# Patient Record
Sex: MALE | Race: BLACK OR AFRICAN AMERICAN | NOT HISPANIC OR LATINO | URBAN - METROPOLITAN AREA
[De-identification: names, ages, dates, MRNs, and addresses within clinical notes are randomized per-mention and may not be internally consistent; named-entity substitution may affect disease eponyms.]

---

## 2021-03-21 ENCOUNTER — INPATIENT (INPATIENT)
Facility: HOSPITAL | Age: 61
LOS: 3 days | Discharge: HOME | End: 2021-03-25
Attending: INTERNAL MEDICINE | Admitting: INTERNAL MEDICINE
Payer: COMMERCIAL

## 2021-03-21 VITALS
HEIGHT: 64 IN | OXYGEN SATURATION: 99 % | SYSTOLIC BLOOD PRESSURE: 117 MMHG | TEMPERATURE: 99 F | HEART RATE: 102 BPM | WEIGHT: 139.99 LBS | RESPIRATION RATE: 18 BRPM | DIASTOLIC BLOOD PRESSURE: 77 MMHG

## 2021-03-21 DIAGNOSIS — N17.9 ACUTE KIDNEY FAILURE, UNSPECIFIED: ICD-10-CM

## 2021-03-21 DIAGNOSIS — I10 ESSENTIAL (PRIMARY) HYPERTENSION: ICD-10-CM

## 2021-03-21 DIAGNOSIS — L03.012 CELLULITIS OF LEFT FINGER: ICD-10-CM

## 2021-03-21 DIAGNOSIS — N50.89 OTHER SPECIFIED DISORDERS OF THE MALE GENITAL ORGANS: ICD-10-CM

## 2021-03-21 DIAGNOSIS — E11.9 TYPE 2 DIABETES MELLITUS WITHOUT COMPLICATIONS: ICD-10-CM

## 2021-03-21 LAB
ALBUMIN SERPL ELPH-MCNC: 3.1 G/DL — LOW (ref 3.3–5)
ALP SERPL-CCNC: 69 U/L — SIGNIFICANT CHANGE UP (ref 40–120)
ALT FLD-CCNC: 26 U/L — SIGNIFICANT CHANGE UP (ref 12–78)
ANION GAP SERPL CALC-SCNC: 8 MMOL/L — SIGNIFICANT CHANGE UP (ref 5–17)
APPEARANCE UR: CLEAR — SIGNIFICANT CHANGE UP
APTT BLD: 30.8 SEC — SIGNIFICANT CHANGE UP (ref 27.5–35.5)
AST SERPL-CCNC: 16 U/L — SIGNIFICANT CHANGE UP (ref 15–37)
BACTERIA # UR AUTO: ABNORMAL
BASOPHILS # BLD AUTO: 0.05 K/UL — SIGNIFICANT CHANGE UP (ref 0–0.2)
BASOPHILS NFR BLD AUTO: 0.3 % — SIGNIFICANT CHANGE UP (ref 0–2)
BILIRUB SERPL-MCNC: 0.5 MG/DL — SIGNIFICANT CHANGE UP (ref 0.2–1.2)
BILIRUB UR-MCNC: NEGATIVE — SIGNIFICANT CHANGE UP
BLD GP AB SCN SERPL QL: SIGNIFICANT CHANGE UP
BUN SERPL-MCNC: 25 MG/DL — HIGH (ref 7–23)
CALCIUM SERPL-MCNC: 8.8 MG/DL — SIGNIFICANT CHANGE UP (ref 8.5–10.1)
CHLORIDE SERPL-SCNC: 99 MMOL/L — SIGNIFICANT CHANGE UP (ref 96–108)
CO2 SERPL-SCNC: 28 MMOL/L — SIGNIFICANT CHANGE UP (ref 22–31)
COLOR SPEC: YELLOW — SIGNIFICANT CHANGE UP
COVID-19 SPIKE DOMAIN AB INTERP: NEGATIVE — SIGNIFICANT CHANGE UP
COVID-19 SPIKE DOMAIN ANTIBODY RESULT: 0.4 U/ML — SIGNIFICANT CHANGE UP
CREAT SERPL-MCNC: 1.59 MG/DL — HIGH (ref 0.5–1.3)
CRP SERPL-MCNC: 53 MG/L — HIGH
DIFF PNL FLD: ABNORMAL
EOSINOPHIL # BLD AUTO: 0.03 K/UL — SIGNIFICANT CHANGE UP (ref 0–0.5)
EOSINOPHIL NFR BLD AUTO: 0.2 % — SIGNIFICANT CHANGE UP (ref 0–6)
EPI CELLS # UR: SIGNIFICANT CHANGE UP
FLUAV AG NPH QL: SIGNIFICANT CHANGE UP
FLUBV AG NPH QL: SIGNIFICANT CHANGE UP
GLUCOSE BLDC GLUCOMTR-MCNC: 241 MG/DL — HIGH (ref 70–99)
GLUCOSE BLDC GLUCOMTR-MCNC: 355 MG/DL — HIGH (ref 70–99)
GLUCOSE BLDC GLUCOMTR-MCNC: 454 MG/DL — CRITICAL HIGH (ref 70–99)
GLUCOSE BLDC GLUCOMTR-MCNC: 472 MG/DL — CRITICAL HIGH (ref 70–99)
GLUCOSE SERPL-MCNC: 396 MG/DL — HIGH (ref 70–99)
GLUCOSE UR QL: 1000 MG/DL
HCT VFR BLD CALC: 36.4 % — LOW (ref 39–50)
HGB BLD-MCNC: 12.3 G/DL — LOW (ref 13–17)
IMM GRANULOCYTES NFR BLD AUTO: 0.5 % — SIGNIFICANT CHANGE UP (ref 0–1.5)
INR BLD: 1.15 RATIO — SIGNIFICANT CHANGE UP (ref 0.88–1.16)
KETONES UR-MCNC: NEGATIVE — SIGNIFICANT CHANGE UP
LACTATE SERPL-SCNC: 1.3 MMOL/L — SIGNIFICANT CHANGE UP (ref 0.7–2)
LEUKOCYTE ESTERASE UR-ACNC: NEGATIVE — SIGNIFICANT CHANGE UP
LIDOCAIN IGE QN: 132 U/L — SIGNIFICANT CHANGE UP (ref 73–393)
LYMPHOCYTES # BLD AUTO: 17.1 % — SIGNIFICANT CHANGE UP (ref 13–44)
LYMPHOCYTES # BLD AUTO: 2.75 K/UL — SIGNIFICANT CHANGE UP (ref 1–3.3)
MCHC RBC-ENTMCNC: 32.1 PG — SIGNIFICANT CHANGE UP (ref 27–34)
MCHC RBC-ENTMCNC: 33.8 GM/DL — SIGNIFICANT CHANGE UP (ref 32–36)
MCV RBC AUTO: 95 FL — SIGNIFICANT CHANGE UP (ref 80–100)
MONOCYTES # BLD AUTO: 1.38 K/UL — HIGH (ref 0–0.9)
MONOCYTES NFR BLD AUTO: 8.6 % — SIGNIFICANT CHANGE UP (ref 2–14)
NEUTROPHILS # BLD AUTO: 11.81 K/UL — HIGH (ref 1.8–7.4)
NEUTROPHILS NFR BLD AUTO: 73.3 % — SIGNIFICANT CHANGE UP (ref 43–77)
NITRITE UR-MCNC: NEGATIVE — SIGNIFICANT CHANGE UP
NRBC # BLD: 0 /100 WBCS — SIGNIFICANT CHANGE UP (ref 0–0)
PH UR: 5 — SIGNIFICANT CHANGE UP (ref 5–8)
PLATELET # BLD AUTO: 211 K/UL — SIGNIFICANT CHANGE UP (ref 150–400)
POTASSIUM SERPL-MCNC: 4.5 MMOL/L — SIGNIFICANT CHANGE UP (ref 3.5–5.3)
POTASSIUM SERPL-SCNC: 4.5 MMOL/L — SIGNIFICANT CHANGE UP (ref 3.5–5.3)
PROT SERPL-MCNC: 7.6 GM/DL — SIGNIFICANT CHANGE UP (ref 6–8.3)
PROT UR-MCNC: 100 MG/DL
PROTHROM AB SERPL-ACNC: 13.2 SEC — SIGNIFICANT CHANGE UP (ref 10.6–13.6)
RBC # BLD: 3.83 M/UL — LOW (ref 4.2–5.8)
RBC # FLD: 11.7 % — SIGNIFICANT CHANGE UP (ref 10.3–14.5)
RBC CASTS # UR COMP ASSIST: ABNORMAL /HPF (ref 0–4)
SARS-COV-2 IGG+IGM SERPL QL IA: 0.4 U/ML — SIGNIFICANT CHANGE UP
SARS-COV-2 IGG+IGM SERPL QL IA: NEGATIVE — SIGNIFICANT CHANGE UP
SARS-COV-2 RNA SPEC QL NAA+PROBE: SIGNIFICANT CHANGE UP
SODIUM SERPL-SCNC: 135 MMOL/L — SIGNIFICANT CHANGE UP (ref 135–145)
SP GR SPEC: 1 — LOW (ref 1.01–1.02)
TROPONIN I SERPL-MCNC: <.015 NG/ML — SIGNIFICANT CHANGE UP (ref 0.01–0.04)
UROBILINOGEN FLD QL: NEGATIVE MG/DL — SIGNIFICANT CHANGE UP
WBC # BLD: 16.1 K/UL — HIGH (ref 3.8–10.5)
WBC # FLD AUTO: 16.1 K/UL — HIGH (ref 3.8–10.5)

## 2021-03-21 PROCEDURE — 74177 CT ABD & PELVIS W/CONTRAST: CPT | Mod: 26,MA

## 2021-03-21 PROCEDURE — 10061 I&D ABSCESS COMP/MULTIPLE: CPT

## 2021-03-21 PROCEDURE — 93010 ELECTROCARDIOGRAM REPORT: CPT

## 2021-03-21 PROCEDURE — 76870 US EXAM SCROTUM: CPT | Mod: 26

## 2021-03-21 PROCEDURE — 71045 X-RAY EXAM CHEST 1 VIEW: CPT | Mod: 26

## 2021-03-21 PROCEDURE — 76857 US EXAM PELVIC LIMITED: CPT | Mod: 26

## 2021-03-21 PROCEDURE — 99285 EMERGENCY DEPT VISIT HI MDM: CPT | Mod: 25

## 2021-03-21 PROCEDURE — 99222 1ST HOSP IP/OBS MODERATE 55: CPT

## 2021-03-21 RX ORDER — ONDANSETRON 8 MG/1
4 TABLET, FILM COATED ORAL ONCE
Refills: 0 | Status: COMPLETED | OUTPATIENT
Start: 2021-03-21 | End: 2021-03-21

## 2021-03-21 RX ORDER — DEXTROSE 50 % IN WATER 50 %
25 SYRINGE (ML) INTRAVENOUS ONCE
Refills: 0 | Status: DISCONTINUED | OUTPATIENT
Start: 2021-03-21 | End: 2021-03-25

## 2021-03-21 RX ORDER — SODIUM CHLORIDE 9 MG/ML
1000 INJECTION, SOLUTION INTRAVENOUS
Refills: 0 | Status: DISCONTINUED | OUTPATIENT
Start: 2021-03-21 | End: 2021-03-25

## 2021-03-21 RX ORDER — HEPARIN SODIUM 5000 [USP'U]/ML
5000 INJECTION INTRAVENOUS; SUBCUTANEOUS EVERY 12 HOURS
Refills: 0 | Status: DISCONTINUED | OUTPATIENT
Start: 2021-03-21 | End: 2021-03-25

## 2021-03-21 RX ORDER — VANCOMYCIN HCL 1 G
750 VIAL (EA) INTRAVENOUS EVERY 12 HOURS
Refills: 0 | Status: DISCONTINUED | OUTPATIENT
Start: 2021-03-21 | End: 2021-03-22

## 2021-03-21 RX ORDER — DEXTROSE 50 % IN WATER 50 %
12.5 SYRINGE (ML) INTRAVENOUS ONCE
Refills: 0 | Status: DISCONTINUED | OUTPATIENT
Start: 2021-03-21 | End: 2021-03-25

## 2021-03-21 RX ORDER — VANCOMYCIN HCL 1 G
1000 VIAL (EA) INTRAVENOUS ONCE
Refills: 0 | Status: COMPLETED | OUTPATIENT
Start: 2021-03-21 | End: 2021-03-21

## 2021-03-21 RX ORDER — PIPERACILLIN AND TAZOBACTAM 4; .5 G/20ML; G/20ML
3.38 INJECTION, POWDER, LYOPHILIZED, FOR SOLUTION INTRAVENOUS ONCE
Refills: 0 | Status: COMPLETED | OUTPATIENT
Start: 2021-03-21 | End: 2021-03-21

## 2021-03-21 RX ORDER — INSULIN LISPRO 100/ML
VIAL (ML) SUBCUTANEOUS
Refills: 0 | Status: DISCONTINUED | OUTPATIENT
Start: 2021-03-21 | End: 2021-03-25

## 2021-03-21 RX ORDER — MAGNESIUM HYDROXIDE 400 MG/1
30 TABLET, CHEWABLE ORAL DAILY
Refills: 0 | Status: DISCONTINUED | OUTPATIENT
Start: 2021-03-21 | End: 2021-03-25

## 2021-03-21 RX ORDER — INSULIN LISPRO 100/ML
VIAL (ML) SUBCUTANEOUS AT BEDTIME
Refills: 0 | Status: DISCONTINUED | OUTPATIENT
Start: 2021-03-21 | End: 2021-03-25

## 2021-03-21 RX ORDER — INSULIN GLARGINE 100 [IU]/ML
15 INJECTION, SOLUTION SUBCUTANEOUS AT BEDTIME
Refills: 0 | Status: DISCONTINUED | OUTPATIENT
Start: 2021-03-21 | End: 2021-03-22

## 2021-03-21 RX ORDER — DEXTROSE 50 % IN WATER 50 %
15 SYRINGE (ML) INTRAVENOUS ONCE
Refills: 0 | Status: DISCONTINUED | OUTPATIENT
Start: 2021-03-21 | End: 2021-03-25

## 2021-03-21 RX ORDER — PIPERACILLIN AND TAZOBACTAM 4; .5 G/20ML; G/20ML
3.38 INJECTION, POWDER, LYOPHILIZED, FOR SOLUTION INTRAVENOUS EVERY 8 HOURS
Refills: 0 | Status: DISCONTINUED | OUTPATIENT
Start: 2021-03-21 | End: 2021-03-22

## 2021-03-21 RX ORDER — SODIUM CHLORIDE 9 MG/ML
1000 INJECTION INTRAMUSCULAR; INTRAVENOUS; SUBCUTANEOUS
Refills: 0 | Status: DISCONTINUED | OUTPATIENT
Start: 2021-03-21 | End: 2021-03-25

## 2021-03-21 RX ORDER — SODIUM CHLORIDE 9 MG/ML
1000 INJECTION INTRAMUSCULAR; INTRAVENOUS; SUBCUTANEOUS ONCE
Refills: 0 | Status: COMPLETED | OUTPATIENT
Start: 2021-03-21 | End: 2021-03-21

## 2021-03-21 RX ORDER — LOSARTAN POTASSIUM 100 MG/1
50 TABLET, FILM COATED ORAL DAILY
Refills: 0 | Status: DISCONTINUED | OUTPATIENT
Start: 2021-03-21 | End: 2021-03-24

## 2021-03-21 RX ORDER — INFLUENZA VIRUS VACCINE 15; 15; 15; 15 UG/.5ML; UG/.5ML; UG/.5ML; UG/.5ML
0.5 SUSPENSION INTRAMUSCULAR ONCE
Refills: 0 | Status: DISCONTINUED | OUTPATIENT
Start: 2021-03-21 | End: 2021-03-24

## 2021-03-21 RX ORDER — SENNA PLUS 8.6 MG/1
2 TABLET ORAL AT BEDTIME
Refills: 0 | Status: DISCONTINUED | OUTPATIENT
Start: 2021-03-21 | End: 2021-03-25

## 2021-03-21 RX ORDER — IBUPROFEN 200 MG
600 TABLET ORAL THREE TIMES A DAY
Refills: 0 | Status: DISCONTINUED | OUTPATIENT
Start: 2021-03-21 | End: 2021-03-25

## 2021-03-21 RX ORDER — VANCOMYCIN HCL 1 G
1000 VIAL (EA) INTRAVENOUS EVERY 12 HOURS
Refills: 0 | Status: DISCONTINUED | OUTPATIENT
Start: 2021-03-21 | End: 2021-03-21

## 2021-03-21 RX ORDER — TAMSULOSIN HYDROCHLORIDE 0.4 MG/1
0.4 CAPSULE ORAL AT BEDTIME
Refills: 0 | Status: DISCONTINUED | OUTPATIENT
Start: 2021-03-21 | End: 2021-03-25

## 2021-03-21 RX ORDER — GLUCAGON INJECTION, SOLUTION 0.5 MG/.1ML
1 INJECTION, SOLUTION SUBCUTANEOUS ONCE
Refills: 0 | Status: DISCONTINUED | OUTPATIENT
Start: 2021-03-21 | End: 2021-03-25

## 2021-03-21 RX ADMIN — Medication 1 APPLICATION(S): at 18:29

## 2021-03-21 RX ADMIN — SODIUM CHLORIDE 1000 MILLILITER(S): 9 INJECTION INTRAMUSCULAR; INTRAVENOUS; SUBCUTANEOUS at 09:49

## 2021-03-21 RX ADMIN — Medication 250 MILLIGRAM(S): at 18:36

## 2021-03-21 RX ADMIN — Medication 600 MILLIGRAM(S): at 18:40

## 2021-03-21 RX ADMIN — INSULIN GLARGINE 15 UNIT(S): 100 INJECTION, SOLUTION SUBCUTANEOUS at 22:06

## 2021-03-21 RX ADMIN — Medication 600 MILLIGRAM(S): at 14:15

## 2021-03-21 RX ADMIN — PIPERACILLIN AND TAZOBACTAM 200 GRAM(S): 4; .5 INJECTION, POWDER, LYOPHILIZED, FOR SOLUTION INTRAVENOUS at 07:52

## 2021-03-21 RX ADMIN — PIPERACILLIN AND TAZOBACTAM 25 GRAM(S): 4; .5 INJECTION, POWDER, LYOPHILIZED, FOR SOLUTION INTRAVENOUS at 22:05

## 2021-03-21 RX ADMIN — HEPARIN SODIUM 5000 UNIT(S): 5000 INJECTION INTRAVENOUS; SUBCUTANEOUS at 18:41

## 2021-03-21 RX ADMIN — Medication 250 MILLIGRAM(S): at 08:50

## 2021-03-21 RX ADMIN — Medication 10: at 14:28

## 2021-03-21 RX ADMIN — TAMSULOSIN HYDROCHLORIDE 0.4 MILLIGRAM(S): 0.4 CAPSULE ORAL at 22:06

## 2021-03-21 RX ADMIN — SENNA PLUS 2 TABLET(S): 8.6 TABLET ORAL at 22:06

## 2021-03-21 RX ADMIN — PIPERACILLIN AND TAZOBACTAM 25 GRAM(S): 4; .5 INJECTION, POWDER, LYOPHILIZED, FOR SOLUTION INTRAVENOUS at 14:19

## 2021-03-21 RX ADMIN — SODIUM CHLORIDE 1000 MILLILITER(S): 9 INJECTION INTRAMUSCULAR; INTRAVENOUS; SUBCUTANEOUS at 07:53

## 2021-03-21 RX ADMIN — LOSARTAN POTASSIUM 50 MILLIGRAM(S): 100 TABLET, FILM COATED ORAL at 18:28

## 2021-03-21 RX ADMIN — Medication 8: at 22:05

## 2021-03-21 RX ADMIN — ONDANSETRON 4 MILLIGRAM(S): 8 TABLET, FILM COATED ORAL at 07:52

## 2021-03-21 NOTE — CONSULT NOTE ADULT - ATTENDING COMMENTS
Patient w no rigors, sweats or chills but left sided inguinal and scrotal discomfort. No change in voiding patterns. No prior GH, stones, UTI or retention.  AVSS  Tm in ER 99.7   exam - clean shallow, 1.5 cm ulceration near left external ring; tender. No drainage.   No erythema, crepitus or fluctuance  UA w no bacteria  Scrotal US normal  CT with no hydro or stones and mild perinephric stranding.    According to patient, had prior iv drug use over 25-30 yrs ago. Had hep C, recently treated with [place] for 3 mo and now gone.    Plan: suggest ID consult and STD eval to r/o syphillis, Chancroid, lymphovenereum etc.  No  surgical intervention necessary  f/u cx

## 2021-03-21 NOTE — ED PROVIDER NOTE - NS ED ROS FT
Constitutional: no fevers or chills  Cardiac: no palpitations, chest pain  Lungs: no shortness of breath, wheezes  Abd: +abd pain, nausea, vomiting, diarrhea  Genitourinary: no dysuria, increased urinary frequency, hematuria  Neurology: no sensorimotor deficits, no dizziness, no headache, no visual changes  Skin: no rashes  All other ROS negative except as per HPI

## 2021-03-21 NOTE — H&P ADULT - HISTORY OF PRESENT ILLNESS
60 yo male with PMH HTN, IDDM presents to ED for evaluation of LLQ pain and paronychia over left 4th digit.. patient also has ulceration of the left scrotum for 2 weeks.    Abd pain began about 2 days ago, associated with nausea and NBNB vomiting. Denies

## 2021-03-21 NOTE — H&P ADULT - NSICDXPASTMEDICALHX_GEN_ALL_CORE_FT
PAST MEDICAL HISTORY:  DM (diabetes mellitus) On Insulin    HTN (hypertension) medicxal managenent

## 2021-03-21 NOTE — ED ADULT NURSE NOTE - ED STAT RN HANDOFF DETAILS
report rec from Maryellen ERAZO. pt in stable condition. safety maintained. will continue to monitor.

## 2021-03-21 NOTE — ED PROVIDER NOTE - OBJECTIVE STATEMENT
62 yo male with PMH HTN, DM presents to ED for evaluation of LLQ pain and paronychia over left 4th digit.   Abd pain began about 2 days ago, associated with nausea and NBNB vomiting. Denies 62 yo male with PMH HTN, DM presents to ED for evaluation of LLQ pain and paronychia over left 4th digit.   Abd pain began about 2 days ago, associated with nausea and NBNB vomiting. Denies fevers, chill, chest pain, shortness of breath. Reports rash on genital area for few weeks, worsened over past few days, +discharge and pain.

## 2021-03-21 NOTE — ED ADULT TRIAGE NOTE - CHIEF COMPLAINT QUOTE
from shelter C/O LUQ pain and 2 episodes of vomiting x 2 days. Additionally C/O Paronychia to L 4th finger yesterday. denies fever, chills, chest pain.

## 2021-03-21 NOTE — ED PROVIDER NOTE - CLINICAL SUMMARY MEDICAL DECISION MAKING FREE TEXT BOX
60 yo male with abd pain, scrotal ulcer, left hand paronychia - labs, imaging, medication, consult uro, i&D paronychia, adm

## 2021-03-21 NOTE — CONSULT NOTE ADULT - SUBJECTIVE AND OBJECTIVE BOX
Patient is a 61y old  Male who presents with a chief complaint of scrotal infection (21 Mar 2021 11:44)    HPI:   62 yo male with PMH HTN, IDDM presents to ED for evaluation of LLQ pain and paronychia over left 4th digit.. patient also has ulceration of the left scrotum for 2 weeks.    Abd pain began about 2 days ago, associated with nausea and NBNB vomiting. Denies     (21 Mar 2021 11:44)    Pt seen in ED, reports 2 day h/o abdominal pain described as constant and radiating across lower abdomen with no associated nausea, vomiting or changes in bowel habits. Pt reports normal voiding, no h/o urinary issues.  Denies fever, chills, cough, dyspnea, chest pain.  Per ED, drainage from scrotum was noted concerning for ulceration/abscess.    PAST MEDICAL & SURGICAL HISTORY:  HTN (hypertension)  medicxal managenent    DM (diabetes mellitus)  On Insulin    No significant past surgical history      Review of Systems:  as above    MEDICATIONS  (STANDING):  dextrose 40% Gel 15 Gram(s) Oral once  dextrose 5%. 1000 milliLiter(s) (50 mL/Hr) IV Continuous <Continuous>  dextrose 5%. 1000 milliLiter(s) (100 mL/Hr) IV Continuous <Continuous>  dextrose 50% Injectable 25 Gram(s) IV Push once  dextrose 50% Injectable 12.5 Gram(s) IV Push once  dextrose 50% Injectable 25 Gram(s) IV Push once  glucagon  Injectable 1 milliGRAM(s) IntraMuscular once  heparin   Injectable 5000 Unit(s) SubCutaneous every 12 hours  insulin glargine Injectable (LANTUS) 15 Unit(s) SubCutaneous at bedtime  insulin lispro (ADMELOG) corrective regimen sliding scale   SubCutaneous three times a day before meals  insulin lispro (ADMELOG) corrective regimen sliding scale   SubCutaneous at bedtime  losartan 50 milliGRAM(s) Oral daily  piperacillin/tazobactam IVPB.. 3.375 Gram(s) IV Intermittent every 8 hours  senna 2 Tablet(s) Oral at bedtime  sodium chloride 0.9%. 1000 milliLiter(s) (80 mL/Hr) IV Continuous <Continuous>  vancomycin  IVPB 750 milliGRAM(s) IV Intermittent every 12 hours    MEDICATIONS  (PRN):  ibuprofen  Tablet. 600 milliGRAM(s) Oral three times a day PRN Temp greater or equal to 38C (100.4F), Moderate Pain (4 - 6)  magnesium hydroxide Suspension 30 milliLiter(s) Oral daily PRN Constipation      Allergies  No Known Allergies      SOCIAL HISTORY lives in shelter          FAMILY HISTORY: none      Vital Signs Last 24 Hrs  T(C): 37.6 (21 Mar 2021 12:18), Max: 37.6 (21 Mar 2021 12:18)  T(F): 99.6 (21 Mar 2021 12:18), Max: 99.6 (21 Mar 2021 12:18)  HR: 92 (21 Mar 2021 12:18) (88 - 102)  BP: 134/85 (21 Mar 2021 12:18) (117/77 - 154/84)  BP(mean): --  RR: 18 (21 Mar 2021 12:18) (18 - 18)  SpO2: 96% (21 Mar 2021 12:18) (96% - 99%)    Physical Exam:  General:  Appears stated age, well-groomed, well-nourished, no distress  Eyes: EOMI, conjunctiva clear  HENT:  WNL, no JVD  Chest:  clear breath sounds  Cardiovascular:  Regular rate & rhythm  Abdomen:  softly distended, active tympanic bowel sounds. No guarding or rebound tenderness.  Extremities:  no pedal edema or calf tenderness noted  Skin:  No rash  Musculoskeletal:  normal strength  Neuro/Psych:  Alert, oriented to time, place and person   : circumcised phallus, adequate meatus. 2mm skin lesion noted left posterior scrotum, no active drainage, induration, tenderness or swelling of scrotum. No evidence of abscess or cellulitis. Testes descended and normal b/l.   Right inguinoscrotal hernia, reducible with large palpable defect. Nontender to palpation.    LABS:                        12.3   16.10 )-----------( 211      ( 21 Mar 2021 07:50 )             36.4     03-21    135  |  99  |  25<H>  ----------------------------<  396<H>  4.5   |  28  |  1.59<H>    Ca    8.8      21 Mar 2021 07:50    TPro  7.6  /  Alb  3.1<L>  /  TBili  0.5  /  DBili  x   /  AST  16  /  ALT  26  /  AlkPhos  69  03-    PT/INR - ( 21 Mar 2021 07:52 )   PT: 13.2 sec;   INR: 1.15 ratio         PTT - ( 21 Mar 2021 07:52 )  PTT:30.8 sec  Urinalysis Basic - ( 21 Mar 2021 13:09 )    Color: Yellow / Appearance: Clear / S.005 / pH: x  Gluc: x / Ketone: Negative  / Bili: Negative / Urobili: Negative mg/dL   Blood: x / Protein: 100 mg/dL / Nitrite: Negative   Leuk Esterase: Negative / RBC: x / WBC x   Sq Epi: x / Non Sq Epi: x / Bacteria: x        RADIOLOGY & ADDITIONAL STUDIES:  < from: CT Abdomen and Pelvis w/ IV Cont (21 @ 09:12) >  IMPRESSION:  Colonic diverticulosis without CT evidence ofacute diverticulitis.  No abscess identified.    < end of copied text >      < from: US Testicles (21 @ 09:03) >  No testicular torsion or fluid collection.    < end of copied text >  < from: US Pelvis Limited or Follow Up (21 @ 09:03) >  Post void residual urine volume 89 ml.    < end of copied text >    Impression/Plan: 62yo male with h/o DM and HTN seen with right inguinoscrotal hernia, reducible  leukocytosis   UA with moderate blood and glucose 1000  no evidence of scrotal infection  as discussed with Dr Julian, begin flomax, repeat PVR  medical management  will follow Patient is a 61y old  Male who presents with a chief complaint of scrotal infection (21 Mar 2021 11:44)    HPI:   60 yo male with PMH HTN, IDDM presents to ED for evaluation of LLQ pain and paronychia over left 4th digit.. patient also has ulceration of the left scrotum for 2 weeks.    Abd pain began about 2 days ago, associated with nausea and NBNB vomiting. Denies     (21 Mar 2021 11:44)    Pt seen in ED, reports 2 day h/o abdominal pain described as constant and radiating across lower abdomen with no associated nausea, vomiting or changes in bowel habits. Pt reports normal voiding, no h/o urinary issues.  Denies fever, chills, cough, dyspnea, chest pain.  Per ED, drainage from scrotum was noted concerning for ulceration/abscess.    PAST MEDICAL & SURGICAL HISTORY:  HTN (hypertension)  medicxal managenent    DM (diabetes mellitus)  On Insulin    No significant past surgical history      Review of Systems:  as above    MEDICATIONS  (STANDING):  dextrose 40% Gel 15 Gram(s) Oral once  dextrose 5%. 1000 milliLiter(s) (50 mL/Hr) IV Continuous <Continuous>  dextrose 5%. 1000 milliLiter(s) (100 mL/Hr) IV Continuous <Continuous>  dextrose 50% Injectable 25 Gram(s) IV Push once  dextrose 50% Injectable 12.5 Gram(s) IV Push once  dextrose 50% Injectable 25 Gram(s) IV Push once  glucagon  Injectable 1 milliGRAM(s) IntraMuscular once  heparin   Injectable 5000 Unit(s) SubCutaneous every 12 hours  insulin glargine Injectable (LANTUS) 15 Unit(s) SubCutaneous at bedtime  insulin lispro (ADMELOG) corrective regimen sliding scale   SubCutaneous three times a day before meals  insulin lispro (ADMELOG) corrective regimen sliding scale   SubCutaneous at bedtime  losartan 50 milliGRAM(s) Oral daily  piperacillin/tazobactam IVPB.. 3.375 Gram(s) IV Intermittent every 8 hours  senna 2 Tablet(s) Oral at bedtime  sodium chloride 0.9%. 1000 milliLiter(s) (80 mL/Hr) IV Continuous <Continuous>  vancomycin  IVPB 750 milliGRAM(s) IV Intermittent every 12 hours    MEDICATIONS  (PRN):  ibuprofen  Tablet. 600 milliGRAM(s) Oral three times a day PRN Temp greater or equal to 38C (100.4F), Moderate Pain (4 - 6)  magnesium hydroxide Suspension 30 milliLiter(s) Oral daily PRN Constipation      Allergies  No Known Allergies      SOCIAL HISTORY lives in shelter          FAMILY HISTORY: none      Vital Signs Last 24 Hrs  T(C): 37.6 (21 Mar 2021 12:18), Max: 37.6 (21 Mar 2021 12:18)  T(F): 99.6 (21 Mar 2021 12:18), Max: 99.6 (21 Mar 2021 12:18)  HR: 92 (21 Mar 2021 12:18) (88 - 102)  BP: 134/85 (21 Mar 2021 12:18) (117/77 - 154/84)  BP(mean): --  RR: 18 (21 Mar 2021 12:18) (18 - 18)  SpO2: 96% (21 Mar 2021 12:18) (96% - 99%)    Physical Exam:  General:  Appears stated age, well-groomed, well-nourished, no distress  Eyes: EOMI, conjunctiva clear  HENT:  WNL, no JVD  Chest:  clear breath sounds  Cardiovascular:  Regular rate & rhythm  Abdomen:  softly distended, active tympanic bowel sounds. No guarding or rebound tenderness.  Extremities:  no pedal edema or calf tenderness noted  Skin:  No rash  Musculoskeletal:  normal strength  Neuro/Psych:  Alert, oriented to time, place and person   : circumcised phallus, adequate meatus. 2mm skin lesion noted left posterior scrotum, no active drainage, induration, tenderness or swelling of scrotum. No evidence of abscess or cellulitis. Testes descended and normal b/l.   Right inguinoscrotal hernia, reducible with large palpable defect. Nontender to palpation.    LABS:                        12.3   16.10 )-----------( 211      ( 21 Mar 2021 07:50 )             36.4     03-21    135  |  99  |  25<H>  ----------------------------<  396<H>  4.5   |  28  |  1.59<H>    Ca    8.8      21 Mar 2021 07:50    TPro  7.6  /  Alb  3.1<L>  /  TBili  0.5  /  DBili  x   /  AST  16  /  ALT  26  /  AlkPhos  69  03-    PT/INR - ( 21 Mar 2021 07:52 )   PT: 13.2 sec;   INR: 1.15 ratio         PTT - ( 21 Mar 2021 07:52 )  PTT:30.8 sec  Urinalysis Basic - ( 21 Mar 2021 13:09 )    Color: Yellow / Appearance: Clear / S.005 / pH: x  Gluc: x / Ketone: Negative  / Bili: Negative / Urobili: Negative mg/dL   Blood: x / Protein: 100 mg/dL / Nitrite: Negative   Leuk Esterase: Negative / RBC: x / WBC x   Sq Epi: x / Non Sq Epi: x / Bacteria: x        RADIOLOGY & ADDITIONAL STUDIES:  < from: CT Abdomen and Pelvis w/ IV Cont (21 @ 09:12) >  IMPRESSION:  Colonic diverticulosis without CT evidence ofacute diverticulitis.  No abscess identified.    < end of copied text >      < from: US Testicles (21 @ 09:03) >  No testicular torsion or fluid collection.    < end of copied text >  < from: US Pelvis Limited or Follow Up (21 @ 09:03) >  Post void residual urine volume 89 ml.    < end of copied text >    Impression/Plan: 62yo male with h/o DM and HTN seen with right inguinoscrotal hernia, reducible  leukocytosis, s/p zosyn/vanco in ED  UA with moderate blood and glucose 1000  no evidence of scrotal infection  as discussed with Dr Julian, begin flomax, repeat PVR. Follow up blood and urine culture results  medical management  will follow Patient is a 61y old  Male who presents with a chief complaint of scrotal infection (21 Mar 2021 11:44)    HPI:   60 yo male with PMH HTN, IDDM presents to ED for evaluation of LLQ pain and paronychia over left 4th digit.. patient also has ulceration of the left scrotum for 2 weeks.    Abd pain began about 2 days ago, associated with nausea and NBNB vomiting. Denies     (21 Mar 2021 11:44)    Pt seen in ED, reports 2 day h/o abdominal pain described as constant and radiating across lower abdomen with no associated nausea, vomiting or changes in bowel habits. Pt reports normal voiding, no h/o urinary issues.  Denies fever, chills, cough, dyspnea, chest pain.  Per ED, drainage from scrotum was noted concerning for ulceration/abscess.  Patient states his left middle finger was drained in the ER.    PAST MEDICAL & SURGICAL HISTORY:  HTN (hypertension)  medicxal managenent    DM (diabetes mellitus)  On Insulin    No significant past surgical history      Review of Systems:  as above    MEDICATIONS  (STANDING):  dextrose 40% Gel 15 Gram(s) Oral once  dextrose 5%. 1000 milliLiter(s) (50 mL/Hr) IV Continuous <Continuous>  dextrose 5%. 1000 milliLiter(s) (100 mL/Hr) IV Continuous <Continuous>  dextrose 50% Injectable 25 Gram(s) IV Push once  dextrose 50% Injectable 12.5 Gram(s) IV Push once  dextrose 50% Injectable 25 Gram(s) IV Push once  glucagon  Injectable 1 milliGRAM(s) IntraMuscular once  heparin   Injectable 5000 Unit(s) SubCutaneous every 12 hours  insulin glargine Injectable (LANTUS) 15 Unit(s) SubCutaneous at bedtime  insulin lispro (ADMELOG) corrective regimen sliding scale   SubCutaneous three times a day before meals  insulin lispro (ADMELOG) corrective regimen sliding scale   SubCutaneous at bedtime  losartan 50 milliGRAM(s) Oral daily  piperacillin/tazobactam IVPB.. 3.375 Gram(s) IV Intermittent every 8 hours  senna 2 Tablet(s) Oral at bedtime  sodium chloride 0.9%. 1000 milliLiter(s) (80 mL/Hr) IV Continuous <Continuous>  vancomycin  IVPB 750 milliGRAM(s) IV Intermittent every 12 hours    MEDICATIONS  (PRN):  ibuprofen  Tablet. 600 milliGRAM(s) Oral three times a day PRN Temp greater or equal to 38C (100.4F), Moderate Pain (4 - 6)  magnesium hydroxide Suspension 30 milliLiter(s) Oral daily PRN Constipation      Allergies  No Known Allergies      SOCIAL HISTORY lives in shelter          FAMILY HISTORY: none      Vital Signs Last 24 Hrs  T(C): 37.6 (21 Mar 2021 12:18), Max: 37.6 (21 Mar 2021 12:18)  T(F): 99.6 (21 Mar 2021 12:18), Max: 99.6 (21 Mar 2021 12:18)  HR: 92 (21 Mar 2021 12:18) (88 - 102)  BP: 134/85 (21 Mar 2021 12:18) (117/77 - 154/84)  BP(mean): --  RR: 18 (21 Mar 2021 12:18) (18 - 18)  SpO2: 96% (21 Mar 2021 12:18) (96% - 99%)    Physical Exam:  General:  Appears stated age, well-groomed, well-nourished, no distress  Eyes: EOMI, conjunctiva clear  HENT:  WNL, no JVD  Chest:  clear breath sounds  Cardiovascular:  Regular rate & rhythm  Abdomen:  softly distended, active tympanic bowel sounds. No guarding or rebound tenderness.  Extremities:  no pedal edema or calf tenderness noted  Skin:  No rash  Musculoskeletal:  normal strength  Neuro/Psych:  Alert, oriented to time, place and person   : circumcised phallus, adequate meatus. Superficial ulcerating skin lesion noted left posterior scrotum, no active drainage, induration, tenderness or swelling of scrotum. No evidence of abscess or cellulitis. Testes descended and normal b/l.   Right inguinoscrotal hernia, reducible with large palpable defect. Nontender to palpation.    LABS:                        12.3   16.10 )-----------( 211      ( 21 Mar 2021 07:50 )             36.4     03-21    135  |  99  |  25<H>  ----------------------------<  396<H>  4.5   |  28  |  1.59<H>    Ca    8.8      21 Mar 2021 07:50    TPro  7.6  /  Alb  3.1<L>  /  TBili  0.5  /  DBili  x   /  AST  16  /  ALT  26  /  AlkPhos  69  03-21  PT/INR - ( 21 Mar 2021 07:52 )   PT: 13.2 sec;   INR: 1.15 ratio         PTT - ( 21 Mar 2021 07:52 )  PTT:30.8 sec  Urinalysis Basic - ( 21 Mar 2021 13:09 )    Color: Yellow / Appearance: Clear / S.005 / pH: x  Gluc: x / Ketone: Negative  / Bili: Negative / Urobili: Negative mg/dL   Blood: x / Protein: 100 mg/dL / Nitrite: Negative   Leuk Esterase: Negative / RBC: x / WBC x   Sq Epi: x / Non Sq Epi: x / Bacteria: x      RADIOLOGY & ADDITIONAL STUDIES:  < from: CT Abdomen and Pelvis w/ IV Cont (21 @ 09:12) >  IMPRESSION:  Colonic diverticulosis without CT evidence ofacute diverticulitis.  No abscess identified.    < end of copied text >      < from: US Testicles (21 @ 09:03) >  No testicular torsion or fluid collection.    < end of copied text >  < from: US Pelvis Limited or Follow Up (21 @ 09:03) >  Post void residual urine volume 89 ml.    < end of copied text >      Impression/Plan: 62yo male with h/o DM and HTN seen with superficial rash of groin, appears fungal and right inguinoscrotal hernia, reducible  leukocytosis, s/p zosyn/vanco in ED  UA with moderate blood and glucose 1000  no evidence of scrotal infection, no drainable collections. Superficial rash appears fungal, recommend topical clotrimazole  as discussed with Dr Julian, begin flomax, repeat PVR. Follow up blood and urine culture results  medical management  will follow

## 2021-03-21 NOTE — ED ADULT NURSE NOTE - OBJECTIVE STATEMENT
from shelter C/O LUQ pain and 2 episodes of vomiting x 2 days. pt also c. o fleft finger pain raidtiating up left arm. pt states pain 10/10 no meds prior to arrival. pmh dm, sugar within normal limits at home, pmh HTN. Additionally C/O Paronychia to L 4th finger yesterday.  puss fillled, no redness. pt denies fever, chills, chest pain diarrhea, constipation

## 2021-03-21 NOTE — H&P ADULT - NSHPLABSRESULTS_GEN_ALL_CORE
12.3                 135  | 28   | 25           16.10 >-----------< 211     ------------------------< 396                   36.4                 4.5  | 99   | 1.59                                         Ca 8.8   Mg x     Ph x      PT/INR - ( 21 Mar 2021 07:52 )   PT: 13.2 sec;   INR: 1.15 ratio         PTT - ( 21 Mar 2021 07:52 )  PTT:30.8 sec    < from: US Pelvis Limited or Follow Up (03.21.21 @ 09:03) >    EXAM:  US PELVIC LIMITED OR FOLLOW UP                            PROCEDURE DATE:  03/21/2021          INTERPRETATION:  CLINICAL INFORMATION: Left pelvic pain    COMPARISON: None available.    TECHNIQUE: Sonography of the bladder.    FINDINGS:    Bladder: Within normal limits.    Pre void volume:  165 ml.    Post void volume: 89 ml.    Reproductive organs: Prostate within normal limits.          IMPRESSION:    Post void residual urine volume 89 ml.    < end of copied text >    < from: CT Abdomen and Pelvis w/ IV Cont (03.21.21 @ 09:12) >      EXAM:  CT ABDOMEN AND PELVIS IC                            PROCEDURE DATE:  03/21/2021          INTERPRETATION:  CLINICAL INFORMATION: Left pelvic pain    COMPARISON: None.    CONTRAST/COMPLICATIONS:  IV Contrast: Omnipaque 350  96 cc administered 4 cc discarded  Oral Contrast: NONE  Complications: None reported at time of study completion    PROCEDURE:  CT of the Abdomen and Pelvis was performed.  Sagittal and coronal reformats were performed.    FINDINGS:  LOWER CHEST: Basilar atelectasis.    LIVER: Within normal limits.  BILE DUCTS: Normal caliber.  GALLBLADDER: Within normal limits.  SPLEEN: Within normal limits.  PANCREAS: Within normal limits.  ADRENALS: 1 cm indeterminate left adrenal nodule.  KIDNEYS/URETERS: Nonspecific perinephric stranding and fluid. No hydronephrosis.    BLADDER: Within normal limits.  REPRODUCTIVE ORGANS: Prostate within normal limits.    BOWEL: No bowel obstruction. Appendix is normal. Colonic diverticulosis.  PERITONEUM: No ascites.  VESSELS: Within normal limits.  RETROPERITONEUM/LYMPH NODES: No lymphadenopathy.  ABDOMINAL WALL: Fat-containing umbilical hernia. Bilateral fat-containing inguinal hernias.  BONES: Mild degenerative changes.    IMPRESSION:  Colonic diverticulosis without CT evidence of acute diverticulitis.  No abscess identified.    < end of copied text >

## 2021-03-21 NOTE — H&P ADULT - NSHPPHYSICALEXAM_GEN_ALL_CORE
General: A/ox 3, No acute Distress  skin : Normal  Head. [place]err[place]. No lacerations  Neck: Supple, NO JVD  Cardiac: S1 S2, No M/R/G  Pulmonary: CTAP, Breathing unlabored, No Rhonchi/Rales/Wheezing  Abdomen: Soft, Non -tender, +BS x 4 quads  Genitalis. there is ulcertion ovr the left scotum. testilces are not swollen or tender.  the skin of the scrotum is not indurated.   Neuro: A/o x 3, No focal deficits. Normal Motor and sensory exam  Vascular: Normal distal pulses.  Extremities: . No rashes. No edema. there is paronychia of the left 4th fingrt- I&D done in the Ed.  Decubiti ; None

## 2021-03-21 NOTE — ED PROVIDER NOTE - PHYSICAL EXAMINATION
Gen: no acute distress, well appearing, awake, alert and oriented x 3  Cardiac: regular rate and rhythm, +S1S2  Pulm: Clear to auscultation bilaterally  Abd: soft, +ttp lower abd, nondistended, no guarding  Back: neg CVA ttp, nontender spine  : (Juice tech at bedside) external genitalia unremarkable, no penile lesions/ulcerations, no penile discharge, +scrotal ulceration noted with active drainage  Extremity: LUE: +swelling noted over left 4th finger, adjacent to nailbed, sensorimotor intact   Neuro: awake, alert, oriented x 3, sensorimotor intact

## 2021-03-22 LAB
A1C WITH ESTIMATED AVERAGE GLUCOSE RESULT: 11.8 % — HIGH (ref 4–5.6)
ALBUMIN SERPL ELPH-MCNC: 2.7 G/DL — LOW (ref 3.3–5)
ALP SERPL-CCNC: 57 U/L — SIGNIFICANT CHANGE UP (ref 40–120)
ALT FLD-CCNC: 23 U/L — SIGNIFICANT CHANGE UP (ref 12–78)
ANION GAP SERPL CALC-SCNC: 2 MMOL/L — LOW (ref 5–17)
AST SERPL-CCNC: 12 U/L — LOW (ref 15–37)
BILIRUB SERPL-MCNC: 0.3 MG/DL — SIGNIFICANT CHANGE UP (ref 0.2–1.2)
BUN SERPL-MCNC: 22 MG/DL — SIGNIFICANT CHANGE UP (ref 7–23)
CALCIUM SERPL-MCNC: 8 MG/DL — LOW (ref 8.5–10.1)
CHLORIDE SERPL-SCNC: 108 MMOL/L — SIGNIFICANT CHANGE UP (ref 96–108)
CO2 SERPL-SCNC: 30 MMOL/L — SIGNIFICANT CHANGE UP (ref 22–31)
CREAT SERPL-MCNC: 1.18 MG/DL — SIGNIFICANT CHANGE UP (ref 0.5–1.3)
CULTURE RESULTS: NO GROWTH — SIGNIFICANT CHANGE UP
ERYTHROCYTE [SEDIMENTATION RATE] IN BLOOD: 82 MM/HR — HIGH (ref 0–20)
ESTIMATED AVERAGE GLUCOSE: 292 MG/DL — HIGH (ref 68–114)
GLUCOSE BLDC GLUCOMTR-MCNC: 228 MG/DL — HIGH (ref 70–99)
GLUCOSE BLDC GLUCOMTR-MCNC: 255 MG/DL — HIGH (ref 70–99)
GLUCOSE BLDC GLUCOMTR-MCNC: 280 MG/DL — HIGH (ref 70–99)
GLUCOSE BLDC GLUCOMTR-MCNC: 286 MG/DL — HIGH (ref 70–99)
GLUCOSE SERPL-MCNC: 252 MG/DL — HIGH (ref 70–99)
HBV CORE IGM SER-ACNC: SIGNIFICANT CHANGE UP
HBV SURFACE AB SER-ACNC: SIGNIFICANT CHANGE UP
HBV SURFACE AG SER-ACNC: SIGNIFICANT CHANGE UP
HCT VFR BLD CALC: 33.5 % — LOW (ref 39–50)
HCV AB S/CO SERPL IA: 11.98 S/CO — HIGH (ref 0–0.99)
HCV AB SERPL-IMP: REACTIVE
HGB BLD-MCNC: 11.3 G/DL — LOW (ref 13–17)
HIV 1+2 AB+HIV1 P24 AG SERPL QL IA: SIGNIFICANT CHANGE UP
MCHC RBC-ENTMCNC: 31.7 PG — SIGNIFICANT CHANGE UP (ref 27–34)
MCHC RBC-ENTMCNC: 33.7 GM/DL — SIGNIFICANT CHANGE UP (ref 32–36)
MCV RBC AUTO: 93.8 FL — SIGNIFICANT CHANGE UP (ref 80–100)
NRBC # BLD: 0 /100 WBCS — SIGNIFICANT CHANGE UP (ref 0–0)
PLATELET # BLD AUTO: 183 K/UL — SIGNIFICANT CHANGE UP (ref 150–400)
POTASSIUM SERPL-MCNC: 3.7 MMOL/L — SIGNIFICANT CHANGE UP (ref 3.5–5.3)
POTASSIUM SERPL-SCNC: 3.7 MMOL/L — SIGNIFICANT CHANGE UP (ref 3.5–5.3)
PROCALCITONIN SERPL-MCNC: 0.08 NG/ML — SIGNIFICANT CHANGE UP (ref 0.02–0.1)
PROT SERPL-MCNC: 6.4 GM/DL — SIGNIFICANT CHANGE UP (ref 6–8.3)
RBC # BLD: 3.57 M/UL — LOW (ref 4.2–5.8)
RBC # FLD: 11.7 % — SIGNIFICANT CHANGE UP (ref 10.3–14.5)
SODIUM SERPL-SCNC: 140 MMOL/L — SIGNIFICANT CHANGE UP (ref 135–145)
SPECIMEN SOURCE: SIGNIFICANT CHANGE UP
WBC # BLD: 9.31 K/UL — SIGNIFICANT CHANGE UP (ref 3.8–10.5)
WBC # FLD AUTO: 9.31 K/UL — SIGNIFICANT CHANGE UP (ref 3.8–10.5)

## 2021-03-22 PROCEDURE — 99231 SBSQ HOSP IP/OBS SF/LOW 25: CPT

## 2021-03-22 RX ORDER — INSULIN GLARGINE 100 [IU]/ML
20 INJECTION, SOLUTION SUBCUTANEOUS AT BEDTIME
Refills: 0 | Status: DISCONTINUED | OUTPATIENT
Start: 2021-03-22 | End: 2021-03-25

## 2021-03-22 RX ORDER — CEFTRIAXONE 500 MG/1
1000 INJECTION, POWDER, FOR SOLUTION INTRAMUSCULAR; INTRAVENOUS EVERY 24 HOURS
Refills: 0 | Status: DISCONTINUED | OUTPATIENT
Start: 2021-03-22 | End: 2021-03-25

## 2021-03-22 RX ADMIN — Medication 6: at 07:44

## 2021-03-22 RX ADMIN — TAMSULOSIN HYDROCHLORIDE 0.4 MILLIGRAM(S): 0.4 CAPSULE ORAL at 21:17

## 2021-03-22 RX ADMIN — CEFTRIAXONE 100 MILLIGRAM(S): 500 INJECTION, POWDER, FOR SOLUTION INTRAMUSCULAR; INTRAVENOUS at 14:25

## 2021-03-22 RX ADMIN — PIPERACILLIN AND TAZOBACTAM 25 GRAM(S): 4; .5 INJECTION, POWDER, LYOPHILIZED, FOR SOLUTION INTRAVENOUS at 05:54

## 2021-03-22 RX ADMIN — SODIUM CHLORIDE 80 MILLILITER(S): 9 INJECTION INTRAMUSCULAR; INTRAVENOUS; SUBCUTANEOUS at 11:34

## 2021-03-22 RX ADMIN — HEPARIN SODIUM 5000 UNIT(S): 5000 INJECTION INTRAVENOUS; SUBCUTANEOUS at 05:54

## 2021-03-22 RX ADMIN — LOSARTAN POTASSIUM 50 MILLIGRAM(S): 100 TABLET, FILM COATED ORAL at 05:54

## 2021-03-22 RX ADMIN — HEPARIN SODIUM 5000 UNIT(S): 5000 INJECTION INTRAVENOUS; SUBCUTANEOUS at 17:17

## 2021-03-22 RX ADMIN — Medication 6: at 16:26

## 2021-03-22 RX ADMIN — Medication 2: at 22:11

## 2021-03-22 RX ADMIN — INSULIN GLARGINE 20 UNIT(S): 100 INJECTION, SOLUTION SUBCUTANEOUS at 22:10

## 2021-03-22 RX ADMIN — Medication 4: at 11:33

## 2021-03-22 RX ADMIN — Medication 1 APPLICATION(S): at 17:18

## 2021-03-22 RX ADMIN — Medication 1 APPLICATION(S): at 05:55

## 2021-03-22 RX ADMIN — Medication 250 MILLIGRAM(S): at 05:54

## 2021-03-22 NOTE — CONSULT NOTE ADULT - SUBJECTIVE AND OBJECTIVE BOX
60 yo man with PMH HTN, IDDM presents to ED for evaluation of LLQ pain and paronychia over left 4th digit. patient also has ulceration of the left scrotum for 2 weeks.    Abd pain began about 2 days ago, associated with nausea and NBNB vomiting. Denies     (21 Mar 2021 11:44)      PAST MEDICAL & SURGICAL HISTORY:  HTN (hypertension)  medicxal managenent    DM (diabetes mellitus)  On Insulin    No significant past surgical history        SOCHX:   tobacco,  -  alcohol    FMHX: FA/MO  - contributory       Recent Travel:    Immunizations:    Allergies    No Known Allergies    Intolerances        MEDICATIONS  (STANDING):  clotrimazole 1% Cream 1 Application(s) Topical two times a day  dextrose 40% Gel 15 Gram(s) Oral once  dextrose 5%. 1000 milliLiter(s) (50 mL/Hr) IV Continuous <Continuous>  dextrose 5%. 1000 milliLiter(s) (100 mL/Hr) IV Continuous <Continuous>  dextrose 50% Injectable 25 Gram(s) IV Push once  dextrose 50% Injectable 12.5 Gram(s) IV Push once  dextrose 50% Injectable 25 Gram(s) IV Push once  glucagon  Injectable 1 milliGRAM(s) IntraMuscular once  heparin   Injectable 5000 Unit(s) SubCutaneous every 12 hours  influenza   Vaccine 0.5 milliLiter(s) IntraMuscular once  insulin glargine Injectable (LANTUS) 15 Unit(s) SubCutaneous at bedtime  insulin lispro (ADMELOG) corrective regimen sliding scale   SubCutaneous three times a day before meals  insulin lispro (ADMELOG) corrective regimen sliding scale   SubCutaneous at bedtime  losartan 50 milliGRAM(s) Oral daily  piperacillin/tazobactam IVPB.. 3.375 Gram(s) IV Intermittent every 8 hours  senna 2 Tablet(s) Oral at bedtime  sodium chloride 0.9%. 1000 milliLiter(s) (80 mL/Hr) IV Continuous <Continuous>  tamsulosin 0.4 milliGRAM(s) Oral at bedtime  vancomycin  IVPB 750 milliGRAM(s) IV Intermittent every 12 hours        REVIEW OF SYSTEMS: IN PROGRESS   CONSTITUTIONAL: No fever, weight loss, or fatigue  EYES: No eye pain, visual disturbances, or discharge  ENMT:  No difficulty hearing, tinnitus, vertigo; No sinus or throat pain  NECK: No pain or stiffness  BREASTS: No pain, masses, or nipple discharge  RESPIRATORY: No cough, wheezing, chills or hemoptysis; No shortness of breath  CARDIOVASCULAR: No chest pain, palpitations, dizziness, or leg swelling  GASTROINTESTINAL: No abdominal or epigastric pain. No nausea, vomiting, or hematemesis; No diarrhea or constipation. No melena or hematochezia.  GENITOURINARY: No dysuria, frequency, hematuria, or incontinence  NEUROLOGICAL: No headaches, memory loss, loss of strength, numbness, or tremors  SKIN: No itching, burning, rashes, or lesions       VITAL SIGNS:    T(C): 36.4 (21 @ 06:13), Max: 37.6 (21 @ 12:18)  T(F): 97.6 (21 @ :13), Max: 99.6 (21 @ 12:18)  HR: 76 (21 @ :13) (76 - 92)  BP: 143/84 (21 @ 06:13) (124/80 - 155/92)  RR: 18 (21 @ 06:13) (17 - 18)  SpO2: 94% (21 @ :13) (94% - 97%)    PHYSICAL EXAM: IN PROGRESS     GENERAL: NAD, well-groomed, well-developed  ENMT: No tonsillar erythema, exudates, or enlargement; Moist mucous membranes, Good dentition, No lesions  NECK: Supple, No JVD, Normal thyroid  NERVOUS SYSTEM:  Al  CHEST/LUNG: Clear bilaterally; No rales, rhonchi, wheezing bilaterally  HEART: Regular rate and rhythm; No murmurs, rubs, or gallops  ABDOMEN: Soft, Nontender, Nondistended; Bowel sounds present  EXTREMITIES:  2+ Peripheral Pulses, No clubbing, cyanosis, or edema  LYMPH: No lymphadenopathy noted  SKIN: No rashes or lesions      LABS:                         12.3   16.10 )-----------( 211      ( 21 Mar 2021 07:50 )             36.4         135  |  99  |  25<H>  ----------------------------<  396<H>  4.5   |  28  |  1.59<H>    Ca    8.8      21 Mar 2021 07:50    TPro  7.6  /  Alb  3.1<L>  /  TBili  0.5  /  DBili  x   /  AST  16  /  ALT  26  /  AlkPhos  69  03-21    LIVER FUNCTIONS - ( 21 Mar 2021 07:50 )  Alb: 3.1 g/dL / Pro: 7.6 gm/dL / ALK PHOS: 69 U/L / ALT: 26 U/L / AST: 16 U/L / GGT: x           PT/INR - ( 21 Mar 2021 07:52 )   PT: 13.2 sec;   INR: 1.15 ratio         PTT - ( 21 Mar 2021 07:52 )  PTT:30.8 sec  Urinalysis Basic - ( 21 Mar 2021 13:09 )    Color: Yellow / Appearance: Clear / S.005 / pH: x  Gluc: x / Ketone: Negative  / Bili: Negative / Urobili: Negative mg/dL   Blood: x / Protein: 100 mg/dL / Nitrite: Negative   Leuk Esterase: Negative / RBC: 3-5 /HPF / WBC x   Sq Epi: x / Non Sq Epi: Few / Bacteria: Few        CARDIAC MARKERS ( 21 Mar 2021 07:50 )  <.015 ng/mL / x     / x     / x     / x                                      Radiology:          60 yo man with PMH HTN, IDDM presents to ED for evaluation of LLQ pain and paronychia over left 4th digit. patient also has ulceration of the left scrotum for 2 weeks.    Abd pain began about 2 days ago, associated with nausea and NBNB vomiting. Denies     (21 Mar 2021 11:44)      PAST MEDICAL & SURGICAL HISTORY:  HTN (hypertension)  medicxal managenent    DM (diabetes mellitus)  On Insulin    No significant past surgical history        SOCHX:   tobacco,  -  alcohol    FMHX: FA/MO  - contributory       Recent Travel:    Immunizations:    Allergies    No Known Allergies    Intolerances        MEDICATIONS  (STANDING):  clotrimazole 1% Cream 1 Application(s) Topical two times a day  dextrose 40% Gel 15 Gram(s) Oral once  dextrose 5%. 1000 milliLiter(s) (50 mL/Hr) IV Continuous <Continuous>  dextrose 5%. 1000 milliLiter(s) (100 mL/Hr) IV Continuous <Continuous>  dextrose 50% Injectable 25 Gram(s) IV Push once  dextrose 50% Injectable 12.5 Gram(s) IV Push once  dextrose 50% Injectable 25 Gram(s) IV Push once  glucagon  Injectable 1 milliGRAM(s) IntraMuscular once  heparin   Injectable 5000 Unit(s) SubCutaneous every 12 hours  influenza   Vaccine 0.5 milliLiter(s) IntraMuscular once  insulin glargine Injectable (LANTUS) 15 Unit(s) SubCutaneous at bedtime  insulin lispro (ADMELOG) corrective regimen sliding scale   SubCutaneous three times a day before meals  insulin lispro (ADMELOG) corrective regimen sliding scale   SubCutaneous at bedtime  losartan 50 milliGRAM(s) Oral daily  piperacillin/tazobactam IVPB.. 3.375 Gram(s) IV Intermittent every 8 hours  senna 2 Tablet(s) Oral at bedtime  sodium chloride 0.9%. 1000 milliLiter(s) (80 mL/Hr) IV Continuous <Continuous>  tamsulosin 0.4 milliGRAM(s) Oral at bedtime  vancomycin  IVPB 750 milliGRAM(s) IV Intermittent every 12 hours        REVIEW OF SYSTEMS:   CONSTITUTIONAL: No fever, weight loss, or fatigue  EYES: No eye pain, visual disturbances, or discharge  ENMT:  No difficulty hearing, tinnitus, vertigo; No sinus or throat pain  NECK: No pain or stiffness  BREASTS: No pain, masses, or nipple discharge  RESPIRATORY: No cough, wheezing, chills or hemoptysis; No shortness of breath  CARDIOVASCULAR: No chest pain, palpitations, dizziness, or leg swelling  GASTROINTESTINAL: LLQ abdominal pain ?   GENITOURINARY: No dysuria, frequency, hematuria, or incontinence  NEUROLOGICAL: No headaches, memory loss, loss of strength, numbness, or tremors  SKIN: No itching, burning, rashes, or lesions       VITAL SIGNS:    T(C): 36.4 (21 @ 06:13), Max: 37.6 (21 @ 12:18)  T(F): 97.6 (21 @ 06:13), Max: 99.6 (21 @ 12:18)  HR: 76 (21 @ 06:13) (76 - 92)  BP: 143/84 (21 @ 06:13) (124/80 - 155/92)  RR: 18 (21 @ 06:13) (17 - 18)  SpO2: 94% (21 @ 06:13) (94% - 97%)    PHYSICAL EXAM     GENERAL: NAD, well-groomed, well-developed  ENMT: No tonsillar erythema, exudates  NECK: Supple, No JVD, Normal thyroid  NERVOUS SYSTEM:  AAO, poor historian   CHEST/LUNG: Clear bilaterally; No rales, rhonchi, wheezing bilaterally  HEART: Regular rate and rhythm; No murmurs, rubs, or gallops  ABDOMEN: Soft, Nontender, Nondistended; Bowel sounds present    - small healed ulcer on left scrotum no active discharge , not even see well , no ulcer on penile shaft , no active urethral discharge   EXTREMITIES:  2+ Peripheral Pulses, No clubbing, cyanosis, or edema  LYMPH: No lymphadenopathy noted  SKIN: No rashes or lesions       LABS:                         12.3   16.10 )-----------( 211      ( 21 Mar 2021 07:50 )             36.4         135  |  99  |  25<H>  ----------------------------<  396<H>  4.5   |  28  |  1.59<H>    Ca    8.8      21 Mar 2021 07:50    TPro  7.6  /  Alb  3.1<L>  /  TBili  0.5  /  DBili  x   /  AST  16  /  ALT  26  /  AlkPhos  69  03-21    LIVER FUNCTIONS - ( 21 Mar 2021 07:50 )  Alb: 3.1 g/dL / Pro: 7.6 gm/dL / ALK PHOS: 69 U/L / ALT: 26 U/L / AST: 16 U/L / GGT: x           PT/INR - ( 21 Mar 2021 07:52 )   PT: 13.2 sec;   INR: 1.15 ratio         PTT - ( 21 Mar 2021 07:52 )  PTT:30.8 sec  Urinalysis Basic - ( 21 Mar 2021 13:09 )    Color: Yellow / Appearance: Clear / S.005 / pH: x  Gluc: x / Ketone: Negative  / Bili: Negative / Urobili: Negative mg/dL   Blood: x / Protein: 100 mg/dL / Nitrite: Negative   Leuk Esterase: Negative / RBC: 3-5 /HPF / WBC x   Sq Epi: x / Non Sq Epi: Few / Bacteria: Few        CARDIAC MARKERS ( 21 Mar 2021 07:50 )  <.015 ng/mL / x     / x     / x     / x                                      Radiology:

## 2021-03-22 NOTE — CONSULT NOTE ADULT - ASSESSMENT
62 yo man with PMH HTN, IDDM presents to ED for evaluation of LLQ pain and paronychia over left 4th digit. patient also has ulceration of the left scrotum for 2 weeks.     IVDU over 25-30 yrs ago. Had hep C, recently treated with [facility] for 3 mo and now gone.      CT shows Colonic diverticulosis. No abscess. mild perinephric stranding.  Sonogram- No testicular torsion or fluid collection. Post void residual urine volume 89 ml.  urinalysis neg for LE and nitrites   leukocytosis noted   R/O STD   SEEN BY UROLOGY   NOTE TO CONTINUE   EVALUATION IN PROGRESS      62 yo man with history of  IVDU over 25-30 yrs ago. Had hep C, recently treated with [facility] for 3 mo and now gone.  patient report he has been clean for many years, not using any illicit drugs and not sexually active for 3 years ?     nonspecific left scrotum small ulcer   does not look like  Chancroid, lymphovenereum  right inguinoscrotal hernia, reducible  CT shows Colonic diverticulosis. No abscess. Nonspecific perinephric stranding and fluid. No hydronephrosis.  Sonogram- No testicular torsion or fluid collection. Post void residual urine volume 89 ml.  urinalysis neg for LE and nitrites   leukocytosis noted   patient agreed for hiv test   send for hiv, rpr ,syphilis, hepatitis B serology , urine GC , esr , crp   fu blood cultures   urology on case   we will fu  thanks

## 2021-03-23 LAB
ANION GAP SERPL CALC-SCNC: 5 MMOL/L — SIGNIFICANT CHANGE UP (ref 5–17)
BASOPHILS # BLD AUTO: 0.02 K/UL — SIGNIFICANT CHANGE UP (ref 0–0.2)
BASOPHILS NFR BLD AUTO: 0.2 % — SIGNIFICANT CHANGE UP (ref 0–2)
BUN SERPL-MCNC: 14 MG/DL — SIGNIFICANT CHANGE UP (ref 7–23)
CALCIUM SERPL-MCNC: 8 MG/DL — LOW (ref 8.5–10.1)
CHLORIDE SERPL-SCNC: 110 MMOL/L — HIGH (ref 96–108)
CO2 SERPL-SCNC: 27 MMOL/L — SIGNIFICANT CHANGE UP (ref 22–31)
CREAT SERPL-MCNC: 0.93 MG/DL — SIGNIFICANT CHANGE UP (ref 0.5–1.3)
CRP SERPL-MCNC: 42 MG/L — HIGH
CRP SERPL-MCNC: 80 MG/L — HIGH
EOSINOPHIL # BLD AUTO: 0.16 K/UL — SIGNIFICANT CHANGE UP (ref 0–0.5)
EOSINOPHIL NFR BLD AUTO: 1.9 % — SIGNIFICANT CHANGE UP (ref 0–6)
GLUCOSE BLDC GLUCOMTR-MCNC: 212 MG/DL — HIGH (ref 70–99)
GLUCOSE BLDC GLUCOMTR-MCNC: 213 MG/DL — HIGH (ref 70–99)
GLUCOSE BLDC GLUCOMTR-MCNC: 216 MG/DL — HIGH (ref 70–99)
GLUCOSE BLDC GLUCOMTR-MCNC: 219 MG/DL — HIGH (ref 70–99)
GLUCOSE SERPL-MCNC: 215 MG/DL — HIGH (ref 70–99)
HCT VFR BLD CALC: 32.3 % — LOW (ref 39–50)
HGB BLD-MCNC: 11 G/DL — LOW (ref 13–17)
IMM GRANULOCYTES NFR BLD AUTO: 0.2 % — SIGNIFICANT CHANGE UP (ref 0–1.5)
LYMPHOCYTES # BLD AUTO: 2.38 K/UL — SIGNIFICANT CHANGE UP (ref 1–3.3)
LYMPHOCYTES # BLD AUTO: 28.6 % — SIGNIFICANT CHANGE UP (ref 13–44)
MCHC RBC-ENTMCNC: 32.2 PG — SIGNIFICANT CHANGE UP (ref 27–34)
MCHC RBC-ENTMCNC: 34.1 GM/DL — SIGNIFICANT CHANGE UP (ref 32–36)
MCV RBC AUTO: 94.4 FL — SIGNIFICANT CHANGE UP (ref 80–100)
MONOCYTES # BLD AUTO: 0.63 K/UL — SIGNIFICANT CHANGE UP (ref 0–0.9)
MONOCYTES NFR BLD AUTO: 7.6 % — SIGNIFICANT CHANGE UP (ref 2–14)
NEUTROPHILS # BLD AUTO: 5.12 K/UL — SIGNIFICANT CHANGE UP (ref 1.8–7.4)
NEUTROPHILS NFR BLD AUTO: 61.5 % — SIGNIFICANT CHANGE UP (ref 43–77)
NRBC # BLD: 0 /100 WBCS — SIGNIFICANT CHANGE UP (ref 0–0)
PLATELET # BLD AUTO: 189 K/UL — SIGNIFICANT CHANGE UP (ref 150–400)
POTASSIUM SERPL-MCNC: 3.9 MMOL/L — SIGNIFICANT CHANGE UP (ref 3.5–5.3)
POTASSIUM SERPL-SCNC: 3.9 MMOL/L — SIGNIFICANT CHANGE UP (ref 3.5–5.3)
RBC # BLD: 3.42 M/UL — LOW (ref 4.2–5.8)
RBC # FLD: 11.6 % — SIGNIFICANT CHANGE UP (ref 10.3–14.5)
SODIUM SERPL-SCNC: 142 MMOL/L — SIGNIFICANT CHANGE UP (ref 135–145)
T PALLIDUM AB TITR SER: NEGATIVE — SIGNIFICANT CHANGE UP
WBC # BLD: 8.33 K/UL — SIGNIFICANT CHANGE UP (ref 3.8–10.5)
WBC # FLD AUTO: 8.33 K/UL — SIGNIFICANT CHANGE UP (ref 3.8–10.5)

## 2021-03-23 RX ORDER — LACTOBACILLUS ACIDOPHILUS 100MM CELL
1 CAPSULE ORAL DAILY
Refills: 0 | Status: DISCONTINUED | OUTPATIENT
Start: 2021-03-23 | End: 2021-03-25

## 2021-03-23 RX ORDER — AZITHROMYCIN 500 MG/1
500 TABLET, FILM COATED ORAL EVERY 24 HOURS
Refills: 0 | Status: DISCONTINUED | OUTPATIENT
Start: 2021-03-23 | End: 2021-03-25

## 2021-03-23 RX ADMIN — Medication 4: at 08:13

## 2021-03-23 RX ADMIN — Medication 4: at 11:26

## 2021-03-23 RX ADMIN — LOSARTAN POTASSIUM 50 MILLIGRAM(S): 100 TABLET, FILM COATED ORAL at 05:33

## 2021-03-23 RX ADMIN — Medication 1 TABLET(S): at 17:01

## 2021-03-23 RX ADMIN — Medication 600 MILLIGRAM(S): at 11:27

## 2021-03-23 RX ADMIN — HEPARIN SODIUM 5000 UNIT(S): 5000 INJECTION INTRAVENOUS; SUBCUTANEOUS at 17:04

## 2021-03-23 RX ADMIN — AZITHROMYCIN 255 MILLIGRAM(S): 500 TABLET, FILM COATED ORAL at 11:27

## 2021-03-23 RX ADMIN — INSULIN GLARGINE 20 UNIT(S): 100 INJECTION, SOLUTION SUBCUTANEOUS at 21:26

## 2021-03-23 RX ADMIN — Medication 4: at 17:01

## 2021-03-23 RX ADMIN — CEFTRIAXONE 100 MILLIGRAM(S): 500 INJECTION, POWDER, FOR SOLUTION INTRAMUSCULAR; INTRAVENOUS at 17:03

## 2021-03-23 RX ADMIN — HEPARIN SODIUM 5000 UNIT(S): 5000 INJECTION INTRAVENOUS; SUBCUTANEOUS at 05:33

## 2021-03-23 RX ADMIN — Medication 1 APPLICATION(S): at 17:04

## 2021-03-23 RX ADMIN — Medication 600 MILLIGRAM(S): at 12:27

## 2021-03-23 RX ADMIN — Medication 1 APPLICATION(S): at 05:34

## 2021-03-23 RX ADMIN — SENNA PLUS 2 TABLET(S): 8.6 TABLET ORAL at 21:27

## 2021-03-23 RX ADMIN — TAMSULOSIN HYDROCHLORIDE 0.4 MILLIGRAM(S): 0.4 CAPSULE ORAL at 21:27

## 2021-03-23 RX ADMIN — SODIUM CHLORIDE 80 MILLILITER(S): 9 INJECTION INTRAMUSCULAR; INTRAVENOUS; SUBCUTANEOUS at 21:26

## 2021-03-23 NOTE — PROGRESS NOTE ADULT - ASSESSMENT
60 yo man with history of  IVDU over 25-30 yrs ago. Had hep C, recently treated with [facility] for 3 mo and now gone.  patient report he has been clean for many years, not using any illicit drugs and not sexually active for 3 years ?     nonspecific painful left scrotum small ulcer   r/o Chancroid, lymphovenereum  no fever or leukocytosis   right inguinoscrotal hernia, reducible  CT shows Nonspecific perinephric stranding and fluid. No hydron  Sonogram- No testicular torsion or fluid collection. Post void residual urine volume 89 ml.  urinalysis neg for infection   superficial wound culture with strep pyrogen pending susceptibility   HIV and RPR neg  pending urine GC , esr   crp 80   fu blood cultures   urology on case   spoke with [facility] asked for chancroid panel , they are looking for it and will call me back   we will fu

## 2021-03-23 NOTE — DIETITIAN INITIAL EVALUATION ADULT. - EDUCATION DIETARY MODIFICATIONS
Discussed high blood sugars with pt. Encouraged consistent meals throughout the day as provided by shelter. Also, discussed importance of blood sugar control for wound healing. Discussed follow up with his  at the shelter to provide appointment with PCP after discharge from hospital to follow up blood sugars./(1) partially meets; needs review/practice

## 2021-03-23 NOTE — DIETITIAN INITIAL EVALUATION ADULT. - PERTINENT LABORATORY DATA
03-23 Na142 mmol/L Glu 215 mg/dL<H> K+ 3.9 mmol/L Cr  0.93 mg/dL BUN 14 mg/dL 03-22 Alb 2.7 g/dL<L>    03-23 Finger Sticks 213-291mg/dL    03-22 HbA1c 11.8%

## 2021-03-23 NOTE — DIETITIAN INITIAL EVALUATION ADULT. - OTHER INFO
Pt reports good appetite and PO intake during LOS. Per flow sheets pt consuming % documented meals. Denies difficulty chewing/swallowing. Pt denies nausea, vomiting, diarrhea, or constipation. States weight loss of 5 pounds x 1 week; states  pounds. Weight loss as noted below.  Pt with T2DM; states Metformin use PTA. States he checks blood sugars 2x/day with ranges 180-200mg/dL PTA. HbA1c 11.8% indicates poor blood glucose control.  Discussed high blood sugars with pt. Encouraged consistent meals throughout the day as provided by shelter. Also, discussed importance of blood sugar control for wound healing. Discussed follow up with his  at the shelter to provide appointment with PCP after discharge from hospital to follow up blood sugars. Pt made aware RD remains available.

## 2021-03-23 NOTE — DIETITIAN INITIAL EVALUATION ADULT. - DIET TYPE
Glucerna x 2/day (provides 440 kcal, 20 g protein)/low sodium/consistent carbohydrate (evening snack)/supplement (specify)

## 2021-03-23 NOTE — DIETITIAN INITIAL EVALUATION ADULT. - OTHER CALCULATIONS
Ht (cm): 162.6cm   Wt (kg): 63.5kg (dosing weight 03/21)   BMI: 24     IBW: 58.9KG  %IBW:  108% UBW:  65.7KG %UBW: 97%

## 2021-03-23 NOTE — DIETITIAN INITIAL EVALUATION ADULT. - ORAL INTAKE PTA/DIET HISTORY
Pt reports poor appetite and PO intake x 1 week PTA due to not feeling well. States he lives at a shelter which provides 3 prepackaged meals/day PTA. Reports he usually consumes 3 meals/day and has no diet restrictions PTA.

## 2021-03-23 NOTE — DIETITIAN INITIAL EVALUATION ADULT. - PERTINENT MEDS FT
MEDICATIONS  (STANDING):  azithromycin  IVPB 500 milliGRAM(s) IV Intermittent every 24 hours  cefTRIAXone   IVPB 1000 milliGRAM(s) IV Intermittent every 24 hours  clotrimazole 1% Cream 1 Application(s) Topical two times a day  dextrose 40% Gel 15 Gram(s) Oral once  dextrose 5%. 1000 milliLiter(s) (50 mL/Hr) IV Continuous <Continuous>  dextrose 5%. 1000 milliLiter(s) (100 mL/Hr) IV Continuous <Continuous>  dextrose 50% Injectable 25 Gram(s) IV Push once  dextrose 50% Injectable 12.5 Gram(s) IV Push once  dextrose 50% Injectable 25 Gram(s) IV Push once  glucagon  Injectable 1 milliGRAM(s) IntraMuscular once  heparin   Injectable 5000 Unit(s) SubCutaneous every 12 hours  influenza   Vaccine 0.5 milliLiter(s) IntraMuscular once  insulin glargine Injectable (LANTUS) 20 Unit(s) SubCutaneous at bedtime  insulin lispro (ADMELOG) corrective regimen sliding scale   SubCutaneous three times a day before meals  insulin lispro (ADMELOG) corrective regimen sliding scale   SubCutaneous at bedtime  losartan 50 milliGRAM(s) Oral daily  senna 2 Tablet(s) Oral at bedtime  sodium chloride 0.9%. 1000 milliLiter(s) (80 mL/Hr) IV Continuous <Continuous>  tamsulosin 0.4 milliGRAM(s) Oral at bedtime    MEDICATIONS  (PRN):  ibuprofen  Tablet. 600 milliGRAM(s) Oral three times a day PRN Temp greater or equal to 38C (100.4F), Moderate Pain (4 - 6)  magnesium hydroxide Suspension 30 milliLiter(s) Oral daily PRN Constipation

## 2021-03-23 NOTE — DIETITIAN NUTRITION RISK NOTIFICATION - TREATMENT: THE FOLLOWING DIET HAS BEEN RECOMMENDED
Diet, Consistent Carbohydrate w/Evening Snack:   Low Sodium  Supplement Feeding Modality:  Oral  Glucerna Shake Cans or Servings Per Day:  1       Frequency:  Two Times a day (03-23-21 @ 15:14) [Pending Verification By Attending]  Diet, DASH/TLC:   Sodium & Cholesterol Restricted  Consistent Carbohydrate {Evening Snack} (03-21-21 @ 11:42) [Active]

## 2021-03-24 LAB
ERYTHROCYTE [SEDIMENTATION RATE] IN BLOOD: 76 MM/HR — HIGH (ref 0–20)
GLUCOSE BLDC GLUCOMTR-MCNC: 143 MG/DL — HIGH (ref 70–99)
GLUCOSE BLDC GLUCOMTR-MCNC: 200 MG/DL — HIGH (ref 70–99)
GLUCOSE BLDC GLUCOMTR-MCNC: 211 MG/DL — HIGH (ref 70–99)
GLUCOSE BLDC GLUCOMTR-MCNC: 246 MG/DL — HIGH (ref 70–99)
N GONORRHOEA RRNA SPEC QL NAA+PROBE: SIGNIFICANT CHANGE UP
SPECIMEN SOURCE: SIGNIFICANT CHANGE UP

## 2021-03-24 RX ORDER — JNJ-78436735 50000000000 [PFU]/.5ML
0.5 SUSPENSION INTRAMUSCULAR ONCE
Refills: 0 | Status: COMPLETED | OUTPATIENT
Start: 2021-03-24 | End: 2021-03-24

## 2021-03-24 RX ORDER — LOSARTAN POTASSIUM 100 MG/1
100 TABLET, FILM COATED ORAL DAILY
Refills: 0 | Status: DISCONTINUED | OUTPATIENT
Start: 2021-03-24 | End: 2021-03-25

## 2021-03-24 RX ADMIN — AZITHROMYCIN 255 MILLIGRAM(S): 500 TABLET, FILM COATED ORAL at 11:29

## 2021-03-24 RX ADMIN — CEFTRIAXONE 100 MILLIGRAM(S): 500 INJECTION, POWDER, FOR SOLUTION INTRAMUSCULAR; INTRAVENOUS at 15:24

## 2021-03-24 RX ADMIN — TAMSULOSIN HYDROCHLORIDE 0.4 MILLIGRAM(S): 0.4 CAPSULE ORAL at 21:52

## 2021-03-24 RX ADMIN — Medication 2: at 17:51

## 2021-03-24 RX ADMIN — SODIUM CHLORIDE 80 MILLILITER(S): 9 INJECTION INTRAMUSCULAR; INTRAVENOUS; SUBCUTANEOUS at 21:52

## 2021-03-24 RX ADMIN — Medication 1 APPLICATION(S): at 05:44

## 2021-03-24 RX ADMIN — LOSARTAN POTASSIUM 100 MILLIGRAM(S): 100 TABLET, FILM COATED ORAL at 13:29

## 2021-03-24 RX ADMIN — LOSARTAN POTASSIUM 50 MILLIGRAM(S): 100 TABLET, FILM COATED ORAL at 05:44

## 2021-03-24 RX ADMIN — INSULIN GLARGINE 20 UNIT(S): 100 INJECTION, SOLUTION SUBCUTANEOUS at 21:52

## 2021-03-24 RX ADMIN — SENNA PLUS 2 TABLET(S): 8.6 TABLET ORAL at 21:52

## 2021-03-24 RX ADMIN — HEPARIN SODIUM 5000 UNIT(S): 5000 INJECTION INTRAVENOUS; SUBCUTANEOUS at 05:44

## 2021-03-24 RX ADMIN — Medication 1 APPLICATION(S): at 17:52

## 2021-03-24 RX ADMIN — HEPARIN SODIUM 5000 UNIT(S): 5000 INJECTION INTRAVENOUS; SUBCUTANEOUS at 17:53

## 2021-03-24 RX ADMIN — SODIUM CHLORIDE 80 MILLILITER(S): 9 INJECTION INTRAMUSCULAR; INTRAVENOUS; SUBCUTANEOUS at 05:43

## 2021-03-24 RX ADMIN — Medication 1 TABLET(S): at 11:31

## 2021-03-24 RX ADMIN — JNJ-78436735 0.5 MILLILITER(S): 50000000000 SUSPENSION INTRAMUSCULAR at 17:36

## 2021-03-24 RX ADMIN — Medication 4: at 11:30

## 2021-03-24 NOTE — PROGRESS NOTE ADULT - NUTRITIONAL ASSESSMENT
This patient has been assessed with a concern for Malnutrition and has been determined to have a diagnosis/diagnoses of Moderate protein-calorie malnutrition.    This patient is being managed with:   Diet Consistent Carbohydrate w/Evening Snack-  Low Sodium  Supplement Feeding Modality:  Oral  Glucerna Shake Cans or Servings Per Day:  1       Frequency:  Two Times a day  Entered: Mar 23 2021  3:14PM

## 2021-03-24 NOTE — PROGRESS NOTE ADULT - ASSESSMENT
60 yo man with history of  IVDU over 25-30 yrs ago. Had hep C, recently treated with [facility] for 3 mo and now gone.  patient report he has been clean for many years, not using any illicit drugs and not sexually active for 3 years ? self-reported     nonspecific painful left scrotum small ulcer r/o Chancroid, lymphovenereum vs HSV less likely   left 4th digit paronychia   no fever or leukocytosis   right inguinoscrotal hernia, reducible  CT shows Nonspecific perinephric stranding and fluid. No hydron  Sonogram- No testicular torsion or fluid collection. Post void residual urine volume 89 ml.  urinalysis neg for infection   superficial left 4th digit wound culture with strep pyrogen gp B pending susceptibility   HIV and RPR neg , crp down to 42 from 80   pending urine GC , esr   fu blood cultures   urology on case   I spoke with lab , haemophilus ducreyi wound swab culture will be sent to reference lab and not sure duration to be done and final result will take longer and not very inaccurate as per lab on phone   clinical evaluation of ulcer daily , less draining today

## 2021-03-25 ENCOUNTER — TRANSCRIPTION ENCOUNTER (OUTPATIENT)
Age: 61
End: 2021-03-25

## 2021-03-25 VITALS
TEMPERATURE: 100 F | DIASTOLIC BLOOD PRESSURE: 83 MMHG | OXYGEN SATURATION: 95 % | RESPIRATION RATE: 17 BRPM | SYSTOLIC BLOOD PRESSURE: 161 MMHG | HEART RATE: 92 BPM

## 2021-03-25 LAB
GLUCOSE BLDC GLUCOMTR-MCNC: 159 MG/DL — HIGH (ref 70–99)
GLUCOSE BLDC GLUCOMTR-MCNC: 165 MG/DL — HIGH (ref 70–99)
GLUCOSE BLDC GLUCOMTR-MCNC: 174 MG/DL — HIGH (ref 70–99)
HSV+VZV DNA SPEC QL NAA+PROBE: SIGNIFICANT CHANGE UP
SPECIMEN SOURCE: SIGNIFICANT CHANGE UP

## 2021-03-25 RX ORDER — INSULIN GLARGINE 100 [IU]/ML
14 INJECTION, SOLUTION SUBCUTANEOUS
Qty: 840 | Refills: 0
Start: 2021-03-25 | End: 2021-05-23

## 2021-03-25 RX ORDER — SENNA PLUS 8.6 MG/1
2 TABLET ORAL
Qty: 0 | Refills: 0 | DISCHARGE
Start: 2021-03-25

## 2021-03-25 RX ORDER — HYDRALAZINE HCL 50 MG
10 TABLET ORAL ONCE
Refills: 0 | Status: COMPLETED | OUTPATIENT
Start: 2021-03-25 | End: 2021-03-25

## 2021-03-25 RX ORDER — LACTOBACILLUS ACIDOPHILUS 100MM CELL
1 CAPSULE ORAL
Qty: 20 | Refills: 0
Start: 2021-03-25 | End: 2021-04-03

## 2021-03-25 RX ORDER — METFORMIN HYDROCHLORIDE 850 MG/1
1 TABLET ORAL
Qty: 120 | Refills: 0
Start: 2021-03-25 | End: 2021-05-23

## 2021-03-25 RX ORDER — LOSARTAN POTASSIUM 100 MG/1
1 TABLET, FILM COATED ORAL
Qty: 60 | Refills: 0
Start: 2021-03-25 | End: 2021-05-23

## 2021-03-25 RX ORDER — TAMSULOSIN HYDROCHLORIDE 0.4 MG/1
1 CAPSULE ORAL
Qty: 60 | Refills: 0
Start: 2021-03-25 | End: 2021-05-23

## 2021-03-25 RX ORDER — INSULIN GLARGINE 100 [IU]/ML
14 INJECTION, SOLUTION SUBCUTANEOUS
Qty: 420 | Refills: 1
Start: 2021-03-25 | End: 2021-05-23

## 2021-03-25 RX ADMIN — Medication 1 TABLET(S): at 11:03

## 2021-03-25 RX ADMIN — Medication 10 MILLIGRAM(S): at 01:15

## 2021-03-25 RX ADMIN — LOSARTAN POTASSIUM 100 MILLIGRAM(S): 100 TABLET, FILM COATED ORAL at 05:16

## 2021-03-25 RX ADMIN — HEPARIN SODIUM 5000 UNIT(S): 5000 INJECTION INTRAVENOUS; SUBCUTANEOUS at 05:16

## 2021-03-25 RX ADMIN — Medication 1 APPLICATION(S): at 05:16

## 2021-03-25 RX ADMIN — HEPARIN SODIUM 5000 UNIT(S): 5000 INJECTION INTRAVENOUS; SUBCUTANEOUS at 17:23

## 2021-03-25 RX ADMIN — SODIUM CHLORIDE 80 MILLILITER(S): 9 INJECTION INTRAMUSCULAR; INTRAVENOUS; SUBCUTANEOUS at 05:15

## 2021-03-25 RX ADMIN — CEFTRIAXONE 100 MILLIGRAM(S): 500 INJECTION, POWDER, FOR SOLUTION INTRAMUSCULAR; INTRAVENOUS at 13:05

## 2021-03-25 RX ADMIN — Medication 2: at 16:09

## 2021-03-25 RX ADMIN — Medication 2: at 08:07

## 2021-03-25 RX ADMIN — Medication 1 APPLICATION(S): at 17:23

## 2021-03-25 RX ADMIN — Medication 2: at 11:03

## 2021-03-25 NOTE — DISCHARGE NOTE NURSING/CASE MANAGEMENT/SOCIAL WORK - PATIENT PORTAL LINK FT
You can access the FollowMyHealth Patient Portal offered by [facility] by registering at the following website: [email or web address]. By joining [facility]’s FollowMyHealth portal, you will also be able to view your health information using other applications (apps) compatible with our system.

## 2021-03-25 NOTE — PROGRESS NOTE ADULT - PROVIDER SPECIALTY LIST ADULT
Urology
Infectious Disease
Internal Medicine

## 2021-03-25 NOTE — DISCHARGE NOTE PROVIDER - DETAILS OF MALNUTRITION DIAGNOSIS/DIAGNOSES
This patient has been assessed with a concern for Malnutrition and was treated during this hospitalization for the following Nutrition diagnosis/diagnoses:     -  03/23/2021: Moderate protein-calorie malnutrition   This patient has been assessed with a concern for Malnutrition and was treated during this hospitalization for the following Nutrition diagnosis/diagnoses:     -  03/23/2021: Moderate protein-calorie malnutrition    This patient has been assessed with a concern for Malnutrition and was treated during this hospitalization for the following Nutrition diagnosis/diagnoses:     -  03/23/2021: Moderate protein-calorie malnutrition   This patient has been assessed with a concern for Malnutrition and was treated during this hospitalization for the following Nutrition diagnosis/diagnoses:     -  03/23/2021: Moderate protein-calorie malnutrition    This patient has been assessed with a concern for Malnutrition and was treated during this hospitalization for the following Nutrition diagnosis/diagnoses:     -  03/23/2021: Moderate protein-calorie malnutrition    This patient has been assessed with a concern for Malnutrition and was treated during this hospitalization for the following Nutrition diagnosis/diagnoses:     -  03/23/2021: Moderate protein-calorie malnutrition   This patient has been assessed with a concern for Malnutrition and was treated during this hospitalization for the following Nutrition diagnosis/diagnoses:     -  03/23/2021: Moderate protein-calorie malnutrition    This patient has been assessed with a concern for Malnutrition and was treated during this hospitalization for the following Nutrition diagnosis/diagnoses:     -  03/23/2021: Moderate protein-calorie malnutrition    This patient has been assessed with a concern for Malnutrition and was treated during this hospitalization for the following Nutrition diagnosis/diagnoses:     -  03/23/2021: Moderate protein-calorie malnutrition    This patient has been assessed with a concern for Malnutrition and was treated during this hospitalization for the following Nutrition diagnosis/diagnoses:     -  03/23/2021: Moderate protein-calorie malnutrition

## 2021-03-25 NOTE — DISCHARGE NOTE PROVIDER - NSDCCPCAREPLAN_GEN_ALL_CORE_FT
PRINCIPAL DISCHARGE DIAGNOSIS  Diagnosis: Scrotal ulcer  Assessment and Plan of Treatment: Continue Augmentin x 10 days, Follow up with wound care at [facility] wound clinic      SECONDARY DISCHARGE DIAGNOSES  Diagnosis: Paronychia of finger of left hand  Assessment and Plan of Treatment: Continue Augmentin x 10 days

## 2021-03-25 NOTE — PROGRESS NOTE ADULT - REASON FOR ADMISSION
scrotal infection
scrotal ulcer
scrotal infection

## 2021-03-25 NOTE — DISCHARGE NOTE PROVIDER - CARE PROVIDER_API CALL
Ming Tam)  Emergency Medicine; Internal Medicine  [address] [place] [address]  Phone: [phone]  Fax: [phone]  Follow Up Time: 1 week

## 2021-03-25 NOTE — PROGRESS NOTE ADULT - ASSESSMENT
60 yo man with history of  IVDU over 25-30 yrs ago. Had hep C, recently treated with [facility] for 3 mo and now gone.  patient report he has been clean for many years, not using any illicit drugs and not sexually active for 3 years ? self-reported     nonspecific painful left scrotum small ulcer r/o Chancroid, lymphovenereum vs HSV less likely   left 4th digit paronychia   no fever or leukocytosis   right inguinoscrotal hernia, reducible  CT shows Nonspecific perinephric stranding and fluid. No hydron  Sonogram- No testicular torsion or fluid collection. Post void residual urine volume 89 ml.  urinalysis neg for infection   superficial left 4th digit wound culture with strep pyrogen gp B pending susceptibility   HIV and RPR neg , crp down to 42 from 80   urine GC pcr neg , dc Zithromax   fu blood cultures so far neg   urology on case   I spoke with lab , haemophilus ducreyi wound swab culture was sent to reference lab and not sure duration to be done and final result will take longer and not very inaccurate as per lab on phone , no need to wait   can discharge on po Augmentin   much less draining today , pain improved   personal hygiene and sexual counselling given

## 2021-03-25 NOTE — PROGRESS NOTE ADULT - SUBJECTIVE AND OBJECTIVE BOX
Patient is a 61y old  Male who presents with a chief complaint of scrotal infection (22 Mar 2021 09:40)  afebrile.. Abd pain improved. feeling better   vitals stable.   blood sugars elevated- will increase lantus dose    INTERVAL HPI/OVERNIGHT EVENTS:  PAST MEDICAL & SURGICAL HISTORY:  HTN (hypertension)  medicxal managenent    DM (diabetes mellitus)  On Insulin    No significant past surgical history        MEDICATIONS  (STANDING):  clotrimazole 1% Cream 1 Application(s) Topical two times a day  dextrose 40% Gel 15 Gram(s) Oral once  dextrose 5%. 1000 milliLiter(s) (50 mL/Hr) IV Continuous <Continuous>  dextrose 5%. 1000 milliLiter(s) (100 mL/Hr) IV Continuous <Continuous>  dextrose 50% Injectable 25 Gram(s) IV Push once  dextrose 50% Injectable 12.5 Gram(s) IV Push once  dextrose 50% Injectable 25 Gram(s) IV Push once  glucagon  Injectable 1 milliGRAM(s) IntraMuscular once  heparin   Injectable 5000 Unit(s) SubCutaneous every 12 hours  influenza   Vaccine 0.5 milliLiter(s) IntraMuscular once  insulin glargine Injectable (LANTUS) 15 Unit(s) SubCutaneous at bedtime  insulin lispro (ADMELOG) corrective regimen sliding scale   SubCutaneous three times a day before meals  insulin lispro (ADMELOG) corrective regimen sliding scale   SubCutaneous at bedtime  losartan 50 milliGRAM(s) Oral daily  piperacillin/tazobactam IVPB.. 3.375 Gram(s) IV Intermittent every 8 hours  senna 2 Tablet(s) Oral at bedtime  sodium chloride 0.9%. 1000 milliLiter(s) (80 mL/Hr) IV Continuous <Continuous>  tamsulosin 0.4 milliGRAM(s) Oral at bedtime  vancomycin  IVPB 750 milliGRAM(s) IV Intermittent every 12 hours    MEDICATIONS  (PRN):  ibuprofen  Tablet. 600 milliGRAM(s) Oral three times a day PRN Temp greater or equal to 38C (100.4F), Moderate Pain (4 - 6)  magnesium hydroxide Suspension 30 milliLiter(s) Oral daily PRN Constipation      Allergies    No Known Allergies    Intolerances        REVIEW OF SYSTEMS:  CONSTITUTIONAL: No fever, weight loss, or fatigue  EYES: No eye pain, visual disturbances, or discharge  ENMT:  No difficulty hearing, tinnitus, vertigo; No sinus or throat pain  NECK: No pain or stiffness  BREASTS: No pain, masses, or nipple discharge  RESPIRATORY: No cough, wheezing, chills or hemoptysis; No shortness of breath  CARDIOVASCULAR: No chest pain, palpitations, dizziness, or leg swelling  GASTROINTESTINAL: No abdominal or epigastric pain. No nausea, vomiting, or hematemesis; No diarrhea or constipation. No melena or hematochezia.  GENITOURINARY: No dysuria, frequency, hematuria, or incontinence  NEUROLOGICAL: No headaches, memory loss, loss of strength, numbness, or tremors  SKIN: No itching, burning, rashes, or lesions   LYMPH NODES: No enlarged glands  ENDOCRINE: No heat or cold intolerance; No hair loss  MUSCULOSKELETAL: No joint pain or swelling; No muscle, back, or extremity pain  PSYCHIATRIC: No depression, anxiety, mood swings, or difficulty sleeping  HEME/LYMPH: No easy bruising, or bleeding gums  ALLERY AND IMMUNOLOGIC: No hives or eczema    Vital Signs Last 24 Hrs  T(C): 36.4 (22 Mar 2021 06:13), Max: 37.6 (21 Mar 2021 12:18)  T(F): 97.6 (22 Mar 2021 06:13), Max: 99.6 (21 Mar 2021 12:18)  HR: 76 (22 Mar 2021 06:13) (76 - 92)  BP: 143/84 (22 Mar 2021 06:13) (124/80 - 155/92)  BP(mean): --  RR: 18 (22 Mar 2021 06:13) (17 - 18)  SpO2: 94% (22 Mar 2021 06:13) (94% - 97%)    PHYSICAL EXAM:  GENERAL: NAD, well-groomed, well-developed  HEAD:  Atraumatic, Normocephalic  EYES: EOMI, PERRLA, conjunctiva and sclera clear  ENMT: No tonsillar erythema, exudates, or enlargement; Moist mucous membranes, Good dentition, No lesions  NECK: Supple, No JVD, Normal thyroid  NERVOUS SYSTEM:  Alert & Oriented X3, Good concentration; Motor Strength 5/5 B/L upper and lower extremities; DTRs 2+ intact and symmetric  CHEST/LUNG: Clear to percussion bilaterally; No rales, rhonchi, wheezing, or rubs  HEART: Regular rate and rhythm; No murmurs, rubs, or gallops  ABDOMEN: Soft, Nontender, Nondistended; Bowel sounds present  EXTREMITIES:  2+ Peripheral Pulses, No clubbing, cyanosis, or edema  LYMPH: No lymphadenopathy noted  SKIN: No rashes or lesions ulcer on left scrotum    LABS:                        11.3   9.31  )-----------( 183      ( 22 Mar 2021 09:58 )             33.5     03-22    140  |  108  |  22  ----------------------------<  252<H>  3.7   |  30  |  1.18    Ca    8.0<L>      22 Mar 2021 09:58    TPro  6.4  /  Alb  2.7<L>  /  TBili  0.3  /  DBili  x   /  AST  12<L>  /  ALT  23  /  AlkPhos  57  -22      PT/INR - ( 21 Mar 2021 07:52 )   PT: 13.2 sec;   INR: 1.15 ratio         PTT - ( 21 Mar 2021 07:52 )  PTT:30.8 sec  Urinalysis Basic - ( 21 Mar 2021 13:09 )    Color: Yellow / Appearance: Clear / S.005 / pH: x  Gluc: x / Ketone: Negative  / Bili: Negative / Urobili: Negative mg/dL   Blood: x / Protein: 100 mg/dL / Nitrite: Negative   Leuk Esterase: Negative / RBC: 3-5 /HPF / WBC x   Sq Epi: x / Non Sq Epi: Few / Bacteria: Few      CAPILLARY BLOOD GLUCOSE      POCT Blood Glucose.: 255 mg/dL (22 Mar 2021 07:38)  POCT Blood Glucose.: 472 mg/dL (21 Mar 2021 21:47)  POCT Blood Glucose.: 454 mg/dL (21 Mar 2021 21:46)  POCT Blood Glucose.: 241 mg/dL (21 Mar 2021 16:27)  POCT Blood Glucose.: 355 mg/dL (21 Mar 2021 14:23)      CARDIAC MARKERS ( 21 Mar 2021 07:50 )  <.015 ng/mL / x     / x     / x     / x                RADIOLOGY & ADDITIONAL TESTS:    Imaging Personally Reviewed:  [ ] YES  [ ] NO    Consultant(s) Notes Reviewed:  [ ] YES  [ ] NO    Care Discussed with Consultants/Other Providers [ ] YES  [ ] NO    Care discussed with family,         [  ]   yes  [  ]  No    imp:    stable[ ]    unstable[  ]     improving [ x  ]       unchanged  [  ]                Plans:  Continue present plans  [ x ] as per ID and                New consult [  ]   specialty  .......               order test[  ]    test name.  cbc , bmp in am                Discharge Planning  [  ]           
Patient is a 61y old  Male who presents with a chief complaint of scrotal infection (22 Mar 2021 14:11)  diabtes under control   infection improving   ID/ notes reviewed.    INTERVAL HPI/OVERNIGHT EVENTS:  PAST MEDICAL & SURGICAL HISTORY:  HTN (hypertension)  medicxal managenent    DM (diabetes mellitus)  On Insulin    No significant past surgical history        MEDICATIONS  (STANDING):  azithromycin  IVPB 500 milliGRAM(s) IV Intermittent every 24 hours  cefTRIAXone   IVPB 1000 milliGRAM(s) IV Intermittent every 24 hours  clotrimazole 1% Cream 1 Application(s) Topical two times a day  dextrose 40% Gel 15 Gram(s) Oral once  dextrose 5%. 1000 milliLiter(s) (50 mL/Hr) IV Continuous <Continuous>  dextrose 5%. 1000 milliLiter(s) (100 mL/Hr) IV Continuous <Continuous>  dextrose 50% Injectable 25 Gram(s) IV Push once  dextrose 50% Injectable 12.5 Gram(s) IV Push once  dextrose 50% Injectable 25 Gram(s) IV Push once  glucagon  Injectable 1 milliGRAM(s) IntraMuscular once  heparin   Injectable 5000 Unit(s) SubCutaneous every 12 hours  influenza   Vaccine 0.5 milliLiter(s) IntraMuscular once  insulin glargine Injectable (LANTUS) 20 Unit(s) SubCutaneous at bedtime  insulin lispro (ADMELOG) corrective regimen sliding scale   SubCutaneous three times a day before meals  insulin lispro (ADMELOG) corrective regimen sliding scale   SubCutaneous at bedtime  losartan 50 milliGRAM(s) Oral daily  senna 2 Tablet(s) Oral at bedtime  sodium chloride 0.9%. 1000 milliLiter(s) (80 mL/Hr) IV Continuous <Continuous>  tamsulosin 0.4 milliGRAM(s) Oral at bedtime    MEDICATIONS  (PRN):  ibuprofen  Tablet. 600 milliGRAM(s) Oral three times a day PRN Temp greater or equal to 38C (100.4F), Moderate Pain (4 - 6)  magnesium hydroxide Suspension 30 milliLiter(s) Oral daily PRN Constipation      Allergies    No Known Allergies    Intolerances        REVIEW OF SYSTEMS:  CONSTITUTIONAL: No fever, weight loss, or fatigue  EYES: No eye pain, visual disturbances, or discharge  ENMT:  No difficulty hearing, tinnitus, vertigo; No sinus or throat pain  NECK: No pain or stiffness  BREASTS: No pain, masses, or nipple discharge  RESPIRATORY: No cough, wheezing, chills or hemoptysis; No shortness of breath  CARDIOVASCULAR: No chest pain, palpitations, dizziness, or leg swelling  GASTROINTESTINAL: No abdominal or epigastric pain. No nausea, vomiting, or hematemesis; No diarrhea or constipation. No melena or hematochezia.  GENITOURINARY: No dysuria, frequency, hematuria, or incontinence  NEUROLOGICAL: No headaches, memory loss, loss of strength, numbness, or tremors  SKIN: No itching, burning, rashes, or lesions   LYMPH NODES: No enlarged glands  ENDOCRINE: No heat or cold intolerance; No hair loss  MUSCULOSKELETAL: No joint pain or swelling; No muscle, back, or extremity pain  PSYCHIATRIC: No depression, anxiety, mood swings, or difficulty sleeping  HEME/LYMPH: No easy bruising, or bleeding gums  ALLERY AND IMMUNOLOGIC: No hives or eczema    Vital Signs Last 24 Hrs  T(C): 37.4 (23 Mar 2021 05:30), Max: 37.4 (23 Mar 2021 05:30)  T(F): 99.4 (23 Mar 2021 05:30), Max: 99.4 (23 Mar 2021 05:30)  HR: 89 (23 Mar 2021 05:30) (85 - 94)  BP: 151/86 (23 Mar 2021 05:30) (134/57 - 179/91)  BP(mean): --  RR: 18 (23 Mar 2021 05:30) (18 - 18)  SpO2: 95% (23 Mar 2021 05:30) (94% - 96%)    PHYSICAL EXAM:  GENERAL: NAD, well-groomed, well-developed  HEAD:  Atraumatic, Normocephalic  EYES: EOMI, PERRLA, conjunctiva and sclera clear  ENMT: No tonsillar erythema, exudates, or enlargement; Moist mucous membranes, Good dentition, No lesions  NECK: Supple, No JVD, Normal thyroid  NERVOUS SYSTEM:  Alert & Oriented X3, Good concentration; Motor Strength 5/5 B/L upper and lower extremities; DTRs 2+ intact and symmetric  CHEST/LUNG: Clear to percussion bilaterally; No rales, rhonchi, wheezing, or rubs  HEART: Regular rate and rhythm; No murmurs, rubs, or gallops  ABDOMEN: Soft, Nontender, Nondistended; Bowel sounds present  EXTREMITIES:  2+ Peripheral Pulses, No clubbing, cyanosis, or edema  LYMPH: No lymphadenopathy noted  SKIN: No rashes or lesions    LABS:                        11.0   8.33  )-----------( 189      ( 23 Mar 2021 09:29 )             32.3         142  |  110<H>  |  14  ----------------------------<  215<H>  3.9   |  27  |  0.93    Ca    8.0<L>      23 Mar 2021 09:29    TPro  6.4  /  Alb  2.7<L>  /  TBili  0.3  /  DBili  x   /  AST  12<L>  /  ALT  23  /  AlkPhos  57          Urinalysis Basic - ( 21 Mar 2021 13:09 )    Color: Yellow / Appearance: Clear / S.005 / pH: x  Gluc: x / Ketone: Negative  / Bili: Negative / Urobili: Negative mg/dL   Blood: x / Protein: 100 mg/dL / Nitrite: Negative   Leuk Esterase: Negative / RBC: 3-5 /HPF / WBC x   Sq Epi: x / Non Sq Epi: Few / Bacteria: Few      CAPILLARY BLOOD GLUCOSE      POCT Blood Glucose.: 219 mg/dL (23 Mar 2021 08:03)  POCT Blood Glucose.: 286 mg/dL (22 Mar 2021 22:01)  POCT Blood Glucose.: 280 mg/dL (22 Mar 2021 16:26)  POCT Blood Glucose.: 228 mg/dL (22 Mar 2021 11:13)                RADIOLOGY & ADDITIONAL TESTS:    Imaging Personally Reviewed:  [ ] YES  [ ] NO    Consultant(s) Notes Reviewed:  [ ] YES  [ ] NO    Care Discussed with Consultants/Other Providers [ ] YES  [ ] NO    Care discussed with family,         [  ]   yes  [  ]  No    imp:    stable[ ]    unstable[  ]     improving [ x  ]       unchanged  [  ]                Plans:  Continue present plans  [x  ] continuos  abx as per ID.               New consult [  ]   specialty  .......               order test[  ]    test name.                  Discharge Planning  [  ]           
   60 yo male with PMH HTN, IDDM presents to ED for evaluation of LLQ pain and paronychia over left 4th digit.. patient also has ulceration of the left scrotum for 2 weeks.    Abd pain began about 2 days ago, associated with nausea and NBNB vomiting. Denies     (21 Mar 2021 11:44)      Allergies    No Known Allergies    Intolerances        MEDICATIONS  (STANDING):  azithromycin  IVPB 500 milliGRAM(s) IV Intermittent every 24 hours  cefTRIAXone   IVPB 1000 milliGRAM(s) IV Intermittent every 24 hours  clotrimazole 1% Cream 1 Application(s) Topical two times a day  dextrose 40% Gel 15 Gram(s) Oral once  dextrose 5%. 1000 milliLiter(s) (50 mL/Hr) IV Continuous <Continuous>  dextrose 5%. 1000 milliLiter(s) (100 mL/Hr) IV Continuous <Continuous>  dextrose 50% Injectable 25 Gram(s) IV Push once  dextrose 50% Injectable 12.5 Gram(s) IV Push once  dextrose 50% Injectable 25 Gram(s) IV Push once  glucagon  Injectable 1 milliGRAM(s) IntraMuscular once  heparin   Injectable 5000 Unit(s) SubCutaneous every 12 hours  influenza   Vaccine 0.5 milliLiter(s) IntraMuscular once  insulin glargine Injectable (LANTUS) 20 Unit(s) SubCutaneous at bedtime  insulin lispro (ADMELOG) corrective regimen sliding scale   SubCutaneous three times a day before meals  insulin lispro (ADMELOG) corrective regimen sliding scale   SubCutaneous at bedtime  losartan 50 milliGRAM(s) Oral daily  senna 2 Tablet(s) Oral at bedtime  sodium chloride 0.9%. 1000 milliLiter(s) (80 mL/Hr) IV Continuous <Continuous>  tamsulosin 0.4 milliGRAM(s) Oral at bedtime        REVIEW OF SYSTEMS:    CONSTITUTIONAL: No fever, chills, weight loss, or fatigue  HEENT: No sore throat, runny nose, ear ache  RESPIRATORY: No cough, wheezing, No shortness of breath  CARDIOVASCULAR: No chest pain, palpitations, dizziness  GASTROINTESTINAL: No abdominal pain. No nausea, vomiting, diarrhea  GENITOURINARY: No dysuria, increase frequency, hematuria, or incontinence  NEUROLOGICAL: No headaches, memory loss, loss of strength, numbness, or tremors, no weakness  EXTREMITY: No pedal edema BLE  SKIN: No itching, burning, rashes, or lesions     VITAL SIGNS:  T(C): 37.4 (03-23-21 @ 05:30), Max: 37.4 (21 @ 05:30)  T(F): 99.4 (21 @ 05:30), Max: 99.4 (21 @ 05:30)  HR: 89 (21 @ 05:30) (85 - 94)  BP: 151/86 (21 @ 05:30) (134/57 - 179/91)  RR: 18 (21 @ 05:30) (18 - 18)  SpO2: 95% (21 @ 05:30) (94% - 96%)  Wt(kg): --    PHYSICAL EXAM:    GENERAL: not in any distress  CHEST/LUNG: Clear to percussion bilaterally; No rales, rhonchi, wheezing  HEART: Regular rate and rhythm; No murmurs, rubs, or gallops  ABDOMEN: Soft, Nontender, Nondistended; Bowel sounds present, no rebound   EXTREMITIES:  2+ Peripheral Pulses, No clubbing, cyanosis, or edema  GENITOURINARY:  left scrotum ulcer with surrounding skin erythema and scant purulent discharge   SKIN: No rashes or lesions  BACK: no pressor sore   NERVOUS SYSTEM:  Alert & Oriented     LABS:                         11.0   8.33  )-----------( 189      ( 23 Mar 2021 09:29 )             32.3     03-    142  |  110<H>  |  14  ----------------------------<  215<H>  3.9   |  27  |  0.93    Ca    8.0<L>      23 Mar 2021 09:29    TPro  6.4  /  Alb  2.7<L>  /  TBili  0.3  /  DBili  x   /  AST  12<L>  /  ALT  23  /  AlkPhos  57  03-22    LIVER FUNCTIONS - ( 22 Mar 2021 09:58 )  Alb: 2.7 g/dL / Pro: 6.4 gm/dL / ALK PHOS: 57 U/L / ALT: 23 U/L / AST: 12 U/L / GGT: x             Urinalysis Basic - ( 21 Mar 2021 13:09 )    Color: Yellow / Appearance: Clear / S.005 / pH: x  Gluc: x / Ketone: Negative  / Bili: Negative / Urobili: Negative mg/dL   Blood: x / Protein: 100 mg/dL / Nitrite: Negative   Leuk Esterase: Negative / RBC: 3-5 /HPF / WBC x   Sq Epi: x / Non Sq Epi: Few / Bacteria: Few                Sedimentation Rate, Erythrocyte: 82 mm/hr ( @ 12:56)            Culture Results:   Numerous Streptococcus pyogenes (Group A)  Penicillin and ampicillin are drugs of choice for  treatment of beta-hemolytic streptococcal infections.  Susceptibility testing is not performed routinely because  S. pyogenes (GAS) is universally susceptible to penicillin  and resistance in other strains is extremely rare. ( @ 18:12)  Culture Results:   No growth ( @ 17:11)  Culture Results:   No growth to date. ( @ 10:52)  Culture Results:   No growth to date. ( @ 10:52)                Radiology:      
HPI:   62 yo male with PMH HTN, IDDM presents to ED for evaluation of LLQ pain and paronychia over left 4th digit.. patient also has ulceration of the left scrotum for 2 weeks.    Abd pain began about 2 days ago, associated with nausea and NBNB vomiting. Denies     (21 Mar 2021 11:44)      Allergies    No Known Allergies    Intolerances        MEDICATIONS  (STANDING):  azithromycin  IVPB 500 milliGRAM(s) IV Intermittent every 24 hours  cefTRIAXone   IVPB 1000 milliGRAM(s) IV Intermittent every 24 hours  clotrimazole 1% Cream 1 Application(s) Topical two times a day  dextrose 40% Gel 15 Gram(s) Oral once  dextrose 5%. 1000 milliLiter(s) (50 mL/Hr) IV Continuous <Continuous>  dextrose 5%. 1000 milliLiter(s) (100 mL/Hr) IV Continuous <Continuous>  dextrose 50% Injectable 25 Gram(s) IV Push once  dextrose 50% Injectable 12.5 Gram(s) IV Push once  dextrose 50% Injectable 25 Gram(s) IV Push once  glucagon  Injectable 1 milliGRAM(s) IntraMuscular once  heparin   Injectable 5000 Unit(s) SubCutaneous every 12 hours  influenza   Vaccine 0.5 milliLiter(s) IntraMuscular once  insulin glargine Injectable (LANTUS) 20 Unit(s) SubCutaneous at bedtime  insulin lispro (ADMELOG) corrective regimen sliding scale   SubCutaneous three times a day before meals  insulin lispro (ADMELOG) corrective regimen sliding scale   SubCutaneous at bedtime  lactobacillus acidophilus 1 Tablet(s) Oral daily  losartan 50 milliGRAM(s) Oral daily  senna 2 Tablet(s) Oral at bedtime  sodium chloride 0.9%. 1000 milliLiter(s) (80 mL/Hr) IV Continuous <Continuous>  tamsulosin 0.4 milliGRAM(s) Oral at bedtime        REVIEW OF SYSTEMS: poor historian     CONSTITUTIONAL: No fever,  RESPIRATORY: No cough,  CARDIOVASCULAR: No chest pain, palpitations, dizziness  GASTROINTESTINAL: No abdominal pain  GENITOURINARY: No dysuria,  NEUROLOGICAL: No headaches  EXTREMITY: No pedal edema BLE  SKIN: No itching    VITAL SIGNS:  T(C): 37.3 (03-24-21 @ 05:39), Max: 37.3 (03-24-21 @ 05:39)  T(F): 99.1 (03-24-21 @ 05:39), Max: 99.1 (03-24-21 @ 05:39)  HR: 94 (03-24-21 @ 05:39) (76 - 94)  BP: 152/84 (03-24-21 @ 05:39) (152/84 - 187/104)  RR: 18 (03-24-21 @ 05:39) (18 - 18)  SpO2: 94% (03-24-21 @ 05:39) (94% - 98%)  Wt(kg): --    PHYSICAL EXAM:    GENERAL: not in any distress  HEENT: Neck is supple, normocephalic, atraumatic   CHEST/LUNG: Clear to percussion bilaterally; No rales, rhonchi, wheezing  HEART: Regular rate and rhythm; No murmurs, rubs, or gallops  ABDOMEN: Soft, Nontender, Nondistended; Bowel sounds present, no rebound   EXTREMITIES:  2+ Peripheral Pulses, No clubbing, cyanosis, or edema  GENITOURINARY:  left scrotum ulcer - less discharge , minimal draining      SKIN: No rashes or lesions  BACK: no pressor sore   NERVOUS SYSTEM:  Alert & Oriented     LABS:                         11.0   8.33  )-----------( 189      ( 23 Mar 2021 09:29 )             32.3     03-23    142  |  110<H>  |  14  ----------------------------<  215<H>  3.9   |  27  |  0.93    Ca    8.0<L>      23 Mar 2021 09:29                      Sedimentation Rate, Erythrocyte: 82 mm/hr (03-22 @ 12:56)            Culture Results:   Numerous Streptococcus pyogenes (Group A)  Penicillin and ampicillin are drugs of choice for  treatment of beta-hemolytic streptococcal infections.  Susceptibility testing is not performed routinely because  S. pyogenes (GAS) is universally susceptible to penicillin  and resistance in other strains is extremely rare. (03-21 @ 18:12)  Culture Results:   No growth (03-21 @ 17:11)  Culture Results:   No growth to date. (03-21 @ 10:52)  Culture Results:   No growth to date. (03-21 @ 10:52)                Radiology:      
HPI:   62 yo male with PMH HTN, IDDM presents to ED for evaluation of LLQ pain and paronychia over left 4th digit.. patient also has ulceration of the left scrotum for 2 weeks.    Abd pain began about 2 days ago, associated with nausea and NBNB vomiting. Denies     (21 Mar 2021 11:44)      Allergies    No Known Allergies    Intolerances        MEDICATIONS  (STANDING):  cefTRIAXone   IVPB 1000 milliGRAM(s) IV Intermittent every 24 hours  clotrimazole 1% Cream 1 Application(s) Topical two times a day  dextrose 40% Gel 15 Gram(s) Oral once  dextrose 5%. 1000 milliLiter(s) (50 mL/Hr) IV Continuous <Continuous>  dextrose 5%. 1000 milliLiter(s) (100 mL/Hr) IV Continuous <Continuous>  dextrose 50% Injectable 25 Gram(s) IV Push once  dextrose 50% Injectable 12.5 Gram(s) IV Push once  dextrose 50% Injectable 25 Gram(s) IV Push once  glucagon  Injectable 1 milliGRAM(s) IntraMuscular once  heparin   Injectable 5000 Unit(s) SubCutaneous every 12 hours  insulin glargine Injectable (LANTUS) 20 Unit(s) SubCutaneous at bedtime  insulin lispro (ADMELOG) corrective regimen sliding scale   SubCutaneous three times a day before meals  insulin lispro (ADMELOG) corrective regimen sliding scale   SubCutaneous at bedtime  lactobacillus acidophilus 1 Tablet(s) Oral daily  losartan 100 milliGRAM(s) Oral daily  senna 2 Tablet(s) Oral at bedtime  sodium chloride 0.9%. 1000 milliLiter(s) (80 mL/Hr) IV Continuous <Continuous>  tamsulosin 0.4 milliGRAM(s) Oral at bedtime    MEDICATIONS  (PRN):  ibuprofen  Tablet. 600 milliGRAM(s) Oral three times a day PRN Temp greater or equal to 38C (100.4F), Moderate Pain (4 - 6)  magnesium hydroxide Suspension 30 milliLiter(s) Oral daily PRN Constipation      REVIEW OF SYSTEMS:    CONSTITUTIONAL: No fever,  RESPIRATORY: No cough, wheezing, No shortness of breath  CARDIOVASCULAR: No chest pain, palpitations, dizziness  GASTROINTESTINAL: No abdominal pain. No nausea, vomiting, diarrhea  GENITOURINARY: No dysuria, increase frequency, hematuria, or incontinence  NEUROLOGICAL: No headaches  EXTREMITY: No pedal edema BLE  SKIN: No itching, burning, rashes, or lesions     VITAL SIGNS:  T(C): 37.5 (03-25-21 @ 05:36), Max: 37.5 (03-25-21 @ 05:36)  T(F): 99.5 (03-25-21 @ 05:36), Max: 99.5 (03-25-21 @ 05:36)  HR: 99 (03-25-21 @ 05:36) (85 - 99)  BP: 137/67 (03-25-21 @ 05:36) (137/67 - 187/94)  RR: 18 (03-25-21 @ 05:36) (18 - 18)  SpO2: 93% (03-25-21 @ 05:36) (93% - 96%)  Wt(kg): --    PHYSICAL EXAM:    GENERAL: not in any distress  HEENT: Neck is supple, normocephalic, atraumatic   CHEST/LUNG: Clear to percussion bilaterally; No rales, rhonchi, wheezing  HEART: Regular rate and rhythm; No murmurs, rubs, or gallops  ABDOMEN: Soft, Nontender, Nondistended; Bowel sounds present, no rebound   EXTREMITIES:  2+ Peripheral Pulses, No clubbing, cyanosis, or edema  GENITOURINARY: less draining scrotum ulcer , healing better   SKIN: No rashes or lesions  BACK: no pressor sore   NERVOUS SYSTEM:  Alert & Oriented     LABS:                           Sedimentation Rate, Erythrocyte: 76 mm/hr (03-24 @ 11:14)  Sedimentation Rate, Erythrocyte: 82 mm/hr (03-22 @ 12:56)            Culture Results:   Numerous Streptococcus pyogenes (Group A)  Penicillin and ampicillin are drugs of choice for  treatment of beta-hemolytic streptococcal infections.  Susceptibility testing is not performed routinely because  S. pyogenes (GAS) is universally susceptible to penicillin  and resistance in other strains is extremely rare.  Few Coag Negative Staphylococcus "Susceptibilities not performed" (03-21 @ 18:12)  Culture Results:   No growth (03-21 @ 17:11)  Culture Results:   No growth to date. (03-21 @ 10:52)  Culture Results:   No growth to date. (03-21 @ 10:52)                Radiology:      
Patient seen and examined bedside resting comfortably.  No complaints offered.   Voiding spontaneously without difficulty.  Tolerating diet      T(F): 97.7 (03-22-21 @ 11:57), Max: 98.4 (03-21-21 @ 17:57)  HR: 94 (03-22-21 @ 11:57) (76 - 94)  BP: 134/57 (03-22-21 @ 11:57) (124/80 - 155/92)  RR: 18 (03-22-21 @ 11:57) (17 - 18)  SpO2: 96% (03-22-21 @ 11:57) (94% - 97%)        POCT Blood Glucose.: 228 mg/dL (22 Mar 2021 11:13)  POCT Blood Glucose.: 255 mg/dL (22 Mar 2021 07:38)  POCT Blood Glucose.: 472 mg/dL (21 Mar 2021 21:47)  POCT Blood Glucose.: 454 mg/dL (21 Mar 2021 21:46)  POCT Blood Glucose.: 241 mg/dL (21 Mar 2021 16:27)  POCT Blood Glucose.: 355 mg/dL (21 Mar 2021 14:23)      PHYSICAL EXAM:    General: NAD, alert and awake  HEENT: NCAT, EOMI, conjunctiva clear  Chest: nonlabored respirations, CTA b/l.  Abdomen: soft, NT/ND.   Extremities: Calf soft, nontender b/l.   : No suprapubic tenderness or bladder distention.  Approx 1cm ulcer with purulent drainage in the left groin. Not tender to palpation.     LABS:                        11.3   9.31  )-----------( 183      ( 22 Mar 2021 09:58 )             33.5   03-22    140  |  108  |  22  ----------------------------<  252<H>  3.7   |  30  |  1.18    Ca    8.0<L>      22 Mar 2021 09:58    TPro  6.4  /  Alb  2.7<L>  /  TBili  0.3  /  DBili  x   /  AST  12<L>  /  ALT  23  /  AlkPhos  57  03-22  PT/INR - ( 21 Mar 2021 07:52 )   PT: 13.2 sec;   INR: 1.15 ratio         PTT - ( 21 Mar 2021 07:52 )  PTT:30.8 sec  I&O's Detail    21 Mar 2021 07:01  -  22 Mar 2021 07:00  --------------------------------------------------------  IN:    Oral Fluid: 475 mL  Total IN: 475 mL    OUT:    Voided (mL): 400 mL  Total OUT: 400 mL    Total NET: 75 mL      
Patient is a 61y old  Male who presents with a chief complaint of scrotal infection (24 Mar 2021 10:19)     has ulcer on left scrotum which is draining culture sent     paronychia is improving     No fever  INTERVAL HPI/OVERNIGHT EVENTS:  PAST MEDICAL & SURGICAL HISTORY:  HTN (hypertension)  medical managenent    DM (diabetes mellitus)  On Insulin    No significant past surgical history        MEDICATIONS  (STANDING):  azithromycin  IVPB 500 milliGRAM(s) IV Intermittent every 24 hours  cefTRIAXone   IVPB 1000 milliGRAM(s) IV Intermittent every 24 hours  clotrimazole 1% Cream 1 Application(s) Topical two times a day  dextrose 40% Gel 15 Gram(s) Oral once  dextrose 5%. 1000 milliLiter(s) (50 mL/Hr) IV Continuous <Continuous>  dextrose 5%. 1000 milliLiter(s) (100 mL/Hr) IV Continuous <Continuous>  dextrose 50% Injectable 25 Gram(s) IV Push once  dextrose 50% Injectable 12.5 Gram(s) IV Push once  dextrose 50% Injectable 25 Gram(s) IV Push once  glucagon  Injectable 1 milliGRAM(s) IntraMuscular once  heparin   Injectable 5000 Unit(s) SubCutaneous every 12 hours  influenza   Vaccine 0.5 milliLiter(s) IntraMuscular once  insulin glargine Injectable (LANTUS) 20 Unit(s) SubCutaneous at bedtime  insulin lispro (ADMELOG) corrective regimen sliding scale   SubCutaneous three times a day before meals  insulin lispro (ADMELOG) corrective regimen sliding scale   SubCutaneous at bedtime  lactobacillus acidophilus 1 Tablet(s) Oral daily  losartan 100 milliGRAM(s) Oral daily  senna 2 Tablet(s) Oral at bedtime  sodium chloride 0.9%. 1000 milliLiter(s) (80 mL/Hr) IV Continuous <Continuous>  tamsulosin 0.4 milliGRAM(s) Oral at bedtime    MEDICATIONS  (PRN):  ibuprofen  Tablet. 600 milliGRAM(s) Oral three times a day PRN Temp greater or equal to 38C (100.4F), Moderate Pain (4 - 6)  magnesium hydroxide Suspension 30 milliLiter(s) Oral daily PRN Constipation      Allergies    No Known Allergies    Intolerances        REVIEW OF SYSTEMS:  CONSTITUTIONAL: No fever, weight loss, or fatigue  EYES: No eye pain, visual disturbances, or discharge  ENMT:  No difficulty hearing, tinnitus, vertigo; No sinus or throat pain  NECK: No pain or stiffness  BREASTS: No pain, masses, or nipple discharge  RESPIRATORY: No cough, wheezing, chills or hemoptysis; No shortness of breath  CARDIOVASCULAR: No chest pain, palpitations, dizziness, or leg swelling  GASTROINTESTINAL: No abdominal or epigastric pain. No nausea, vomiting, or hematemesis; No diarrhea or constipation. No melena or hematochezia.  GENITOURINARY: No dysuria, frequency, hematuria, or incontinence  NEUROLOGICAL: No headaches, memory loss, loss of strength, numbness, or tremors  SKIN: No itching, burning, rashes, or lesions   LYMPH NODES: No enlarged glands  ENDOCRINE: No heat or cold intolerance; No hair loss  MUSCULOSKELETAL: No joint pain or swelling; No muscle, back, or extremity pain  PSYCHIATRIC: No depression, anxiety, mood swings, or difficulty sleeping  HEME/LYMPH: No easy bruising, or bleeding gums  ALLERY AND IMMUNOLOGIC: No hives or eczema    Vital Signs Last 24 Hrs  T(C): 37.3 (24 Mar 2021 05:39), Max: 37.3 (24 Mar 2021 05:39)  T(F): 99.1 (24 Mar 2021 05:39), Max: 99.1 (24 Mar 2021 05:39)  HR: 94 (24 Mar 2021 05:39) (76 - 94)  BP: 152/84 (24 Mar 2021 05:39) (152/84 - 187/104)  BP(mean): --  RR: 18 (24 Mar 2021 05:39) (18 - 18)  SpO2: 94% (24 Mar 2021 05:39) (94% - 98%)    PHYSICAL EXAM:  GENERAL: NAD, well-groomed, well-developed  HEAD:  Atraumatic, Normocephalic  EYES: EOMI, PERRLA, conjunctiva and sclera clear  ENMT: No tonsillar erythema, exudates, or enlargement; Moist mucous membranes, Good dentition, No lesions  NECK: Supple, No JVD, Normal thyroid  NERVOUS SYSTEM:  Alert & Oriented X3, Good concentration; Motor Strength 5/5 B/L upper and lower extremities; DTRs 2+ intact and symmetric  CHEST/LUNG: Clear to percussion bilaterally; No rales, rhonchi, wheezing, or rubs  HEART: Regular rate and rhythm; No murmurs, rubs, or gallops  ABDOMEN: Soft, Nontender, Nondistended; Bowel sounds present  EXTREMITIES:  2+ Peripheral Pulses, No clubbing, cyanosis, or edema   Genitalis- draining ulcer from left scrotum  LYMPH: No lymphadenopathy noted  SKIN: No rashes or lesions    LABS:                        11.0   8.33  )-----------( 189      ( 23 Mar 2021 09:29 )             32.3     03-23    142  |  110<H>  |  14  ----------------------------<  215<H>  3.9   |  27  |  0.93    Ca    8.0<L>      23 Mar 2021 09:29            CAPILLARY BLOOD GLUCOSE      POCT Blood Glucose.: 143 mg/dL (24 Mar 2021 07:25)  POCT Blood Glucose.: 212 mg/dL (23 Mar 2021 21:09)  POCT Blood Glucose.: 216 mg/dL (23 Mar 2021 16:00)  POCT Blood Glucose.: 213 mg/dL (23 Mar 2021 11:15)                RADIOLOGY & ADDITIONAL TESTS:    Imaging Personally Reviewed:  [ ] YES  [ ] NO    Consultant(s) Notes Reviewed:  [x ] YES  [ ] NO    Care Discussed with Consultants/Other Providers [ x] YES  [ ] NO    Care discussed with family,         [  ]   yes  [  ]  No    imp:    stable[x ]    unstable[  ]     improving [   x]       unchanged  [  ]                Plans:  Continue present plans  [x  ] will switch to po augmentin                New consult [  ]   specialty  .......               order test[  ]    test name.                  Discharge Planning  [ x ]

## 2021-03-25 NOTE — DISCHARGE NOTE PROVIDER - NSDCMRMEDTOKEN_GEN_ALL_CORE_FT
amoxicillin-clavulanate 875 mg-125 mg oral tablet: 1 tab(s) orally 2 times a day   Basaglar KwikPen 100 units/mL subcutaneous solution: 14 unit(s) subcutaneous once a day   clotrimazole 1% topical cream: 1 application topically 2 times a day  Fortamet 1000 mg oral tablet, extended release: 1 tab(s) orally 2 times a day   insulin glargine: 14 unit(s) subcutaneous once a day   lactobacillus acidophilus oral capsule: 1 cap(s) orally 2 times a day   losartan 100 mg oral tablet: 1 tab(s) orally once a day  senna oral tablet: 2 tab(s) orally once a day (at bedtime)  tamsulosin 0.4 mg oral capsule: 1 cap(s) orally once a day (at bedtime)

## 2021-03-25 NOTE — DISCHARGE NOTE PROVIDER - HOSPITAL COURSE
62 yo man with history of  IVDU over 25-30 yrs ago. Had hep C, recently treated with [place] for 3 mo and now gone.  patient report he has been clean for many years, not using any illicit drugs and not sexually active for 3 years ? self-reported     nonspecific painful left scrotum small ulcer r/o Chancroid, lymphovenereum vs HSV less likely   left 4th digit paronychia   no fever or leukocytosis   right inguinoscrotal hernia, reducible  CT shows Nonspecific perinephric stranding and fluid. No hydron  Sonogram- No testicular torsion or fluid collection. Post void residual urine volume 89 ml.  urinalysis neg for infection   superficial left 4th digit wound culture with strep pyrogen gp B pending susceptibility   HIV and RPR neg , crp down to 42 from 80   urine GC pcr neg , dc Zithromax   fu blood cultures so far neg   urology on case   I spoke with lab , haemophilus ducreyi wound swab culture was sent to reference lab and not sure duration to be done and final result will take longer and not very inaccurate as per lab on phone , no need to wait   can discharge on po Augmentin   much less draining today , pain improved   personal hygiene and sexual counselling given

## 2021-03-25 NOTE — DISCHARGE NOTE PROVIDER - REASON FOR ADMISSION
Scrotal Infection    Scrotal Ulcer																																																			           scrotal infection

## 2021-03-25 NOTE — DISCHARGE NOTE NURSING/CASE MANAGEMENT/SOCIAL WORK - NSDCVIVACCINE_GEN_ALL_CORE_FT
Severe acute respiratory syndrome coronavirus 2 (SARS-CoV-2) (Coronavirus disease [COVID-19]) vaccine , 2021/3/24 17:36 , Daiana Diallo (CASTRO)

## 2021-03-26 LAB
CULTURE RESULTS: SIGNIFICANT CHANGE UP
HCV RNA FLD QL NAA+PROBE: SIGNIFICANT CHANGE UP
SPECIMEN SOURCE: SIGNIFICANT CHANGE UP

## 2021-03-30 DIAGNOSIS — E44.0 MODERATE PROTEIN-CALORIE MALNUTRITION: ICD-10-CM

## 2021-03-30 DIAGNOSIS — N17.9 ACUTE KIDNEY FAILURE, UNSPECIFIED: ICD-10-CM

## 2021-03-30 DIAGNOSIS — K57.30 DIVERTICULOSIS OF LARGE INTESTINE WITHOUT PERFORATION OR ABSCESS WITHOUT BLEEDING: ICD-10-CM

## 2021-03-30 DIAGNOSIS — E10.9 TYPE 1 DIABETES MELLITUS WITHOUT COMPLICATIONS: ICD-10-CM

## 2021-03-30 DIAGNOSIS — N50.89 OTHER SPECIFIED DISORDERS OF THE MALE GENITAL ORGANS: ICD-10-CM

## 2021-03-30 DIAGNOSIS — N40.0 BENIGN PROSTATIC HYPERPLASIA WITHOUT LOWER URINARY TRACT SYMPTOMS: ICD-10-CM

## 2021-03-30 DIAGNOSIS — K40.90 UNILATERAL INGUINAL HERNIA, WITHOUT OBSTRUCTION OR GANGRENE, NOT SPECIFIED AS RECURRENT: ICD-10-CM

## 2021-03-30 DIAGNOSIS — I10 ESSENTIAL (PRIMARY) HYPERTENSION: ICD-10-CM

## 2021-03-30 DIAGNOSIS — L03.012 CELLULITIS OF LEFT FINGER: ICD-10-CM

## 2021-06-19 ENCOUNTER — EMERGENCY (EMERGENCY)
Facility: HOSPITAL | Age: 61
LOS: 0 days | Discharge: ANOTHER HOSPITAL | End: 2021-06-20
Attending: EMERGENCY MEDICINE
Payer: COMMERCIAL

## 2021-06-19 VITALS
OXYGEN SATURATION: 97 % | HEIGHT: 64 IN | WEIGHT: 139.99 LBS | HEART RATE: 90 BPM | TEMPERATURE: 98 F | DIASTOLIC BLOOD PRESSURE: 97 MMHG | RESPIRATION RATE: 19 BRPM | SYSTOLIC BLOOD PRESSURE: 174 MMHG

## 2021-06-19 DIAGNOSIS — H66.92 OTITIS MEDIA, UNSPECIFIED, LEFT EAR: ICD-10-CM

## 2021-06-19 DIAGNOSIS — Z87.891 PERSONAL HISTORY OF NICOTINE DEPENDENCE: ICD-10-CM

## 2021-06-19 DIAGNOSIS — Z91.19 PATIENT'S NONCOMPLIANCE WITH OTHER MEDICAL TREATMENT AND REGIMEN: ICD-10-CM

## 2021-06-19 DIAGNOSIS — L02.412 CUTANEOUS ABSCESS OF LEFT AXILLA: ICD-10-CM

## 2021-06-19 DIAGNOSIS — I10 ESSENTIAL (PRIMARY) HYPERTENSION: ICD-10-CM

## 2021-06-19 DIAGNOSIS — Z20.822 CONTACT WITH AND (SUSPECTED) EXPOSURE TO COVID-19: ICD-10-CM

## 2021-06-19 DIAGNOSIS — T38.3X6A UNDERDOSING OF INSULIN AND ORAL HYPOGLYCEMIC [ANTIDIABETIC] DRUGS, INITIAL ENCOUNTER: ICD-10-CM

## 2021-06-19 DIAGNOSIS — E78.5 HYPERLIPIDEMIA, UNSPECIFIED: ICD-10-CM

## 2021-06-19 DIAGNOSIS — E11.65 TYPE 2 DIABETES MELLITUS WITH HYPERGLYCEMIA: ICD-10-CM

## 2021-06-19 DIAGNOSIS — Z79.4 LONG TERM (CURRENT) USE OF INSULIN: ICD-10-CM

## 2021-06-19 DIAGNOSIS — H40.9 UNSPECIFIED GLAUCOMA: ICD-10-CM

## 2021-06-19 DIAGNOSIS — L02.411 CUTANEOUS ABSCESS OF RIGHT AXILLA: ICD-10-CM

## 2021-06-19 DIAGNOSIS — Z91.128 PATIENT'S INTENTIONAL UNDERDOSING OF MEDICATION REGIMEN FOR OTHER REASON: ICD-10-CM

## 2021-06-19 DIAGNOSIS — H92.02 OTALGIA, LEFT EAR: ICD-10-CM

## 2021-06-19 DIAGNOSIS — Z91.14 PATIENT'S OTHER NONCOMPLIANCE WITH MEDICATION REGIMEN: ICD-10-CM

## 2021-06-19 DIAGNOSIS — H60.22 MALIGNANT OTITIS EXTERNA, LEFT EAR: ICD-10-CM

## 2021-06-19 LAB
ALBUMIN SERPL ELPH-MCNC: 3.1 G/DL — LOW (ref 3.3–5)
ALP SERPL-CCNC: 72 U/L — SIGNIFICANT CHANGE UP (ref 40–120)
ALT FLD-CCNC: 26 U/L — SIGNIFICANT CHANGE UP (ref 12–78)
ANION GAP SERPL CALC-SCNC: 3 MMOL/L — LOW (ref 5–17)
APPEARANCE UR: CLEAR — SIGNIFICANT CHANGE UP
APTT BLD: 24.6 SEC — LOW (ref 27.5–35.5)
AST SERPL-CCNC: 22 U/L — SIGNIFICANT CHANGE UP (ref 15–37)
BASOPHILS # BLD AUTO: 0.05 K/UL — SIGNIFICANT CHANGE UP (ref 0–0.2)
BASOPHILS NFR BLD AUTO: 0.5 % — SIGNIFICANT CHANGE UP (ref 0–2)
BILIRUB SERPL-MCNC: 0.3 MG/DL — SIGNIFICANT CHANGE UP (ref 0.2–1.2)
BILIRUB UR-MCNC: NEGATIVE — SIGNIFICANT CHANGE UP
BUN SERPL-MCNC: 21 MG/DL — SIGNIFICANT CHANGE UP (ref 7–23)
CALCIUM SERPL-MCNC: 9.3 MG/DL — SIGNIFICANT CHANGE UP (ref 8.5–10.1)
CHLORIDE SERPL-SCNC: 105 MMOL/L — SIGNIFICANT CHANGE UP (ref 96–108)
CO2 SERPL-SCNC: 30 MMOL/L — SIGNIFICANT CHANGE UP (ref 22–31)
COLOR SPEC: YELLOW — SIGNIFICANT CHANGE UP
CREAT SERPL-MCNC: 1.05 MG/DL — SIGNIFICANT CHANGE UP (ref 0.5–1.3)
DIFF PNL FLD: ABNORMAL
EOSINOPHIL # BLD AUTO: 0.11 K/UL — SIGNIFICANT CHANGE UP (ref 0–0.5)
EOSINOPHIL NFR BLD AUTO: 1.2 % — SIGNIFICANT CHANGE UP (ref 0–6)
FLUAV AG NPH QL: SIGNIFICANT CHANGE UP
FLUBV AG NPH QL: SIGNIFICANT CHANGE UP
GLUCOSE BLDC GLUCOMTR-MCNC: 304 MG/DL — HIGH (ref 70–99)
GLUCOSE SERPL-MCNC: 318 MG/DL — HIGH (ref 70–99)
GLUCOSE UR QL: 1000 MG/DL
HCT VFR BLD CALC: 40.9 % — SIGNIFICANT CHANGE UP (ref 39–50)
HGB BLD-MCNC: 13.7 G/DL — SIGNIFICANT CHANGE UP (ref 13–17)
IMM GRANULOCYTES NFR BLD AUTO: 0.3 % — SIGNIFICANT CHANGE UP (ref 0–1.5)
INR BLD: 1.05 RATIO — SIGNIFICANT CHANGE UP (ref 0.88–1.16)
KETONES UR-MCNC: NEGATIVE — SIGNIFICANT CHANGE UP
LACTATE SERPL-SCNC: 0.7 MMOL/L — SIGNIFICANT CHANGE UP (ref 0.7–2)
LEUKOCYTE ESTERASE UR-ACNC: NEGATIVE — SIGNIFICANT CHANGE UP
LYMPHOCYTES # BLD AUTO: 2.75 K/UL — SIGNIFICANT CHANGE UP (ref 1–3.3)
LYMPHOCYTES # BLD AUTO: 29.4 % — SIGNIFICANT CHANGE UP (ref 13–44)
MAGNESIUM SERPL-MCNC: 2.4 MG/DL — SIGNIFICANT CHANGE UP (ref 1.6–2.6)
MCHC RBC-ENTMCNC: 31.6 PG — SIGNIFICANT CHANGE UP (ref 27–34)
MCHC RBC-ENTMCNC: 33.5 GM/DL — SIGNIFICANT CHANGE UP (ref 32–36)
MCV RBC AUTO: 94.5 FL — SIGNIFICANT CHANGE UP (ref 80–100)
MONOCYTES # BLD AUTO: 0.67 K/UL — SIGNIFICANT CHANGE UP (ref 0–0.9)
MONOCYTES NFR BLD AUTO: 7.2 % — SIGNIFICANT CHANGE UP (ref 2–14)
NEUTROPHILS # BLD AUTO: 5.75 K/UL — SIGNIFICANT CHANGE UP (ref 1.8–7.4)
NEUTROPHILS NFR BLD AUTO: 61.4 % — SIGNIFICANT CHANGE UP (ref 43–77)
NITRITE UR-MCNC: NEGATIVE — SIGNIFICANT CHANGE UP
NRBC # BLD: 0 /100 WBCS — SIGNIFICANT CHANGE UP (ref 0–0)
PH UR: 5 — SIGNIFICANT CHANGE UP (ref 5–8)
PLATELET # BLD AUTO: 226 K/UL — SIGNIFICANT CHANGE UP (ref 150–400)
POTASSIUM SERPL-MCNC: 4.7 MMOL/L — SIGNIFICANT CHANGE UP (ref 3.5–5.3)
POTASSIUM SERPL-SCNC: 4.7 MMOL/L — SIGNIFICANT CHANGE UP (ref 3.5–5.3)
PROT SERPL-MCNC: 8.2 GM/DL — SIGNIFICANT CHANGE UP (ref 6–8.3)
PROT UR-MCNC: 500 MG/DL
PROTHROM AB SERPL-ACNC: 12.1 SEC — SIGNIFICANT CHANGE UP (ref 10.6–13.6)
RBC # BLD: 4.33 M/UL — SIGNIFICANT CHANGE UP (ref 4.2–5.8)
RBC # FLD: 11.9 % — SIGNIFICANT CHANGE UP (ref 10.3–14.5)
SARS-COV-2 RNA SPEC QL NAA+PROBE: SIGNIFICANT CHANGE UP
SODIUM SERPL-SCNC: 138 MMOL/L — SIGNIFICANT CHANGE UP (ref 135–145)
SP GR SPEC: 1.01 — SIGNIFICANT CHANGE UP (ref 1.01–1.02)
UROBILINOGEN FLD QL: NEGATIVE MG/DL — SIGNIFICANT CHANGE UP
WBC # BLD: 9.36 K/UL — SIGNIFICANT CHANGE UP (ref 3.8–10.5)
WBC # FLD AUTO: 9.36 K/UL — SIGNIFICANT CHANGE UP (ref 3.8–10.5)

## 2021-06-19 PROCEDURE — 70450 CT HEAD/BRAIN W/O DYE: CPT | Mod: 26,MA

## 2021-06-19 PROCEDURE — 71045 X-RAY EXAM CHEST 1 VIEW: CPT | Mod: 26

## 2021-06-19 PROCEDURE — 99285 EMERGENCY DEPT VISIT HI MDM: CPT

## 2021-06-19 RX ORDER — INSULIN HUMAN 100 [IU]/ML
5 INJECTION, SOLUTION SUBCUTANEOUS ONCE
Refills: 0 | Status: COMPLETED | OUTPATIENT
Start: 2021-06-19 | End: 2021-06-19

## 2021-06-19 RX ORDER — SODIUM CHLORIDE 9 MG/ML
1000 INJECTION INTRAMUSCULAR; INTRAVENOUS; SUBCUTANEOUS ONCE
Refills: 0 | Status: COMPLETED | OUTPATIENT
Start: 2021-06-19 | End: 2021-06-19

## 2021-06-19 RX ORDER — OFLOXACIN 0.3 %
10 DROPS OPHTHALMIC (EYE) ONCE
Refills: 0 | Status: COMPLETED | OUTPATIENT
Start: 2021-06-19 | End: 2021-06-19

## 2021-06-19 RX ORDER — CIPROFLOXACIN HCL 0.3 %
4 DROPS OPHTHALMIC (EYE) ONCE
Refills: 0 | Status: DISCONTINUED | OUTPATIENT
Start: 2021-06-19 | End: 2021-06-19

## 2021-06-19 RX ORDER — VANCOMYCIN HCL 1 G
1000 VIAL (EA) INTRAVENOUS ONCE
Refills: 0 | Status: COMPLETED | OUTPATIENT
Start: 2021-06-19 | End: 2021-06-19

## 2021-06-19 RX ORDER — CIPROFLOXACIN LACTATE 400MG/40ML
400 VIAL (ML) INTRAVENOUS ONCE
Refills: 0 | Status: COMPLETED | OUTPATIENT
Start: 2021-06-19 | End: 2021-06-19

## 2021-06-19 RX ADMIN — Medication 200 MILLIGRAM(S): at 21:26

## 2021-06-19 RX ADMIN — Medication 250 MILLIGRAM(S): at 22:59

## 2021-06-19 RX ADMIN — Medication 1000 MILLIGRAM(S): at 23:59

## 2021-06-19 RX ADMIN — SODIUM CHLORIDE 1000 MILLILITER(S): 9 INJECTION INTRAMUSCULAR; INTRAVENOUS; SUBCUTANEOUS at 20:31

## 2021-06-19 RX ADMIN — INSULIN HUMAN 5 UNIT(S): 100 INJECTION, SOLUTION SUBCUTANEOUS at 23:35

## 2021-06-19 RX ADMIN — SODIUM CHLORIDE 1000 MILLILITER(S): 9 INJECTION INTRAMUSCULAR; INTRAVENOUS; SUBCUTANEOUS at 23:34

## 2021-06-19 RX ADMIN — SODIUM CHLORIDE 1000 MILLILITER(S): 9 INJECTION INTRAMUSCULAR; INTRAVENOUS; SUBCUTANEOUS at 21:30

## 2021-06-19 RX ADMIN — Medication 400 MILLIGRAM(S): at 22:26

## 2021-06-19 RX ADMIN — Medication 10 DROP(S): at 23:02

## 2021-06-19 NOTE — ED PROVIDER NOTE - PHYSICAL EXAMINATION
Gen: Alert, NAD, well appearing  Head: NC, AT, EOMI, normal lids/conjunctiva  ENT: normal hearing, patent oropharynx without erythema/exudate, uvula midline, right ear with significant cerumen obscuring TM, left ear canal swollen with purulent drainage, BL mastoids NT  Neck: +supple, no tenderness, +Trachea midline  Pulm: Bilateral BS, normal resp effort, no wheeze/stridor/retractions  CV: RRR, no M/R/G, +dist pulses  Abd: soft, NT/ND, Negative [place] signs, +BS, no palpable masses  Mskel: no edema/erythema/cyanosis  Skin: no rash, warm/dry, cluster of nondraining abscess in right and left axilla, left upper thigh abscess is draining  Neuro: AAOx3, no apparent sensory/motor deficits, coordination intact Gen: Alert, NAD, well appearing  Head: NC, AT, EOMI, normal lids/conjunctiva  ENT: normal hearing, patent oropharynx without erythema/exudate, uvula midline, right ear with significant cerumen obscuring TM, left ear canal swollen with purulent drainage, BL mastoids NT  Neck: +supple, no tenderness, +Trachea midline  Pulm: Bilateral BS, normal resp effort, no wheeze/stridor/retractions  CV: RRR, no M/R/G, +dist pulses  Abd: soft, NT/ND, Negative [place] signs, +BS, no palpable masses  Mskel: no edema/erythema/cyanosis  Skin: no rash, warm/dry, confluent cluster of nondraining abscess in right and left axilla, left upper thigh abscess is draining  Neuro: AAOx3, no apparent sensory/motor deficits, coordination intact

## 2021-06-19 NOTE — ED ADULT NURSE NOTE - ED STAT RN HANDOFF DETAILS
Report given to Darleen ERAZO. Patient is A&Ox4. Patient complains of left ear pain and abscesses on the bilateral armpits and on the left groin that is draining. Abscesses have been present for 1 week and the left ear pain with ringing has been present x1day. Patient denies changing in his hearing. Patient has IV access in the left AC 20g with no fluids running at this time. Patient received all medications with no adverse reactions noted. All concerns endorsed to oncoming RN.

## 2021-06-19 NOTE — ED ADULT NURSE REASSESSMENT NOTE - NS ED NURSE REASSESS COMMENT FT1
Patient BP is elevated even after administration of Labetalol IVP. Dr. Bob made aware and okay with transfer to [facility] contacted as per EMS crew and receiving physician is okay with transfer because patient is noncompliant with BP medications usually.

## 2021-06-19 NOTE — ED ADULT TRIAGE NOTE - CHIEF COMPLAINT QUOTE
left ear pain x 1 day, left groin, bilateral armpit abscess/infection with pus x1 week, denies fever/chills  hx dm, htn, glaucoma

## 2021-06-19 NOTE — ED PROVIDER NOTE - CLINICAL SUMMARY MEDICAL DECISION MAKING FREE TEXT BOX
Patient with poorly controlled DM with multiple issues.  VSS.  Has glucose 318, no DKA.  Exam and history concerning for malignant OM, needs ENT and admission.  Also needs surgery consult for multiple abscess drainage. No concern for nec fasc.  D/w Dr. Chairez ENT via transfer center and Dr. Perez, ER at [facility].  Patient will require transfer for ongoing management and is consenting to transfer.

## 2021-06-19 NOTE — ED PROVIDER NOTE - IV ALTEPLASE EXCL ABS HIDDEN
Assessment: Follow up with [facility] today, no change in intake or food choices, finds cooking for herself is much better than getting prepared meals. Has but back on sweets and chips but does allow herself some on occasion.  Wt down 3# since last visit, today's wet 177#, Gardenia has been losing wt at each visit which she is very happy about.  Continues to walk two times daily at her building and now attends adult day two days per week which she enjoys.    Bg noted and all WNL, fasting < 130 mg/dl and PC < 140 mg/dl.  No diabetes medication at this time, continues with diet control.    Breakfast, cereal and toast or eggs and toast, lunch is generally a sandwich, and dinner is sandwich or frozen meals, purchased lean cuisine and healthy choice meals as suggested at last visit. Snacks as desired on fruit or chips/ sweet on occasion.     Doing well with current dm mgmt. Follow up 9.2017 with MD and CDE.     Plan: as above    Subjective and Objective:      Gardenia Muller is referred by  for Diabetes Education.     Lab Results   Component Value Date    HGBA1C 6.4 (H) 04/12/2017         Current diabetes medications:  none    Goals       monitoring            1. Maintain A1c < 7.0.  3.16.16  MET            Follow up:   CDE (certified diabetic educator)      Education:     Monitoring   Meter (per above goals): Competent  Monitoring: Competent  BG goals: Assessed and Discussed    Nutrition Management  Nutrition Management: Discussed and Competent  Weight: Assessed and Discussed  Portions/Balance: Competent  Carb ID/Count: Competent  Label Reading: Competent  Heart Healthy Fats: Competent  Menu Planning: Discussed  Physical Activity: Discussed and Competent    Acute Complications: Prevent, Detect, Treat:  Hypoglycemia: Assessed  Hyperglycemia: Assessed    Chronic Complications  Foot Care:Competent  Skin Care: Competent  Eye: Competent  ABC: Assessed  Teeth:Competent  Goal Setting and Problem Solving:  Assessed  Barriers: Assessed  Psychosocial Adjustments: Assessed      Time spent with the patient: 30 minutes for diabetes education and counseling.   Previous Education: yes  Visit Type:MNT  Hours Remaining: DSMT 2 and MNT 45 min      Lolis Young  5/24/2017              show

## 2021-06-19 NOTE — ED PROVIDER NOTE - CARE PLAN
Principal Discharge DX:	Acute malignant otitis externa of left ear  Secondary Diagnosis:	Abscesses of both axillae  Secondary Diagnosis:	Hyperglycemia

## 2021-06-19 NOTE — ED ADULT NURSE NOTE - OBJECTIVE STATEMENT
Patient received at bed 30. Patient is A&Ox4. Patient reports left ear pain and discomfort x1 day. Patient reports abscesses on the left and right armpit-and on the left groin that is currently draining. Patient reports ringing in the ear, but denies hearing changes. Patient denies trauma to the ear. Patient denies fevers and headache. Patient denies chest pain and SOB. Patient denies N/V/D and sick contacts. Patient reports PMH of DM and HTN but is noncompliant with medications daily.

## 2021-06-19 NOTE — ED PROVIDER NOTE - NSRISKOFTRANSFER_ED_A_ED
Death or Disability/Increased Pain/Transportation Risk (There is always a risk of traffic delays resulting in deterioration of condition.)

## 2021-06-19 NOTE — ED PROVIDER NOTE - OBJECTIVE STATEMENT
Triage Nurse Notes: "left ear pain x 1 day, left groin, bilateral armpit abscess/infection with pus x1 week, denies fever/chills"    Pertinent PMH/PSH/FHx/SHx and Review of Systems contained within:     Pt is a 61 year old male HLD, DM not complaint with medications, HTN, glaucoma, former alcoholic last drink in 2018, who presents to the ED today for ear an infection x 1 week. Pt c/o severe ear pain and purulent drainage from the left ear. Denies fevers or chills. Reports multiple abscesses in the right and left axilla and abscesses in left groin which is draining. Pt has not been taking any antibiotics for his ear and reports he has not been taking care of his diabetes properly. Denies CP, numbness, tingling, abdominal pain, generalized weakness, decreased sense of hearing, or swimming in pools. Pt is a former smoker, last smoked 4 years ago.    No fever/chills, No photophobia/eye pain/changes in vision, No ear pain/sore throat/dysphagia, No chest pain/palpitations, no SOB/cough/wheeze/stridor, No abdominal pain, No N/V/D, no dysuria/frequency/discharge, No neck/back pain, no rash, no changes in neurological status/function. + ear pain and drainage, + abscess of b/l axilla and left of groin Triage Nurse Notes: "left ear pain x 1 day, left groin, bilateral armpit abscess/infection with pus x1 week, denies fever/chills"    Pertinent PMH/PSH/FHx/SHx and Review of Systems contained within:     Pt is a 61 year old male HLD, DM not complaint with medications, HTN, glaucoma, former alcoholic last drink in 2018, who presents to the ED today for ear an infection x 1 week. Pt c/o severe ear pain and purulent drainage from the left ear. Denies fevers or chills, no significant changes in hearing.  He also reports multiple abscesses in the right and left axilla and a draining abscess in left groin which is draining. He denies h/o hidradenitis suppurtiva but says that he gets frequent abscesses.  Pt has not been taking any antibiotics for his ear or skin and report poor medical follow up. Denies CP, numbness, tingling, abdominal pain, generalized weakness, decreased sense of hearing, or exposure to travel or swimming in pools. Pt is a former smoker, last smoked 4 years ago.    No fever/chills, No photophobia/eye pain/changes in vision, No ear pain/sore throat/dysphagia, No chest pain/palpitations, no SOB/cough/wheeze/stridor, No abdominal pain, No N/V/D, no dysuria/frequency/discharge, No neck/back pain, no rash, no changes in neurological status/function. + ear pain and drainage, + abscess of b/l axilla and left of groin

## 2021-06-20 ENCOUNTER — INPATIENT (INPATIENT)
Facility: HOSPITAL | Age: 61
LOS: 18 days | Discharge: NURSING, REHAB OR LONG-TERM CARE | End: 2021-07-09
Attending: INTERNAL MEDICINE | Admitting: INTERNAL MEDICINE
Payer: MEDICAID

## 2021-06-20 VITALS
HEIGHT: 64 IN | SYSTOLIC BLOOD PRESSURE: 185 MMHG | DIASTOLIC BLOOD PRESSURE: 104 MMHG | RESPIRATION RATE: 16 BRPM | TEMPERATURE: 98 F | OXYGEN SATURATION: 100 % | HEART RATE: 80 BPM

## 2021-06-20 VITALS — DIASTOLIC BLOOD PRESSURE: 105 MMHG | SYSTOLIC BLOOD PRESSURE: 181 MMHG

## 2021-06-20 DIAGNOSIS — E11.65 TYPE 2 DIABETES MELLITUS WITH HYPERGLYCEMIA: ICD-10-CM

## 2021-06-20 DIAGNOSIS — E27.8 OTHER SPECIFIED DISORDERS OF ADRENAL GLAND: ICD-10-CM

## 2021-06-20 DIAGNOSIS — E78.49 OTHER HYPERLIPIDEMIA: ICD-10-CM

## 2021-06-20 DIAGNOSIS — H60.22 MALIGNANT OTITIS EXTERNA, LEFT EAR: ICD-10-CM

## 2021-06-20 DIAGNOSIS — H66.92 OTITIS MEDIA, UNSPECIFIED, LEFT EAR: ICD-10-CM

## 2021-06-20 DIAGNOSIS — L73.9 FOLLICULAR DISORDER, UNSPECIFIED: ICD-10-CM

## 2021-06-20 DIAGNOSIS — I10 ESSENTIAL (PRIMARY) HYPERTENSION: ICD-10-CM

## 2021-06-20 PROBLEM — E11.9 TYPE 2 DIABETES MELLITUS WITHOUT COMPLICATIONS: Chronic | Status: ACTIVE | Noted: 2021-03-21

## 2021-06-20 LAB
A1C WITH ESTIMATED AVERAGE GLUCOSE RESULT: 14.3 % — HIGH (ref 4–5.6)
ACETONE SERPL-MCNC: NEGATIVE — SIGNIFICANT CHANGE UP
BACTERIA # UR AUTO: ABNORMAL
EPI CELLS # UR: SIGNIFICANT CHANGE UP
ESTIMATED AVERAGE GLUCOSE: 364 MG/DL — HIGH (ref 68–114)
GLUCOSE BLDC GLUCOMTR-MCNC: 234 MG/DL — HIGH (ref 70–99)
GLUCOSE BLDC GLUCOMTR-MCNC: 251 MG/DL — HIGH (ref 70–99)
GLUCOSE BLDC GLUCOMTR-MCNC: 277 MG/DL — HIGH (ref 70–99)
GLUCOSE BLDC GLUCOMTR-MCNC: 279 MG/DL — HIGH (ref 70–99)
GLUCOSE BLDC GLUCOMTR-MCNC: 295 MG/DL — HIGH (ref 70–99)
GLUCOSE BLDC GLUCOMTR-MCNC: 385 MG/DL — HIGH (ref 70–99)
RBC CASTS # UR COMP ASSIST: ABNORMAL /HPF (ref 0–4)
WBC UR QL: SIGNIFICANT CHANGE UP

## 2021-06-20 PROCEDURE — 99223 1ST HOSP IP/OBS HIGH 75: CPT

## 2021-06-20 PROCEDURE — 99285 EMERGENCY DEPT VISIT HI MDM: CPT

## 2021-06-20 PROCEDURE — 70481 CT ORBIT/EAR/FOSSA W/DYE: CPT | Mod: 26,59

## 2021-06-20 PROCEDURE — 70450 CT HEAD/BRAIN W/O DYE: CPT | Mod: 26

## 2021-06-20 RX ORDER — AMLODIPINE BESYLATE 2.5 MG/1
10 TABLET ORAL DAILY
Refills: 0 | Status: DISCONTINUED | OUTPATIENT
Start: 2021-06-21 | End: 2021-07-09

## 2021-06-20 RX ORDER — CIPROFLOXACIN LACTATE 400MG/40ML
400 VIAL (ML) INTRAVENOUS EVERY 12 HOURS
Refills: 0 | Status: DISCONTINUED | OUTPATIENT
Start: 2021-06-20 | End: 2021-06-23

## 2021-06-20 RX ORDER — AMLODIPINE BESYLATE 2.5 MG/1
5 TABLET ORAL ONCE
Refills: 0 | Status: COMPLETED | OUTPATIENT
Start: 2021-06-20 | End: 2021-06-20

## 2021-06-20 RX ORDER — DEXTROSE 50 % IN WATER 50 %
15 SYRINGE (ML) INTRAVENOUS ONCE
Refills: 0 | Status: DISCONTINUED | OUTPATIENT
Start: 2021-06-20 | End: 2021-07-09

## 2021-06-20 RX ORDER — TAMSULOSIN HYDROCHLORIDE 0.4 MG/1
0.4 CAPSULE ORAL AT BEDTIME
Refills: 0 | Status: DISCONTINUED | OUTPATIENT
Start: 2021-06-20 | End: 2021-07-09

## 2021-06-20 RX ORDER — INSULIN GLARGINE 100 [IU]/ML
14 INJECTION, SOLUTION SUBCUTANEOUS AT BEDTIME
Refills: 0 | Status: DISCONTINUED | OUTPATIENT
Start: 2021-06-20 | End: 2021-06-21

## 2021-06-20 RX ORDER — LISINOPRIL 2.5 MG/1
10 TABLET ORAL DAILY
Refills: 0 | Status: DISCONTINUED | OUTPATIENT
Start: 2021-06-20 | End: 2021-06-21

## 2021-06-20 RX ORDER — AMLODIPINE BESYLATE 2.5 MG/1
5 TABLET ORAL DAILY
Refills: 0 | Status: DISCONTINUED | OUTPATIENT
Start: 2021-06-20 | End: 2021-06-20

## 2021-06-20 RX ORDER — INSULIN LISPRO 100/ML
4 VIAL (ML) SUBCUTANEOUS
Refills: 0 | Status: DISCONTINUED | OUTPATIENT
Start: 2021-06-20 | End: 2021-06-21

## 2021-06-20 RX ORDER — CIPROFLOXACIN HCL 0.3 %
4 DROPS OPHTHALMIC (EYE)
Refills: 0 | Status: DISCONTINUED | OUTPATIENT
Start: 2021-06-20 | End: 2021-06-20

## 2021-06-20 RX ORDER — ACETAMINOPHEN 500 MG
650 TABLET ORAL ONCE
Refills: 0 | Status: COMPLETED | OUTPATIENT
Start: 2021-06-20 | End: 2021-06-20

## 2021-06-20 RX ORDER — ACETAMINOPHEN 500 MG
650 TABLET ORAL EVERY 4 HOURS
Refills: 0 | Status: DISCONTINUED | OUTPATIENT
Start: 2021-06-20 | End: 2021-07-09

## 2021-06-20 RX ORDER — DEXTROSE 50 % IN WATER 50 %
25 SYRINGE (ML) INTRAVENOUS ONCE
Refills: 0 | Status: DISCONTINUED | OUTPATIENT
Start: 2021-06-20 | End: 2021-07-09

## 2021-06-20 RX ORDER — OXYCODONE HYDROCHLORIDE 5 MG/1
10 TABLET ORAL EVERY 4 HOURS
Refills: 0 | Status: DISCONTINUED | OUTPATIENT
Start: 2021-06-20 | End: 2021-06-24

## 2021-06-20 RX ORDER — OFLOXACIN 0.3 %
10 DROPS OPHTHALMIC (EYE)
Refills: 0 | Status: COMPLETED | OUTPATIENT
Start: 2021-06-20 | End: 2021-06-25

## 2021-06-20 RX ORDER — INSULIN LISPRO 100/ML
VIAL (ML) SUBCUTANEOUS
Refills: 0 | Status: DISCONTINUED | OUTPATIENT
Start: 2021-06-20 | End: 2021-06-20

## 2021-06-20 RX ORDER — VANCOMYCIN HCL 1 G
1000 VIAL (EA) INTRAVENOUS EVERY 12 HOURS
Refills: 0 | Status: DISCONTINUED | OUTPATIENT
Start: 2021-06-20 | End: 2021-06-22

## 2021-06-20 RX ORDER — AMLODIPINE BESYLATE 2.5 MG/1
1 TABLET ORAL
Qty: 0 | Refills: 0 | DISCHARGE

## 2021-06-20 RX ORDER — INSULIN LISPRO 100/ML
VIAL (ML) SUBCUTANEOUS
Refills: 0 | Status: DISCONTINUED | OUTPATIENT
Start: 2021-06-20 | End: 2021-07-09

## 2021-06-20 RX ORDER — OXYCODONE HYDROCHLORIDE 5 MG/1
5 TABLET ORAL EVERY 4 HOURS
Refills: 0 | Status: DISCONTINUED | OUTPATIENT
Start: 2021-06-20 | End: 2021-06-24

## 2021-06-20 RX ORDER — INSULIN LISPRO 100/ML
3 VIAL (ML) SUBCUTANEOUS ONCE
Refills: 0 | Status: COMPLETED | OUTPATIENT
Start: 2021-06-20 | End: 2021-06-20

## 2021-06-20 RX ORDER — INSULIN GLARGINE 100 [IU]/ML
10 INJECTION, SOLUTION SUBCUTANEOUS AT BEDTIME
Refills: 0 | Status: DISCONTINUED | OUTPATIENT
Start: 2021-06-20 | End: 2021-06-20

## 2021-06-20 RX ORDER — LABETALOL HCL 100 MG
10 TABLET ORAL ONCE
Refills: 0 | Status: COMPLETED | OUTPATIENT
Start: 2021-06-20 | End: 2021-06-20

## 2021-06-20 RX ORDER — GLUCAGON INJECTION, SOLUTION 0.5 MG/.1ML
1 INJECTION, SOLUTION SUBCUTANEOUS ONCE
Refills: 0 | Status: DISCONTINUED | OUTPATIENT
Start: 2021-06-20 | End: 2021-07-09

## 2021-06-20 RX ORDER — HYDRALAZINE HCL 50 MG
5 TABLET ORAL ONCE
Refills: 0 | Status: COMPLETED | OUTPATIENT
Start: 2021-06-20 | End: 2021-06-20

## 2021-06-20 RX ORDER — DEXTROSE 50 % IN WATER 50 %
12.5 SYRINGE (ML) INTRAVENOUS ONCE
Refills: 0 | Status: DISCONTINUED | OUTPATIENT
Start: 2021-06-20 | End: 2021-07-09

## 2021-06-20 RX ORDER — INSULIN LISPRO 100/ML
VIAL (ML) SUBCUTANEOUS AT BEDTIME
Refills: 0 | Status: DISCONTINUED | OUTPATIENT
Start: 2021-06-20 | End: 2021-07-09

## 2021-06-20 RX ORDER — ATORVASTATIN CALCIUM 80 MG/1
20 TABLET, FILM COATED ORAL AT BEDTIME
Refills: 0 | Status: DISCONTINUED | OUTPATIENT
Start: 2021-06-20 | End: 2021-07-09

## 2021-06-20 RX ADMIN — AMLODIPINE BESYLATE 5 MILLIGRAM(S): 2.5 TABLET ORAL at 03:40

## 2021-06-20 RX ADMIN — Medication 250 MILLIGRAM(S): at 13:07

## 2021-06-20 RX ADMIN — AMLODIPINE BESYLATE 5 MILLIGRAM(S): 2.5 TABLET ORAL at 13:07

## 2021-06-20 RX ADMIN — Medication 200 MILLIGRAM(S): at 11:32

## 2021-06-20 RX ADMIN — AMLODIPINE BESYLATE 5 MILLIGRAM(S): 2.5 TABLET ORAL at 14:40

## 2021-06-20 RX ADMIN — Medication 10 DROP(S): at 04:13

## 2021-06-20 RX ADMIN — Medication 3 UNIT(S): at 13:21

## 2021-06-20 RX ADMIN — OXYCODONE HYDROCHLORIDE 10 MILLIGRAM(S): 5 TABLET ORAL at 17:39

## 2021-06-20 RX ADMIN — SODIUM CHLORIDE 1000 MILLILITER(S): 9 INJECTION INTRAMUSCULAR; INTRAVENOUS; SUBCUTANEOUS at 00:34

## 2021-06-20 RX ADMIN — Medication 10 DROP(S): at 17:39

## 2021-06-20 RX ADMIN — Medication 2: at 04:25

## 2021-06-20 RX ADMIN — Medication 1: at 22:37

## 2021-06-20 RX ADMIN — TAMSULOSIN HYDROCHLORIDE 0.4 MILLIGRAM(S): 0.4 CAPSULE ORAL at 21:44

## 2021-06-20 RX ADMIN — Medication 5 MILLIGRAM(S): at 14:39

## 2021-06-20 RX ADMIN — OXYCODONE HYDROCHLORIDE 10 MILLIGRAM(S): 5 TABLET ORAL at 18:29

## 2021-06-20 RX ADMIN — Medication 5: at 18:03

## 2021-06-20 RX ADMIN — Medication 4 UNIT(S): at 18:02

## 2021-06-20 RX ADMIN — Medication 250 MILLIGRAM(S): at 21:45

## 2021-06-20 RX ADMIN — LISINOPRIL 10 MILLIGRAM(S): 2.5 TABLET ORAL at 11:32

## 2021-06-20 RX ADMIN — Medication 650 MILLIGRAM(S): at 03:40

## 2021-06-20 RX ADMIN — Medication 3: at 07:37

## 2021-06-20 RX ADMIN — Medication 10 MILLIGRAM(S): at 01:26

## 2021-06-20 RX ADMIN — INSULIN GLARGINE 14 UNIT(S): 100 INJECTION, SOLUTION SUBCUTANEOUS at 22:37

## 2021-06-20 RX ADMIN — ATORVASTATIN CALCIUM 20 MILLIGRAM(S): 80 TABLET, FILM COATED ORAL at 21:44

## 2021-06-20 RX ADMIN — Medication 200 MILLIGRAM(S): at 17:39

## 2021-06-20 NOTE — H&P ADULT - ASSESSMENT
61M with PMH uncontrolled DM, with neuropathy, cataract, glaucoma presented with malignant otitis externa and folliculitis.

## 2021-06-20 NOTE — ED ADULT TRIAGE NOTE - CHIEF COMPLAINT QUOTE
pt is a transfer from [place] for ENT consult. pt c/o left ear pain  and headache x 1 week. pt also c/o bilateral armpit and left groin abscess that's been draining.  pt states hearing is slightly muffled. pt noted to be hypertensive and given 10mg hydralazine at other hospital. Pt not compliant with his medications. No complaints of chest pain, nausea, dizziness, vomiting  SOB, fever, chills verbalized.. Pt received with 20g to left AC with no redness or swelling noted.

## 2021-06-20 NOTE — H&P ADULT - PROBLEM SELECTOR PLAN 1
CT head as above  ENT eval appreciated, f/u culture  CT IAC w/ contrast to evaluated for OM  c/w Vanc and Cipro.  ID consult, need abx plan as patient is un-domicile with h/o IVDU

## 2021-06-20 NOTE — CONSULT NOTE ADULT - PROBLEM SELECTOR RECOMMENDATION 9
T2DM uncontrolled a1c of 14.3 with barriers of poor vision (unable to see insulin units) and lives at shelter  -recommend Lantus 14 units with Admelog 4 units tidac  -Admelog low dose correction scale tidac and qhs  -Nutrition consult    Discharge   Provider to RN insulin pen teaching (see if patient can give himself insulin by counting the number of clicks of the pen)   Patient states that he is unable to do basal/bolus insulin, will tentatively discharge on humalog 70/30 pen BID dose TBD.   [facility] (office and aquiles emailed)   [address] [facility] [phone] T2DM uncontrolled a1c of 14.3 with barriers of poor vision (unable to see insulin units) and lives at shelter  -recommend Lantus 14 units with Admelog 4 units tidac  -Admelog low dose correction scale tidac and qhs  -Nutrition consult    Discharge   Provider to RN insulin pen teaching (see if patient can give himself insulin by counting the number of clicks of the pen)   Patient states that he is unable to do basal/bolus insulin, will tentatively discharge on Novolog 70/30 pen BID dose TBD.   [facility] [place]) (office and aquiles emailed)   [address] [facility] [phone]

## 2021-06-20 NOTE — ED ADULT NURSE NOTE - OBJECTIVE STATEMENT
Pt received to room 3 A&OX4 and ambulatory at baseline. Pt is a transfer from [facility] for ENT consult. Pt endorsing L ear pain and HA x 1 week. Drainage noted to ear at this time, gauze applied at [facility]. Pt with armpit abscess and L groin abcess with drainage. Pt denies fevers or chills. Pt endorsing limited access to healthcare, states he lives in a homeless shelter. PMHx DM, glaucoma. Received with 20G IV in LAC. MD sears in progress.

## 2021-06-20 NOTE — CONSULT NOTE ADULT - SUBJECTIVE AND OBJECTIVE BOX
HPI:  61M PMH EtOH abuse, uncontrolled DM, HTN, glaucoma, who presented to [facility] with 1 week history of left-sided ear pain and hearing loss.   CT with nonspecific effusion of left mastoid aircells/middle ear cavity, but grossly intact ossicles.  No vertigo, tinnitus      T(C): 36.7 (06-20-21 @ 02:41), Max: 37 (06-20-21 @ 01:33)  HR: 82 (06-20-21 @ 02:41) (80 - 90)  BP: 191/90 (06-20-21 @ 02:41) (174/97 - 196/110)  RR: 16 (06-20-21 @ 02:41) (16 - 19)  SpO2: 100% (06-20-21 @ 02:41) (97% - 100%)  NAD, AOx3  No respiratory distress, stridor, stertor on RA  Voice quality: normal  PERRL, EOMI  Nose: bilateral NC clear anteriorly, IT normal, mucosa normal  OC/OP: edentulous, tongue and FOM soft, no lesions, OP clear  AD: cerumen impaction  AS: no post-auricular/mastoid tenderness or fullness, pinna wnl and minimally tender to manipulation, EAC with purulent debris (cultured and debrided), granulation tissue at BC junction, unable to visualize TM.   Neck: soft and flat, no LAD  CN7 intact          A/P:  61M, undomiciled, uncontrolled, DM presents with 1 week history of AS ear pain. Physical exam with granulation tissue at bony cartilaginous junction, minimal debris (cultured), unable to visualize TM. FN intact.    -Admit to medicine for DM control, IV Abx  -CT temporal bone  -continue cipro gtt, continue IV cipro and vanc  -fu left ear culture  -dry ear prec  -strict glucose control (<150)  -ENT to debride ear canal prn  -Will follow  -will d/w attending and update with any further recommendations

## 2021-06-20 NOTE — ED ADULT NURSE NOTE - CHIEF COMPLAINT QUOTE
pt is a transfer from [employer] [facility] [employer] for ENT consult. pt c/o left ear pain  and headache x 1 week. pt also c/o bilateral armpit and left groin abscess that's been draining.  pt states hearing is slightly muffled. pt noted to be hypertensive and given 10mg hydralazine at other hospital. Pt not compliant with his medications. No complaints of chest pain, nausea, dizziness, vomiting  SOB, fever, chills verbalized.. Pt received with 20g to left AC with no redness or swelling noted.

## 2021-06-20 NOTE — H&P ADULT - PROBLEM SELECTOR PLAN 3
multiple skin lesions with small area of induration and erythema. no fluctuant or discharges from these lesions.   likely secondary to uncontrolled DM. would be empirically covered with current abx regimen.   monitor lesions.

## 2021-06-20 NOTE — H&P ADULT - NSHPPHYSICALEXAM_GEN_ALL_CORE
T(C): 36.6 (06-20-21 @ 04:58), Max: 37 (06-20-21 @ 01:33)  HR: 84 (06-20-21 @ 04:58) (80 - 90)  BP: 158/77 (06-20-21 @ 04:58) (158/77 - 196/110)  RR: 18 (06-20-21 @ 04:58) (16 - 19)  SpO2: 100% (06-20-21 @ 04:58) (97% - 100%)    GENERAL: no apparent distress, on room air  HEAD:  Atraumatic, Normocephalic  EYES: EOMI, PERRLA, conjunctiva and sclera clear b/l  NECK: No cervical lymphadenopathy; no obvious masses. posterior auricular tenderness over left year.   CHEST/LUNG: Clear to auscultation bilaterally; No wheezing or crackles  HEART: Regular rate and rhythm; No obvious murmurs; nl S1/S2; No peripheral edema  ABDOMEN: Soft, Nontender, Nondistended; Bowel sounds present  EXTREMITIES:  2+ Peripheral Pulses; No joint swelling.  PSYCH: normal affect, calm demeanor  NEUROLOGY: Awake and alert; CN 2-12 grossly intact; no obvious FND  SKIN: multiple lesions of erythematous, tender lesions over b/l armpit and left groin, around body hair.

## 2021-06-20 NOTE — ED PROVIDER NOTE - CLINICAL SUMMARY MEDICAL DECISION MAKING FREE TEXT BOX
61M with concern for malignant otitis media. Will consult ENT to evaluate for malignant OM. Will likely need an admission.

## 2021-06-20 NOTE — CONSULT NOTE ADULT - PROBLEM SELECTOR RECOMMENDATION 4
Adrenal nodule incidentally found  -check aldosterone, renin activity (currently restarted on lisinopril)   -check metanephrine  -24 hour urine cortisol

## 2021-06-20 NOTE — H&P ADULT - HISTORY OF PRESENT ILLNESS
Patient is a 61M with PMH DM2, HTN, HLD, diabetic neuropathy, glaucoma/cataract with poor vision, HCV s/p [place] (cleared per patient) presented with left ear pain. Patient initially presented to [facility] and transferred to [facility] for ENT evaluation. Patient states he has been experiencing pain in his left ear for the past 1 week, with waxy, crusty discharges. associated with decreased hearing in the left ear. also endorsed chills. no n/v/d. Patient also reports multiple painful bumps over his b/l armpit, abdominal/chest and left groin. Patient was recently moved from a shelter in [place] to [place] in March and has not been able to get his medications, since the pharmacy and PCP were both in [place] and he is unable to travel due to his eye sight. He has not been taking any medications for the past 3 months.   In ED, afebrile. vss. hypertensive. no respiratory distress. patient was evaluated and cultures send by ENT. CT head performed with concerning signs for OM. patient was started on Cipro and vanco.

## 2021-06-20 NOTE — ED PROVIDER NOTE - OBJECTIVE STATEMENT
61M with PMHx of alcohol abuse, DM, HTN, glaucoma p/w left ear pain and drainage. Was transferred from  for drainage from the left ear and pain that has been worsening over one week. Has not taken any treatment for this infection. Denies fevers, chills, nausea, vomiting, dizziness, room spinning, rashes or other changes. Does endorse some mild hearing loss from his left ear. was accepted here for ENT evaluation. Also concern for abscesses in the b/l armpits.

## 2021-06-20 NOTE — ED ADULT NURSE NOTE - NSIMPLEMENTINTERV_GEN_ALL_ED
Implemented All Universal Safety Interventions:  [place] to call system. Call bell, personal items and telephone within reach. Instruct patient to call for assistance. Room bathroom lighting operational. Non-slip footwear when patient is off stretcher. Physically safe environment: no spills, clutter or unnecessary equipment. Stretcher in lowest position, wheels locked, appropriate side rails in place.

## 2021-06-20 NOTE — ED PROVIDER NOTE - ATTENDING CONTRIBUTION TO CARE
62 y/o M with h/o HTN, DM on insulin (nonadherent to meds), undomiciled transfer from [facility] for ENT evaluation.  Pt reports 1 week of left ear pain with hearing loss and discharge.  Also reports swelling to bilateral axilla.  Pt found to be hyperglycemic and hypertensive (he states he was previously on insulin, amlodipine, has not taken meds in 3 months).  CT head and exam concerning for malignant otitis externa - pt started on topical and IV cipro, vanc, transferred to [facility] for ENT evaluation.  No fever, abd pain, rash, cp, sob, palpitations, congestion, sore throat, headache.  Well appearing, lying in stretcher, awake and alert, nontoxic.  AF/VSS.  NCAT L TM not visualized, discharge, edema, tragal tenderness no mastoid tenderness.  Neck supple.  (+)nonfluctuant swelling and mild tenderness to bilateral axilla, no palpable LAD.  Lungs cta bl.  Cards nl S1/S2, RRR, no MRG.  Abd soft ntnd.  ENT consulted, will need admission for control of hyperglycemia, iv abx, f/u ENT recs.  No e/o fluid collection to axilla for I&D at this time.

## 2021-06-20 NOTE — PROGRESS NOTE ADULT - SUBJECTIVE AND OBJECTIVE BOX
ENT FOLLOW UP CONSULT NOTE    Interval Events  CT head done  CT T bone pending read    Vital Signs Last 24 Hrs  T(C): 36.8 (20 Jun 2021 13:53), Max: 37.2 (20 Jun 2021 10:14)  T(F): 98.3 (20 Jun 2021 13:53), Max: 99 (20 Jun 2021 10:14)  HR: 83 (20 Jun 2021 13:53) (80 - 90)  BP: 197/104 (20 Jun 2021 13:53) (158/77 - 197/104)  BP(mean): --  RR: 17 (20 Jun 2021 13:53) (16 - 19)  SpO2: 100% (20 Jun 2021 13:53) (97% - 100%)    PHYSICAL EXAM:  Gen: NAD, A/Ox3  Breathing comfortably on RA  Voice: normal  Facial nerve function fully intact  Ears: Right - ear canal cerumen, TM intact            Left - external ear canal mild swelling, purulent fluid, granulation medial, unable to see TM  Neck: Flat, supple, no lymphadenopathy    A/P  61M with DM2, HTN, HLD, diabetic neuropathy, glaucoma/cataract with poor vision, HCV s/p [place] (cleared per patient) presented 6/20 with left ear pain. Leading diagnosis left malignant otitis externa    -f/u CT temporal bone read  -left ear with wick in place, ENT will manage  -ciprofloxacin drops to left ear, 4 drops BID   -continue IV antibiotics, currently ciprofloxacin, vanc  -f/u left ear culture  -strict glucose control  -will follow  -call/page with questions    Ronald Palacios MD  Department of Otolaryngology - Head and Neck Surgery  Adult Page #[phone]

## 2021-06-20 NOTE — ED PROVIDER NOTE - NS ED MD DISPO SPECIAL CONSIDERATION1
Patient here for lab draw only name and  verified venipuncture performed on patient's right arm was successful patient tolerated well. Low Suspicion of COVID-19

## 2021-06-20 NOTE — CONSULT NOTE ADULT - SUBJECTIVE AND OBJECTIVE BOX
HPI:  Patient is a 61M with PMH DM2, HTN, HLD, diabetic neuropathy, glaucoma/cataract with poor vision, HCV s/p [place] (cleared per patient) presented with left ear pain. Patient initially presented to [facility] and transferred to [facility] for ENT evaluation.    Endocrine History:  Followed at [facility] Dr. Montgomery, last visit 12/2020 per patient but he was recently moved from a shelter in [place] to [place] in March and has not been able to get his medications, since the pharmacy and PCP were both in [place] and he is unable to travel due to his eye sight.  Has not been taking his insulin or other meds for at least 3 months. Hgb a1c of 14.3 supposed to be on lantus 12 units qhs and metformin 1000 mg bid. States that his FS are 180-200s even while taking his meds. He eats 3 meals a day and endorses adherence to diabetic diet. Complicated by peripheral neuropathy, glaucoma and cataracts. Patient is unable to see the units on insulin pen but can read the  numbers on glucometer.     Left indeterminate adrenal nodule on CT 3/2021 incidentally found.     PAST MEDICAL & SURGICAL HISTORY:  DM (diabetes mellitus)  On Insulin    HTN (hypertension)  medicxal managenent    No significant past surgical history    FAMILY HISTORY:  Fh of diabetes in brother     Social History: Quit etoh, tobacco and illicit drug use in 2018. Lives in shelter, no family around for support.     Outpatient Medications: Home Medications:  amLODIPine 10 mg oral tablet: 1 tab(s) orally once a day (20 Jun 2021 10:12)  atorvastatin:  (20 Jun 2021 10:12)  lisinopril 20 mg oral tablet: 1 tab(s) orally once a day (20 Jun 2021 10:12)      MEDICATIONS  (STANDING):  ciprofloxacin   IVPB 400 milliGRAM(s) IV Intermittent every 12 hours  dextrose 40% Gel 15 Gram(s) Oral once  dextrose 50% Injectable 25 Gram(s) IV Push once  dextrose 50% Injectable 12.5 Gram(s) IV Push once  dextrose 50% Injectable 25 Gram(s) IV Push once  glucagon  Injectable 1 milliGRAM(s) IntraMuscular once  insulin glargine Injectable (LANTUS) 14 Unit(s) SubCutaneous at bedtime  insulin lispro (ADMELOG) corrective regimen sliding scale   SubCutaneous three times a day before meals  insulin lispro (ADMELOG) corrective regimen sliding scale   SubCutaneous at bedtime  insulin lispro Injectable (ADMELOG) 4 Unit(s) SubCutaneous three times a day before meals  lisinopril 10 milliGRAM(s) Oral daily  ofloxacin 0.3% Solution 10 Drop(s) Left Ear two times a day  tamsulosin 0.4 milliGRAM(s) Oral at bedtime  vancomycin  IVPB 1000 milliGRAM(s) IV Intermittent every 12 hours    MEDICATIONS  (PRN):      Allergies    No Known Allergies    Intolerances      Review of Systems:  Constitutional: No fever, chills   Neuro: No tremors, headache   Cardiovascular: No chest pain, palpitations  Respiratory: No SOB, no cough  GI: No nausea, vomiting, abdominal pain  : No dysuria, polyuria   Skin: no rash, ulcers   Psych: no depression, anxiety   Endocrine: no polyphagia, polydipsia     ALL OTHER SYSTEMS REVIEWED AND NEGATIVE        PHYSICAL EXAM:  VITALS: T(C): 36.6 (06-20-21 @ 14:18)  T(F): 97.9 (06-20-21 @ 14:18), Max: 99 (06-20-21 @ 10:14)  HR: 90 (06-20-21 @ 15:27) (80 - 90)  BP: 160/82 (06-20-21 @ 15:27) (158/77 - 197/104)  RR:  (16 - 19)  SpO2:  (97% - 100%)  Wt(kg): --  GENERAL: NAD, well-groomed, well-developed  EYES: No proptosis, anicteric  HEENT:  Atraumatic, Normocephalic, moist mucous membranes  RESPIRATORY: Clear to auscultation bilaterally; No rales, rhonchi, wheezing  CARDIOVASCULAR: Regular rate and rhythm; No murmurs  GI: Soft, nontender, non distended, normal bowel sounds  SKIN: Dry, intact, No rashes or lesions  NEURO: AOx3, moves all extremities spontaneously   PSYCH: Reactive affect, euthymic mood    POCT Blood Glucose.: 295 mg/dL (06-20-21 @ 12:04)  POCT Blood Glucose.: 277 mg/dL (06-20-21 @ 07:26)  POCT Blood Glucose.: 234 mg/dL (06-20-21 @ 03:44)  POCT Blood Glucose.: 273 mg/dL (06-20-21 @ 02:24)  POCT Blood Glucose.: 251 mg/dL (06-20-21 @ 00:29)  POCT Blood Glucose.: 304 mg/dL (06-19-21 @ 23:33)                            13.7   9.36  )-----------( 226      ( 19 Jun 2021 20:42 )             40.9       06-19    138  |  105  |  21  ----------------------------<  318<H>  4.7   |  30  |  1.05    EGFR if : 90  EGFR if non : 77    Ca    9.3      06-19  Mg     2.4     06-19    TPro  8.2  /  Alb  3.1<L>  /  TBili  0.3  /  DBili  x   /  AST  22  /  ALT  26  /  AlkPhos  72  06-19      Thyroid Function Tests:              A1C with Estimated Average Glucose Result: 14.3 % (06-20-21 @ 03:47)  A1C with Estimated Average Glucose Result: 11.8 % (03-22-21 @ 14:20)      Radiology:

## 2021-06-20 NOTE — H&P ADULT - PROBLEM SELECTOR PLAN 2
uncontrolled in setting of medication non-adherence  A1c increased from 11 to 14 over past 3 months  start basal Lantus 10U, moderate ISS  Endocrine cs emailed.   check Lipid panel.

## 2021-06-20 NOTE — H&P ADULT - NSHPREVIEWOFSYSTEMS_GEN_ALL_CORE
CONSTITUTIONAL: No fever; +chills; No night sweats; No weight loss; No fatigue  EYES: No eye pain; No blurry vision  ENMT:  left ear pain and difficulty hearing; No sinus or throat pain  NECK: No pain or stiffness  RESPIRATORY: No cough; No wheezing; No hemoptysis; No shortness of breath; No sputum production  CARDIOVASCULAR: No chest pain; No palpitations; No leg swelling  GASTROINTESTINAL: No abdominal pain; No nausea; No vomiting; No hematemesis; No diarrhea; No constipation. No melena or hematochezia.  GENITOURINARY: No dysuria; No frequency; No hematuria; No incontinence  NEUROLOGICAL: No headaches; No loss of strength; No numbness  SKIN: No itching/burning; rashes as above.   MUSCULOSKELETAL: No joint pain or swelling; No muscle, back, or extremity pain  HEME/LYMPH: No easy bruising, or bleeding gums

## 2021-06-20 NOTE — ED PROVIDER NOTE - ENMT, MLM
Right TM visualized and normal. Left TM dull in appearance with sticky purulent material being extruded.

## 2021-06-20 NOTE — CONSULT NOTE ADULT - ASSESSMENT
61M with PMH DM2, HTN, HLD, diabetic neuropathy, glaucoma/cataract with poor vision, HCV s/p [place] (cleared per patient) presented with left ear pain. Patient initially presented to [facility] and transferred to [facility] for ENT evaluation.      61M with PMH DM2, HTN, HLD, diabetic neuropathy, glaucoma/cataract with poor vision, HCV s/p [place] (cleared per patient) presented with left ear pain. Patient initially presented to [facility] and transferred to [facility] for ENT evaluation.  Consulted for uncontrolled T2DM with a1c 14.3, High risk patient with high level decision-making.

## 2021-06-21 LAB
ALDOST SERPL-MCNC: <3 NG/DL — SIGNIFICANT CHANGE UP
ANION GAP SERPL CALC-SCNC: 11 MMOL/L — SIGNIFICANT CHANGE UP (ref 7–14)
BUN SERPL-MCNC: 20 MG/DL — SIGNIFICANT CHANGE UP (ref 7–23)
CALCIUM SERPL-MCNC: 8.4 MG/DL — SIGNIFICANT CHANGE UP (ref 8.4–10.5)
CHLORIDE SERPL-SCNC: 102 MMOL/L — SIGNIFICANT CHANGE UP (ref 98–107)
CO2 SERPL-SCNC: 23 MMOL/L — SIGNIFICANT CHANGE UP (ref 22–31)
COVID-19 SPIKE DOMAIN AB INTERP: POSITIVE
COVID-19 SPIKE DOMAIN ANTIBODY RESULT: 17.6 U/ML — HIGH
CREAT SERPL-MCNC: 1.6 MG/DL — HIGH (ref 0.5–1.3)
CULTURE RESULTS: SIGNIFICANT CHANGE UP
GLUCOSE BLDC GLUCOMTR-MCNC: 272 MG/DL — HIGH (ref 70–99)
GLUCOSE BLDC GLUCOMTR-MCNC: 274 MG/DL — HIGH (ref 70–99)
GLUCOSE BLDC GLUCOMTR-MCNC: 284 MG/DL — HIGH (ref 70–99)
GLUCOSE BLDC GLUCOMTR-MCNC: 322 MG/DL — HIGH (ref 70–99)
GLUCOSE SERPL-MCNC: 329 MG/DL — HIGH (ref 70–99)
HCT VFR BLD CALC: 35.8 % — LOW (ref 39–50)
HGB BLD-MCNC: 11.8 G/DL — LOW (ref 13–17)
MAGNESIUM SERPL-MCNC: 1.9 MG/DL — SIGNIFICANT CHANGE UP (ref 1.6–2.6)
MCHC RBC-ENTMCNC: 31.3 PG — SIGNIFICANT CHANGE UP (ref 27–34)
MCHC RBC-ENTMCNC: 33 GM/DL — SIGNIFICANT CHANGE UP (ref 32–36)
MCV RBC AUTO: 95 FL — SIGNIFICANT CHANGE UP (ref 80–100)
NRBC # BLD: 0 /100 WBCS — SIGNIFICANT CHANGE UP
NRBC # FLD: 0 K/UL — SIGNIFICANT CHANGE UP
PHOSPHATE SERPL-MCNC: 3.7 MG/DL — SIGNIFICANT CHANGE UP (ref 2.5–4.5)
PLATELET # BLD AUTO: 208 K/UL — SIGNIFICANT CHANGE UP (ref 150–400)
POTASSIUM SERPL-MCNC: 4 MMOL/L — SIGNIFICANT CHANGE UP (ref 3.5–5.3)
POTASSIUM SERPL-SCNC: 4 MMOL/L — SIGNIFICANT CHANGE UP (ref 3.5–5.3)
RBC # BLD: 3.77 M/UL — LOW (ref 4.2–5.8)
RBC # FLD: 11.8 % — SIGNIFICANT CHANGE UP (ref 10.3–14.5)
SARS-COV-2 IGG+IGM SERPL QL IA: 17.6 U/ML — HIGH
SARS-COV-2 IGG+IGM SERPL QL IA: POSITIVE
SODIUM SERPL-SCNC: 136 MMOL/L — SIGNIFICANT CHANGE UP (ref 135–145)
SPECIMEN SOURCE: SIGNIFICANT CHANGE UP
VANCOMYCIN TROUGH SERPL-MCNC: 20.9 UG/ML — HIGH (ref 10–20)
WBC # BLD: 7.89 K/UL — SIGNIFICANT CHANGE UP (ref 3.8–10.5)
WBC # FLD AUTO: 7.89 K/UL — SIGNIFICANT CHANGE UP (ref 3.8–10.5)

## 2021-06-21 PROCEDURE — 93010 ELECTROCARDIOGRAM REPORT: CPT

## 2021-06-21 RX ORDER — INSULIN GLARGINE 100 [IU]/ML
20 INJECTION, SOLUTION SUBCUTANEOUS AT BEDTIME
Refills: 0 | Status: DISCONTINUED | OUTPATIENT
Start: 2021-06-21 | End: 2021-06-22

## 2021-06-21 RX ORDER — INSULIN LISPRO 100/ML
7 VIAL (ML) SUBCUTANEOUS
Refills: 0 | Status: DISCONTINUED | OUTPATIENT
Start: 2021-06-21 | End: 2021-06-22

## 2021-06-21 RX ORDER — ONDANSETRON 8 MG/1
4 TABLET, FILM COATED ORAL ONCE
Refills: 0 | Status: COMPLETED | OUTPATIENT
Start: 2021-06-21 | End: 2021-06-21

## 2021-06-21 RX ORDER — SODIUM CHLORIDE 9 MG/ML
1000 INJECTION INTRAMUSCULAR; INTRAVENOUS; SUBCUTANEOUS
Refills: 0 | Status: DISCONTINUED | OUTPATIENT
Start: 2021-06-21 | End: 2021-06-25

## 2021-06-21 RX ADMIN — Medication 650 MILLIGRAM(S): at 14:00

## 2021-06-21 RX ADMIN — OXYCODONE HYDROCHLORIDE 5 MILLIGRAM(S): 5 TABLET ORAL at 22:03

## 2021-06-21 RX ADMIN — SODIUM CHLORIDE 100 MILLILITER(S): 9 INJECTION INTRAMUSCULAR; INTRAVENOUS; SUBCUTANEOUS at 14:41

## 2021-06-21 RX ADMIN — Medication 4 UNIT(S): at 12:59

## 2021-06-21 RX ADMIN — Medication 1: at 22:38

## 2021-06-21 RX ADMIN — TAMSULOSIN HYDROCHLORIDE 0.4 MILLIGRAM(S): 0.4 CAPSULE ORAL at 21:03

## 2021-06-21 RX ADMIN — OXYCODONE HYDROCHLORIDE 10 MILLIGRAM(S): 5 TABLET ORAL at 06:30

## 2021-06-21 RX ADMIN — Medication 650 MILLIGRAM(S): at 13:03

## 2021-06-21 RX ADMIN — LISINOPRIL 10 MILLIGRAM(S): 2.5 TABLET ORAL at 05:35

## 2021-06-21 RX ADMIN — Medication 200 MILLIGRAM(S): at 18:00

## 2021-06-21 RX ADMIN — OXYCODONE HYDROCHLORIDE 10 MILLIGRAM(S): 5 TABLET ORAL at 05:30

## 2021-06-21 RX ADMIN — AMLODIPINE BESYLATE 10 MILLIGRAM(S): 2.5 TABLET ORAL at 05:35

## 2021-06-21 RX ADMIN — Medication 3: at 17:56

## 2021-06-21 RX ADMIN — Medication 4: at 09:09

## 2021-06-21 RX ADMIN — INSULIN GLARGINE 20 UNIT(S): 100 INJECTION, SOLUTION SUBCUTANEOUS at 22:38

## 2021-06-21 RX ADMIN — OXYCODONE HYDROCHLORIDE 5 MILLIGRAM(S): 5 TABLET ORAL at 21:03

## 2021-06-21 RX ADMIN — Medication 3: at 12:59

## 2021-06-21 RX ADMIN — Medication 10 DROP(S): at 05:35

## 2021-06-21 RX ADMIN — ONDANSETRON 4 MILLIGRAM(S): 8 TABLET, FILM COATED ORAL at 10:48

## 2021-06-21 RX ADMIN — ATORVASTATIN CALCIUM 20 MILLIGRAM(S): 80 TABLET, FILM COATED ORAL at 21:03

## 2021-06-21 RX ADMIN — Medication 250 MILLIGRAM(S): at 09:11

## 2021-06-21 RX ADMIN — Medication 7 UNIT(S): at 17:56

## 2021-06-21 RX ADMIN — Medication 200 MILLIGRAM(S): at 05:35

## 2021-06-21 RX ADMIN — Medication 4 UNIT(S): at 09:10

## 2021-06-21 RX ADMIN — Medication 10 DROP(S): at 17:57

## 2021-06-21 NOTE — CONSULT NOTE ADULT - SUBJECTIVE AND OBJECTIVE BOX
Patient is a 61y old  Male who presents with a chief complaint of otitis externa, uncontrolled DM (2021 08:09)      HPI:    Patient is a 61M with PMH DM2, HTN, HLD, diabetic neuropathy, glaucoma/cataract with poor vision, HCV s/p [place] (cleared per patient) presented with left ear pain. Patient initially presented to [facility] and transferred to [facility] for ENT evaluation. Patient states he has been experiencing pain in his left ear for the past 1 week, with waxy, crusty discharges. associated with decreased hearing in the left ear. also endorsed chills. no n/v/d. Patient also reports multiple painful bumps over his b/l armpit, abdominal/chest and left groin. Patient was recently moved from a shelter in [place] to [place] in March and has not been able to get his medications, since the pharmacy and PCP were both in [place] and he is unable to travel due to his eye sight. He has not been taking any medications for the past 3 months.   In ED, afebrile. vss. hypertensive. no respiratory distress. patient was evaluated and cultures send by ENT. CT head performed with concerning signs for OM. patient was started on Cipro and vanco.  (2021 10:13)              REVIEW OF SYSTEMS:    CONSTITUTIONAL: No fever, weight loss, or fatigue  EYES: No eye pain, visual disturbances, or discharge  ENMT:  No sore throat  NECK: No pain or stiffness  RESPIRATORY: No cough, wheezing, chills or hemoptysis; No shortness of breath  CARDIOVASCULAR: No chest pain, palpitations, dizziness, or leg swelling  GASTROINTESTINAL: No abdominal or epigastric pain. No nausea, vomiting, or hematemesis; No diarrhea or constipation. No melena or hematochezia.  GENITOURINARY: No dysuria, frequency, hematuria, or incontinence  NEUROLOGICAL: No headaches, memory loss, loss of strength, numbness, or tremors  SKIN: No itching, burning, rashes, or lesions   LYMPH NODES: No enlarged glands  MUSCULOSKELETAL: No joint pain or swelling; No muscle, back, or extremity pain      PAST MEDICAL & SURGICAL HISTORY:  DM (diabetes mellitus)  On Insulin    HTN (hypertension)  medicxal managenent    No significant past surgical history        Allergies    No Known Allergies    Intolerances        FAMILY HISTORY:  No pertinent family history in first degree relatives        SOCIAL HISTORY:        MEDICATIONS  (STANDING):  amLODIPine   Tablet 10 milliGRAM(s) Oral daily  atorvastatin 20 milliGRAM(s) Oral at bedtime  ciprofloxacin   IVPB 400 milliGRAM(s) IV Intermittent every 12 hours  dextrose 40% Gel 15 Gram(s) Oral once  dextrose 50% Injectable 25 Gram(s) IV Push once  dextrose 50% Injectable 12.5 Gram(s) IV Push once  dextrose 50% Injectable 25 Gram(s) IV Push once  glucagon  Injectable 1 milliGRAM(s) IntraMuscular once  insulin glargine Injectable (LANTUS) 20 Unit(s) SubCutaneous at bedtime  insulin lispro (ADMELOG) corrective regimen sliding scale   SubCutaneous three times a day before meals  insulin lispro (ADMELOG) corrective regimen sliding scale   SubCutaneous at bedtime  insulin lispro Injectable (ADMELOG) 7 Unit(s) SubCutaneous three times a day before meals  lisinopril 10 milliGRAM(s) Oral daily  ofloxacin 0.3% Solution 10 Drop(s) Left Ear two times a day  sodium chloride 0.9%. 1000 milliLiter(s) (100 mL/Hr) IV Continuous <Continuous>  tamsulosin 0.4 milliGRAM(s) Oral at bedtime  vancomycin  IVPB 1000 milliGRAM(s) IV Intermittent every 12 hours    MEDICATIONS  (PRN):  acetaminophen   Tablet .. 650 milliGRAM(s) Oral every 4 hours PRN Mild Pain (1 - 3)  oxyCODONE    IR 5 milliGRAM(s) Oral every 4 hours PRN Moderate Pain (4 - 6)  oxyCODONE    IR 10 milliGRAM(s) Oral every 4 hours PRN Severe Pain (7 - 10)      Vital Signs Last 24 Hrs  T(C): 37 (2021 13:32), Max: 37.1 (2021 21:43)  T(F): 98.6 (2021 13:32), Max: 98.8 (2021 21:43)  HR: 86 (2021 13:32) (80 - 90)  BP: 157/72 (2021 13:32) (119/65 - 160/82)  BP(mean): --  RR: 16 (2021 05:31) (16 - 17)  SpO2: 98% (2021 13:32) (97% - 100%)    PHYSICAL EXAM:    GENERAL: NAD, well-groomed  HEAD:  Atraumatic, Normocephalic  EYES: EOMI, PERRLA, conjunctiva and sclera clear  ENMT: No tonsillar erythema, exudates, or enlargement; Moist mucous membranes  NECK: Supple, No JVD  CHEST/LUNG: Clear to percussion bilaterally; No rales, rhonchi, wheezing, or rubs  HEART: Regular rate and rhythm; No murmurs, rubs, or gallops  ABDOMEN: Soft, Nontender, Nondistended; Bowel sounds present  EXTREMITIES:  2+ Peripheral Pulses, No clubbing, cyanosis, or edema  LYMPH: No lymphadenopathy noted  SKIN: No rashes or lesions    LABS:  CBC Full  -  ( 2021 06:35 )  WBC Count : 7.89 K/uL  RBC Count : 3.77 M/uL  Hemoglobin : 11.8 g/dL  Hematocrit : 35.8 %  Platelet Count - Automated : 208 K/uL  Mean Cell Volume : 95.0 fL  Mean Cell Hemoglobin : 31.3 pg  Mean Cell Hemoglobin Concentration : 33.0 gm/dL  Auto Neutrophil # : x  Auto Lymphocyte # : x  Auto Monocyte # : x  Auto Eosinophil # : x  Auto Basophil # : x  Auto Neutrophil % : x  Auto Lymphocyte % : x  Auto Monocyte % : x  Auto Eosinophil % : x  Auto Basophil % : x      06-21    136  |  102  |  20  ----------------------------<  329<H>  4.0   |  23  |  1.60<H>    Ca    8.4      2021 06:35  Phos  3.7     06-  Mg     1.9     -    TPro  8.2  /  Alb  3.1<L>  /  TBili  0.3  /  DBili  x   /  AST  22  /  ALT  26  /  AlkPhos  72  06-19      LIVER FUNCTIONS - ( 2021 20:42 )  Alb: 3.1 g/dL / Pro: 8.2 gm/dL / ALK PHOS: 72 U/L / ALT: 26 U/L / AST: 22 U/L / GGT: x                               MICROBIOLOGY:        Urinalysis Basic - ( 2021 23:18 )    Color: Yellow / Appearance: Clear / S.015 / pH: x  Gluc: x / Ketone: Negative  / Bili: Negative / Urobili: Negative mg/dL   Blood: x / Protein: 500 mg/dL / Nitrite: Negative   Leuk Esterase: Negative / RBC: 3-5 /HPF / WBC 0-2   Sq Epi: x / Non Sq Epi: Occasional / Bacteria: Occasional                RADIOLOGY:      < from: CT Internal Auditory Canals w/ IV Cont (21 @ 13:01) >  IMPRESSION:    1. Findings concerning for malignant left-sided otitis externa with bony erosive changes involving the external auditory canal. Superimposed osteomyelitis within the bony external auditory canal is a consideration given the patient's clinical information. An external auditory canal cholesteatoma cannot be excluded. A neoplastic process such as squamous cell carcinoma is also difficult to exclude.    2. Additional imaging findings compatible with left-sided otitismedia and left-sided mastoiditis. No gross ossicular chain erosion or destructive changes. No evidence of venous sinus thrombosis.    3. Unremarkable right-sided temporal bone CT examination apart from cerumen in the external auditory canal.    < end of copied text >        < from: CT Head No Cont (21 @ 04:40) >  IMPRESSION:    Evaluation of the temporal bone is limited with noncontrast head CT technique. Consider follow-up temporal bone CT with contrast and/or temporal bone MRI with and without contrast for further evaluation if clinically warranted.    Soft tissue swelling involves the left external auditory canal consistent with the clinical history of otitis externa. Evaluation for the presence or absence of bony destruction-erosions (malignant otitis externa) is limited with noncontrast CT technique. Moderate nonspecific opacification of the left mastoid air cells and middle ear cavities is present which could reflect mastoid and middle ear effusions versus otomastoiditis in the appropriate clinical context.    < end of copied text >        < from: Xray Chest 1 View- PORTABLE-Urgent (Xray Chest 1 View- PORTABLE-Urgent .) (21 @ 22:29) >  INTERPRETATION:  AP semierect chest on 2021 at 10:07 PM. Patient has an inner ear infection.    Heart magnified by technique.    On of this year there was slight right base atelectases.    Presently lungs are clear.    IMPRESSION: Clear lungs at this time.    < end of copied text >        < from: CT Head No Cont (21 @ 21:16) >    IMPRESSION:    No acute intracranial hemorrhage, large cortical infarct or mass effect. If clinically indicated, short-term follow-up or MRI may be obtained for further evaluation.    Paranasal sinus disease. Recommend clinical correlation to assess acute sinusitis.    Nonspecific opacification of the left mastoid air cells, middle ear cavity and external auditory canal, which can be seen in the setting of acute mastoiditis/otitis media/otitis externa. Recommend clinical correlation and follow-up as indicated.    < end of copied text >           Patient is a 61y old  Male who presents with a chief complaint of otitis externa, uncontrolled DM (2021 08:09)      HPI:    Patient is a 61M with PMH DM2, HTN, HLD, diabetic neuropathy, glaucoma/cataract with poor vision, HCV s/p [place] (cleared per patient) presented with left ear pain. Patient initially presented to [facility] and transferred to [facility] for ENT evaluation. Patient states he has been experiencing pain in his left ear for the past 1 week, with waxy, crusty discharges. associated with decreased hearing in the left ear. also endorsed chills. no n/v/d. Patient also reports multiple painful bumps over his b/l armpit, abdominal/chest and left groin. Patient was recently moved from a shelter in [place] to [place] in March and has not been able to get his medications, since the pharmacy and PCP were both in [place] and he is unable to travel due to his eye sight. He has not been taking any medications for the past 3 months.   In ED, afebrile. vss. hypertensive. no respiratory distress. patient was evaluated and cultures send by ENT. CT head performed with concerning signs for OM. patient was started on Cipro and vanco.  (2021 10:13)    Pt seen by ENT and found to have granulation tissue at bony cartilaginous junction, minimal debris (cultured from left ear).  Pt on cipro gtt, cipro/vanco IV.  Left ear wick left in place by ENT.  CT auditory ear canal shows:    Findings concerning for malignant left-sided otitis externa with bony erosive changes involving the external auditory canal. Superimposed osteomyelitis within the bony external auditory canal is a consideration given the patient's clinical information. An external auditory canal cholesteatoma cannot be excluded. A neoplastic process such as squamous cell carcinoma is also difficult to exclude.    2. Additional imaging findings compatible with left-sided otitis media and left-sided mastoiditis. No gross ossicular chain erosion or destructive changes. No evidence of venous sinus thrombosis.    3. Unremarkable right-sided temporal bone CT examination apart from cerumen in the external auditory canal.    Pt also c/o new painful bumps, draining pus, in b/l armpits, b/l groin and R perineal area.    ID consulted for further abx management.         REVIEW OF SYSTEMS:    CONSTITUTIONAL: No fever, weight loss, or fatigue  EYES: No eye pain, visual disturbances, or discharge  ENMT:  No sore throat  NECK: No pain or stiffness  RESPIRATORY: No cough, wheezing, chills or hemoptysis; No shortness of breath  CARDIOVASCULAR: No chest pain, palpitations, dizziness, or leg swelling  GASTROINTESTINAL: No abdominal or epigastric pain. No nausea, vomiting, or hematemesis; No diarrhea or constipation. No melena or hematochezia.  GENITOURINARY: No dysuria, frequency, hematuria, or incontinence  NEUROLOGICAL: No headaches, memory loss, loss of strength, numbness, or tremors  SKIN: No itching, burning, rashes, or lesions   LYMPH NODES: No enlarged glands  MUSCULOSKELETAL: No joint pain or swelling; No muscle, back, or extremity pain      PAST MEDICAL & SURGICAL HISTORY:  DM (diabetes mellitus)  On Insulin    HTN (hypertension)  medicxal managenent    No significant past surgical history        Allergies    No Known Allergies    Intolerances        FAMILY HISTORY:  No pertinent family history in first degree relatives        SOCIAL HISTORY:    IVDU 40 yrs ago  denied active etoh use  denied smoking.  undomicile, lives at shelter.    MEDICATIONS  (STANDING):  amLODIPine   Tablet 10 milliGRAM(s) Oral daily  atorvastatin 20 milliGRAM(s) Oral at bedtime  ciprofloxacin   IVPB 400 milliGRAM(s) IV Intermittent every 12 hours  dextrose 40% Gel 15 Gram(s) Oral once  dextrose 50% Injectable 25 Gram(s) IV Push once  dextrose 50% Injectable 12.5 Gram(s) IV Push once  dextrose 50% Injectable 25 Gram(s) IV Push once  glucagon  Injectable 1 milliGRAM(s) IntraMuscular once  insulin glargine Injectable (LANTUS) 20 Unit(s) SubCutaneous at bedtime  insulin lispro (ADMELOG) corrective regimen sliding scale   SubCutaneous three times a day before meals  insulin lispro (ADMELOG) corrective regimen sliding scale   SubCutaneous at bedtime  insulin lispro Injectable (ADMELOG) 7 Unit(s) SubCutaneous three times a day before meals  lisinopril 10 milliGRAM(s) Oral daily  ofloxacin 0.3% Solution 10 Drop(s) Left Ear two times a day  sodium chloride 0.9%. 1000 milliLiter(s) (100 mL/Hr) IV Continuous <Continuous>  tamsulosin 0.4 milliGRAM(s) Oral at bedtime  vancomycin  IVPB 1000 milliGRAM(s) IV Intermittent every 12 hours    MEDICATIONS  (PRN):  acetaminophen   Tablet .. 650 milliGRAM(s) Oral every 4 hours PRN Mild Pain (1 - 3)  oxyCODONE    IR 5 milliGRAM(s) Oral every 4 hours PRN Moderate Pain (4 - 6)  oxyCODONE    IR 10 milliGRAM(s) Oral every 4 hours PRN Severe Pain (7 - 10)      Vital Signs Last 24 Hrs  T(C): 37 (2021 13:32), Max: 37.1 (2021 21:43)  T(F): 98.6 (2021 13:32), Max: 98.8 (2021 21:43)  HR: 86 (2021 13:32) (80 - 90)  BP: 157/72 (2021 13:32) (119/65 - 160/82)  BP(mean): --  RR: 16 (2021 05:31) (16 - 17)  SpO2: 98% (2021 13:32) (97% - 100%)    PHYSICAL EXAM:    GENERAL: NAD, well-groomed  HEAD:  Atraumatic, Normocephalic  EYES: EOMI, PERRLA, conjunctiva and sclera clear  ENMT: L ear wick  NECK: Supple, No JVD  CHEST/LUNG: Clear to percussion bilaterally; No rales, rhonchi, wheezing, or rubs  HEART: Regular rate and rhythm; No murmurs, rubs, or gallops  ABDOMEN: Soft, Nontender, Nondistended; Bowel sounds present  EXTREMITIES:  2+ Peripheral Pulses, No clubbing, cyanosis, or edema  LYMPH: No lymphadenopathy noted  SKIN: Nodular/pustular lesions b/l armpits, b/l groin, R perineum    LABS:  CBC Full  -  ( 2021 06:35 )  WBC Count : 7.89 K/uL  RBC Count : 3.77 M/uL  Hemoglobin : 11.8 g/dL  Hematocrit : 35.8 %  Platelet Count - Automated : 208 K/uL  Mean Cell Volume : 95.0 fL  Mean Cell Hemoglobin : 31.3 pg  Mean Cell Hemoglobin Concentration : 33.0 gm/dL  Auto Neutrophil # : x  Auto Lymphocyte # : x  Auto Monocyte # : x  Auto Eosinophil # : x  Auto Basophil # : x  Auto Neutrophil % : x  Auto Lymphocyte % : x  Auto Monocyte % : x  Auto Eosinophil % : x  Auto Basophil % : x      -    136  |  102  |  20  ----------------------------<  329<H>  4.0   |  23  |  1.60<H>    Ca    8.4      2021 06:35  Phos  3.7     -  Mg     1.9         TPro  8.2  /  Alb  3.1<L>  /  TBili  0.3  /  DBili  x   /  AST  22  /  ALT  26  /  AlkPhos  72  -      LIVER FUNCTIONS - ( 2021 20:42 )  Alb: 3.1 g/dL / Pro: 8.2 gm/dL / ALK PHOS: 72 U/L / ALT: 26 U/L / AST: 22 U/L / GGT: x                               MICROBIOLOGY:        Urinalysis Basic - ( 2021 23:18 )    Color: Yellow / Appearance: Clear / S.015 / pH: x  Gluc: x / Ketone: Negative  / Bili: Negative / Urobili: Negative mg/dL   Blood: x / Protein: 500 mg/dL / Nitrite: Negative   Leuk Esterase: Negative / RBC: 3-5 /HPF / WBC 0-2   Sq Epi: x / Non Sq Epi: Occasional / Bacteria: Occasional                RADIOLOGY:      < from: CT Internal Auditory Canals w/ IV Cont (21 @ 13:01) >  IMPRESSION:    1. Findings concerning for malignant left-sided otitis externa with bony erosive changes involving the external auditory canal. Superimposed osteomyelitis within the bony external auditory canal is a consideration given the patient's clinical information. An external auditory canal cholesteatoma cannot be excluded. A neoplastic process such as squamous cell carcinoma is also difficult to exclude.    2. Additional imaging findings compatible with left-sided otitismedia and left-sided mastoiditis. No gross ossicular chain erosion or destructive changes. No evidence of venous sinus thrombosis.    3. Unremarkable right-sided temporal bone CT examination apart from cerumen in the external auditory canal.    < end of copied text >        < from: CT Head No Cont (21 @ 04:40) >  IMPRESSION:    Evaluation of the temporal bone is limited with noncontrast head CT technique. Consider follow-up temporal bone CT with contrast and/or temporal bone MRI with and without contrast for further evaluation if clinically warranted.    Soft tissue swelling involves the left external auditory canal consistent with the clinical history of otitis externa. Evaluation for the presence or absence of bony destruction-erosions (malignant otitis externa) is limited with noncontrast CT technique. Moderate nonspecific opacification of the left mastoid air cells and middle ear cavities is present which could reflect mastoid and middle ear effusions versus otomastoiditis in the appropriate clinical context.    < end of copied text >        < from: Xray Chest 1 View- PORTABLE-Urgent (Xray Chest 1 View- PORTABLE-Urgent .) (21 @ 22:29) >  INTERPRETATION:  AP semierect chest on 2021 at 10:07 PM. Patient has an inner ear infection.    Heart magnified by technique.    On of this year there was slight right base atelectases.    Presently lungs are clear.    IMPRESSION: Clear lungs at this time.    < end of copied text >        < from: CT Head No Cont (21 @ 21:16) >    IMPRESSION:    No acute intracranial hemorrhage, large cortical infarct or mass effect. If clinically indicated, short-term follow-up or MRI may be obtained for further evaluation.    Paranasal sinus disease. Recommend clinical correlation to assess acute sinusitis.    Nonspecific opacification of the left mastoid air cells, middle ear cavity and external auditory canal, which can be seen in the setting of acute mastoiditis/otitis media/otitis externa. Recommend clinical correlation and follow-up as indicated.    < end of copied text >

## 2021-06-21 NOTE — PROGRESS NOTE ADULT - SUBJECTIVE AND OBJECTIVE BOX
ENT FOLLOW UP CONSULT NOTE    Interval Events  transferred to floor overnight  hyperglycemic    Vital Signs Last 24 Hrs  T(C): 36.8 (20 Jun 2021 13:53), Max: 37.2 (20 Jun 2021 10:14)  T(F): 98.3 (20 Jun 2021 13:53), Max: 99 (20 Jun 2021 10:14)  HR: 83 (20 Jun 2021 13:53) (80 - 90)  BP: 197/104 (20 Jun 2021 13:53) (158/77 - 197/104)  BP(mean): --  RR: 17 (20 Jun 2021 13:53) (16 - 19)  SpO2: 100% (20 Jun 2021 13:53) (97% - 100%)    PHYSICAL EXAM:  Gen: NAD, A/Ox3  Breathing comfortably on RA  Voice: normal  Facial nerve function fully intact  Ears: Right - ear canal cerumen, TM intact            Left - external ear canal mild swelling, purulent fluid, granulation medial, unable to see TM  Neck: Flat, supple, no lymphadenopathy    A/P  61M with DM2, HTN, HLD, diabetic neuropathy, glaucoma/cataract with poor vision, HCV s/p [place] (cleared per patient) presented 6/20 with left ear pain. Workup consistent with left malignant otitis externa    -CT temporal bone reviewed  -left ear with wick in place, ENT will manage  -ciprofloxacin drops to left ear, 4 drops BID   -continue IV antibiotics, currently ciprofloxacin, vanc  -ID consult for antibiotic regimen  -f/u left ear culture  -strict glucose control  -will follow  -call/page with questions    Ronald Palacios MD  Department of Otolaryngology - Head and Neck Surgery  Adult Page #[phone]

## 2021-06-21 NOTE — CONSULT NOTE ADULT - ASSESSMENT
Patient is a 61M with PMH DM2, HTN, HLD, diabetic neuropathy, glaucoma/cataract with poor vision, HCV s/p [place] (cleared per patient) presented with left ear pain.  CT auditory ear canal shows:    Findings concerning for malignant left-sided otitis externa with bony erosive changes involving the external auditory canal. Superimposed osteomyelitis within the bony external auditory canal is a consideration given the patient's clinical information. An external auditory canal cholesteatoma cannot be excluded. A neoplastic process such as squamous cell carcinoma is also difficult to exclude.    2. Additional imaging findings compatible with left-sided otitis media and left-sided mastoiditis. No gross ossicular chain erosion or destructive changes. No evidence of venous sinus thrombosis.    3. Unremarkable right-sided temporal bone CT examination apart from cerumen in the external auditory canal.    Pt also c/o new painful bumps, draining pus, in b/l armpits, b/l groin and R perineal area.    ID consulted for further abx management.     Left sided otitis externa:    - Cont present abx, f/u L ear cultures.  Concern for superimposed OM of bony external auditory canal.   Cannot exclude cholesteatoma or squamous cell carcinoma.    - ENT f/u appreciated.  ?role for biopsy given possibility of neoplasm.      - May need long term IV abx.  f/u blood cx x 2.  ESR, CRP    - f/u vanco trough.      r/o Hidradenitis suppurativa:    - c/o new nodular/pustular lesions in b/l armpits, b/l groin and perineum for past 1 week. States lesions are painful and drain pus.    - Recommend Derm eval, ?hidradenitis suppurativa    - Cont IV abx for now.  Cont to monitor and evaluate for need for further I&D.    d/w Medicine NP.    Will follow,    Mena Osullivan  [phone]

## 2021-06-21 NOTE — PHARMACOTHERAPY INTERVENTION NOTE - COMMENTS
Pt educated on his A1c, S&S hyperglycemia (has been experiencing polyuria + polydipsia), insulin pen administration, hypoglycemia and treatment, healthy plate, goal BG, and when to check BG, pt cannot see very well due to cataracts/glaucoma. Showed patient how to count the clicks on the insulin pen - very concerned for his ability to give his own insulin given that he cannot see. Pt was able to count the clicks however he could not see well enough to place the needle on or inject the practice pad (needed my assistance to guide the pen). S/w CM and SW - they will offer nursing home care for him to get more medical assistance. Pt will also need to practice testing his BG (if he is returning to the shelter). Will continue to follow-up.

## 2021-06-21 NOTE — PROGRESS NOTE ADULT - SUBJECTIVE AND OBJECTIVE BOX
Patient is a 61y old  Male who presents with a chief complaint of otitis externa, uncontrolled DM (2021 15:00)      HPI:  Patient is a 61M with PMH DM2, HTN, HLD, diabetic neuropathy, glaucoma/cataract with poor vision, HCV s/p [place] (cleared per patient) presented with left ear pain. Patient initially presented to [facility] and transferred to [facility] for ENT evaluation. Patient states he has been experiencing pain in his left ear for the past 1 week, with waxy, crusty discharges. associated with decreased hearing in the left ear. also endorsed chills. no n/v/d. Patient also reports multiple painful bumps over his b/l armpit, abdominal/chest and left groin. Patient was recently moved from a shelter in [place] to [place] in March and has not been able to get his medications, since the pharmacy and PCP were both in [place] and he is unable to travel due to his eye sight. He has not been taking any medications for the past 3 months.   In ED, afebrile. vss. hypertensive. no respiratory distress. patient was evaluated and cultures send by ENT. CT head performed with concerning signs for OM. patient was started on Cipro and vanco.  (2021 10:13)    DATE OF SERVICE: 21 @ 23:11  Afebrile. Left ear pain +    PAST MEDICAL & SURGICAL HISTORY:  DM (diabetes mellitus)  On Insulin    HTN (hypertension)  medicxal managenent    No significant past surgical history        Review of Systems:   CONSTITUTIONAL: No fever, weight loss, or fatigue  EYES: No eye pain, visual disturbances, or discharge  ENMT:  No difficulty hearing, tinnitus, vertigo; No sinus or throat pain. Left ear plugged  NECK: No pain or stiffness  BREASTS: No pain, masses, or nipple discharge  RESPIRATORY: No cough, wheezing, chills or hemoptysis; No shortness of breath  CARDIOVASCULAR: No chest pain, palpitations, dizziness, or leg swelling  GASTROINTESTINAL: No abdominal or epigastric pain. No nausea, vomiting, or hematemesis; No diarrhea or constipation. No melena or hematochezia.  GENITOURINARY: No dysuria, frequency, hematuria, or incontinence  NEUROLOGICAL: No headaches, memory loss, loss of strength, numbness, or tremors  SKIN: No itching, burning, rashes, or lesions   LYMPH NODES: No enlarged glands  ENDOCRINE: No heat or cold intolerance; No hair loss  MUSCULOSKELETAL: No joint pain or swelling; No muscle, back, or extremity pain  PSYCHIATRIC: No depression, anxiety, mood swings, or difficulty sleeping  HEME/LYMPH: No easy bruising, or bleeding gums  ALLERY AND IMMUNOLOGIC: No hives or eczema    Allergies    No Known Allergies    Intolerances        Social History:     FAMILY HISTORY:  No pertinent family history in first degree relatives        MEDICATIONS  (STANDING):  amLODIPine   Tablet 10 milliGRAM(s) Oral daily  atorvastatin 20 milliGRAM(s) Oral at bedtime  ciprofloxacin   IVPB 400 milliGRAM(s) IV Intermittent every 12 hours  dextrose 40% Gel 15 Gram(s) Oral once  dextrose 50% Injectable 25 Gram(s) IV Push once  dextrose 50% Injectable 12.5 Gram(s) IV Push once  dextrose 50% Injectable 25 Gram(s) IV Push once  glucagon  Injectable 1 milliGRAM(s) IntraMuscular once  insulin glargine Injectable (LANTUS) 20 Unit(s) SubCutaneous at bedtime  insulin lispro (ADMELOG) corrective regimen sliding scale   SubCutaneous three times a day before meals  insulin lispro (ADMELOG) corrective regimen sliding scale   SubCutaneous at bedtime  insulin lispro Injectable (ADMELOG) 7 Unit(s) SubCutaneous three times a day before meals  ofloxacin 0.3% Solution 10 Drop(s) Left Ear two times a day  sodium chloride 0.9%. 1000 milliLiter(s) (100 mL/Hr) IV Continuous <Continuous>  tamsulosin 0.4 milliGRAM(s) Oral at bedtime  vancomycin  IVPB 1000 milliGRAM(s) IV Intermittent every 12 hours    MEDICATIONS  (PRN):  acetaminophen   Tablet .. 650 milliGRAM(s) Oral every 4 hours PRN Mild Pain (1 - 3)  oxyCODONE    IR 5 milliGRAM(s) Oral every 4 hours PRN Moderate Pain (4 - 6)  oxyCODONE    IR 10 milliGRAM(s) Oral every 4 hours PRN Severe Pain (7 - 10)        CAPILLARY BLOOD GLUCOSE      POCT Blood Glucose.: 272 mg/dL (2021 22:33)  POCT Blood Glucose.: 284 mg/dL (2021 17:48)  POCT Blood Glucose.: 274 mg/dL (2021 12:09)  POCT Blood Glucose.: 322 mg/dL (2021 08:11)    I&O's Summary    2021 07:01  -  2021 23:11  --------------------------------------------------------  IN: 0 mL / OUT: 525 mL / NET: -525 mL        PHYSICAL EXAM:  Vital Signs Last 24 Hrs  T(C): 37 (2021 22:11), Max: 37.1 (2021 05:31)  T(F): 98.6 (2021 22:11), Max: 98.7 (2021 05:31)  HR: 91 (2021 22:11) (86 - 91)  BP: 134/75 (2021 22:11) (134/75 - 157/72)  BP(mean): --  RR: 16 (2021 22:11) (16 - 16)  SpO2: 96% (2021 22:11) (96% - 100%)    GENERAL: NAD, well-developed  HEAD:  Atraumatic, Normocephalic  EYES: EOMI, PERRLA, conjunctiva and sclera clear  Ears: Left ear tender, cotton plug noted  NECK: Supple, No JVD  CHEST/LUNG: Clear to auscultation bilaterally; No wheeze  HEART: Regular rate and rhythm; No murmurs, rubs, or gallops  ABDOMEN: Soft, Nontender, Nondistended; Bowel sounds present  EXTREMITIES:  2+ Peripheral Pulses, No clubbing, cyanosis, or edema  PSYCH: AAOx3  NEUROLOGY: non-focal  SKIN: No rashes or lesions    LABS:                        11.8   7.89  )-----------( 208      ( 2021 06:35 )             35.8     06-21    136  |  102  |  20  ----------------------------<  329<H>  4.0   |  23  |  1.60<H>    Ca    8.4      2021 06:35  Phos  3.7     06-21  Mg     1.9     06-21            Urinalysis Basic - ( 2021 23:18 )    Color: Yellow / Appearance: Clear / S.015 / pH: x  Gluc: x / Ketone: Negative  / Bili: Negative / Urobili: Negative mg/dL   Blood: x / Protein: 500 mg/dL / Nitrite: Negative   Leuk Esterase: Negative / RBC: 3-5 /HPF / WBC 0-2   Sq Epi: x / Non Sq Epi: Occasional / Bacteria: Occasional        RADIOLOGY & ADDITIONAL TESTS:    Imaging Personally Reviewed:    Consultant(s) Notes Reviewed:      Care Discussed with Consultants/Other Providers:

## 2021-06-22 DIAGNOSIS — N17.0 ACUTE KIDNEY FAILURE WITH TUBULAR NECROSIS: ICD-10-CM

## 2021-06-22 LAB
-  AMPICILLIN/SULBACTAM: SIGNIFICANT CHANGE UP
-  CEFAZOLIN: SIGNIFICANT CHANGE UP
-  CLINDAMYCIN: SIGNIFICANT CHANGE UP
-  DAPTOMYCIN: SIGNIFICANT CHANGE UP
-  ERYTHROMYCIN: SIGNIFICANT CHANGE UP
-  GENTAMICIN: SIGNIFICANT CHANGE UP
-  LINEZOLID: SIGNIFICANT CHANGE UP
-  OXACILLIN: SIGNIFICANT CHANGE UP
-  PENICILLIN: SIGNIFICANT CHANGE UP
-  RIFAMPIN: SIGNIFICANT CHANGE UP
-  TETRACYCLINE: SIGNIFICANT CHANGE UP
-  TRIMETHOPRIM/SULFAMETHOXAZOLE: SIGNIFICANT CHANGE UP
-  VANCOMYCIN: SIGNIFICANT CHANGE UP
ANION GAP SERPL CALC-SCNC: 11 MMOL/L — SIGNIFICANT CHANGE UP (ref 7–14)
APPEARANCE UR: CLEAR — SIGNIFICANT CHANGE UP
BILIRUB UR-MCNC: NEGATIVE — SIGNIFICANT CHANGE UP
BUN SERPL-MCNC: 22 MG/DL — SIGNIFICANT CHANGE UP (ref 7–23)
CALCIUM SERPL-MCNC: 8.2 MG/DL — LOW (ref 8.4–10.5)
CHLORIDE SERPL-SCNC: 106 MMOL/L — SIGNIFICANT CHANGE UP (ref 98–107)
CO2 SERPL-SCNC: 22 MMOL/L — SIGNIFICANT CHANGE UP (ref 22–31)
COLOR SPEC: SIGNIFICANT CHANGE UP
CREAT SERPL-MCNC: 1.63 MG/DL — HIGH (ref 0.5–1.3)
DIFF PNL FLD: ABNORMAL
GLUCOSE BLDC GLUCOMTR-MCNC: 191 MG/DL — HIGH (ref 70–99)
GLUCOSE BLDC GLUCOMTR-MCNC: 218 MG/DL — HIGH (ref 70–99)
GLUCOSE BLDC GLUCOMTR-MCNC: 252 MG/DL — HIGH (ref 70–99)
GLUCOSE BLDC GLUCOMTR-MCNC: 360 MG/DL — HIGH (ref 70–99)
GLUCOSE SERPL-MCNC: 306 MG/DL — HIGH (ref 70–99)
GLUCOSE UR QL: ABNORMAL
HCT VFR BLD CALC: 35.8 % — LOW (ref 39–50)
HGB BLD-MCNC: 11.8 G/DL — LOW (ref 13–17)
KETONES UR-MCNC: NEGATIVE — SIGNIFICANT CHANGE UP
LEUKOCYTE ESTERASE UR-ACNC: NEGATIVE — SIGNIFICANT CHANGE UP
MAGNESIUM SERPL-MCNC: 2 MG/DL — SIGNIFICANT CHANGE UP (ref 1.6–2.6)
MCHC RBC-ENTMCNC: 31.3 PG — SIGNIFICANT CHANGE UP (ref 27–34)
MCHC RBC-ENTMCNC: 33 GM/DL — SIGNIFICANT CHANGE UP (ref 32–36)
MCV RBC AUTO: 95 FL — SIGNIFICANT CHANGE UP (ref 80–100)
METHOD TYPE: SIGNIFICANT CHANGE UP
NITRITE UR-MCNC: NEGATIVE — SIGNIFICANT CHANGE UP
NRBC # BLD: 0 /100 WBCS — SIGNIFICANT CHANGE UP
NRBC # FLD: 0 K/UL — SIGNIFICANT CHANGE UP
PH UR: 6 — SIGNIFICANT CHANGE UP (ref 5–8)
PHOSPHATE SERPL-MCNC: 3.5 MG/DL — SIGNIFICANT CHANGE UP (ref 2.5–4.5)
PLATELET # BLD AUTO: 193 K/UL — SIGNIFICANT CHANGE UP (ref 150–400)
POTASSIUM SERPL-MCNC: 3.9 MMOL/L — SIGNIFICANT CHANGE UP (ref 3.5–5.3)
POTASSIUM SERPL-SCNC: 3.9 MMOL/L — SIGNIFICANT CHANGE UP (ref 3.5–5.3)
PROT UR-MCNC: ABNORMAL
RBC # BLD: 3.77 M/UL — LOW (ref 4.2–5.8)
RBC # FLD: 11.7 % — SIGNIFICANT CHANGE UP (ref 10.3–14.5)
SODIUM SERPL-SCNC: 139 MMOL/L — SIGNIFICANT CHANGE UP (ref 135–145)
SP GR SPEC: 1.01 — LOW (ref 1.01–1.02)
UROBILINOGEN FLD QL: SIGNIFICANT CHANGE UP
VANCOMYCIN FLD-MCNC: 13.3 UG/ML — SIGNIFICANT CHANGE UP
WBC # BLD: 8.19 K/UL — SIGNIFICANT CHANGE UP (ref 3.8–10.5)
WBC # FLD AUTO: 8.19 K/UL — SIGNIFICANT CHANGE UP (ref 3.8–10.5)

## 2021-06-22 PROCEDURE — 99223 1ST HOSP IP/OBS HIGH 75: CPT

## 2021-06-22 PROCEDURE — 99232 SBSQ HOSP IP/OBS MODERATE 35: CPT

## 2021-06-22 RX ORDER — VANCOMYCIN HCL 1 G
1000 VIAL (EA) INTRAVENOUS EVERY 24 HOURS
Refills: 0 | Status: DISCONTINUED | OUTPATIENT
Start: 2021-06-22 | End: 2021-06-22

## 2021-06-22 RX ORDER — CHLORHEXIDINE GLUCONATE 213 G/1000ML
1 SOLUTION TOPICAL DAILY
Refills: 0 | Status: DISCONTINUED | OUTPATIENT
Start: 2021-06-22 | End: 2021-07-07

## 2021-06-22 RX ORDER — INSULIN GLARGINE 100 [IU]/ML
26 INJECTION, SOLUTION SUBCUTANEOUS AT BEDTIME
Refills: 0 | Status: DISCONTINUED | OUTPATIENT
Start: 2021-06-22 | End: 2021-06-23

## 2021-06-22 RX ORDER — VANCOMYCIN HCL 1 G
1000 VIAL (EA) INTRAVENOUS EVERY 24 HOURS
Refills: 0 | Status: DISCONTINUED | OUTPATIENT
Start: 2021-06-23 | End: 2021-06-26

## 2021-06-22 RX ORDER — VANCOMYCIN HCL 1 G
750 VIAL (EA) INTRAVENOUS EVERY 12 HOURS
Refills: 0 | Status: DISCONTINUED | OUTPATIENT
Start: 2021-06-22 | End: 2021-06-22

## 2021-06-22 RX ORDER — MUPIROCIN 20 MG/G
1 OINTMENT TOPICAL
Refills: 0 | Status: DISCONTINUED | OUTPATIENT
Start: 2021-06-22 | End: 2021-07-09

## 2021-06-22 RX ORDER — MUPIROCIN 20 MG/G
1 OINTMENT TOPICAL
Refills: 0 | Status: COMPLETED | OUTPATIENT
Start: 2021-06-22 | End: 2021-06-29

## 2021-06-22 RX ORDER — INSULIN LISPRO 100/ML
9 VIAL (ML) SUBCUTANEOUS
Refills: 0 | Status: DISCONTINUED | OUTPATIENT
Start: 2021-06-22 | End: 2021-06-30

## 2021-06-22 RX ADMIN — ATORVASTATIN CALCIUM 20 MILLIGRAM(S): 80 TABLET, FILM COATED ORAL at 22:16

## 2021-06-22 RX ADMIN — Medication 200 MILLIGRAM(S): at 05:52

## 2021-06-22 RX ADMIN — Medication 7 UNIT(S): at 12:42

## 2021-06-22 RX ADMIN — INSULIN GLARGINE 26 UNIT(S): 100 INJECTION, SOLUTION SUBCUTANEOUS at 22:16

## 2021-06-22 RX ADMIN — Medication 3: at 08:58

## 2021-06-22 RX ADMIN — Medication 5: at 12:46

## 2021-06-22 RX ADMIN — Medication 7 UNIT(S): at 08:58

## 2021-06-22 RX ADMIN — MUPIROCIN 1 APPLICATION(S): 20 OINTMENT TOPICAL at 22:17

## 2021-06-22 RX ADMIN — Medication 250 MILLIGRAM(S): at 10:16

## 2021-06-22 RX ADMIN — Medication 200 MILLIGRAM(S): at 18:40

## 2021-06-22 RX ADMIN — SODIUM CHLORIDE 100 MILLILITER(S): 9 INJECTION INTRAMUSCULAR; INTRAVENOUS; SUBCUTANEOUS at 05:52

## 2021-06-22 RX ADMIN — AMLODIPINE BESYLATE 10 MILLIGRAM(S): 2.5 TABLET ORAL at 05:51

## 2021-06-22 RX ADMIN — Medication 10 DROP(S): at 18:00

## 2021-06-22 RX ADMIN — TAMSULOSIN HYDROCHLORIDE 0.4 MILLIGRAM(S): 0.4 CAPSULE ORAL at 22:16

## 2021-06-22 RX ADMIN — Medication 100 MILLIGRAM(S): at 22:17

## 2021-06-22 RX ADMIN — Medication 9 UNIT(S): at 18:20

## 2021-06-22 RX ADMIN — Medication 10 DROP(S): at 05:51

## 2021-06-22 NOTE — CONSULT NOTE ADULT - SUBJECTIVE AND OBJECTIVE BOX
[facility]   MD ANITA QUINTANILLA DO ANAM SIDDIQUI ANGELA WONG, PA        TEL:  OFFICE: [phone]  DR MONROE CELL: [phone]  DR. PORTILLO CELL: [phone]  DR. MYLES CELL: [phone]  PEGGY JAFFE CELL: [phone]    From 5pm-7am answering service [phone]    --- INITIAL RENAL CONSULT NOTE ---date of service 21 @ 15:22    HPI:  61M with PMH DM2, HTN, HLD, diabetic neuropathy, glaucoma/cataract with poor vision, HCV s/p [place] (cleared per patient) presented with left ear pain. Patient initially presented to [facility] and transferred to [facility] for ENT evaluation. Patient states he has been experiencing pain in his left ear for the past 1 week, with waxy, crusty discharges. associated with decreased hearing in the left ear. also endorsed chills. no n/v/d. Patient also reports multiple painful bumps over his b/l armpit, abdominal/chest and left groin. Patient was recently moved from a shelter in [place] to [facility] in March and has not been able to get his medications, since the pharmacy and PCP were both in [place] and he is unable to travel due to his eye sight. He has not been taking any medications for the past 3 months.   nephrology consulted for trevor        Allergies:  No Known Allergies      PAST MEDICAL & SURGICAL HISTORY:  DM (diabetes mellitus)  On Insulin    HTN (hypertension)  medicxal managenent    No significant past surgical history        Home Medications Reviewed    Hospital Medications:   MEDICATIONS  (STANDING):  amLODIPine   Tablet 10 milliGRAM(s) Oral daily  atorvastatin 20 milliGRAM(s) Oral at bedtime  ciprofloxacin   IVPB 400 milliGRAM(s) IV Intermittent every 12 hours  dextrose 40% Gel 15 Gram(s) Oral once  dextrose 50% Injectable 25 Gram(s) IV Push once  dextrose 50% Injectable 12.5 Gram(s) IV Push once  dextrose 50% Injectable 25 Gram(s) IV Push once  glucagon  Injectable 1 milliGRAM(s) IntraMuscular once  insulin glargine Injectable (LANTUS) 26 Unit(s) SubCutaneous at bedtime  insulin lispro (ADMELOG) corrective regimen sliding scale   SubCutaneous three times a day before meals  insulin lispro (ADMELOG) corrective regimen sliding scale   SubCutaneous at bedtime  insulin lispro Injectable (ADMELOG) 9 Unit(s) SubCutaneous three times a day before meals  ofloxacin 0.3% Solution 10 Drop(s) Left Ear two times a day  sodium chloride 0.9%. 1000 milliLiter(s) (100 mL/Hr) IV Continuous <Continuous>  tamsulosin 0.4 milliGRAM(s) Oral at bedtime  vancomycin  IVPB 1000 milliGRAM(s) IV Intermittent every 24 hours      SOCIAL HISTORY:  former durg, ETOh, Smoking use    FAMILY HISTORY:  No pertinent family history in first degree relatives        REVIEW OF SYSTEMS:  CONSTITUTIONAL: No weakness, fevers + chills  EYES/ENT: b/l poor vision;  see hpi  NECK: No pain or stiffness  RESPIRATORY: No cough, wheezing, hemoptysis; No shortness of breath  CARDIOVASCULAR: No chest pain or palpitations.  GASTROINTESTINAL: No abdominal or epigastric pain. No nausea, vomiting, or hematemesis; No diarrhea or constipation. No melena or hematochezia.  GENITOURINARY: No dysuria, frequency, foamy urine, urinary urgency, incontinence or hematuria  NEUROLOGICAL: No numbness or weakness  SKIN: No itching, burning, rashes, or lesions   VASCULAR: No bilateral lower extremity edema.   All other review of systems is negative unless indicated above.    VITALS:  T(F): 98.5 (21 @ 14:44), Max: 98.8 (21 @ 05:50)  HR: 89 (21 @ 14:44)  BP: 144/83 (21 @ 14:44)  RR: 17 (21 @ 14:44)  SpO2: 100% (21 @ 05:50)  Wt(kg): --     @ 07:01  -   @ 07:00  --------------------------------------------------------  IN: 0 mL / OUT: 525 mL / NET: -525 mL          PHYSICAL EXAM:  Constitutional: NAD  HEENT: anicteric sclera, MMM  Neck: No JVD  Respiratory: CTAB, no wheezes, rales or rhonchi  Cardiovascular: S1, S2, RRR  Gastrointestinal: BS+, soft, NT/ND  Extremities: No cyanosis or clubbing. No peripheral edema  Neurological: A/O x 3, no focal deficits  Psychiatric: Normal mood, normal affect  : No CVA tenderness. No chakraborty.   Skin: No rashes      LABS:      139  |  106  |  22  ----------------------------<  306<H>  3.9   |  22  |  1.63<H>    Ca    8.2<L>      2021 07:28  Phos  3.5       Mg     2.0           Creatinine Trend: 1.63 <--, 1.60 <--, 1.05 <--                        11.8   8.19  )-----------( 193      ( 2021 07:28 )             35.8     Urine Studies:  Urinalysis Basic - ( 2021 23:18 )    Color: Yellow / Appearance: Clear / S.015 / pH:   Gluc:  / Ketone: Negative  / Bili: Negative / Urobili: Negative mg/dL   Blood:  / Protein: 500 mg/dL / Nitrite: Negative   Leuk Esterase: Negative / RBC: 3-5 /HPF / WBC 0-2   Sq Epi:  / Non Sq Epi: Occasional / Bacteria: Occasional          RADIOLOGY & ADDITIONAL STUDIES:

## 2021-06-22 NOTE — DIETITIAN INITIAL EVALUATION ADULT. - OTHER INFO
Met with patient at bedside. Reports good appetite and PO intake 100% at this time. Patient denies any nausea/vomiting/diarrhea/constipation or difficulty chewing and swallowing. NKFA. Usual weight reported 145lbs and currently weighs 148.8lbs, weight relatively stable. HbA1c elevated 14.3% (6/20/21) was 11.8% 3/22/21. RD provided the patient with extensive verbal and written DM diet education; including, carb counting, sources of carbohydrate, understanding different food groups and avoiding concentrated sweets and beverages.

## 2021-06-22 NOTE — PROGRESS NOTE ADULT - SUBJECTIVE AND OBJECTIVE BOX
I have examined the patient this afternoon for left EAC infection, appears to be responding nicely to otic drops and IV antibiotics. Appreciate medicine help with diabetes control which is the underlying issue. CT temporal bone completed, no evidence of skull base invasion, or sigmoid thrombosis, and facial nerve is intact on exam. Will continue to follow, will recommend outpatient followup with our otology service (Dr. Feldman) for microscopic exam, additional management, and if granulation tissue does not resolve with therapy, additional workup, including cultures, imaging,  biopsy as indicated.

## 2021-06-22 NOTE — CONSULT NOTE ADULT - ASSESSMENT
#Subcutaneous nodules  Differential includes staph furunculosis vs Hidradenitis suppurativa  Acuity of lesions and morphology are suggestive of staph furuncles and no evidence of scarring/sinus tracts suggestive of HS. If lesions persist and recur that could support a diagnosis of HS.    At this time recommend:  - start doxycycline 100mg BID x 14 days, taken with meals   - can apply topical mupirocin ointment BID to affected areas until healed  - can start staph decolonization protocol with topical mupirocin applied to nares and umbilicus twice daily for the first week of the month for 3 months  - can cleanse pt with chlorhexidine to axillae, groin, buttocks  - Patient can follow up at our outpatient Dermatology clinic in [facility] within 2 week(s). Our office can call the pt to arrange.    The patient's chart was reviewed in addition to being seen and examined at bedside with the dermatology attending Dr. Mariann Will.  Recommendations were communicated with the primary team.  Please page [phone] for further related questions.    Melanie Pandya MD  Resident Physician, PGY2  [facility]  Pager: [phone]  Office: [phone]

## 2021-06-22 NOTE — DIETITIAN INITIAL EVALUATION ADULT. - ETIOLOGY
physiological causes, endocrine dysfunction (uncontrolled DM2), social/environmental circumstances (lives in shelter and limited food choices)

## 2021-06-22 NOTE — DIETITIAN INITIAL EVALUATION ADULT. - PERTINENT LABORATORY DATA
06-22 Na139 mmol/L Glu 306 mg/dL<H> K+ 3.9 mmol/L Cr  1.63 mg/dL<H> BUN 22 mg/dL 06-22 Phos 3.5 mg/dL 06-19 Alb 3.1 g/dL<L>    A1C with Estimated Average Glucose Result: 14.3: High Risk (prediabetic)    5.7 - 6.4 %  Diabetic, diagnostic           > 6.5 %  ADA diabetic treatment goal    < 7.0 %  HbA1C values may not accurately reflect mean blood glucose in patients  with Hb variants.  Suggest clinical correlation. % (06.20.21 @ 03:47)

## 2021-06-22 NOTE — DIETITIAN INITIAL EVALUATION ADULT. - PERTINENT MEDS FT
amLODIPine   Tablet  atorvastatin  dextrose 40% Gel  dextrose 50% Injectable  dextrose 50% Injectable  dextrose 50% Injectable  glucagon  Injectable  insulin glargine Injectable (LANTUS)  insulin lispro (ADMELOG) corrective regimen sliding scale  insulin lispro (ADMELOG) corrective regimen sliding scale  insulin lispro Injectable (ADMELOG)  sodium chloride 0.9%.  tamsulosin

## 2021-06-22 NOTE — PROGRESS NOTE ADULT - SUBJECTIVE AND OBJECTIVE BOX
Infectious Diseases progress note:    Subjective:  NAD, events noted.      ROS:  CONSTITUTIONAL:  No fever, chills, rigors  CARDIOVASCULAR:  No chest pain or palpitations  RESPIRATORY:   No SOB, cough, dyspnea on exertion.  No wheezing  GASTROINTESTINAL:  No abd pain, N/V, diarrhea/constipation  EXTREMITIES:  No swelling or joint pain  GENITOURINARY:  No burning on urination, increased frequency or urgency.  No flank pain  NEUROLOGIC:  No HA, visual disturbances  SKIN: No rashes    Allergies    No Known Allergies    Intolerances        ANTIBIOTICS/RELEVANT:  antimicrobials  ciprofloxacin   IVPB 400 milliGRAM(s) IV Intermittent every 12 hours  doxycycline hyclate Capsule 100 milliGRAM(s) Oral every 12 hours    immunologic:    OTHER:  acetaminophen   Tablet .. 650 milliGRAM(s) Oral every 4 hours PRN  amLODIPine   Tablet 10 milliGRAM(s) Oral daily  atorvastatin 20 milliGRAM(s) Oral at bedtime  chlorhexidine 4% Liquid 1 Application(s) Topical daily  dextrose 40% Gel 15 Gram(s) Oral once  dextrose 50% Injectable 25 Gram(s) IV Push once  dextrose 50% Injectable 12.5 Gram(s) IV Push once  dextrose 50% Injectable 25 Gram(s) IV Push once  glucagon  Injectable 1 milliGRAM(s) IntraMuscular once  insulin glargine Injectable (LANTUS) 26 Unit(s) SubCutaneous at bedtime  insulin lispro (ADMELOG) corrective regimen sliding scale   SubCutaneous three times a day before meals  insulin lispro (ADMELOG) corrective regimen sliding scale   SubCutaneous at bedtime  insulin lispro Injectable (ADMELOG) 9 Unit(s) SubCutaneous three times a day before meals  mupirocin 2% Ointment 1 Application(s) Topical two times a day  mupirocin 2% Ointment 1 Application(s) Topical two times a day  ofloxacin 0.3% Solution 10 Drop(s) Left Ear two times a day  oxyCODONE    IR 5 milliGRAM(s) Oral every 4 hours PRN  oxyCODONE    IR 10 milliGRAM(s) Oral every 4 hours PRN  sodium chloride 0.9%. 1000 milliLiter(s) IV Continuous <Continuous>  tamsulosin 0.4 milliGRAM(s) Oral at bedtime      Objective:  Vital Signs Last 24 Hrs  T(C): 37.1 (2021 21:53), Max: 37.1 (2021 05:50)  T(F): 98.8 (2021 21:53), Max: 98.8 (2021 05:50)  HR: 92 (2021 21:53) (89 - 92)  BP: 165/85 (2021 21:53) (134/76 - 165/85)  BP(mean): --  RR: 17 (2021 21:53) (16 - 17)  SpO2: 96% (2021 21:53) (96% - 100%)    PHYSICAL EXAM:  Constitutional:NAD  Eyes:VINCENZO, EOMI  Ear/Nose/Throat: L ear wick in place	  Neck:no JVD, no lymphadenopathy, supple  Respiratory: CTA sudheer  Cardiovascular: S1S2 RRR, no murmurs  Gastrointestinal:soft, nontender,  nondistended (+) BS  Extremities:no e/e/c  Skin:  b/l pustular/nodular lesions in armpits/groin/perineum        LABS:                        11.8   8.19  )-----------( 193      ( 2021 07:28 )             35.8     06-22    139  |  106  |  22  ----------------------------<  306<H>  3.9   |  22  |  1.63<H>    Ca    8.2<L>      2021 07:28  Phos  3.5     06-22  Mg     2.0     06-22        Urinalysis Basic - ( 2021 16:47 )    Color: Light Yellow / Appearance: Clear / S.009 / pH: x  Gluc: x / Ketone: Negative  / Bili: Negative / Urobili: <2 mg/dL   Blood: x / Protein: Trace / Nitrite: Negative   Leuk Esterase: Negative / RBC: 1 /HPF / WBC 1 /HPF   Sq Epi: x / Non Sq Epi: 0 /HPF / Bacteria: Negative        Vancomycin Level, Random (21 @ 07:28)   Vancomycin Level, Random: 13.3: Therapeutic ranges have not been established for random vancomycin.   Interpret results in context of patient's clinical condition and time   sample was drawn relative to peak and trough therapeutic ranges.   Therapeutic ranges for peak vancomycin are 25-50 and for trough   vancomycin 10-20 with 15-20 mcg/mL used for complicated infections. ug/mL         Vancomycin Level, Trough: 20.9 ug/mL ( @ 21:30)              MICROBIOLOGY:    Culture - Urine (21 @ 12:24)   Specimen Source: .Urine Clean Catch (Midstream)   Culture Results:   <10,000 CFU/mL Normal Urogenital Lena     Culture - Other (21 @ 07:27)   - Ampicillin/Sulbactam: R 16/8   - Cefazolin: R 8   - Clindamycin: S <=0.25   - Daptomycin: S 0.5   - Erythromycin: R >4   - Gentamicin: S <=1 Should not be used as monotherapy   - Linezolid: S 2   - Oxacillin: R >2   - Penicillin: R >8   - RIF- Rifampin: S <=1 Should not be used as monotherapy   - Tetra/Doxy: R >8   - Trimethoprim/Sulfamethoxazole: S <=0.5/9.5   - Vancomycin: S 2   Specimen Source: .Other left ear   Culture Results:   Numerous Methicillin resistant Staphylococcus aureus   Numerous Corynebacterium species "Susceptibilities not performed"   Moderate Enterococcus faecalis   Organism Identification: Methicillin resistant Staphylococcus aureus   Organism: Methicillin resistant Staphylococcus aureus   Method Type: KIKE       RADIOLOGY & ADDITIONAL STUDIES:

## 2021-06-22 NOTE — PROGRESS NOTE ADULT - SUBJECTIVE AND OBJECTIVE BOX
ENT FOLLOW UP CONSULT NOTE    Interval Events  seen and examined bedside  hyperglycemic      T(C): 37.1 (06-22-21 @ 05:50), Max: 37.1 (06-22-21 @ 05:50)  HR: 90 (06-22-21 @ 05:50) (86 - 91)  BP: 163/86 (06-22-21 @ 05:50) (134/75 - 163/86)  RR: 16 (06-22-21 @ 05:50) (16 - 16)  SpO2: 100% (06-22-21 @ 05:50) (96% - 100%)  PHYSICAL EXAM:  Gen: NAD, A/Ox3  Breathing comfortably on RA  Voice: normal  Facial nerve function fully intact  Ears: Right - ear canal cerumen, TM intact            Left - external ear canal mild swelling, purulent fluid, granulation medial, unable to see TM  Neck: Flat, supple, no lymphadenopathy    A/P  61M with DM2, HTN, HLD, diabetic neuropathy, glaucoma/cataract with poor vision, HCV s/p [place] (cleared per patient) presented 6/20 with left ear pain. 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

## 2021-06-22 NOTE — DIETITIAN INITIAL EVALUATION ADULT. - COLLABORATION WITH OTHER PROVIDERS
Suggest outpatient follow up with an endocrinologist to ensure long-term DM diet comprehension and compliance. Suggest outpatient follow up with appropriate RD for the purposes of long-term nutrition evaluation and diet education.

## 2021-06-22 NOTE — PROGRESS NOTE ADULT - ASSESSMENT
Patient is a 61M with PMH DM2, HTN, HLD, diabetic neuropathy, glaucoma/cataract with poor vision, HCV s/p [place] (cleared per patient) presented with left ear pain.  CT auditory ear canal shows:    Findings concerning for malignant left-sided otitis externa with bony erosive changes involving the external auditory canal. Superimposed osteomyelitis within the bony external auditory canal is a consideration given the patient's clinical information. An external auditory canal cholesteatoma cannot be excluded. A neoplastic process such as squamous cell carcinoma is also difficult to exclude.    2. Additional imaging findings compatible with left-sided otitis media and left-sided mastoiditis. No gross ossicular chain erosion or destructive changes. No evidence of venous sinus thrombosis.    3. Unremarkable right-sided temporal bone CT examination apart from cerumen in the external auditory canal.    Pt also c/o new painful bumps, draining pus, in b/l armpits, b/l groin and R perineal area.    ID consulted for further abx management.     Left sided otitis externa:    - Cont present abx, f/u L ear cultures.  Concern for superimposed OM of bony external auditory canal.   Cannot exclude cholesteatoma or squamous cell carcinoma.    - ENT f/u appreciated.  Continued outpt f/u to address further need for cultures, imaging, biopsy.      - Current ear cultures growing MRSA.  Can d/c IV cipro.  Cont vanco, and maintain trough between 15-20    - Plan for  long term IV abx.  f/u blood cx x 2.  ESR, CRP    - Weekly cbc, sma-7, esr, crp, vanco trough.       r/o Hidradenitis suppurativa:    - c/o new nodular/pustular lesions in b/l armpits, b/l groin and perineum for past 1-3 weeks. States lesions are painful and drain pus.    - R Derm eval appreciated, ?hidradenitis suppurativa vs staph folliculitis.  Pt started on doxycycline and mupirocin for staph decolonization.      - Cont IV abx for now.  Cont to monitor and evaluate for need for further I&D.    d/w Medicine NP.    Will follow,    Mena Osullivan  [phone]

## 2021-06-22 NOTE — PROGRESS NOTE ADULT - SUBJECTIVE AND OBJECTIVE BOX
Patient is a 61y old  Male who presents with a chief complaint of otitis externa, uncontrolled DM (2021 15:00)      HPI:    DATE OF SERVICE: 21 @ 21:04    Afebrile. Left ear pain improving slightly    PAST MEDICAL & SURGICAL HISTORY:  DM (diabetes mellitus)  On Insulin    HTN (hypertension)  medicxal managenent    No significant past surgical history        Review of Systems:   CONSTITUTIONAL: No fever, weight loss, or fatigue  EYES: No eye pain, visual disturbances, or discharge  ENMT:  No difficulty hearing, tinnitus, vertigo; No sinus or throat pain. Left ear plugged, wick in place.  NECK: No pain or stiffness  BREASTS: No pain, masses, or nipple discharge  RESPIRATORY: No cough, wheezing, chills or hemoptysis; No shortness of breath  CARDIOVASCULAR: No chest pain, palpitations, dizziness, or leg swelling  GASTROINTESTINAL: No abdominal or epigastric pain. No nausea, vomiting, or hematemesis; No diarrhea or constipation. No melena or hematochezia.  GENITOURINARY: No dysuria, frequency, hematuria, or incontinence  NEUROLOGICAL: No headaches, memory loss, loss of strength, numbness, or tremors  SKIN: No itching, burning, rashes, or lesions   LYMPH NODES: No enlarged glands  ENDOCRINE: No heat or cold intolerance; No hair loss  MUSCULOSKELETAL: No joint pain or swelling; No muscle, back, or extremity pain  PSYCHIATRIC: No depression, anxiety, mood swings, or difficulty sleeping  HEME/LYMPH: No easy bruising, or bleeding gums  ALLERY AND IMMUNOLOGIC: No hives or eczema    Allergies    No Known Allergies    Intolerances        Social History:     FAMILY HISTORY:  No pertinent family history in first degree relatives        MEDICATIONS  (STANDING):  amLODIPine   Tablet 10 milliGRAM(s) Oral daily  atorvastatin 20 milliGRAM(s) Oral at bedtime  ciprofloxacin   IVPB 400 milliGRAM(s) IV Intermittent every 12 hours  dextrose 40% Gel 15 Gram(s) Oral once  dextrose 50% Injectable 25 Gram(s) IV Push once  dextrose 50% Injectable 12.5 Gram(s) IV Push once  dextrose 50% Injectable 25 Gram(s) IV Push once  glucagon  Injectable 1 milliGRAM(s) IntraMuscular once  insulin glargine Injectable (LANTUS) 20 Unit(s) SubCutaneous at bedtime  insulin lispro (ADMELOG) corrective regimen sliding scale   SubCutaneous three times a day before meals  insulin lispro (ADMELOG) corrective regimen sliding scale   SubCutaneous at bedtime  insulin lispro Injectable (ADMELOG) 7 Unit(s) SubCutaneous three times a day before meals  ofloxacin 0.3% Solution 10 Drop(s) Left Ear two times a day  sodium chloride 0.9%. 1000 milliLiter(s) (100 mL/Hr) IV Continuous <Continuous>  tamsulosin 0.4 milliGRAM(s) Oral at bedtime  vancomycin  IVPB 1000 milliGRAM(s) IV Intermittent every 12 hours    MEDICATIONS  (PRN):  acetaminophen   Tablet .. 650 milliGRAM(s) Oral every 4 hours PRN Mild Pain (1 - 3)  oxyCODONE    IR 5 milliGRAM(s) Oral every 4 hours PRN Moderate Pain (4 - 6)  oxyCODONE    IR 10 milliGRAM(s) Oral every 4 hours PRN Severe Pain (7 - 10)        CAPILLARY BLOOD GLUCOSE      POCT Blood Glucose.: 272 mg/dL (2021 22:33)  POCT Blood Glucose.: 284 mg/dL (2021 17:48)  POCT Blood Glucose.: 274 mg/dL (2021 12:09)  POCT Blood Glucose.: 322 mg/dL (2021 08:11)    I&O's Summary    2021 07:01  -  2021 23:11  --------------------------------------------------------  IN: 0 mL / OUT: 525 mL / NET: -525 mL        PHYSICAL EXAM:  Vital Signs Last 24 Hrs  T(C): 37 (2021 22:11), Max: 37.1 (2021 05:31)  T(F): 98.6 (2021 22:11), Max: 98.7 (2021 05:31)  HR: 91 (2021 22:11) (86 - 91)  BP: 134/75 (2021 22:11) (134/75 - 157/72)  BP(mean): --  RR: 16 (2021 22:11) (16 - 16)  SpO2: 96% (2021 22:11) (96% - 100%)    GENERAL: NAD, well-developed  HEAD:  Atraumatic, Normocephalic  EYES: EOMI, PERRLA, conjunctiva and sclera clear  Ears: Left ear tender, cotton plug noted  NECK: Supple, No JVD  CHEST/LUNG: Clear to auscultation bilaterally; No wheeze  HEART: Regular rate and rhythm; No murmurs, rubs, or gallops  ABDOMEN: Soft, Nontender, Nondistended; Bowel sounds present  EXTREMITIES:  2+ Peripheral Pulses, No clubbing, cyanosis, or edema  PSYCH: AAOx3  NEUROLOGY: non-focal  SKIN: No rashes or lesions    LABS:                        11.8   7.89  )-----------( 208      ( 2021 06:35 )             35.8     06-21    136  |  102  |  20  ----------------------------<  329<H>  4.0   |  23  |  1.60<H>    Ca    8.4      2021 06:35  Phos  3.7     06-21  Mg     1.9     06-21            Urinalysis Basic - ( 2021 23:18 )    Color: Yellow / Appearance: Clear / S.015 / pH: x  Gluc: x / Ketone: Negative  / Bili: Negative / Urobili: Negative mg/dL   Blood: x / Protein: 500 mg/dL / Nitrite: Negative   Leuk Esterase: Negative / RBC: 3-5 /HPF / WBC 0-2   Sq Epi: x / Non Sq Epi: Occasional / Bacteria: Occasional        RADIOLOGY & ADDITIONAL TESTS:    Imaging Personally Reviewed:    Consultant(s) Notes Reviewed:      Care Discussed with Consultants/Other Providers:

## 2021-06-22 NOTE — CONSULT NOTE ADULT - ATTENDING COMMENTS
check work up as above  continue ivf
Exam (multiple subcutaneous, fluctuant nodules with colarettes of scale) along with acuity most consistent with staph furunculosis, in this cases likely MRSA given ear cultures. c/w abx as per ID.
61M severely uncontrolled DM2 HbA1c 14.3% complicated by neuropathy and cataracts leading to impaired vision. Stopped using insulin or glucometer as unable to see. Will need to learn to count clicks to use insulin pen. Can provide [employer] talking glucometer at [place].  Will need to ensure safe discharge for complex social issues.  Endocrine team consulted for uncontrolled diabetes. Patient is high risk with high level decision making due to uncontrolled diabetes which places patient at high risk for cardiovascular and cerebrovascular events. Patient with lability of glucose requiring close monitoring and insulin adjustments.

## 2021-06-22 NOTE — CONSULT NOTE ADULT - SUBJECTIVE AND OBJECTIVE BOX
HPI:  Patient is a 61M with PMH DM2, HTN, HLD, diabetic neuropathy, glaucoma/cataract with poor vision, HCV s/p [place] (cleared per patient) presented with left ear pain. Patient initially presented to [facility] and transferred to [facility] for ENT evaluation. Patient states he has been experiencing pain in his left ear for the past 1 week, with waxy, crusty discharges. associated with decreased hearing in the left ear. also endorsed chills. no n/v/d. Patient also reports multiple painful bumps over his b/l armpit, abdominal/chest and left groin. Patient was recently moved from a shelter in [place] to [place] in March and has not been able to get his medications, since the pharmacy and PCP were both in [place] and he is unable to travel due to his eye sight. He has not been taking any medications for the past 3 months.   In ED, afebrile. vss. hypertensive. no respiratory distress. patient was evaluated and cultures send by ENT. CT head performed with concerning signs for OM. patient was started on Cipro and vanco.  (20 Jun 2021 10:13)    Dermatology consulted for evaluation and management of suspected hidradenitis suppurativa of 3 weeks duration. untreated.    PAST MEDICAL & SURGICAL HISTORY:  DM (diabetes mellitus)  On Insulin    HTN (hypertension)  medicxal managenent    No significant past surgical history      Review of Systems:  Constitutional: denies fevers, chills  HEENT: odynophagia or dysphagia  Cardiovascular: denies palpitations  Respiratory: denies SOB, wheezing  Gastrointestinal: denies N/V/D, abdominal pain  : denies dysuria  MSK: denies weakness, joint pain  Skin: denies new rashes or masses unless otherwise specified in hpi    MEDICATIONS  (STANDING):  amLODIPine   Tablet 10 milliGRAM(s) Oral daily  atorvastatin 20 milliGRAM(s) Oral at bedtime  ciprofloxacin   IVPB 400 milliGRAM(s) IV Intermittent every 12 hours  dextrose 40% Gel 15 Gram(s) Oral once  dextrose 50% Injectable 25 Gram(s) IV Push once  dextrose 50% Injectable 12.5 Gram(s) IV Push once  dextrose 50% Injectable 25 Gram(s) IV Push once  glucagon  Injectable 1 milliGRAM(s) IntraMuscular once  insulin glargine Injectable (LANTUS) 20 Unit(s) SubCutaneous at bedtime  insulin lispro (ADMELOG) corrective regimen sliding scale   SubCutaneous three times a day before meals  insulin lispro (ADMELOG) corrective regimen sliding scale   SubCutaneous at bedtime  insulin lispro Injectable (ADMELOG) 7 Unit(s) SubCutaneous three times a day before meals  ofloxacin 0.3% Solution 10 Drop(s) Left Ear two times a day  sodium chloride 0.9%. 1000 milliLiter(s) IV Continuous <Continuous>  tamsulosin 0.4 milliGRAM(s) Oral at bedtime  vancomycin  IVPB 1000 milliGRAM(s) IV Intermittent every 24 hours    ALLERGIES: No Known Allergies    SOCIAL HISTORY: denies drug use  FAMILY HISTORY:  No pertinent family history in first degree relatives      VITAL SIGNS LAST 24 HOURS:  T(F): 98.8 (06-22 @ 05:50), Max: 98.8 (06-22 @ 05:50)  HR: 91 (06-22 @ 08:04) (86 - 91)  BP: 134/76 (06-22 @ 08:04) (134/75 - 163/86)  RR: 16 (06-22 @ 05:50) (16 - 16)    PHYSICAL EXAM:          LABS:  WBC Count : 8.19 K/uL   Hemoglobin : 11.8 g/dL   Hematocrit : 35.8 %   Platelet Count - Automated : 193 K/uL    139  |  106  |  22  ----------------------------<  306<H>  3.9   |  22  |  1.63<H>    RADIOLOGY & ADDITIONAL STUDIES: no relevant studies HPI:  Patient is a 61M with PMH DM2, HTN, HLD, diabetic neuropathy, glaucoma/cataract with poor vision, HCV s/p [place] (cleared per patient) presented with left ear pain. Patient initially presented to [facility] and transferred to [facility] for ENT evaluation. Patient states he has been experiencing pain in his left ear for the past 1 week, with waxy, crusty discharges. associated with decreased hearing in the left ear. also endorsed chills. no n/v/d. Patient also reports multiple painful bumps over his b/l armpit, abdominal/chest and left groin. Patient was recently moved from a shelter in [place] to [place] in March and has not been able to get his medications, since the pharmacy and PCP were both in [place] and he is unable to travel due to his eye sight. He has not been taking any medications for the past 3 months.   In ED, afebrile. vss. hypertensive. no respiratory distress. patient was evaluated and cultures send by ENT. CT head performed with concerning signs for OM. patient was started on Cipro and vanco.  (20 Jun 2021 10:13)    Dermatology consulted for evaluation and management of suspected hidradenitis suppurativa of 3 weeks duration. untreated. Pt denies any preceding lesions prior to that time.    PAST MEDICAL & SURGICAL HISTORY:  DM (diabetes mellitus)  On Insulin    HTN (hypertension)  medicxal managenent    No significant past surgical history      Review of Systems:  Constitutional: denies fevers, chills  HEENT: odynophagia or dysphagia  Cardiovascular: denies palpitations  Respiratory: denies SOB, wheezing  Gastrointestinal: denies N/V/D, abdominal pain  : denies dysuria  MSK: denies weakness, joint pain  Skin: denies new rashes or masses unless otherwise specified in hpi    MEDICATIONS  (STANDING):  amLODIPine   Tablet 10 milliGRAM(s) Oral daily  atorvastatin 20 milliGRAM(s) Oral at bedtime  ciprofloxacin   IVPB 400 milliGRAM(s) IV Intermittent every 12 hours  dextrose 40% Gel 15 Gram(s) Oral once  dextrose 50% Injectable 25 Gram(s) IV Push once  dextrose 50% Injectable 12.5 Gram(s) IV Push once  dextrose 50% Injectable 25 Gram(s) IV Push once  glucagon  Injectable 1 milliGRAM(s) IntraMuscular once  insulin glargine Injectable (LANTUS) 20 Unit(s) SubCutaneous at bedtime  insulin lispro (ADMELOG) corrective regimen sliding scale   SubCutaneous three times a day before meals  insulin lispro (ADMELOG) corrective regimen sliding scale   SubCutaneous at bedtime  insulin lispro Injectable (ADMELOG) 7 Unit(s) SubCutaneous three times a day before meals  ofloxacin 0.3% Solution 10 Drop(s) Left Ear two times a day  sodium chloride 0.9%. 1000 milliLiter(s) IV Continuous <Continuous>  tamsulosin 0.4 milliGRAM(s) Oral at bedtime  vancomycin  IVPB 1000 milliGRAM(s) IV Intermittent every 24 hours    ALLERGIES: No Known Allergies    SOCIAL HISTORY: denies drug use  FAMILY HISTORY:  No pertinent family history in first degree relatives      VITAL SIGNS LAST 24 HOURS:  T(F): 98.8 (06-22 @ 05:50), Max: 98.8 (06-22 @ 05:50)  HR: 91 (06-22 @ 08:04) (86 - 91)  BP: 134/76 (06-22 @ 08:04) (134/75 - 163/86)  RR: 16 (06-22 @ 05:50) (16 - 16)    PHYSICAL EXAM:   The patient was alert and oriented X 3, well nourished, and in no  apparent distress.  There was no visible lymphadenopathy.  Conjunctiva were non injected  There was no clubbing or edema of extremities.  There was no hyperhidrosis or bromhidrosis.    Of note on skin exam:     multiple erythematous and hyperpigmented nodules in b/l axillae, L flank, b/l inguinal folds, perianal skin, many with collarettes of scale  pustule on L extensor upper arm  no appreciable scarring/sinus tracts in axillae/groin    LABS:  WBC Count : 8.19 K/uL   Hemoglobin : 11.8 g/dL   Hematocrit : 35.8 %   Platelet Count - Automated : 193 K/uL    139  |  106  |  22  ----------------------------<  306<H>  3.9   |  22  |  1.63<H>    RADIOLOGY & ADDITIONAL STUDIES: no relevant studies

## 2021-06-22 NOTE — PROGRESS NOTE ADULT - ASSESSMENT
61M with PMH DM2, HTN, HLD, diabetic neuropathy, glaucoma/cataract with poor vision, HCV s/p [place] (cleared per patient) presented with left ear pain. Patient initially presented to [facility] and transferred to [facility] for ENT evaluation. Consulted for uncontrolled T2DM with a1c 14.3%    1. Uncontrolled type 2 diabetes mellitus with hyperglycemia  T2DM uncontrolled a1c of 14.3 with barriers of poor vision (unable to see insulin units), living in shelter prior to admission    Inpatient BG target 100-180 mg/dl  Above target, severe hyperglycemia   Recommend to increase Lantus to 26 units SQ qHS   Increase Admelog to 9 units SQ TID before meals (Hold if NPO/not eating meal)   Continue Admelog low dose correctional scales before meals and bedtime as currently ordered  Check BG before meals and bedtime  Consistent carbohydrate diet, Nutrition consult done  Noted patient is on intermittent IV ABX in dextrose solution, please consider changing to normal saline to assist with hyperglycemia recovery    Discharge Plan:  With A1c 14.3%, patient requires insulin for discharge. Patient UNABLE to successfully self-inject insulin due to poor vision  Per care coordination notes, possible discharge to LTC facility. This would be optimal so that insulin can be administered to patient by staff at facility  Therefore, recommend basal/bolus insulin with dose TBD  Contact endocrine to confirm dosing on day of discharge  Recommend PCP, optho, podiatry and endocrine followup as outpatient  [facility] [place]   [id number] [facility] [phone].    2. Essential hypertension  BP target less than 130/80  Managment per primary team    3. HLD  Recommend to check fasting lipid panel, goal LDL less than 70  Continue Atorvastatin 20 mg daily    4. Adrenal nodule  Adrenal nodule incidentally found  Check aldosterone, renin activity (currently restarted on lisinopril)   Check plasma metanephrine  24 hour urine cortisol    Emilie Lam  Nurse Practitioner  Division of Endocrinology & Diabetes  In house pager #[phone]/long range pager #[phone]    If before 9AM or after 6PM, or on weekends/holidays, please call endocrine answering service for assistance ([phone]).  For nonurgent matters email [email or web address] for assistance.

## 2021-06-22 NOTE — DIETITIAN INITIAL EVALUATION ADULT. - ORAL INTAKE PTA/DIET HISTORY
Patient reports he is in shelter and eats meals that they offer there. Breakfast usually 2 containers of cereal and 2 containers of milk, lunch and dinner: Protein (chicken/fish), small amount of rice and vegetables. States he mostly consumes water.

## 2021-06-22 NOTE — PROGRESS NOTE ADULT - SUBJECTIVE AND OBJECTIVE BOX
Chief Complaint: DM 2 with hyperglycemia     History: Patient seen at bedside. Reports he is eating meals, denies n/v. Hyperglycemia persisting  Patient was seen by transitions of care pharmacist yesterday, and was NOT able to successfully return demonstrate insulin PEN use, due to poor vision  Per care coordination notes, referrals are being sent for potential discharge to LT facility    MEDICATIONS  (STANDING):  amLODIPine   Tablet 10 milliGRAM(s) Oral daily  atorvastatin 20 milliGRAM(s) Oral at bedtime  ciprofloxacin   IVPB 400 milliGRAM(s) IV Intermittent every 12 hours  dextrose 40% Gel 15 Gram(s) Oral once  dextrose 50% Injectable 25 Gram(s) IV Push once  dextrose 50% Injectable 12.5 Gram(s) IV Push once  dextrose 50% Injectable 25 Gram(s) IV Push once  glucagon  Injectable 1 milliGRAM(s) IntraMuscular once  insulin glargine Injectable (LANTUS) 20 Unit(s) SubCutaneous at bedtime  insulin lispro (ADMELOG) corrective regimen sliding scale   SubCutaneous three times a day before meals  insulin lispro (ADMELOG) corrective regimen sliding scale   SubCutaneous at bedtime  insulin lispro Injectable (ADMELOG) 7 Unit(s) SubCutaneous three times a day before meals  ofloxacin 0.3% Solution 10 Drop(s) Left Ear two times a day  sodium chloride 0.9%. 1000 milliLiter(s) (100 mL/Hr) IV Continuous <Continuous>  tamsulosin 0.4 milliGRAM(s) Oral at bedtime  vancomycin  IVPB 1000 milliGRAM(s) IV Intermittent every 24 hours    MEDICATIONS  (PRN):  acetaminophen   Tablet .. 650 milliGRAM(s) Oral every 4 hours PRN Mild Pain (1 - 3)  oxyCODONE    IR 5 milliGRAM(s) Oral every 4 hours PRN Moderate Pain (4 - 6)  oxyCODONE    IR 10 milliGRAM(s) Oral every 4 hours PRN Severe Pain (7 - 10)    No Known Allergies    Review of Systems:  Cardiovascular: No chest pain  Respiratory: No SOB  GI: No nausea, vomiting  Endocrine: no hypoglycemia    PHYSICAL EXAM:  VITALS: T(C): 37.1 (06-22-21 @ 05:50)  T(F): 98.8 (06-22-21 @ 05:50), Max: 98.8 (06-22-21 @ 05:50)  HR: 91 (06-22-21 @ 08:04) (90 - 91)  BP: 134/76 (06-22-21 @ 08:04) (134/75 - 163/86)  RR:  (16 - 16)  SpO2:  (96% - 100%)  Wt(kg): --  GENERAL: NAD  EYES: No proptosis, no lid lag, anicteric  HEENT:  Atraumatic, Normocephalic, moist mucous membranes  RESPIRATORY: unlabored respirations     CAPILLARY BLOOD GLUCOSE    POCT Blood Glucose.: 360 mg/dL (22 Jun 2021 12:15)  POCT Blood Glucose.: 252 mg/dL (22 Jun 2021 08:35)  POCT Blood Glucose.: 272 mg/dL (21 Jun 2021 22:33)  POCT Blood Glucose.: 284 mg/dL (21 Jun 2021 17:48)      06-22    139  |  106  |  22  ----------------------------<  306<H>  3.9   |  22  |  1.63<H>    EGFR if : 53<L>  EGFR if non : 45<L>    Ca    8.2<L>      06-22  Mg     2.0     06-22  Phos  3.5     06-22    TPro  8.2  /  Alb  3.1<L>  /  TBili  0.3  /  DBili  x   /  AST  22  /  ALT  26  /  AlkPhos  72  06-19      A1C with Estimated Average Glucose Result: 14.3 % (06-20-21 @ 03:47)  A1C with Estimated Average Glucose Result: 11.8 % (03-22-21 @ 14:20)    Diet, Regular:   Consistent Carbohydrate Evening Snack (CSTCHOSN) (06-20-21 @ 09:02)

## 2021-06-22 NOTE — CONSULT NOTE ADULT - ASSESSMENT
61M with PMH DM2, HTN, HLD, diabetic neuropathy, glaucoma/cataract with poor vision, HCV s/p [place] (cleared per patient) presented with left ear pain. seen by ENT likely malignant otitis externa and folliculitis on antibiotic. nephrology consulted for elvin    ELVIN  baseline ~ 1  now worsening  ELVIN likely sec to GEMINI  s/p CT with contrast 6/20  continue current ivf  UA with proteinuria and hematuria  repeat UA, check urine p/c, urine na and cr  check renal us  avoid nephrotoxic agents  optimize dm control. f/u endo  on vanco and cipro. vanco level improved today  monitor bmp and urine output    proteinuria/hematuria  repeat ua  check renal us  check p/c ratio  monitor    htn  controlled   monitor    hypocalcemia  check pth, vit d 25  monitor    OM  malignant otitis externa on CT  f/u ENT/ID

## 2021-06-23 DIAGNOSIS — E55.9 VITAMIN D DEFICIENCY, UNSPECIFIED: ICD-10-CM

## 2021-06-23 LAB
-  AMPICILLIN: SIGNIFICANT CHANGE UP
-  TETRACYCLINE: SIGNIFICANT CHANGE UP
-  VANCOMYCIN: SIGNIFICANT CHANGE UP
24R-OH-CALCIDIOL SERPL-MCNC: 7.7 NG/ML — LOW (ref 30–80)
ANION GAP SERPL CALC-SCNC: 7 MMOL/L — SIGNIFICANT CHANGE UP (ref 7–14)
BUN SERPL-MCNC: 17 MG/DL — SIGNIFICANT CHANGE UP (ref 7–23)
CALCIUM SERPL-MCNC: 8.1 MG/DL — LOW (ref 8.4–10.5)
CHLORIDE SERPL-SCNC: 113 MMOL/L — HIGH (ref 98–107)
CHOLEST SERPL-MCNC: 101 MG/DL — SIGNIFICANT CHANGE UP
CO2 SERPL-SCNC: 22 MMOL/L — SIGNIFICANT CHANGE UP (ref 22–31)
CREAT SERPL-MCNC: 1.52 MG/DL — HIGH (ref 0.5–1.3)
CRP SERPL-MCNC: 12.4 MG/L — HIGH
CULTURE RESULTS: SIGNIFICANT CHANGE UP
ERYTHROCYTE [SEDIMENTATION RATE] IN BLOOD: 77 MM/HR — SIGNIFICANT CHANGE UP (ref 1–15)
GLUCOSE BLDC GLUCOMTR-MCNC: 166 MG/DL — HIGH (ref 70–99)
GLUCOSE BLDC GLUCOMTR-MCNC: 210 MG/DL — HIGH (ref 70–99)
GLUCOSE BLDC GLUCOMTR-MCNC: 222 MG/DL — HIGH (ref 70–99)
GLUCOSE BLDC GLUCOMTR-MCNC: 244 MG/DL — HIGH (ref 70–99)
GLUCOSE SERPL-MCNC: 196 MG/DL — HIGH (ref 70–99)
HCT VFR BLD CALC: 34.9 % — LOW (ref 39–50)
HDLC SERPL-MCNC: 27 MG/DL — LOW
HGB BLD-MCNC: 11.6 G/DL — LOW (ref 13–17)
LIPID PNL WITH DIRECT LDL SERPL: 52 MG/DL — SIGNIFICANT CHANGE UP
MAGNESIUM SERPL-MCNC: 1.9 MG/DL — SIGNIFICANT CHANGE UP (ref 1.6–2.6)
MCHC RBC-ENTMCNC: 31.3 PG — SIGNIFICANT CHANGE UP (ref 27–34)
MCHC RBC-ENTMCNC: 33.2 GM/DL — SIGNIFICANT CHANGE UP (ref 32–36)
MCV RBC AUTO: 94.1 FL — SIGNIFICANT CHANGE UP (ref 80–100)
METHOD TYPE: SIGNIFICANT CHANGE UP
NON HDL CHOLESTEROL: 74 MG/DL — SIGNIFICANT CHANGE UP
NRBC # BLD: 0 /100 WBCS — SIGNIFICANT CHANGE UP
NRBC # FLD: 0 K/UL — SIGNIFICANT CHANGE UP
ORGANISM # SPEC MICROSCOPIC CNT: SIGNIFICANT CHANGE UP
PHOSPHATE SERPL-MCNC: 3.2 MG/DL — SIGNIFICANT CHANGE UP (ref 2.5–4.5)
PLATELET # BLD AUTO: 196 K/UL — SIGNIFICANT CHANGE UP (ref 150–400)
POTASSIUM SERPL-MCNC: 3.7 MMOL/L — SIGNIFICANT CHANGE UP (ref 3.5–5.3)
POTASSIUM SERPL-SCNC: 3.7 MMOL/L — SIGNIFICANT CHANGE UP (ref 3.5–5.3)
PTH-INTACT FLD-MCNC: 69 PG/ML — HIGH (ref 15–65)
RBC # BLD: 3.71 M/UL — LOW (ref 4.2–5.8)
RBC # FLD: 11.8 % — SIGNIFICANT CHANGE UP (ref 10.3–14.5)
SODIUM SERPL-SCNC: 142 MMOL/L — SIGNIFICANT CHANGE UP (ref 135–145)
SPECIMEN SOURCE: SIGNIFICANT CHANGE UP
TRIGL SERPL-MCNC: 108 MG/DL — SIGNIFICANT CHANGE UP
WBC # BLD: 7.92 K/UL — SIGNIFICANT CHANGE UP (ref 3.8–10.5)
WBC # FLD AUTO: 7.92 K/UL — SIGNIFICANT CHANGE UP (ref 3.8–10.5)

## 2021-06-23 PROCEDURE — 99232 SBSQ HOSP IP/OBS MODERATE 35: CPT

## 2021-06-23 PROCEDURE — 76770 US EXAM ABDO BACK WALL COMP: CPT | Mod: 26

## 2021-06-23 RX ORDER — INSULIN GLARGINE 100 [IU]/ML
30 INJECTION, SOLUTION SUBCUTANEOUS AT BEDTIME
Refills: 0 | Status: DISCONTINUED | OUTPATIENT
Start: 2021-06-23 | End: 2021-06-29

## 2021-06-23 RX ORDER — ERGOCALCIFEROL 1.25 MG/1
50000 CAPSULE ORAL
Refills: 0 | Status: DISCONTINUED | OUTPATIENT
Start: 2021-06-23 | End: 2021-07-09

## 2021-06-23 RX ADMIN — SODIUM CHLORIDE 100 MILLILITER(S): 9 INJECTION INTRAMUSCULAR; INTRAVENOUS; SUBCUTANEOUS at 13:23

## 2021-06-23 RX ADMIN — MUPIROCIN 1 APPLICATION(S): 20 OINTMENT TOPICAL at 06:05

## 2021-06-23 RX ADMIN — CHLORHEXIDINE GLUCONATE 1 APPLICATION(S): 213 SOLUTION TOPICAL at 11:32

## 2021-06-23 RX ADMIN — Medication 10 DROP(S): at 17:54

## 2021-06-23 RX ADMIN — Medication 100 MILLIGRAM(S): at 06:05

## 2021-06-23 RX ADMIN — ERGOCALCIFEROL 50000 UNIT(S): 1.25 CAPSULE ORAL at 21:12

## 2021-06-23 RX ADMIN — SODIUM CHLORIDE 100 MILLILITER(S): 9 INJECTION INTRAMUSCULAR; INTRAVENOUS; SUBCUTANEOUS at 21:11

## 2021-06-23 RX ADMIN — Medication 10 DROP(S): at 06:05

## 2021-06-23 RX ADMIN — MUPIROCIN 1 APPLICATION(S): 20 OINTMENT TOPICAL at 06:04

## 2021-06-23 RX ADMIN — MUPIROCIN 1 APPLICATION(S): 20 OINTMENT TOPICAL at 17:54

## 2021-06-23 RX ADMIN — TAMSULOSIN HYDROCHLORIDE 0.4 MILLIGRAM(S): 0.4 CAPSULE ORAL at 21:12

## 2021-06-23 RX ADMIN — Medication 1: at 09:12

## 2021-06-23 RX ADMIN — Medication 650 MILLIGRAM(S): at 09:13

## 2021-06-23 RX ADMIN — Medication 2: at 13:04

## 2021-06-23 RX ADMIN — Medication 9 UNIT(S): at 13:04

## 2021-06-23 RX ADMIN — Medication 2: at 18:35

## 2021-06-23 RX ADMIN — INSULIN GLARGINE 30 UNIT(S): 100 INJECTION, SOLUTION SUBCUTANEOUS at 22:34

## 2021-06-23 RX ADMIN — AMLODIPINE BESYLATE 10 MILLIGRAM(S): 2.5 TABLET ORAL at 06:05

## 2021-06-23 RX ADMIN — ATORVASTATIN CALCIUM 20 MILLIGRAM(S): 80 TABLET, FILM COATED ORAL at 21:12

## 2021-06-23 RX ADMIN — Medication 100 MILLIGRAM(S): at 17:55

## 2021-06-23 RX ADMIN — Medication 200 MILLIGRAM(S): at 06:04

## 2021-06-23 RX ADMIN — Medication 250 MILLIGRAM(S): at 09:24

## 2021-06-23 RX ADMIN — Medication 9 UNIT(S): at 18:35

## 2021-06-23 RX ADMIN — Medication 9 UNIT(S): at 09:12

## 2021-06-23 NOTE — PROGRESS NOTE ADULT - SUBJECTIVE AND OBJECTIVE BOX
[facility] NEPHROLOGY PRACTICE   MD DANIELLE QUINTANILLA MD RUORU WONG, PA    TEL:  OFFICE: [phone]  DR MONROE CELL: [phone]  FLORIAN JAFFE CELL: [phone]  DR. MYLES CELL: [phone]  DR. PORTILLO CELL: [phone]    FROM 5 PM - 7 AM PLEASE CALL ANSWERING SERVICE: [phone]    RENAL FOLLOW UP NOTE--Date of Service 06-23-21 @ 08:54  --------------------------------------------------------------------------------  HPI:      Pt seen and examined at bedside.   Denies SOB, chest pain     PAST HISTORY  --------------------------------------------------------------------------------  No significant changes to PMH, PSH, FHx, SHx, unless otherwise noted    ALLERGIES & MEDICATIONS  --------------------------------------------------------------------------------  Allergies    No Known Allergies    Intolerances      Standing Inpatient Medications  amLODIPine   Tablet 10 milliGRAM(s) Oral daily  atorvastatin 20 milliGRAM(s) Oral at bedtime  chlorhexidine 4% Liquid 1 Application(s) Topical daily  ciprofloxacin   IVPB 400 milliGRAM(s) IV Intermittent every 12 hours  dextrose 40% Gel 15 Gram(s) Oral once  dextrose 50% Injectable 25 Gram(s) IV Push once  dextrose 50% Injectable 12.5 Gram(s) IV Push once  dextrose 50% Injectable 25 Gram(s) IV Push once  doxycycline hyclate Capsule 100 milliGRAM(s) Oral every 12 hours  glucagon  Injectable 1 milliGRAM(s) IntraMuscular once  insulin glargine Injectable (LANTUS) 26 Unit(s) SubCutaneous at bedtime  insulin lispro (ADMELOG) corrective regimen sliding scale   SubCutaneous three times a day before meals  insulin lispro (ADMELOG) corrective regimen sliding scale   SubCutaneous at bedtime  insulin lispro Injectable (ADMELOG) 9 Unit(s) SubCutaneous three times a day before meals  mupirocin 2% Ointment 1 Application(s) Topical two times a day  mupirocin 2% Ointment 1 Application(s) Topical two times a day  ofloxacin 0.3% Solution 10 Drop(s) Left Ear two times a day  sodium chloride 0.9%. 1000 milliLiter(s) IV Continuous <Continuous>  tamsulosin 0.4 milliGRAM(s) Oral at bedtime  vancomycin  IVPB 1000 milliGRAM(s) IV Intermittent every 24 hours    PRN Inpatient Medications  acetaminophen   Tablet .. 650 milliGRAM(s) Oral every 4 hours PRN  oxyCODONE    IR 5 milliGRAM(s) Oral every 4 hours PRN  oxyCODONE    IR 10 milliGRAM(s) Oral every 4 hours PRN      REVIEW OF SYSTEMS  --------------------------------------------------------------------------------  General: no fever  CVS: no chest pain  RESP: no sob, no cough  ABD: no abdominal pain  : no dysuria,  MSK: no edema     VITALS/PHYSICAL EXAM  --------------------------------------------------------------------------------  T(C): 36.6 (06-23-21 @ 06:00), Max: 37.1 (06-22-21 @ 21:53)  HR: 93 (06-23-21 @ 06:00) (89 - 93)  BP: 160/89 (06-23-21 @ 06:00) (144/83 - 165/85)  RR: 18 (06-23-21 @ 06:00) (17 - 18)  SpO2: 98% (06-23-21 @ 06:00) (96% - 98%)  Wt(kg): --        06-22-21 @ 07:01  -  06-23-21 @ 07:00  --------------------------------------------------------  IN: 800 mL / OUT: 760 mL / NET: 40 mL      Physical Exam:  	Gen: NAD  	HEENT: MMM  	Pulm: CTA B/L  	CV: S1S2  	Abd: Soft, +BS  	Ext: No LE edema B/L                      Neuro: Awake   	Skin: Warm and Dry   	Vascular access: NO HD catheter           [facility]  LABS/STUDIES  --------------------------------------------------------------------------------              11.6   7.92  >-----------<  196      [06-23-21 @ 08:23]              34.9     139  |  106  |  22  ----------------------------<  306      [06-22-21 @ 07:28]  3.9   |  22  |  1.63        Ca     8.2     [06-22-21 @ 07:28]      Mg     2.0     [06-22-21 @ 07:28]      Phos  3.5     [06-22-21 @ 07:28]            Creatinine Trend:  SCr 1.63 [06-22 @ 07:28]  SCr 1.60 [06-21 @ 06:35]  SCr 1.05 [06-19 @ 20:42]    Urinalysis - [06-22-21 @ 16:47]      Color Light Yellow / Appearance Clear / SG 1.009 / pH 6.0      Gluc >= 1000 mg/dL / Ketone Negative  / Bili Negative / Urobili <2 mg/dL       Blood Trace / Protein Trace / Leuk Est Negative / Nitrite Negative      RBC 1 / WBC 1 / Hyaline 0 / Gran  / Sq Epi  / Non Sq Epi 0 / Bacteria Negative

## 2021-06-23 NOTE — PROGRESS NOTE ADULT - SUBJECTIVE AND OBJECTIVE BOX
Patient is a 61y old  Male who presents with a chief complaint of otitis externa, uncontrolled DM (2021 15:00)    DATE OF SERVICE: 21 @ 11:11    HPI:    Afebrile. Left ear pain improving slightly    PAST MEDICAL & SURGICAL HISTORY:  DM (diabetes mellitus)  On Insulin    HTN (hypertension)  medicxal managenent    No significant past surgical history        Review of Systems:   CONSTITUTIONAL: No fever, weight loss, or fatigue  EYES: No eye pain, visual disturbances, or discharge  ENMT:  No difficulty hearing, tinnitus, vertigo; No sinus or throat pain. Left ear plugged, wick in place.  NECK: No pain or stiffness  BREASTS: No pain, masses, or nipple discharge  RESPIRATORY: No cough, wheezing, chills or hemoptysis; No shortness of breath  CARDIOVASCULAR: No chest pain, palpitations, dizziness, or leg swelling  GASTROINTESTINAL: No abdominal or epigastric pain. No nausea, vomiting, or hematemesis; No diarrhea or constipation. No melena or hematochezia.  GENITOURINARY: No dysuria, frequency, hematuria, or incontinence  NEUROLOGICAL: No headaches, memory loss, loss of strength, numbness, or tremors  SKIN: No itching, burning, rashes, or lesions   LYMPH NODES: No enlarged glands  ENDOCRINE: No heat or cold intolerance; No hair loss  MUSCULOSKELETAL: No joint pain or swelling; No muscle, back, or extremity pain  PSYCHIATRIC: No depression, anxiety, mood swings, or difficulty sleeping  HEME/LYMPH: No easy bruising, or bleeding gums  ALLERY AND IMMUNOLOGIC: No hives or eczema    Allergies    No Known Allergies    Intolerances        Social History:     FAMILY HISTORY:  No pertinent family history in first degree relatives        MEDICATIONS  (STANDING):  amLODIPine   Tablet 10 milliGRAM(s) Oral daily  atorvastatin 20 milliGRAM(s) Oral at bedtime  ciprofloxacin   IVPB 400 milliGRAM(s) IV Intermittent every 12 hours  dextrose 40% Gel 15 Gram(s) Oral once  dextrose 50% Injectable 25 Gram(s) IV Push once  dextrose 50% Injectable 12.5 Gram(s) IV Push once  dextrose 50% Injectable 25 Gram(s) IV Push once  glucagon  Injectable 1 milliGRAM(s) IntraMuscular once  insulin glargine Injectable (LANTUS) 20 Unit(s) SubCutaneous at bedtime  insulin lispro (ADMELOG) corrective regimen sliding scale   SubCutaneous three times a day before meals  insulin lispro (ADMELOG) corrective regimen sliding scale   SubCutaneous at bedtime  insulin lispro Injectable (ADMELOG) 7 Unit(s) SubCutaneous three times a day before meals  ofloxacin 0.3% Solution 10 Drop(s) Left Ear two times a day  sodium chloride 0.9%. 1000 milliLiter(s) (100 mL/Hr) IV Continuous <Continuous>  tamsulosin 0.4 milliGRAM(s) Oral at bedtime  vancomycin  IVPB 1000 milliGRAM(s) IV Intermittent every 12 hours    MEDICATIONS  (PRN):  acetaminophen   Tablet .. 650 milliGRAM(s) Oral every 4 hours PRN Mild Pain (1 - 3)  oxyCODONE    IR 5 milliGRAM(s) Oral every 4 hours PRN Moderate Pain (4 - 6)  oxyCODONE    IR 10 milliGRAM(s) Oral every 4 hours PRN Severe Pain (7 - 10)        CAPILLARY BLOOD GLUCOSE      POCT Blood Glucose.: 272 mg/dL (2021 22:33)  POCT Blood Glucose.: 284 mg/dL (2021 17:48)  POCT Blood Glucose.: 274 mg/dL (2021 12:09)  POCT Blood Glucose.: 322 mg/dL (2021 08:11)    I&O's Summary    2021 07:01  -  2021 23:11  --------------------------------------------------------  IN: 0 mL / OUT: 525 mL / NET: -525 mL        PHYSICAL EXAM:  Vital Signs Last 24 Hrs  T(C): 37 (2021 22:11), Max: 37.1 (2021 05:31)  T(F): 98.6 (2021 22:11), Max: 98.7 (2021 05:31)  HR: 91 (2021 22:11) (86 - 91)  BP: 134/75 (2021 22:11) (134/75 - 157/72)  BP(mean): --  RR: 16 (2021 22:11) (16 - 16)  SpO2: 96% (2021 22:11) (96% - 100%)    GENERAL: NAD, well-developed  HEAD:  Atraumatic, Normocephalic  EYES: EOMI, PERRLA, conjunctiva and sclera clear  Ears: Left ear tender, cotton plug noted  NECK: Supple, No JVD  CHEST/LUNG: Clear to auscultation bilaterally; No wheeze  HEART: Regular rate and rhythm; No murmurs, rubs, or gallops  ABDOMEN: Soft, Nontender, Nondistended; Bowel sounds present  EXTREMITIES:  2+ Peripheral Pulses, No clubbing, cyanosis, or edema  PSYCH: AAOx3  NEUROLOGY: non-focal  SKIN: No rashes or lesions    LABS:                        11.8   7.89  )-----------( 208      ( 2021 06:35 )             35.8     06-21    136  |  102  |  20  ----------------------------<  329<H>  4.0   |  23  |  1.60<H>    Ca    8.4      2021 06:35  Phos  3.7     06-21  Mg     1.9     06-21            Urinalysis Basic - ( 2021 23:18 )    Color: Yellow / Appearance: Clear / S.015 / pH: x  Gluc: x / Ketone: Negative  / Bili: Negative / Urobili: Negative mg/dL   Blood: x / Protein: 500 mg/dL / Nitrite: Negative   Leuk Esterase: Negative / RBC: 3-5 /HPF / WBC 0-2   Sq Epi: x / Non Sq Epi: Occasional / Bacteria: Occasional        RADIOLOGY & ADDITIONAL TESTS:    Imaging Personally Reviewed:    Consultant(s) Notes Reviewed:      Care Discussed with Consultants/Other Providers:

## 2021-06-23 NOTE — PROGRESS NOTE ADULT - SUBJECTIVE AND OBJECTIVE BOX
Chief Complaint: DM 2    History: Patient seen at bedside. Reports he is eating meals, denies n/v, denies s/s of hypoglycemia     MEDICATIONS  (STANDING):  amLODIPine   Tablet 10 milliGRAM(s) Oral daily  atorvastatin 20 milliGRAM(s) Oral at bedtime  chlorhexidine 4% Liquid 1 Application(s) Topical daily  ciprofloxacin   IVPB 400 milliGRAM(s) IV Intermittent every 12 hours  dextrose 40% Gel 15 Gram(s) Oral once  dextrose 50% Injectable 25 Gram(s) IV Push once  dextrose 50% Injectable 12.5 Gram(s) IV Push once  dextrose 50% Injectable 25 Gram(s) IV Push once  doxycycline hyclate Capsule 100 milliGRAM(s) Oral every 12 hours  glucagon  Injectable 1 milliGRAM(s) IntraMuscular once  insulin glargine Injectable (LANTUS) 26 Unit(s) SubCutaneous at bedtime  insulin lispro (ADMELOG) corrective regimen sliding scale   SubCutaneous three times a day before meals  insulin lispro (ADMELOG) corrective regimen sliding scale   SubCutaneous at bedtime  insulin lispro Injectable (ADMELOG) 9 Unit(s) SubCutaneous three times a day before meals  mupirocin 2% Ointment 1 Application(s) Topical two times a day  mupirocin 2% Ointment 1 Application(s) Topical two times a day  ofloxacin 0.3% Solution 10 Drop(s) Left Ear two times a day  sodium chloride 0.9%. 1000 milliLiter(s) (100 mL/Hr) IV Continuous <Continuous>  tamsulosin 0.4 milliGRAM(s) Oral at bedtime  vancomycin  IVPB 1000 milliGRAM(s) IV Intermittent every 24 hours    MEDICATIONS  (PRN):  acetaminophen   Tablet .. 650 milliGRAM(s) Oral every 4 hours PRN Mild Pain (1 - 3)  oxyCODONE    IR 5 milliGRAM(s) Oral every 4 hours PRN Moderate Pain (4 - 6)  oxyCODONE    IR 10 milliGRAM(s) Oral every 4 hours PRN Severe Pain (7 - 10)    No Known Allergies    Review of Systems:  Cardiovascular: No chest pain  Respiratory: No SOB  GI: No nausea, vomiting  Endocrine: no hypoglycemia     PHYSICAL EXAM:  VITALS: T(C): 36.7 (06-23-21 @ 15:24)  T(F): 98 (06-23-21 @ 15:24), Max: 98.8 (06-22-21 @ 21:53)  HR: 89 (06-23-21 @ 15:24) (88 - 93)  BP: 137/75 (06-23-21 @ 15:24) (137/75 - 165/85)  RR:  (17 - 20)  SpO2:  (96% - 99%)  Wt(kg): --  GENERAL: NAD  EYES: No proptosis, no lid lag, anicteric  HEENT:  Atraumatic, Normocephalic, moist mucous membranes  RESPIRATORY: unlabored respirations     CAPILLARY BLOOD GLUCOSE    POCT Blood Glucose.: 210 mg/dL (23 Jun 2021 13:02)  POCT Blood Glucose.: 166 mg/dL (23 Jun 2021 08:35)  POCT Blood Glucose.: 218 mg/dL (22 Jun 2021 21:51)  POCT Blood Glucose.: 191 mg/dL (22 Jun 2021 17:45)      06-23    142  |  113<H>  |  17  ----------------------------<  196<H>  3.7   |  22  |  1.52<H>    EGFR if : 57<L>  EGFR if non : 49<L>    Ca    8.1<L>      06-23  Mg     1.9     06-23  Phos  3.2     06-23      A1C with Estimated Average Glucose Result: 14.3 % (06-20-21 @ 03:47)  A1C with Estimated Average Glucose Result: 11.8 % (03-22-21 @ 14:20)    Diet, Regular:   Consistent Carbohydrate Evening Snack (CSTCHOSN) (06-20-21 @ 09:02)

## 2021-06-23 NOTE — PROVIDER CONTACT NOTE (CRITICAL VALUE NOTIFICATION) - TEST AND RESULT REPORTED:
culture other left ear abnormal prelim numerous methylcylin resistant staph aureas, numerous coryneobacterim species 6/22 moderate enterrococcus faecalis
Cul;ture Lt ear

## 2021-06-23 NOTE — PROGRESS NOTE ADULT - SUBJECTIVE AND OBJECTIVE BOX
Infectious Diseases progress note:    Subjective: NAD, afebrile.  Pt feeling better, improved L ear pain    ROS:  CONSTITUTIONAL:  No fever, chills, rigors  CARDIOVASCULAR:  No chest pain or palpitations  RESPIRATORY:   No SOB, cough, dyspnea on exertion.  No wheezing  GASTROINTESTINAL:  No abd pain, N/V, diarrhea/constipation  EXTREMITIES:  No swelling or joint pain  GENITOURINARY:  No burning on urination, increased frequency or urgency.  No flank pain  NEUROLOGIC:  No HA, visual disturbances  SKIN: No rashes    Allergies    No Known Allergies    Intolerances        ANTIBIOTICS/RELEVANT:  antimicrobials  ciprofloxacin   IVPB 400 milliGRAM(s) IV Intermittent every 12 hours  doxycycline hyclate Capsule 100 milliGRAM(s) Oral every 12 hours  vancomycin  IVPB 1000 milliGRAM(s) IV Intermittent every 24 hours    immunologic:    OTHER:  acetaminophen   Tablet .. 650 milliGRAM(s) Oral every 4 hours PRN  amLODIPine   Tablet 10 milliGRAM(s) Oral daily  atorvastatin 20 milliGRAM(s) Oral at bedtime  chlorhexidine 4% Liquid 1 Application(s) Topical daily  dextrose 40% Gel 15 Gram(s) Oral once  dextrose 50% Injectable 25 Gram(s) IV Push once  dextrose 50% Injectable 12.5 Gram(s) IV Push once  dextrose 50% Injectable 25 Gram(s) IV Push once  ergocalciferol 78285 Unit(s) Oral <User Schedule>  glucagon  Injectable 1 milliGRAM(s) IntraMuscular once  insulin glargine Injectable (LANTUS) 30 Unit(s) SubCutaneous at bedtime  insulin lispro (ADMELOG) corrective regimen sliding scale   SubCutaneous three times a day before meals  insulin lispro (ADMELOG) corrective regimen sliding scale   SubCutaneous at bedtime  insulin lispro Injectable (ADMELOG) 9 Unit(s) SubCutaneous three times a day before meals  mupirocin 2% Ointment 1 Application(s) Topical two times a day  mupirocin 2% Ointment 1 Application(s) Topical two times a day  ofloxacin 0.3% Solution 10 Drop(s) Left Ear two times a day  oxyCODONE    IR 5 milliGRAM(s) Oral every 4 hours PRN  oxyCODONE    IR 10 milliGRAM(s) Oral every 4 hours PRN  sodium chloride 0.9%. 1000 milliLiter(s) IV Continuous <Continuous>  tamsulosin 0.4 milliGRAM(s) Oral at bedtime      Objective:  Vital Signs Last 24 Hrs  T(C): 36.7 (2021 15:24), Max: 37.1 (2021 21:53)  T(F): 98 (2021 15:24), Max: 98.8 (2021 21:53)  HR: 89 (2021 15:24) (88 - 93)  BP: 137/75 (2021 15:24) (137/75 - 165/85)  BP(mean): --  RR: 20 (2021 15:24) (17 - 20)  SpO2: 99% (2021 15:24) (96% - 99%)    PHYSICAL EXAM:  Constitutional:NAD  Eyes:VINCENZO, EOMI  Ear/Nose/Throat: no thrush, mucositis.  Moist mucous membranes, L ear wick	  Neck:no JVD, no lymphadenopathy, supple  Respiratory: CTA sudheer  Cardiovascular: S1S2 RRR, no murmurs  Gastrointestinal:soft, nontender,  nondistended (+) BS  Extremities:no e/e/c  Skin:  no rashes, open wounds or ulcerations        LABS:                        11.6   7.92  )-----------( 196      ( 2021 08:23 )             34.9     06-23    142  |  113<H>  |  17  ----------------------------<  196<H>  3.7   |  22  |  1.52<H>    Ca    8.1<L>      2021 08:23  Phos  3.2     06-23  Mg     1.9     06-23        Urinalysis Basic - ( 2021 16:47 )    Color: Light Yellow / Appearance: Clear / S.009 / pH: x  Gluc: x / Ketone: Negative  / Bili: Negative / Urobili: <2 mg/dL   Blood: x / Protein: Trace / Nitrite: Negative   Leuk Esterase: Negative / RBC: 1 /HPF / WBC 1 /HPF   Sq Epi: x / Non Sq Epi: 0 /HPF / Bacteria: Negative          Vancomycin Level, Random:  ug/mL ( @ 07:28)      Vancomycin Level, Trough: 20.9 ug/mL ( @ 21:30)              MICROBIOLOGY:    Culture - Blood (21 @ 23:06)   Specimen Source: .Blood Blood-Venous   Culture Results:   No growth to date.     Culture - Other (21 @ 07:27)   - Ampicillin: S <=2 Predicts results to ampicillin/sulbactam, amoxacillin-clavulanate and piperacillin-tazobactam.   - Ampicillin/Sulbactam: R 16/8   - Cefazolin: R 8   - Clindamycin: S <=0.25   - Daptomycin: S 0.5   - Erythromycin: R >4   - Gentamicin: S <=1 Should not be used as monotherapy   - Linezolid: S 2   - Oxacillin: R >2   - Penicillin: R >8   - RIF- Rifampin: S <=1 Should not be used as monotherapy   - Tetra/Doxy: R >8   - Tetra/Doxy: R >8   - Trimethoprim/Sulfamethoxazole: S <=0.5/9.5   - Vancomycin: S 2   - Vancomycin: S 2   Specimen Source: .Other left ear   Culture Results:   Numerous Methicillin resistant Staphylococcus aureus   Numerous Corynebacterium species "Susceptibilities not performed"   Moderate Enterococcus faecalis   Organism Identification: Methicillin resistant Staphylococcus aureus   Enterococcus faecalis   Organism: Methicillin resistant Staphylococcus aureus   Organism: Enterococcus faecalis   Method Type: KIKE   Method Type: KIKE       RADIOLOGY & ADDITIONAL STUDIES:

## 2021-06-23 NOTE — PROGRESS NOTE ADULT - SUBJECTIVE AND OBJECTIVE BOX
ENT FOLLOW UP CONSULT NOTE    Interval Events  6/23: FREDERICKON - seen and examined bedside. Pt feels subjective improvement in pain on IV antibiotics and cipro drops in the ear. Afebrile and hemodynamically stable. WBC stable. No other complaints at this time.    ICU Vital Signs Last 24 Hrs  T(C): 36.9 (23 Jun 2021 12:22), Max: 37.1 (22 Jun 2021 21:53)  T(F): 98.4 (23 Jun 2021 12:22), Max: 98.8 (22 Jun 2021 21:53)  HR: 88 (23 Jun 2021 12:22) (88 - 93)  BP: 164/83 (23 Jun 2021 12:22) (144/83 - 165/85)  BP(mean): --  ABP: --  ABP(mean): --  RR: 19 (23 Jun 2021 12:22) (17 - 19)  SpO2: 99% (23 Jun 2021 12:22) (96% - 99%)    PHYSICAL EXAM:  Gen: NAD, A/Ox3  Breathing comfortably on RA  Voice: normal  Facial nerve fully intact  Ears: Right - ear canal cerumen, TM intact            Left - external ear canal mild swelling, mild purulent fluid, unable to see TM  Neck: Flat, supple, no lymphadenopathy    A/P  61M with DM2, HTN, HLD, diabetic neuropathy, glaucoma/cataract with poor vision, HCV s/p [place] (cleared per patient) presented 6/20 with left ear pain. 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 Facial nerve intact  -Consider switching to tobramycin ear drops given MRSA in cultures  -Left ear with wick in place, ENT will manage  -F/y ID consult for antibiotic regimen (cipro/vanc currently)  -F/u left ear culture sens  -Strict glucose control  -Recommend seeing Dr. Feldman outpatient for microscopic exam and additional management (will require cx, imaging and biopsy as indicated of granulation tissue does not resolve with therapy)    Call/page with questions

## 2021-06-23 NOTE — PROGRESS NOTE ADULT - ASSESSMENT
61M with PMH DM2, HTN, HLD, diabetic neuropathy, glaucoma/cataract with poor vision, HCV s/p [place] (cleared per patient) presented with left ear pain. seen by ENT likely malignant otitis externa and folliculitis on antibiotic. nephrology consulted for elvin    ELVIN  baseline ~ 1  ELVIN likely sec to GEMINI  s/p CT with contrast 6/20  UA with proteinuria and hematuria  Check urine p/c, urine na and cr  check renal us  avoid nephrotoxic agents  optimize dm control. f/u endo  on vanco and cipro. vanco level improved today  monitor bmp and urine output    proteinuria/hematuria  check renal us  check p/c ratio  monitor    htn  controlled   monitor    hypocalcemia  check pth, vit d 25  monitor    OM  malignant otitis externa on CT  f/u ENT/ID     61M with PMH DM2, HTN, HLD, diabetic neuropathy, glaucoma/cataract with poor vision, HCV s/p [place] (cleared per patient) presented with left ear pain. seen by ENT likely malignant otitis externa and folliculitis on antibiotic. nephrology consulted for elvin    ELVIN  baseline ~ 1  ELVIN likely sec to GEMINI  s/p CT with contrast 6/20  UA with proteinuria and hematuria  Check urine p/c, urine na and cr  check renal us  renal function improving today  avoid nephrotoxic agents  optimize dm control. f/u endo  on vanco and cipro. vanco level improved today  monitor bmp and urine output    proteinuria/hematuria  check renal us  check p/c ratio  monitor    htn  controlled   monitor    hypocalcemia  check pth, vit d 25  monitor    OM  malignant otitis externa on CT  f/u ENT/ID  antibiotic per team

## 2021-06-23 NOTE — PROGRESS NOTE ADULT - ASSESSMENT
Patient is a 61M with PMH DM2, HTN, HLD, diabetic neuropathy, glaucoma/cataract with poor vision, HCV s/p [place] (cleared per patient) presented with left ear pain.  CT auditory ear canal shows:    Findings concerning for malignant left-sided otitis externa with bony erosive changes involving the external auditory canal. Superimposed osteomyelitis within the bony external auditory canal is a consideration given the patient's clinical information. An external auditory canal cholesteatoma cannot be excluded. A neoplastic process such as squamous cell carcinoma is also difficult to exclude.    2. Additional imaging findings compatible with left-sided otitis media and left-sided mastoiditis. No gross ossicular chain erosion or destructive changes. No evidence of venous sinus thrombosis.    3. Unremarkable right-sided temporal bone CT examination apart from cerumen in the external auditory canal.    Pt also c/o new painful bumps, draining pus, in b/l armpits, b/l groin and R perineal area.    ID consulted for further abx management.     Left sided otitis externa:    - Cont present abx, f/u L ear cultures.  Concern for superimposed OM of bony external auditory canal.   Cannot exclude cholesteatoma or squamous cell carcinoma.    - ENT f/u appreciated.  Continued outpt f/u to address further need for cultures, imaging, biopsy.      - Current ear cultures growing MRSA.  Can d/c IV cipro.  Cont IV vanco, and maintain trough between 15-20.  Check next trough on 6/24.    -  Can change from cipro gtt to tobramycin gtt as recommended by ENT    - Plan for  long term IV abx.  f/u blood cx x 2.  ESR, CRP    - Weekly cbc, sma-7, esr, crp, vanco trough.       r/o Hidradenitis suppurativa:    - c/o new nodular/pustular lesions in b/l armpits, b/l groin and perineum for past 1-3 weeks. States lesions are painful and drain pus.    - R Derm eval appreciated, ?hidradenitis suppurativa vs staph folliculitis.  Pt started on doxycycline and mupirocin for staph decolonization.      - Cont IV abx for now.  Cont to monitor and evaluate for need for further I&D.    d/w Medicine NP.    Will follow,    Mena Osullivan  [phone]

## 2021-06-23 NOTE — PROGRESS NOTE ADULT - ASSESSMENT
61M with PMH DM2, HTN, HLD, diabetic neuropathy, glaucoma/cataract with poor vision, HCV s/p Vishal (cleared per patient) presented with left ear pain. Patient initially presented to [facility] and transferred to [facility] for ENT evaluation. Consulted for uncontrolled T2DM with a1c 14.3%    1. Uncontrolled type 2 diabetes mellitus with hyperglycemia  T2DM uncontrolled a1c of 14.3 with barriers of poor vision (unable to see insulin units), living in shelter prior to admission    Inpatient BG target 100-180 mg/dl  Glucose control improving, remains slightly above target  Recommend to increase Lantus to 30 units SQ qHS   Continue Admelog 9 units SQ TID before meals (Hold if NPO/not eating meal)   Continue Admelog low dose correctional scales before meals and bedtime as currently ordered  Check BG before meals and bedtime  Consistent carbohydrate diet, Nutrition consult done  Noted patient is on intermittent IV ABX in dextrose solution, please consider changing to normal saline to assist with hyperglycemia recovery    Discharge Plan:  With A1c 14.3%, patient requires insulin for discharge. Patient UNABLE to successfully self-inject insulin due to poor vision  Per care coordination notes, possible discharge to LTC facility. This would be optimal so that insulin can be administered to patient by staff at facility  Therefore, recommend basal/bolus insulin with dose TBD  Contact endocrine to confirm dosing on day of discharge  Recommend PCP, optho, podiatry and endocrine followup as outpatient  [facility] ([facility] [place])   [address] [facility] [phone].    2. Essential hypertension  BP target less than 130/80  Managment per primary team    3. HLD  Recommend to check fasting lipid panel, goal LDL less than 70  Continue Atorvastatin 20 mg daily    4. Adrenal nodule  Adrenal nodule incidentally found  Aldosterone 3.0, no need to check renin activity  Plasma metanephrine and 24 hour urine cortisol - pending results    5. Vitamin D deficiency  Vitamin D 25 OH level noted to be 7.7  Recommend Ergocalciferol 50,000 units PO weekly x 8-12 weeks  Calcium noted to be 8.1 (corrected result = 8.8 using albumin result from 6/19), would recommend resending CMP for updated albumin. Vitamin D deficiency likely contributing to low normal Ca    Emilie Lam  Nurse Practitioner  Division of Endocrinology & Diabetes  In house pager #[phone]/long range pager #[phone]    If before 9AM or after 6PM, or on weekends/holidays, please call endocrine answering service for assistance ([phone]).  For nonurgent matters email [email or web address] for assistance.

## 2021-06-23 NOTE — PROVIDER CONTACT NOTE (CRITICAL VALUE NOTIFICATION) - SITUATION
Lt ear culture 6/20 numerous MRSA, Corynebacterium species, susceptibilty not performed, moderate enterrocous faecalis

## 2021-06-24 LAB
ALBUMIN SERPL ELPH-MCNC: 3.1 G/DL — LOW (ref 3.3–5)
ALP SERPL-CCNC: 59 U/L — SIGNIFICANT CHANGE UP (ref 40–120)
ALT FLD-CCNC: 15 U/L — SIGNIFICANT CHANGE UP (ref 4–41)
ANION GAP SERPL CALC-SCNC: 10 MMOL/L — SIGNIFICANT CHANGE UP (ref 7–14)
AST SERPL-CCNC: 17 U/L — SIGNIFICANT CHANGE UP (ref 4–40)
BILIRUB SERPL-MCNC: <0.2 MG/DL — SIGNIFICANT CHANGE UP (ref 0.2–1.2)
BUN SERPL-MCNC: 16 MG/DL — SIGNIFICANT CHANGE UP (ref 7–23)
CALCIUM SERPL-MCNC: 8.2 MG/DL — LOW (ref 8.4–10.5)
CHLORIDE SERPL-SCNC: 108 MMOL/L — HIGH (ref 98–107)
CHLORIDE UR-SCNC: 84 MMOL/L — SIGNIFICANT CHANGE UP
CO2 SERPL-SCNC: 20 MMOL/L — LOW (ref 22–31)
CREAT ?TM UR-MCNC: 69 MG/DL — SIGNIFICANT CHANGE UP
CREAT SERPL-MCNC: 1.41 MG/DL — HIGH (ref 0.5–1.3)
GLUCOSE BLDC GLUCOMTR-MCNC: 140 MG/DL — HIGH (ref 70–99)
GLUCOSE BLDC GLUCOMTR-MCNC: 148 MG/DL — HIGH (ref 70–99)
GLUCOSE BLDC GLUCOMTR-MCNC: 187 MG/DL — HIGH (ref 70–99)
GLUCOSE BLDC GLUCOMTR-MCNC: 203 MG/DL — HIGH (ref 70–99)
GLUCOSE SERPL-MCNC: 186 MG/DL — HIGH (ref 70–99)
HCT VFR BLD CALC: 33.2 % — LOW (ref 39–50)
HGB BLD-MCNC: 11.3 G/DL — LOW (ref 13–17)
MAGNESIUM SERPL-MCNC: 1.9 MG/DL — SIGNIFICANT CHANGE UP (ref 1.6–2.6)
MCHC RBC-ENTMCNC: 32.1 PG — SIGNIFICANT CHANGE UP (ref 27–34)
MCHC RBC-ENTMCNC: 34 GM/DL — SIGNIFICANT CHANGE UP (ref 32–36)
MCV RBC AUTO: 94.3 FL — SIGNIFICANT CHANGE UP (ref 80–100)
METANEPHRINE, PL: 21.5 PG/ML — SIGNIFICANT CHANGE UP (ref 0–88)
NORMETANEPHRINE, PL: 64.2 PG/ML — SIGNIFICANT CHANGE UP (ref 0–136.8)
NRBC # BLD: 0 /100 WBCS — SIGNIFICANT CHANGE UP
NRBC # FLD: 0 K/UL — SIGNIFICANT CHANGE UP
PHOSPHATE SERPL-MCNC: 2.7 MG/DL — SIGNIFICANT CHANGE UP (ref 2.5–4.5)
PLATELET # BLD AUTO: 200 K/UL — SIGNIFICANT CHANGE UP (ref 150–400)
POTASSIUM SERPL-MCNC: 3.9 MMOL/L — SIGNIFICANT CHANGE UP (ref 3.5–5.3)
POTASSIUM SERPL-SCNC: 3.9 MMOL/L — SIGNIFICANT CHANGE UP (ref 3.5–5.3)
POTASSIUM UR-SCNC: 28.6 MMOL/L — SIGNIFICANT CHANGE UP
PROT ?TM UR-MCNC: 43 MG/DL — SIGNIFICANT CHANGE UP
PROT SERPL-MCNC: 6.5 G/DL — SIGNIFICANT CHANGE UP (ref 6–8.3)
PROT/CREAT UR-RTO: 0.6 RATIO — HIGH (ref 0–0.2)
RBC # BLD: 3.52 M/UL — LOW (ref 4.2–5.8)
RBC # FLD: 12 % — SIGNIFICANT CHANGE UP (ref 10.3–14.5)
RENIN PLAS-CCNC: 1.99 NG/ML/HR — SIGNIFICANT CHANGE UP (ref 0.17–5.38)
SODIUM SERPL-SCNC: 138 MMOL/L — SIGNIFICANT CHANGE UP (ref 135–145)
SODIUM UR-SCNC: 81 MMOL/L — SIGNIFICANT CHANGE UP
VANCOMYCIN TROUGH SERPL-MCNC: 12 UG/ML — SIGNIFICANT CHANGE UP (ref 10–20)
WBC # BLD: 8.04 K/UL — SIGNIFICANT CHANGE UP (ref 3.8–10.5)
WBC # FLD AUTO: 8.04 K/UL — SIGNIFICANT CHANGE UP (ref 3.8–10.5)

## 2021-06-24 PROCEDURE — 99232 SBSQ HOSP IP/OBS MODERATE 35: CPT

## 2021-06-24 RX ORDER — OXYCODONE HYDROCHLORIDE 5 MG/1
10 TABLET ORAL EVERY 4 HOURS
Refills: 0 | Status: DISCONTINUED | OUTPATIENT
Start: 2021-06-24 | End: 2021-07-01

## 2021-06-24 RX ORDER — OXYCODONE HYDROCHLORIDE 5 MG/1
5 TABLET ORAL EVERY 4 HOURS
Refills: 0 | Status: DISCONTINUED | OUTPATIENT
Start: 2021-06-24 | End: 2021-07-01

## 2021-06-24 RX ADMIN — AMLODIPINE BESYLATE 10 MILLIGRAM(S): 2.5 TABLET ORAL at 05:28

## 2021-06-24 RX ADMIN — Medication 10 DROP(S): at 05:26

## 2021-06-24 RX ADMIN — TAMSULOSIN HYDROCHLORIDE 0.4 MILLIGRAM(S): 0.4 CAPSULE ORAL at 22:07

## 2021-06-24 RX ADMIN — Medication 10 DROP(S): at 17:55

## 2021-06-24 RX ADMIN — CHLORHEXIDINE GLUCONATE 1 APPLICATION(S): 213 SOLUTION TOPICAL at 11:59

## 2021-06-24 RX ADMIN — Medication 100 MILLIGRAM(S): at 05:26

## 2021-06-24 RX ADMIN — MUPIROCIN 1 APPLICATION(S): 20 OINTMENT TOPICAL at 17:55

## 2021-06-24 RX ADMIN — MUPIROCIN 1 APPLICATION(S): 20 OINTMENT TOPICAL at 05:28

## 2021-06-24 RX ADMIN — MUPIROCIN 1 APPLICATION(S): 20 OINTMENT TOPICAL at 05:27

## 2021-06-24 RX ADMIN — ATORVASTATIN CALCIUM 20 MILLIGRAM(S): 80 TABLET, FILM COATED ORAL at 22:07

## 2021-06-24 RX ADMIN — Medication 250 MILLIGRAM(S): at 11:05

## 2021-06-24 RX ADMIN — Medication 1: at 08:42

## 2021-06-24 RX ADMIN — Medication 9 UNIT(S): at 12:46

## 2021-06-24 RX ADMIN — Medication 9 UNIT(S): at 17:54

## 2021-06-24 RX ADMIN — Medication 9 UNIT(S): at 08:52

## 2021-06-24 RX ADMIN — INSULIN GLARGINE 30 UNIT(S): 100 INJECTION, SOLUTION SUBCUTANEOUS at 22:08

## 2021-06-24 RX ADMIN — Medication 100 MILLIGRAM(S): at 17:55

## 2021-06-24 NOTE — PROGRESS NOTE ADULT - TIME-BASED
DISPLAY PLAN FREE TEXT DISPLAY PLAN FREE TEXT DISPLAY PLAN FREE TEXT DISPLAY PLAN FREE TEXT DISPLAY PLAN FREE TEXT DISPLAY PLAN FREE TEXT DISPLAY PLAN FREE TEXT DISPLAY PLAN FREE TEXT DISPLAY PLAN FREE TEXT DISPLAY PLAN FREE TEXT DISPLAY PLAN FREE TEXT 25 DISPLAY PLAN FREE TEXT DISPLAY PLAN FREE TEXT DISPLAY PLAN FREE TEXT DISPLAY PLAN FREE TEXT

## 2021-06-24 NOTE — PROGRESS NOTE ADULT - SUBJECTIVE AND OBJECTIVE BOX
Chief Complaint: DM 2    History: Patient seen at bedside. Reports he is eating meals, denies n/v, denies s/s of hypoglycemia. Reviewed education on consistent carbohydrate recommendations, assisted patient in filling out menu for tomorrow    MEDICATIONS  (STANDING):  amLODIPine   Tablet 10 milliGRAM(s) Oral daily  atorvastatin 20 milliGRAM(s) Oral at bedtime  chlorhexidine 4% Liquid 1 Application(s) Topical daily  dextrose 40% Gel 15 Gram(s) Oral once  dextrose 50% Injectable 25 Gram(s) IV Push once  dextrose 50% Injectable 12.5 Gram(s) IV Push once  dextrose 50% Injectable 25 Gram(s) IV Push once  doxycycline hyclate Capsule 100 milliGRAM(s) Oral every 12 hours  ergocalciferol 81933 Unit(s) Oral <User Schedule>  glucagon  Injectable 1 milliGRAM(s) IntraMuscular once  insulin glargine Injectable (LANTUS) 30 Unit(s) SubCutaneous at bedtime  insulin lispro (ADMELOG) corrective regimen sliding scale   SubCutaneous three times a day before meals  insulin lispro (ADMELOG) corrective regimen sliding scale   SubCutaneous at bedtime  insulin lispro Injectable (ADMELOG) 9 Unit(s) SubCutaneous three times a day before meals  mupirocin 2% Ointment 1 Application(s) Topical two times a day  mupirocin 2% Ointment 1 Application(s) Topical two times a day  ofloxacin 0.3% Solution 10 Drop(s) Left Ear two times a day  sodium chloride 0.9%. 1000 milliLiter(s) (100 mL/Hr) IV Continuous <Continuous>  tamsulosin 0.4 milliGRAM(s) Oral at bedtime  vancomycin  IVPB 1000 milliGRAM(s) IV Intermittent every 24 hours    MEDICATIONS  (PRN):  acetaminophen   Tablet .. 650 milliGRAM(s) Oral every 4 hours PRN Mild Pain (1 - 3)  oxyCODONE    IR 5 milliGRAM(s) Oral every 4 hours PRN Moderate Pain (4 - 6)  oxyCODONE    IR 10 milliGRAM(s) Oral every 4 hours PRN Severe Pain (7 - 10)    No Known Allergies    Review of Systems:  Cardiovascular: No chest pain  Respiratory: No SOB  GI: No nausea, vomiting  Endocrine: no hypoglycemia    PHYSICAL EXAM:  VITALS: T(C): 36.7 (06-24-21 @ 15:11)  T(F): 98 (06-24-21 @ 15:11), Max: 99.1 (06-24-21 @ 05:27)  HR: 86 (06-24-21 @ 15:11) (86 - 95)  BP: 151/81 (06-24-21 @ 15:11) (151/81 - 179/99)  RR:  (18 - 18)  SpO2:  (97% - 99%)  Wt(kg): --  GENERAL: NAD  EYES: No proptosis, no lid lag, anicteric  HEENT:  Atraumatic, Normocephalic, moist mucous membranes  RESPIRATORY: unlabored respirations   PSYCH: Alert and oriented x 3    CAPILLARY BLOOD GLUCOSE    POCT Blood Glucose.: 148 mg/dL (24 Jun 2021 12:10)  POCT Blood Glucose.: 187 mg/dL (24 Jun 2021 08:20)  POCT Blood Glucose.: 244 mg/dL (23 Jun 2021 21:53)  POCT Blood Glucose.: 222 mg/dL (23 Jun 2021 18:05)      06-24    138  |  108<H>  |  16  ----------------------------<  186<H>  3.9   |  20<L>  |  1.41<H>    EGFR if : 63  EGFR if non : 54<L>    Ca    8.2<L>      06-24  Mg     1.9     06-24  Phos  2.7     06-24    TPro  6.5  /  Alb  3.1<L>  /  TBili  <0.2  /  DBili  x   /  AST  17  /  ALT  15  /  AlkPhos  59  06-24        A1C with Estimated Average Glucose Result: 14.3 % (06-20-21 @ 03:47)  A1C with Estimated Average Glucose Result: 11.8 % (03-22-21 @ 14:20)    Diet, Regular:   Consistent Carbohydrate Evening Snack (CSTCHOSN) (06-20-21 @ 09:02)

## 2021-06-24 NOTE — PROGRESS NOTE ADULT - SUBJECTIVE AND OBJECTIVE BOX
Infectious Diseases progress note:    Subjective: NAD, afebrile, pt feeling better overall    ROS:  CONSTITUTIONAL:  No fever, chills, rigors  CARDIOVASCULAR:  No chest pain or palpitations  RESPIRATORY:   No SOB, cough, dyspnea on exertion.  No wheezing  GASTROINTESTINAL:  No abd pain, N/V, diarrhea/constipation  EXTREMITIES:  No swelling or joint pain  GENITOURINARY:  No burning on urination, increased frequency or urgency.  No flank pain  NEUROLOGIC:  No HA, visual disturbances  SKIN: No rashes    Allergies    No Known Allergies    Intolerances        ANTIBIOTICS/RELEVANT:  antimicrobials  doxycycline hyclate Capsule 100 milliGRAM(s) Oral every 12 hours  vancomycin  IVPB 1000 milliGRAM(s) IV Intermittent every 24 hours    immunologic:    OTHER:  acetaminophen   Tablet .. 650 milliGRAM(s) Oral every 4 hours PRN  amLODIPine   Tablet 10 milliGRAM(s) Oral daily  atorvastatin 20 milliGRAM(s) Oral at bedtime  chlorhexidine 4% Liquid 1 Application(s) Topical daily  dextrose 40% Gel 15 Gram(s) Oral once  dextrose 50% Injectable 25 Gram(s) IV Push once  dextrose 50% Injectable 12.5 Gram(s) IV Push once  dextrose 50% Injectable 25 Gram(s) IV Push once  ergocalciferol 28184 Unit(s) Oral <User Schedule>  glucagon  Injectable 1 milliGRAM(s) IntraMuscular once  insulin glargine Injectable (LANTUS) 30 Unit(s) SubCutaneous at bedtime  insulin lispro (ADMELOG) corrective regimen sliding scale   SubCutaneous three times a day before meals  insulin lispro (ADMELOG) corrective regimen sliding scale   SubCutaneous at bedtime  insulin lispro Injectable (ADMELOG) 9 Unit(s) SubCutaneous three times a day before meals  mupirocin 2% Ointment 1 Application(s) Topical two times a day  mupirocin 2% Ointment 1 Application(s) Topical two times a day  ofloxacin 0.3% Solution 10 Drop(s) Left Ear two times a day  oxyCODONE    IR 5 milliGRAM(s) Oral every 4 hours PRN  oxyCODONE    IR 10 milliGRAM(s) Oral every 4 hours PRN  sodium chloride 0.9%. 1000 milliLiter(s) IV Continuous <Continuous>  tamsulosin 0.4 milliGRAM(s) Oral at bedtime      Objective:  Vital Signs Last 24 Hrs  T(C): 36.7 (2021 15:11), Max: 37.3 (2021 05:27)  T(F): 98 (2021 15:11), Max: 99.1 (2021 05:27)  HR: 86 (2021 15:11) (86 - 95)  BP: 151/81 (2021 15:11) (137/75 - 179/99)  BP(mean): --  RR: 18 (2021 15:11) (18 - 20)  SpO2: 99% (2021 15:11) (97% - 99%)    PHYSICAL EXAM:  Constitutional:NAD  Eyes:VINCENZO, EOMI  Ear/Nose/Throat: no thrush, mucositis.  Moist mucous membranes	  Neck:no JVD, no lymphadenopathy, supple  Respiratory: CTA sudheer  Cardiovascular: S1S2 RRR, no murmurs  Gastrointestinal:soft, nontender,  nondistended (+) BS  Extremities:no e/e/c  Skin:  no rashes, open wounds or ulcerations        LABS:                        11.3   8.04  )-----------( 200      ( 2021 09:35 )             33.2     06-24    138  |  108<H>  |  16  ----------------------------<  186<H>  3.9   |  20<L>  |  1.41<H>    Ca    8.2<L>      2021 09:35  Phos  2.7     06-24  Mg     1.9     06-24    TPro  6.5  /  Alb  3.1<L>  /  TBili  <0.2  /  DBili  x   /  AST  17  /  ALT  15  /  AlkPhos  59  06-24      Urinalysis Basic - ( 2021 16:47 )    Color: Light Yellow / Appearance: Clear / S.009 / pH: x  Gluc: x / Ketone: Negative  / Bili: Negative / Urobili: <2 mg/dL   Blood: x / Protein: Trace / Nitrite: Negative   Leuk Esterase: Negative / RBC: 1 /HPF / WBC 1 /HPF   Sq Epi: x / Non Sq Epi: 0 /HPF / Bacteria: Negative          Vancomycin Level, Random:  ug/mL ( @ 07:28)      Vancomycin Level, Trough: 12.0 ug/mL ( @ 09:35)  Vancomycin Level, Trough: 20.9 ug/mL ( @ 21:30)              MICROBIOLOGY:      Culture - Blood (21 @ 23:06)   Specimen Source: .Blood Blood-Venous   Culture Results:   No growth to date.           RADIOLOGY & ADDITIONAL STUDIES:    < from: US Kidney and Bladder (21 @ 10:54) >    IMPRESSION:    Trace bilateral perinephric fluid. No renal abnormality.    < end of copied text >

## 2021-06-24 NOTE — PROGRESS NOTE ADULT - ASSESSMENT
61M with PMH DM2, HTN, HLD, diabetic neuropathy, glaucoma/cataract with poor vision, HCV s/p Vishal (cleared per patient) presented with left ear pain. Patient initially presented to [facility] and transferred to [facility] for ENT evaluation. Consulted for uncontrolled T2DM with a1c 14.3%    1. Uncontrolled type 2 diabetes mellitus with hyperglycemia  T2DM uncontrolled a1c of 14.3 with barriers of poor vision (unable to see insulin units), living in shelter prior to admission    Inpatient BG target 100-180 mg/dl  Within target   Continue Lantus 30 units SQ qHS   Continue Admelog 9 units SQ TID before meals (Hold if NPO/not eating meal)   Continue Admelog low dose correctional scales before meals and bedtime as currently ordered  Check BG before meals and bedtime  Consistent carbohydrate diet, Nutrition consult done  Noted patient is on intermittent IV ABX in dextrose solution, please consider changing to normal saline    Discharge Plan:  With A1c 14.3%, patient requires insulin for discharge. Patient UNABLE to successfully self-inject insulin due to poor vision  Per care coordination notes, possible discharge to LTC facility. This would be optimal so that insulin can be administered to patient by staff at facility  Therefore, recommend basal/bolus insulin with dose TBD  Contact endocrine to confirm dosing on day of discharge  Recommend PCP, optho, podiatry and endocrine followup as outpatient  [facility] ([facility] [place])   [address] [facility] [phone].    2. Essential hypertension  BP target less than 130/80  Managment per primary team    3. HLD  Recommend to check fasting lipid panel, goal LDL less than 70  Continue Atorvastatin 20 mg daily    4. Adrenal nodule  Adrenal nodule incidentally found  Aldosterone 3.0, no need to check renin activity  Plasma metanephrine and 24 hour urine cortisol - pending results    5. Vitamin D deficiency  Vitamin D 25 OH level noted to be 7.7  Recommend Ergocalciferol 50,000 units PO weekly x 8-12 weeks  Corrected calcium 8.9. Vitamin D deficiency likely contributing to low normal Ca    Emilie Lam  Nurse Practitioner  Division of Endocrinology & Diabetes  In house pager #[phone]/long range pager #[phone]    If before 9AM or after 6PM, or on weekends/holidays, please call endocrine answering service for assistance ([phone]).  For nonurgent matters email [email or web address] for assistance.

## 2021-06-24 NOTE — PROGRESS NOTE ADULT - SUBJECTIVE AND OBJECTIVE BOX
ENT FOLLOW UP CONSULT NOTE    Interval Events  6/23: NAEON - seen and examined bedside. Pt feels subjective improvement in pain on IV antibiotics and cipro drops in the ear. Afebrile and hemodynamically stable. WBC stable. No other complaints at this time.  6/24: naeon. daniel fell out overnight    Vital Signs Last 24 Hrs  T(C): 37.3 (24 Jun 2021 05:27), Max: 37.3 (24 Jun 2021 05:27)  T(F): 99.1 (24 Jun 2021 05:27), Max: 99.1 (24 Jun 2021 05:27)  HR: 89 (24 Jun 2021 05:27) (88 - 95)  BP: 161/92 (24 Jun 2021 05:27) (137/75 - 179/99)  BP(mean): --  RR: 18 (24 Jun 2021 05:27) (18 - 20)  SpO2: 98% (24 Jun 2021 05:27) (97% - 99%)    PHYSICAL EXAM:  Gen: NAD, A/Ox3  Breathing comfortably on RA  Voice: normal  Facial nerve fully intact  Ears: Right - ear canal cerumen, TM intact            Left - external ear canal mild swelling, mild purulent fluid, unable to see TM  Neck: Flat, supple, no lymphadenopathy    A/P  61M with DM2, HTN, HLD, diabetic neuropathy, glaucoma/cataract with poor vision, HCV s/p [place] (cleared per patient) presented 6/20 with left ear pain. Workup consistent with left malignant otitis externa  - daniel replaced on 6/24  -CT temporal bone reviewed - no evidence of skull base invasion or sigmoid thrombosis. Facial nerve intact  -continue oflox gtt given MRSA in cultures  - ID consult for antibiotic regimen   -Strict glucose control  -Recommend seeing Dr. Feldman outpatient for microscopic exam and additional management (will require cx, imaging and biopsy as indicated of granulation tissue does not resolve with therapy)    Call/page with questions

## 2021-06-24 NOTE — PROGRESS NOTE ADULT - SUBJECTIVE AND OBJECTIVE BOX
Patient is a 61y old  Male who presents with a chief complaint of otitis externa, uncontrolled DM (2021 15:00)    DATE OF SERVICE: 21 @ 17:16    HPI:    Afebrile.Ear is improving    PAST MEDICAL & SURGICAL HISTORY:  DM (diabetes mellitus)  On Insulin    HTN (hypertension)  medicxal managenent    No significant past surgical history        Review of Systems:   CONSTITUTIONAL: No fever, weight loss, or fatigue  EYES: No eye pain, visual disturbances, or discharge  ENMT:  No difficulty hearing, tinnitus, vertigo; No sinus or throat pain. Left ear plugged, wick in place.  NECK: No pain or stiffness  BREASTS: No pain, masses, or nipple discharge  RESPIRATORY: No cough, wheezing, chills or hemoptysis; No shortness of breath  CARDIOVASCULAR: No chest pain, palpitations, dizziness, or leg swelling  GASTROINTESTINAL: No abdominal or epigastric pain. No nausea, vomiting, or hematemesis; No diarrhea or constipation. No melena or hematochezia.  GENITOURINARY: No dysuria, frequency, hematuria, or incontinence  NEUROLOGICAL: No headaches, memory loss, loss of strength, numbness, or tremors  SKIN: No itching, burning, rashes, or lesions   LYMPH NODES: No enlarged glands  ENDOCRINE: No heat or cold intolerance; No hair loss  MUSCULOSKELETAL: No joint pain or swelling; No muscle, back, or extremity pain  PSYCHIATRIC: No depression, anxiety, mood swings, or difficulty sleeping  HEME/LYMPH: No easy bruising, or bleeding gums  ALLERY AND IMMUNOLOGIC: No hives or eczema    Allergies    No Known Allergies    Intolerances        Social History:     FAMILY HISTORY:  No pertinent family history in first degree relatives        MEDICATIONS  (STANDING):  amLODIPine   Tablet 10 milliGRAM(s) Oral daily  atorvastatin 20 milliGRAM(s) Oral at bedtime  ciprofloxacin   IVPB 400 milliGRAM(s) IV Intermittent every 12 hours  dextrose 40% Gel 15 Gram(s) Oral once  dextrose 50% Injectable 25 Gram(s) IV Push once  dextrose 50% Injectable 12.5 Gram(s) IV Push once  dextrose 50% Injectable 25 Gram(s) IV Push once  glucagon  Injectable 1 milliGRAM(s) IntraMuscular once  insulin glargine Injectable (LANTUS) 20 Unit(s) SubCutaneous at bedtime  insulin lispro (ADMELOG) corrective regimen sliding scale   SubCutaneous three times a day before meals  insulin lispro (ADMELOG) corrective regimen sliding scale   SubCutaneous at bedtime  insulin lispro Injectable (ADMELOG) 7 Unit(s) SubCutaneous three times a day before meals  ofloxacin 0.3% Solution 10 Drop(s) Left Ear two times a day  sodium chloride 0.9%. 1000 milliLiter(s) (100 mL/Hr) IV Continuous <Continuous>  tamsulosin 0.4 milliGRAM(s) Oral at bedtime  vancomycin  IVPB 1000 milliGRAM(s) IV Intermittent every 12 hours    MEDICATIONS  (PRN):  acetaminophen   Tablet .. 650 milliGRAM(s) Oral every 4 hours PRN Mild Pain (1 - 3)  oxyCODONE    IR 5 milliGRAM(s) Oral every 4 hours PRN Moderate Pain (4 - 6)  oxyCODONE    IR 10 milliGRAM(s) Oral every 4 hours PRN Severe Pain (7 - 10)        CAPILLARY BLOOD GLUCOSE      POCT Blood Glucose.: 272 mg/dL (2021 22:33)  POCT Blood Glucose.: 284 mg/dL (2021 17:48)  POCT Blood Glucose.: 274 mg/dL (2021 12:09)  POCT Blood Glucose.: 322 mg/dL (2021 08:11)    I&O's Summary    2021 07:01  -  2021 23:11  --------------------------------------------------------  IN: 0 mL / OUT: 525 mL / NET: -525 mL        PHYSICAL EXAM:  Vital Signs Last 24 Hrs  T(C): 37 (2021 22:11), Max: 37.1 (2021 05:31)  T(F): 98.6 (2021 22:11), Max: 98.7 (2021 05:31)  HR: 91 (2021 22:11) (86 - 91)  BP: 134/75 (2021 22:11) (134/75 - 157/72)  BP(mean): --  RR: 16 (2021 22:11) (16 - 16)  SpO2: 96% (2021 22:11) (96% - 100%)    GENERAL: NAD, well-developed  HEAD:  Atraumatic, Normocephalic  EYES: EOMI, PERRLA, conjunctiva and sclera clear  Ears: Left ear tender, cotton plug noted  NECK: Supple, No JVD  CHEST/LUNG: Clear to auscultation bilaterally; No wheeze  HEART: Regular rate and rhythm; No murmurs, rubs, or gallops  ABDOMEN: Soft, Nontender, Nondistended; Bowel sounds present  EXTREMITIES:  2+ Peripheral Pulses, No clubbing, cyanosis, or edema  PSYCH: AAOx3  NEUROLOGY: non-focal  SKIN: No rashes or lesions    LABS:                        11.8   7.89  )-----------( 208      ( 2021 06:35 )             35.8     06-21    136  |  102  |  20  ----------------------------<  329<H>  4.0   |  23  |  1.60<H>    Ca    8.4      2021 06:35  Phos  3.7     06-21  Mg     1.9     06-21            Urinalysis Basic - ( 2021 23:18 )    Color: Yellow / Appearance: Clear / S.015 / pH: x  Gluc: x / Ketone: Negative  / Bili: Negative / Urobili: Negative mg/dL   Blood: x / Protein: 500 mg/dL / Nitrite: Negative   Leuk Esterase: Negative / RBC: 3-5 /HPF / WBC 0-2   Sq Epi: x / Non Sq Epi: Occasional / Bacteria: Occasional        RADIOLOGY & ADDITIONAL TESTS:    Imaging Personally Reviewed:    Consultant(s) Notes Reviewed:      Care Discussed with Consultants/Other Providers:

## 2021-06-24 NOTE — PROGRESS NOTE ADULT - ASSESSMENT
61M with PMH DM2, HTN, HLD, diabetic neuropathy, glaucoma/cataract with poor vision, HCV s/p [place] (cleared per patient) presented with left ear pain. seen by ENT likely malignant otitis externa and folliculitis on antibiotic. nephrology consulted for elvin    ELVIN  baseline ~ 1  ELVIN likely sec to GEMINI  s/p CT with contrast 6/20  FEna>1% suggested ATN  UA with proteinuria and hematuria  renal us without hydro  renal function improving today  avoid nephrotoxic agents  optimize dm control. f/u endo  on vanco and cipro. monitor vanco level  monitor bmp and urine output    proteinuria/hematuria  renal us noted   p/c ratio <1g  likely sec to dm/htn  work up can be done outpatient  monitor    htn  controlled   monitor    hypocalcemia  likely vit d def  start weekly vit d 50000x12 dose  monitor    OM  malignant otitis externa on CT  f/u ENT/ID  antibiotic per team

## 2021-06-24 NOTE — PROGRESS NOTE ADULT - SUBJECTIVE AND OBJECTIVE BOX
[facility] [place] [facility]   MD ANITA QUINTANILLA DO ANAM SIDDIQUI ANGELA WONG, PA    TEL:  OFFICE: [phone]  DR MONROE CELL: [phone]  DR. PORTILLO CELL: [phone]  DR. MYLES CELL: [phone]  PEGGY JAFFE CELL: [phone]    From 5pm-7am Answering Service [phone]    -- RENAL FOLLOW UP NOTE ---Date of Service 06-24-21 @ 11:09    Patient is a 61y old  Male who presents with a chief complaint of otitis externa, uncontrolled DM (24 Jun 2021 07:13)      Patient seen and examined at bedside. No chest pain/sob    VITALS:  T(F): 99.1 (06-24-21 @ 05:27), Max: 99.1 (06-24-21 @ 05:27)  HR: 89 (06-24-21 @ 05:27)  BP: 161/92 (06-24-21 @ 05:27)  RR: 18 (06-24-21 @ 05:27)  SpO2: 98% (06-24-21 @ 05:27)  Wt(kg): --    06-23 @ 07:01  -  06-24 @ 07:00  --------------------------------------------------------  IN: 0 mL / OUT: 980 mL / NET: -980 mL          PHYSICAL EXAM:  Constitutional: NAD  Neck: No JVD  Respiratory: CTAB, no wheezes, rales or rhonchi  Cardiovascular: S1, S2, RRR  Gastrointestinal: BS+, soft, NT/ND  Extremities: No peripheral edema    Hospital Medications:   MEDICATIONS  (STANDING):  amLODIPine   Tablet 10 milliGRAM(s) Oral daily  atorvastatin 20 milliGRAM(s) Oral at bedtime  chlorhexidine 4% Liquid 1 Application(s) Topical daily  dextrose 40% Gel 15 Gram(s) Oral once  dextrose 50% Injectable 25 Gram(s) IV Push once  dextrose 50% Injectable 12.5 Gram(s) IV Push once  dextrose 50% Injectable 25 Gram(s) IV Push once  doxycycline hyclate Capsule 100 milliGRAM(s) Oral every 12 hours  ergocalciferol 35294 Unit(s) Oral <User Schedule>  glucagon  Injectable 1 milliGRAM(s) IntraMuscular once  insulin glargine Injectable (LANTUS) 30 Unit(s) SubCutaneous at bedtime  insulin lispro (ADMELOG) corrective regimen sliding scale   SubCutaneous three times a day before meals  insulin lispro (ADMELOG) corrective regimen sliding scale   SubCutaneous at bedtime  insulin lispro Injectable (ADMELOG) 9 Unit(s) SubCutaneous three times a day before meals  mupirocin 2% Ointment 1 Application(s) Topical two times a day  mupirocin 2% Ointment 1 Application(s) Topical two times a day  ofloxacin 0.3% Solution 10 Drop(s) Left Ear two times a day  sodium chloride 0.9%. 1000 milliLiter(s) (100 mL/Hr) IV Continuous <Continuous>  tamsulosin 0.4 milliGRAM(s) Oral at bedtime  vancomycin  IVPB 1000 milliGRAM(s) IV Intermittent every 24 hours      LABS:  06-24    138  |  108<H>  |  16  ----------------------------<  186<H>  3.9   |  20<L>  |  1.41<H>    Ca    8.2<L>      24 Jun 2021 09:35  Phos  2.7     06-24  Mg     1.9     06-24    TPro  6.5  /  Alb  3.1<L>  /  TBili  <0.2  /  DBili      /  AST  17  /  ALT  15  /  AlkPhos  59  06-24    Creatinine Trend: 1.41 <--, 1.52 <--, 1.63 <--, 1.60 <--, 1.05 <--    Phosphorus Level, Serum: 2.7 mg/dL (06-24 @ 09:35)  Albumin, Serum: 3.1 g/dL (06-24 @ 09:35)  Vitamin D, 25-Hydroxy: 7.7 ng/mL (06-23 @ 12:30)                              11.3   8.04  )-----------( 200      ( 24 Jun 2021 09:35 )             33.2     Urine Studies:  Urinalysis - [06-22-21 @ 16:47]      Color Light Yellow / Appearance Clear / SG 1.009 / pH 6.0      Gluc >= 1000 mg/dL / Ketone Negative  / Bili Negative / Urobili <2 mg/dL       Blood Trace / Protein Trace / Leuk Est Negative / Nitrite Negative      RBC 1 / WBC 1 / Hyaline 0 / Gran  / Sq Epi  / Non Sq Epi 0 / Bacteria Negative    Urine Creatinine 69      [06-24-21 @ 04:29]  Urine Protein 43      [06-24-21 @ 04:29]  Urine Sodium 81      [06-24-21 @ 04:29]  Urine Potassium 28.6      [06-24-21 @ 04:29]  Urine Chloride 84      [06-24-21 @ 04:29]    PTH -- (Ca --)      [06-23-21 @ 08:23]   69  Vitamin D (25OH) 7.7      [06-23-21 @ 12:30]  Lipid: chol 101, , HDL 27, LDL --      [06-23-21 @ 08:23]        RADIOLOGY & ADDITIONAL STUDIES:

## 2021-06-24 NOTE — PROGRESS NOTE ADULT - ASSESSMENT
Patient is a 61M with PMH DM2, HTN, HLD, diabetic neuropathy, glaucoma/cataract with poor vision, HCV s/p [place] (cleared per patient) presented with left ear pain.  CT auditory ear canal shows:    Findings concerning for malignant left-sided otitis externa with bony erosive changes involving the external auditory canal. Superimposed osteomyelitis within the bony external auditory canal is a consideration given the patient's clinical information. An external auditory canal cholesteatoma cannot be excluded. A neoplastic process such as squamous cell carcinoma is also difficult to exclude.    2. Additional imaging findings compatible with left-sided otitis media and left-sided mastoiditis. No gross ossicular chain erosion or destructive changes. No evidence of venous sinus thrombosis.    3. Unremarkable right-sided temporal bone CT examination apart from cerumen in the external auditory canal.    Pt also c/o new painful bumps, draining pus, in b/l armpits, b/l groin and R perineal area.    ID consulted for further abx management.     Left sided otitis externa:    - Cont present abx, f/u L ear cultures.  Concern for superimposed OM of bony external auditory canal.   Cannot exclude cholesteatoma or squamous cell carcinoma.    - ENT f/u appreciated.  Continued outpt f/u to address further need for cultures, imaging, biopsy.      - Current ear cultures growing MRSA.  IV cipro d/c'd.  Cont IV vanco, and maintain trough between 15-20.     -  Cont cipro gtt as per ENT    - Plan for  long term IV abx.  f/u blood cx x 2.  ESR, CRP    - Weekly cbc, sma-7, esr, crp, vanco trough.       r/o Hidradenitis suppurativa:    - c/o new nodular/pustular lesions in b/l armpits, b/l groin and perineum for past 1-3 weeks. States lesions are painful and drain pus.    - R Derm eval appreciated, ?hidradenitis suppurativa vs staph folliculitis.  Pt started on doxycycline and mupirocin for staph decolonization.      - Cont IV abx for now.  Cont to monitor and evaluate for need for further I&D.    d/w Medicine NP.    Will follow,    Mena Osullivan  [phone]

## 2021-06-25 LAB
ANION GAP SERPL CALC-SCNC: 14 MMOL/L — SIGNIFICANT CHANGE UP (ref 7–14)
BUN SERPL-MCNC: 16 MG/DL — SIGNIFICANT CHANGE UP (ref 7–23)
CALCIUM SERPL-MCNC: 8.2 MG/DL — LOW (ref 8.4–10.5)
CHLORIDE SERPL-SCNC: 112 MMOL/L — HIGH (ref 98–107)
CO2 SERPL-SCNC: 14 MMOL/L — LOW (ref 22–31)
CORTIS 24H UR-MRATE: 2.1 MCG/24 H — LOW (ref 3.5–45)
CORTIS UR-MCNC: 24 H — SIGNIFICANT CHANGE UP
CORTIS UR-MCNC: 750 ML — SIGNIFICANT CHANGE UP
CREAT SERPL-MCNC: 1.34 MG/DL — HIGH (ref 0.5–1.3)
GLUCOSE BLDC GLUCOMTR-MCNC: 112 MG/DL — HIGH (ref 70–99)
GLUCOSE BLDC GLUCOMTR-MCNC: 163 MG/DL — HIGH (ref 70–99)
GLUCOSE BLDC GLUCOMTR-MCNC: 192 MG/DL — HIGH (ref 70–99)
GLUCOSE BLDC GLUCOMTR-MCNC: 97 MG/DL — SIGNIFICANT CHANGE UP (ref 70–99)
GLUCOSE SERPL-MCNC: 100 MG/DL — HIGH (ref 70–99)
MAGNESIUM SERPL-MCNC: 1.9 MG/DL — SIGNIFICANT CHANGE UP (ref 1.6–2.6)
METANEPH 24H UR-MRATE: 56 UG/24 HR — LOW (ref 58–276)
METANEPHS 24H UR-MCNC: 75 UG/L — SIGNIFICANT CHANGE UP
NORMETANEPHRINE 24H UR-MRATE: 96 UG/24 HR — LOW (ref 156–729)
NORMETANEPHRINE.: 128 UG/L — SIGNIFICANT CHANGE UP
PHOSPHATE SERPL-MCNC: 3 MG/DL — SIGNIFICANT CHANGE UP (ref 2.5–4.5)
POTASSIUM SERPL-MCNC: 4 MMOL/L — SIGNIFICANT CHANGE UP (ref 3.5–5.3)
POTASSIUM SERPL-SCNC: 4 MMOL/L — SIGNIFICANT CHANGE UP (ref 3.5–5.3)
SODIUM SERPL-SCNC: 140 MMOL/L — SIGNIFICANT CHANGE UP (ref 135–145)

## 2021-06-25 RX ORDER — SODIUM BICARBONATE 1 MEQ/ML
1300 SYRINGE (ML) INTRAVENOUS THREE TIMES A DAY
Refills: 0 | Status: COMPLETED | OUTPATIENT
Start: 2021-06-25 | End: 2021-06-27

## 2021-06-25 RX ADMIN — MUPIROCIN 1 APPLICATION(S): 20 OINTMENT TOPICAL at 17:49

## 2021-06-25 RX ADMIN — Medication 1: at 17:48

## 2021-06-25 RX ADMIN — Medication 250 MILLIGRAM(S): at 09:43

## 2021-06-25 RX ADMIN — ATORVASTATIN CALCIUM 20 MILLIGRAM(S): 80 TABLET, FILM COATED ORAL at 22:48

## 2021-06-25 RX ADMIN — Medication 1300 MILLIGRAM(S): at 14:30

## 2021-06-25 RX ADMIN — MUPIROCIN 1 APPLICATION(S): 20 OINTMENT TOPICAL at 17:48

## 2021-06-25 RX ADMIN — AMLODIPINE BESYLATE 10 MILLIGRAM(S): 2.5 TABLET ORAL at 05:57

## 2021-06-25 RX ADMIN — MUPIROCIN 1 APPLICATION(S): 20 OINTMENT TOPICAL at 05:58

## 2021-06-25 RX ADMIN — Medication 9 UNIT(S): at 12:21

## 2021-06-25 RX ADMIN — SODIUM CHLORIDE 100 MILLILITER(S): 9 INJECTION INTRAMUSCULAR; INTRAVENOUS; SUBCUTANEOUS at 00:58

## 2021-06-25 RX ADMIN — Medication 9 UNIT(S): at 17:48

## 2021-06-25 RX ADMIN — Medication 1300 MILLIGRAM(S): at 22:48

## 2021-06-25 RX ADMIN — INSULIN GLARGINE 30 UNIT(S): 100 INJECTION, SOLUTION SUBCUTANEOUS at 22:49

## 2021-06-25 RX ADMIN — Medication 100 MILLIGRAM(S): at 05:57

## 2021-06-25 RX ADMIN — TAMSULOSIN HYDROCHLORIDE 0.4 MILLIGRAM(S): 0.4 CAPSULE ORAL at 22:48

## 2021-06-25 RX ADMIN — Medication 10 DROP(S): at 05:57

## 2021-06-25 RX ADMIN — Medication 100 MILLIGRAM(S): at 17:49

## 2021-06-25 RX ADMIN — Medication 9 UNIT(S): at 09:02

## 2021-06-25 RX ADMIN — CHLORHEXIDINE GLUCONATE 1 APPLICATION(S): 213 SOLUTION TOPICAL at 11:38

## 2021-06-25 RX ADMIN — Medication 10 DROP(S): at 17:48

## 2021-06-25 NOTE — PROGRESS NOTE ADULT - ASSESSMENT
61M with PMH DM2, HTN, HLD, diabetic neuropathy, glaucoma/cataract with poor vision, HCV s/p [place] (cleared per patient) presented with left ear pain. seen by ENT likely malignant otitis externa and folliculitis on antibiotic. nephrology consulted for elvin    ELVIN  baseline ~ 1  ELVIN likely sec to GEMINI  s/p CT with contrast 6/20  FEna>1% suggested ATN  UA with proteinuria and hematuria  renal us without hydro  renal function continue to improve.   IVF can be d/c'd  avoid nephrotoxic agents  optimize dm control. f/u endo  on vanco and cipro. monitor vanco level  monitor bmp and urine output    proteinuria/hematuria  renal us noted   p/c ratio <1g  likely sec to dm/htn  work up can be done outpatient  monitor    htn  controlled   monitor    hypocalcemia  likely vit d def  start weekly vit d 50000x12 dose  monitor    OM  malignant otitis externa on CT  f/u ENT/ID  antibiotic per team     61M with PMH DM2, HTN, HLD, diabetic neuropathy, glaucoma/cataract with poor vision, HCV s/p [place] (cleared per patient) presented with left ear pain. seen by ENT likely malignant otitis externa and folliculitis on antibiotic. nephrology consulted for elvin    ELVIN  baseline ~ 1  ELVIN likely sec to GEMINI  s/p CT with contrast 6/20  FEna>1% suggested ATN  UA with proteinuria and hematuria  renal us without hydro  renal function continue to improve.   dc ivf  avoid nephrotoxic agents  optimize dm control. f/u endo  on vanco and cipro. monitor vanco level  monitor bmp and urine output    proteinuria/hematuria  renal us noted   p/c ratio <1g  likely sec to dm/htn  work up can be done outpatient  monitor    Acidosis  non AG  start bicarb 1300 tid x2 days  monitor    htn  controlled   monitor    hypocalcemia  likely vit d def  start weekly vit d 50000x12 dose  monitor    OM  malignant otitis externa on CT  f/u ENT/ID  antibiotic per team

## 2021-06-25 NOTE — PROGRESS NOTE ADULT - ASSESSMENT
Patient is a 61M with PMH DM2, HTN, HLD, diabetic neuropathy, glaucoma/cataract with poor vision, HCV s/p [place] (cleared per patient) presented with left ear pain.  CT auditory ear canal shows:    Findings concerning for malignant left-sided otitis externa with bony erosive changes involving the external auditory canal. Superimposed osteomyelitis within the bony external auditory canal is a consideration given the patient's clinical information. An external auditory canal cholesteatoma cannot be excluded. A neoplastic process such as squamous cell carcinoma is also difficult to exclude.    2. Additional imaging findings compatible with left-sided otitis media and left-sided mastoiditis. No gross ossicular chain erosion or destructive changes. No evidence of venous sinus thrombosis.    3. Unremarkable right-sided temporal bone CT examination apart from cerumen in the external auditory canal.    Pt also c/o new painful bumps, draining pus, in b/l armpits, b/l groin and R perineal area.    ID consulted for further abx management.     Left sided otitis externa:    - Cont present abx, f/u L ear cultures.  Concern for superimposed OM of bony external auditory canal.   Cannot exclude cholesteatoma or squamous cell carcinoma.    - ENT f/u appreciated.  Continued outpt f/u to address further need for cultures, imaging, biopsy.      - Current ear cultures growing MRSA.  IV cipro d/c'd.  Cont IV vanco, and maintain trough between 15-20.     -  Cont cipro gtt as per ENT    - Plan for  long term IV abx.  f/u blood cx x 2.  ESR, CRP    - Weekly cbc, sma-7, esr, crp, vanco trough.       r/o Hidradenitis suppurativa:    - c/o new nodular/pustular lesions in b/l armpits, b/l groin and perineum for past 1-3 weeks. States lesions are painful and drain pus.    - R Derm eval appreciated, ?hidradenitis suppurativa vs staph folliculitis.  Pt started on doxycycline and mupirocin for staph decolonization.      - Cont IV abx for now.  Cont to monitor and evaluate for need for further I&D.      * Plan for PICC line next week, cont IV vancomycin x 6 week course, maintain trough between 15-20.  Check weekly cbc, sma-7, esr, crp, vanco trough.  d/c planning.     d/w Medicine NP      Dr. Faria covering 6/26 and 6/27.  [phone]

## 2021-06-25 NOTE — PROGRESS NOTE ADULT - SUBJECTIVE AND OBJECTIVE BOX
Patient is a 61y old  Male who presents with a chief complaint of otitis externa, uncontrolled DM (2021 15:00)    DATE OF SERVICE: 21 @ 13:26    HPI:    Afebrile.Ear pain is improving    PAST MEDICAL & SURGICAL HISTORY:  DM (diabetes mellitus)  On Insulin    HTN (hypertension)  medicxal managenent    No significant past surgical history        Review of Systems:   CONSTITUTIONAL: No fever, weight loss, or fatigue  EYES: No eye pain, visual disturbances, or discharge  ENMT:  No difficulty hearing, tinnitus, vertigo; No sinus or throat pain. Left ear plugged, wick in place.  NECK: No pain or stiffness  BREASTS: No pain, masses, or nipple discharge  RESPIRATORY: No cough, wheezing, chills or hemoptysis; No shortness of breath  CARDIOVASCULAR: No chest pain, palpitations, dizziness, or leg swelling  GASTROINTESTINAL: No abdominal or epigastric pain. No nausea, vomiting, or hematemesis; No diarrhea or constipation. No melena or hematochezia.  GENITOURINARY: No dysuria, frequency, hematuria, or incontinence  NEUROLOGICAL: No headaches, memory loss, loss of strength, numbness, or tremors  SKIN: No itching, burning, rashes, or lesions   LYMPH NODES: No enlarged glands  ENDOCRINE: No heat or cold intolerance; No hair loss  MUSCULOSKELETAL: No joint pain or swelling; No muscle, back, or extremity pain  PSYCHIATRIC: No depression, anxiety, mood swings, or difficulty sleeping  HEME/LYMPH: No easy bruising, or bleeding gums  ALLERY AND IMMUNOLOGIC: No hives or eczema    Allergies    No Known Allergies    Intolerances        Social History:     FAMILY HISTORY:  No pertinent family history in first degree relatives        MEDICATIONS  (STANDING):  amLODIPine   Tablet 10 milliGRAM(s) Oral daily  atorvastatin 20 milliGRAM(s) Oral at bedtime  ciprofloxacin   IVPB 400 milliGRAM(s) IV Intermittent every 12 hours  dextrose 40% Gel 15 Gram(s) Oral once  dextrose 50% Injectable 25 Gram(s) IV Push once  dextrose 50% Injectable 12.5 Gram(s) IV Push once  dextrose 50% Injectable 25 Gram(s) IV Push once  glucagon  Injectable 1 milliGRAM(s) IntraMuscular once  insulin glargine Injectable (LANTUS) 20 Unit(s) SubCutaneous at bedtime  insulin lispro (ADMELOG) corrective regimen sliding scale   SubCutaneous three times a day before meals  insulin lispro (ADMELOG) corrective regimen sliding scale   SubCutaneous at bedtime  insulin lispro Injectable (ADMELOG) 7 Unit(s) SubCutaneous three times a day before meals  ofloxacin 0.3% Solution 10 Drop(s) Left Ear two times a day  sodium chloride 0.9%. 1000 milliLiter(s) (100 mL/Hr) IV Continuous <Continuous>  tamsulosin 0.4 milliGRAM(s) Oral at bedtime  vancomycin  IVPB 1000 milliGRAM(s) IV Intermittent every 12 hours    MEDICATIONS  (PRN):  acetaminophen   Tablet .. 650 milliGRAM(s) Oral every 4 hours PRN Mild Pain (1 - 3)  oxyCODONE    IR 5 milliGRAM(s) Oral every 4 hours PRN Moderate Pain (4 - 6)  oxyCODONE    IR 10 milliGRAM(s) Oral every 4 hours PRN Severe Pain (7 - 10)        CAPILLARY BLOOD GLUCOSE      POCT Blood Glucose.: 272 mg/dL (2021 22:33)  POCT Blood Glucose.: 284 mg/dL (2021 17:48)  POCT Blood Glucose.: 274 mg/dL (2021 12:09)  POCT Blood Glucose.: 322 mg/dL (2021 08:11)    I&O's Summary    2021 07:01  -  2021 23:11  --------------------------------------------------------  IN: 0 mL / OUT: 525 mL / NET: -525 mL        PHYSICAL EXAM:  Vital Signs Last 24 Hrs  T(C): 37 (2021 22:11), Max: 37.1 (2021 05:31)  T(F): 98.6 (2021 22:11), Max: 98.7 (2021 05:31)  HR: 91 (2021 22:11) (86 - 91)  BP: 134/75 (2021 22:11) (134/75 - 157/72)  BP(mean): --  RR: 16 (2021 22:11) (16 - 16)  SpO2: 96% (2021 22:11) (96% - 100%)    GENERAL: NAD, well-developed  HEAD:  Atraumatic, Normocephalic  EYES: EOMI, PERRLA, conjunctiva and sclera clear  Ears: Left ear tender, cotton plug noted  NECK: Supple, No JVD  CHEST/LUNG: Clear to auscultation bilaterally; No wheeze  HEART: Regular rate and rhythm; No murmurs, rubs, or gallops  ABDOMEN: Soft, Nontender, Nondistended; Bowel sounds present  EXTREMITIES:  2+ Peripheral Pulses, No clubbing, cyanosis, or edema  PSYCH: AAOx3  NEUROLOGY: non-focal  SKIN: No rashes or lesions    LABS:                        11.8   7.89  )-----------( 208      ( 2021 06:35 )             35.8     06-21    136  |  102  |  20  ----------------------------<  329<H>  4.0   |  23  |  1.60<H>    Ca    8.4      2021 06:35  Phos  3.7     06-21  Mg     1.9     06-21            Urinalysis Basic - ( 2021 23:18 )    Color: Yellow / Appearance: Clear / S.015 / pH: x  Gluc: x / Ketone: Negative  / Bili: Negative / Urobili: Negative mg/dL   Blood: x / Protein: 500 mg/dL / Nitrite: Negative   Leuk Esterase: Negative / RBC: 3-5 /HPF / WBC 0-2   Sq Epi: x / Non Sq Epi: Occasional / Bacteria: Occasional        RADIOLOGY & ADDITIONAL TESTS:    Imaging Personally Reviewed:    Consultant(s) Notes Reviewed:      Care Discussed with Consultants/Other Providers:

## 2021-06-25 NOTE — PROGRESS NOTE ADULT - SUBJECTIVE AND OBJECTIVE BOX
Infectious Diseases progress note:    Subjective: Events noted.  Pt afebrile, bcx neg    ROS:  CONSTITUTIONAL:  No fever, chills, rigors  CARDIOVASCULAR:  No chest pain or palpitations  RESPIRATORY:   No SOB, cough, dyspnea on exertion.  No wheezing  GASTROINTESTINAL:  No abd pain, N/V, diarrhea/constipation  EXTREMITIES:  No swelling or joint pain  GENITOURINARY:  No burning on urination, increased frequency or urgency.  No flank pain  NEUROLOGIC:  No HA, visual disturbances  SKIN: No rashes    Allergies    No Known Allergies    Intolerances        ANTIBIOTICS/RELEVANT:  antimicrobials  doxycycline hyclate Capsule 100 milliGRAM(s) Oral every 12 hours  vancomycin  IVPB 1000 milliGRAM(s) IV Intermittent every 24 hours    immunologic:    OTHER:  acetaminophen   Tablet .. 650 milliGRAM(s) Oral every 4 hours PRN  amLODIPine   Tablet 10 milliGRAM(s) Oral daily  atorvastatin 20 milliGRAM(s) Oral at bedtime  chlorhexidine 4% Liquid 1 Application(s) Topical daily  dextrose 40% Gel 15 Gram(s) Oral once  dextrose 50% Injectable 25 Gram(s) IV Push once  dextrose 50% Injectable 12.5 Gram(s) IV Push once  dextrose 50% Injectable 25 Gram(s) IV Push once  ergocalciferol 02469 Unit(s) Oral <User Schedule>  glucagon  Injectable 1 milliGRAM(s) IntraMuscular once  insulin glargine Injectable (LANTUS) 30 Unit(s) SubCutaneous at bedtime  insulin lispro (ADMELOG) corrective regimen sliding scale   SubCutaneous three times a day before meals  insulin lispro (ADMELOG) corrective regimen sliding scale   SubCutaneous at bedtime  insulin lispro Injectable (ADMELOG) 9 Unit(s) SubCutaneous three times a day before meals  mupirocin 2% Ointment 1 Application(s) Topical two times a day  mupirocin 2% Ointment 1 Application(s) Topical two times a day  oxyCODONE    IR 5 milliGRAM(s) Oral every 4 hours PRN  oxyCODONE    IR 10 milliGRAM(s) Oral every 4 hours PRN  sodium bicarbonate 1300 milliGRAM(s) Oral three times a day  tamsulosin 0.4 milliGRAM(s) Oral at bedtime      Objective:  Vital Signs Last 24 Hrs  T(C): 37.2 (25 Jun 2021 05:56), Max: 37.3 (24 Jun 2021 22:02)  T(F): 99 (25 Jun 2021 05:56), Max: 99.2 (24 Jun 2021 22:02)  HR: 92 (25 Jun 2021 05:56) (91 - 94)  BP: 153/76 (25 Jun 2021 05:56) (153/76 - 171/84)  BP(mean): --  RR: 19 (25 Jun 2021 05:56) (18 - 19)  SpO2: 97% (25 Jun 2021 05:56) (97% - 99%)    PHYSICAL EXAM:  Constitutional:NAD  Eyes:VINCENZO, EOMI  Ear/Nose/Throat: no thrush, mucositis.  Moist mucous membranes	  Neck:no JVD, no lymphadenopathy, supple  Respiratory: CTA sudheer  Cardiovascular: S1S2 RRR, no murmurs  Gastrointestinal:soft, nontender,  nondistended (+) BS  Extremities:no e/e/c  Skin:  no rashes, open wounds or ulcerations        LABS:                        11.3   8.04  )-----------( 200      ( 24 Jun 2021 09:35 )             33.2     06-25    140  |  112<H>  |  16  ----------------------------<  100<H>  4.0   |  14<L>  |  1.34<H>    Ca    8.2<L>      25 Jun 2021 07:43  Phos  3.0     06-25  Mg     1.9     06-25    TPro  6.5  /  Alb  3.1<L>  /  TBili  <0.2  /  DBili  x   /  AST  17  /  ALT  15  /  AlkPhos  59  06-24            Vancomycin Level, Random:  ug/mL (06-22 @ 07:28)      Vancomycin Level, Trough: 12.0 ug/mL (06-24 @ 09:35)  Vancomycin Level, Trough: 20.9 ug/mL (06-21 @ 21:30)              MICROBIOLOGY:    Culture - Blood (06.21.21 @ 23:06)   Specimen Source: .Blood Blood-Venous   Culture Results:   No growth to date.       RADIOLOGY & ADDITIONAL STUDIES:    < from: US Kidney and Bladder (06.23.21 @ 10:54) >  IMPRESSION:    Trace bilateral perinephric fluid. No renal abnormality.    < end of copied text >

## 2021-06-25 NOTE — PROGRESS NOTE ADULT - SUBJECTIVE AND OBJECTIVE BOX
[facility] [place] [facility]   MD ANITA QUINTANILLA DO ANAM SIDDIQUI ANGELA WONG, PA    TEL:  OFFICE: [phone]  DR MONROE CELL: [phone]  DR. PORTILLO CELL: [phone]  DR. MYLES CELL: [phone]  PEGGY JAFFE CELL: [phone]    From 5pm-7am Answering Service [phone]    -- RENAL FOLLOW UP NOTE ---Date of Service 06-25-21 @ 12:27    Patient is a 61y old  Male who presents with a chief complaint of otitis externa, uncontrolled DM (24 Jun 2021 17:15)      Patient seen and examined at bedside. No chest pain/sob    VITALS:  T(F): 99 (06-25-21 @ 05:56), Max: 99.2 (06-24-21 @ 22:02)  HR: 92 (06-25-21 @ 05:56)  BP: 153/76 (06-25-21 @ 05:56)  RR: 19 (06-25-21 @ 05:56)  SpO2: 97% (06-25-21 @ 05:56)  Wt(kg): --    06-24 @ 07:01  -  06-25 @ 07:00  --------------------------------------------------------  IN: 120 mL / OUT: 1475 mL / NET: -1355 mL          PHYSICAL EXAM:  Constitutional: NAD  Neck: No JVD  Respiratory: CTAB, no wheezes, rales or rhonchi  Cardiovascular: S1, S2, RRR  Gastrointestinal: BS+, soft, NT/ND  Extremities: No peripheral edema    Hospital Medications:   MEDICATIONS  (STANDING):  amLODIPine   Tablet 10 milliGRAM(s) Oral daily  atorvastatin 20 milliGRAM(s) Oral at bedtime  chlorhexidine 4% Liquid 1 Application(s) Topical daily  dextrose 40% Gel 15 Gram(s) Oral once  dextrose 50% Injectable 25 Gram(s) IV Push once  dextrose 50% Injectable 12.5 Gram(s) IV Push once  dextrose 50% Injectable 25 Gram(s) IV Push once  doxycycline hyclate Capsule 100 milliGRAM(s) Oral every 12 hours  ergocalciferol 64031 Unit(s) Oral <User Schedule>  glucagon  Injectable 1 milliGRAM(s) IntraMuscular once  insulin glargine Injectable (LANTUS) 30 Unit(s) SubCutaneous at bedtime  insulin lispro (ADMELOG) corrective regimen sliding scale   SubCutaneous three times a day before meals  insulin lispro (ADMELOG) corrective regimen sliding scale   SubCutaneous at bedtime  insulin lispro Injectable (ADMELOG) 9 Unit(s) SubCutaneous three times a day before meals  mupirocin 2% Ointment 1 Application(s) Topical two times a day  mupirocin 2% Ointment 1 Application(s) Topical two times a day  ofloxacin 0.3% Solution 10 Drop(s) Left Ear two times a day  sodium chloride 0.9%. 1000 milliLiter(s) (100 mL/Hr) IV Continuous <Continuous>  tamsulosin 0.4 milliGRAM(s) Oral at bedtime  vancomycin  IVPB 1000 milliGRAM(s) IV Intermittent every 24 hours      LABS:  06-25    140  |  112<H>  |  16  ----------------------------<  100<H>  4.0   |  14<L>  |  1.34<H>    Ca    8.2<L>      25 Jun 2021 07:43  Phos  3.0     06-25  Mg     1.9     06-25    TPro  6.5  /  Alb  3.1<L>  /  TBili  <0.2  /  DBili      /  AST  17  /  ALT  15  /  AlkPhos  59  06-24    Creatinine Trend: 1.34 <--, 1.41 <--, 1.52 <--, 1.63 <--, 1.60 <--, 1.05 <--    Phosphorus Level, Serum: 3.0 mg/dL (06-25 @ 07:43)                              11.3   8.04  )-----------( 200      ( 24 Jun 2021 09:35 )             33.2     Urine Studies:  Urinalysis - [06-22-21 @ 16:47]      Color Light Yellow / Appearance Clear / SG 1.009 / pH 6.0      Gluc >= 1000 mg/dL / Ketone Negative  / Bili Negative / Urobili <2 mg/dL       Blood Trace / Protein Trace / Leuk Est Negative / Nitrite Negative      RBC 1 / WBC 1 / Hyaline 0 / Gran  / Sq Epi  / Non Sq Epi 0 / Bacteria Negative    Urine Creatinine 69      [06-24-21 @ 04:29]  Urine Protein 43      [06-24-21 @ 04:29]  Urine Sodium 81      [06-24-21 @ 04:29]  Urine Potassium 28.6      [06-24-21 @ 04:29]  Urine Chloride 84      [06-24-21 @ 04:29]    PTH -- (Ca --)      [06-23-21 @ 08:23]   69  Vitamin D (25OH) 7.7      [06-23-21 @ 12:30]  Lipid: chol 101, , HDL 27, LDL --      [06-23-21 @ 08:23]        RADIOLOGY & ADDITIONAL STUDIES:

## 2021-06-26 LAB
ANION GAP SERPL CALC-SCNC: 11 MMOL/L — SIGNIFICANT CHANGE UP (ref 7–14)
BUN SERPL-MCNC: 14 MG/DL — SIGNIFICANT CHANGE UP (ref 7–23)
CALCIUM SERPL-MCNC: 8.4 MG/DL — SIGNIFICANT CHANGE UP (ref 8.4–10.5)
CHLORIDE SERPL-SCNC: 110 MMOL/L — HIGH (ref 98–107)
CO2 SERPL-SCNC: 21 MMOL/L — LOW (ref 22–31)
CREAT SERPL-MCNC: 1.34 MG/DL — HIGH (ref 0.5–1.3)
GLUCOSE BLDC GLUCOMTR-MCNC: 106 MG/DL — HIGH (ref 70–99)
GLUCOSE BLDC GLUCOMTR-MCNC: 138 MG/DL — HIGH (ref 70–99)
GLUCOSE BLDC GLUCOMTR-MCNC: 145 MG/DL — HIGH (ref 70–99)
GLUCOSE BLDC GLUCOMTR-MCNC: 192 MG/DL — HIGH (ref 70–99)
GLUCOSE BLDC GLUCOMTR-MCNC: 225 MG/DL — HIGH (ref 70–99)
GLUCOSE SERPL-MCNC: 133 MG/DL — HIGH (ref 70–99)
HCT VFR BLD CALC: 35 % — LOW (ref 39–50)
HGB BLD-MCNC: 12 G/DL — LOW (ref 13–17)
MAGNESIUM SERPL-MCNC: 1.8 MG/DL — SIGNIFICANT CHANGE UP (ref 1.6–2.6)
MCHC RBC-ENTMCNC: 31.6 PG — SIGNIFICANT CHANGE UP (ref 27–34)
MCHC RBC-ENTMCNC: 34.3 GM/DL — SIGNIFICANT CHANGE UP (ref 32–36)
MCV RBC AUTO: 92.1 FL — SIGNIFICANT CHANGE UP (ref 80–100)
NRBC # BLD: 0 /100 WBCS — SIGNIFICANT CHANGE UP
NRBC # FLD: 0 K/UL — SIGNIFICANT CHANGE UP
PHOSPHATE SERPL-MCNC: 2.9 MG/DL — SIGNIFICANT CHANGE UP (ref 2.5–4.5)
PLATELET # BLD AUTO: 237 K/UL — SIGNIFICANT CHANGE UP (ref 150–400)
POTASSIUM SERPL-MCNC: 3.2 MMOL/L — LOW (ref 3.5–5.3)
POTASSIUM SERPL-SCNC: 3.2 MMOL/L — LOW (ref 3.5–5.3)
RBC # BLD: 3.8 M/UL — LOW (ref 4.2–5.8)
RBC # FLD: 12 % — SIGNIFICANT CHANGE UP (ref 10.3–14.5)
SODIUM SERPL-SCNC: 142 MMOL/L — SIGNIFICANT CHANGE UP (ref 135–145)
VANCOMYCIN TROUGH SERPL-MCNC: 12.1 UG/ML — SIGNIFICANT CHANGE UP (ref 10–20)
WBC # BLD: 8.02 K/UL — SIGNIFICANT CHANGE UP (ref 3.8–10.5)
WBC # FLD AUTO: 8.02 K/UL — SIGNIFICANT CHANGE UP (ref 3.8–10.5)

## 2021-06-26 RX ORDER — VANCOMYCIN HCL 1 G
1250 VIAL (EA) INTRAVENOUS DAILY
Refills: 0 | Status: DISCONTINUED | OUTPATIENT
Start: 2021-06-26 | End: 2021-06-26

## 2021-06-26 RX ORDER — VANCOMYCIN HCL 1 G
1250 VIAL (EA) INTRAVENOUS DAILY
Refills: 0 | Status: DISCONTINUED | OUTPATIENT
Start: 2021-06-27 | End: 2021-07-06

## 2021-06-26 RX ORDER — POTASSIUM CHLORIDE 20 MEQ
30 PACKET (EA) ORAL ONCE
Refills: 0 | Status: COMPLETED | OUTPATIENT
Start: 2021-06-26 | End: 2021-06-26

## 2021-06-26 RX ADMIN — Medication 1300 MILLIGRAM(S): at 13:48

## 2021-06-26 RX ADMIN — Medication 9 UNIT(S): at 17:55

## 2021-06-26 RX ADMIN — MUPIROCIN 1 APPLICATION(S): 20 OINTMENT TOPICAL at 17:57

## 2021-06-26 RX ADMIN — Medication 30 MILLIEQUIVALENT(S): at 12:04

## 2021-06-26 RX ADMIN — MUPIROCIN 1 APPLICATION(S): 20 OINTMENT TOPICAL at 17:56

## 2021-06-26 RX ADMIN — Medication 9 UNIT(S): at 12:36

## 2021-06-26 RX ADMIN — Medication 100 MILLIGRAM(S): at 05:36

## 2021-06-26 RX ADMIN — INSULIN GLARGINE 30 UNIT(S): 100 INJECTION, SOLUTION SUBCUTANEOUS at 23:54

## 2021-06-26 RX ADMIN — MUPIROCIN 1 APPLICATION(S): 20 OINTMENT TOPICAL at 05:37

## 2021-06-26 RX ADMIN — TAMSULOSIN HYDROCHLORIDE 0.4 MILLIGRAM(S): 0.4 CAPSULE ORAL at 23:55

## 2021-06-26 RX ADMIN — ATORVASTATIN CALCIUM 20 MILLIGRAM(S): 80 TABLET, FILM COATED ORAL at 23:54

## 2021-06-26 RX ADMIN — Medication 100 MILLIGRAM(S): at 17:58

## 2021-06-26 RX ADMIN — Medication 166.67 MILLIGRAM(S): at 12:36

## 2021-06-26 RX ADMIN — Medication 1300 MILLIGRAM(S): at 05:36

## 2021-06-26 RX ADMIN — AMLODIPINE BESYLATE 10 MILLIGRAM(S): 2.5 TABLET ORAL at 05:36

## 2021-06-26 RX ADMIN — CHLORHEXIDINE GLUCONATE 1 APPLICATION(S): 213 SOLUTION TOPICAL at 12:04

## 2021-06-26 RX ADMIN — Medication 9 UNIT(S): at 09:01

## 2021-06-26 RX ADMIN — Medication 1300 MILLIGRAM(S): at 23:54

## 2021-06-26 NOTE — PROGRESS NOTE ADULT - SUBJECTIVE AND OBJECTIVE BOX
Patient is a 61y old  Male who presents with a chief complaint of otitis externa, uncontrolled DM (2021 15:00)    DATE OF SERVICE: 21 @ 16:48    HPI:    Afebrile.Ear pain is improving    PAST MEDICAL & SURGICAL HISTORY:  DM (diabetes mellitus)  On Insulin    HTN (hypertension)  medicxal managenent    No significant past surgical history        Review of Systems:   CONSTITUTIONAL: No fever, weight loss, or fatigue  EYES: No eye pain, visual disturbances, or discharge  ENMT:  No difficulty hearing, tinnitus, vertigo; No sinus or throat pain. Left ear plugged, wick in place.  NECK: No pain or stiffness  BREASTS: No pain, masses, or nipple discharge  RESPIRATORY: No cough, wheezing, chills or hemoptysis; No shortness of breath  CARDIOVASCULAR: No chest pain, palpitations, dizziness, or leg swelling  GASTROINTESTINAL: No abdominal or epigastric pain. No nausea, vomiting, or hematemesis; No diarrhea or constipation. No melena or hematochezia.  GENITOURINARY: No dysuria, frequency, hematuria, or incontinence  NEUROLOGICAL: No headaches, memory loss, loss of strength, numbness, or tremors  SKIN: No itching, burning, rashes, or lesions   LYMPH NODES: No enlarged glands  ENDOCRINE: No heat or cold intolerance; No hair loss  MUSCULOSKELETAL: No joint pain or swelling; No muscle, back, or extremity pain  PSYCHIATRIC: No depression, anxiety, mood swings, or difficulty sleeping  HEME/LYMPH: No easy bruising, or bleeding gums  ALLERY AND IMMUNOLOGIC: No hives or eczema    Allergies    No Known Allergies    Intolerances        Social History:     FAMILY HISTORY:  No pertinent family history in first degree relatives        MEDICATIONS  (STANDING):  amLODIPine   Tablet 10 milliGRAM(s) Oral daily  atorvastatin 20 milliGRAM(s) Oral at bedtime  ciprofloxacin   IVPB 400 milliGRAM(s) IV Intermittent every 12 hours  dextrose 40% Gel 15 Gram(s) Oral once  dextrose 50% Injectable 25 Gram(s) IV Push once  dextrose 50% Injectable 12.5 Gram(s) IV Push once  dextrose 50% Injectable 25 Gram(s) IV Push once  glucagon  Injectable 1 milliGRAM(s) IntraMuscular once  insulin glargine Injectable (LANTUS) 20 Unit(s) SubCutaneous at bedtime  insulin lispro (ADMELOG) corrective regimen sliding scale   SubCutaneous three times a day before meals  insulin lispro (ADMELOG) corrective regimen sliding scale   SubCutaneous at bedtime  insulin lispro Injectable (ADMELOG) 7 Unit(s) SubCutaneous three times a day before meals  ofloxacin 0.3% Solution 10 Drop(s) Left Ear two times a day  sodium chloride 0.9%. 1000 milliLiter(s) (100 mL/Hr) IV Continuous <Continuous>  tamsulosin 0.4 milliGRAM(s) Oral at bedtime  vancomycin  IVPB 1000 milliGRAM(s) IV Intermittent every 12 hours    MEDICATIONS  (PRN):  acetaminophen   Tablet .. 650 milliGRAM(s) Oral every 4 hours PRN Mild Pain (1 - 3)  oxyCODONE    IR 5 milliGRAM(s) Oral every 4 hours PRN Moderate Pain (4 - 6)  oxyCODONE    IR 10 milliGRAM(s) Oral every 4 hours PRN Severe Pain (7 - 10)        CAPILLARY BLOOD GLUCOSE      POCT Blood Glucose.: 272 mg/dL (2021 22:33)  POCT Blood Glucose.: 284 mg/dL (2021 17:48)  POCT Blood Glucose.: 274 mg/dL (2021 12:09)  POCT Blood Glucose.: 322 mg/dL (2021 08:11)    I&O's Summary    2021 07:01  -  2021 23:11  --------------------------------------------------------  IN: 0 mL / OUT: 525 mL / NET: -525 mL        PHYSICAL EXAM:  Vital Signs Last 24 Hrs  T(C): 37 (2021 22:11), Max: 37.1 (2021 05:31)  T(F): 98.6 (2021 22:11), Max: 98.7 (2021 05:31)  HR: 91 (2021 22:11) (86 - 91)  BP: 134/75 (2021 22:11) (134/75 - 157/72)  BP(mean): --  RR: 16 (2021 22:11) (16 - 16)  SpO2: 96% (2021 22:11) (96% - 100%)    GENERAL: NAD, well-developed  HEAD:  Atraumatic, Normocephalic  EYES: EOMI, PERRLA, conjunctiva and sclera clear  Ears: Left ear tender, cotton plug noted  NECK: Supple, No JVD  CHEST/LUNG: Clear to auscultation bilaterally; No wheeze  HEART: Regular rate and rhythm; No murmurs, rubs, or gallops  ABDOMEN: Soft, Nontender, Nondistended; Bowel sounds present  EXTREMITIES:  2+ Peripheral Pulses, No clubbing, cyanosis, or edema  PSYCH: AAOx3  NEUROLOGY: non-focal  SKIN: No rashes or lesions    LABS:                        11.8   7.89  )-----------( 208      ( 2021 06:35 )             35.8     06-21    136  |  102  |  20  ----------------------------<  329<H>  4.0   |  23  |  1.60<H>    Ca    8.4      2021 06:35  Phos  3.7     06-21  Mg     1.9     06-21            Urinalysis Basic - ( 2021 23:18 )    Color: Yellow / Appearance: Clear / S.015 / pH: x  Gluc: x / Ketone: Negative  / Bili: Negative / Urobili: Negative mg/dL   Blood: x / Protein: 500 mg/dL / Nitrite: Negative   Leuk Esterase: Negative / RBC: 3-5 /HPF / WBC 0-2   Sq Epi: x / Non Sq Epi: Occasional / Bacteria: Occasional        RADIOLOGY & ADDITIONAL TESTS:    Imaging Personally Reviewed:    Consultant(s) Notes Reviewed:      Care Discussed with Consultants/Other Providers:

## 2021-06-26 NOTE — PROGRESS NOTE ADULT - ASSESSMENT
61M with PMH DM2, HTN, HLD, diabetic neuropathy, glaucoma/cataract with poor vision, HCV s/p [place] (cleared per patient) presented with left ear pain. seen by ENT likely malignant otitis externa and folliculitis on antibiotic. nephrology consulted for elvin    ELVIN  baseline ~ 1  ELVIN likely sec to GEMINI  s/p CT with contrast 6/20  FEna>1% suggested ATN  UA with proteinuria and hematuria  renal us without hydro  renal function stable  avoid nephrotoxic agents  optimize dm control. f/u endo  on vanco and cipro. monitor vanco level  monitor bmp and urine output    proteinuria/hematuria  renal us noted   p/c ratio <1g  likely sec to dm/htn  work up can be done outpatient  monitor    Acidosis  non AG  continue oral bicarb 1300 tid x2 days  monitor    htn  controlled   monitor    hypocalcemia  likely vit d def  start weekly vit d 50000x12 dose  monitor    OM  malignant otitis externa on CT  f/u ENT/ID  antibiotic per team

## 2021-06-26 NOTE — PROGRESS NOTE ADULT - SUBJECTIVE AND OBJECTIVE BOX
[facility]   MD DANIELLE QUINTANILLA MD RUORU WONG, PA    TEL:  OFFICE: [phone]  DR MONROE CELL: [phone]  FLORIAN JAFFE CELL: [phone]  DR. MYLES CELL: [phone]  DR. PORTILLO CELL: [phone]    FROM 5 PM - 7 AM PLEASE CALL ANSWERING SERVICE: [phone]    RENAL FOLLOW UP NOTE--Date of Service 06-26-21 @ 10:53  --------------------------------------------------------------------------------  HPI:      Pt seen and examined at bedside.     PAST HISTORY  --------------------------------------------------------------------------------  No significant changes to PMH, PSH, FHx, SHx, unless otherwise noted    ALLERGIES & MEDICATIONS  --------------------------------------------------------------------------------  Allergies    No Known Allergies    Intolerances      Standing Inpatient Medications  amLODIPine   Tablet 10 milliGRAM(s) Oral daily  atorvastatin 20 milliGRAM(s) Oral at bedtime  chlorhexidine 4% Liquid 1 Application(s) Topical daily  dextrose 40% Gel 15 Gram(s) Oral once  dextrose 50% Injectable 25 Gram(s) IV Push once  dextrose 50% Injectable 12.5 Gram(s) IV Push once  dextrose 50% Injectable 25 Gram(s) IV Push once  doxycycline hyclate Capsule 100 milliGRAM(s) Oral every 12 hours  ergocalciferol 83496 Unit(s) Oral <User Schedule>  glucagon  Injectable 1 milliGRAM(s) IntraMuscular once  insulin glargine Injectable (LANTUS) 30 Unit(s) SubCutaneous at bedtime  insulin lispro (ADMELOG) corrective regimen sliding scale   SubCutaneous three times a day before meals  insulin lispro (ADMELOG) corrective regimen sliding scale   SubCutaneous at bedtime  insulin lispro Injectable (ADMELOG) 9 Unit(s) SubCutaneous three times a day before meals  mupirocin 2% Ointment 1 Application(s) Topical two times a day  mupirocin 2% Ointment 1 Application(s) Topical two times a day  potassium chloride    Tablet ER 30 milliEquivalent(s) Oral once  sodium bicarbonate 1300 milliGRAM(s) Oral three times a day  tamsulosin 0.4 milliGRAM(s) Oral at bedtime  vancomycin  IVPB 1250 milliGRAM(s) IV Intermittent daily    PRN Inpatient Medications  acetaminophen   Tablet .. 650 milliGRAM(s) Oral every 4 hours PRN  oxyCODONE    IR 5 milliGRAM(s) Oral every 4 hours PRN  oxyCODONE    IR 10 milliGRAM(s) Oral every 4 hours PRN      REVIEW OF SYSTEMS  --------------------------------------------------------------------------------  General: no fever  MSK: no edema     VITALS/PHYSICAL EXAM  --------------------------------------------------------------------------------  T(C): 36.9 (06-26-21 @ 05:35), Max: 36.9 (06-25-21 @ 22:37)  HR: 94 (06-26-21 @ 05:35) (94 - 97)  BP: 153/79 (06-26-21 @ 05:35) (153/79 - 164/82)  RR: 18 (06-26-21 @ 05:35) (18 - 19)  SpO2: 97% (06-26-21 @ 05:35) (97% - 97%)  Wt(kg): --        06-25-21 @ 07:01  -  06-26-21 @ 07:00  --------------------------------------------------------  IN: 120 mL / OUT: 0 mL / NET: 120 mL      Physical Exam:  	Gen: NAD  	HEENT: MMM  	Pulm: CTA B/L  	CV: S1S2  	Abd: Soft, +BS  	Ext: No LE edema B/L                      Neuro: Awake   	Skin: Warm and Dry   	Vascular access: NO HD catheter            no  palmer  LABS/STUDIES  --------------------------------------------------------------------------------              12.0   8.02  >-----------<  237      [06-26-21 @ 09:42]              35.0     142  |  110  |  14  ----------------------------<  133      [06-26-21 @ 09:42]  3.2   |  21  |  1.34        Ca     8.4     [06-26-21 @ 09:42]      Mg     1.8     [06-26-21 @ 09:42]      Phos  2.9     [06-26-21 @ 09:42]            Creatinine Trend:  SCr 1.34 [06-26 @ 09:42]  SCr 1.34 [06-25 @ 07:43]  SCr 1.41 [06-24 @ 09:35]  SCr 1.52 [06-23 @ 08:23]  SCr 1.63 [06-22 @ 07:28]    Urinalysis - [06-22-21 @ 16:47]      Color Light Yellow / Appearance Clear / SG 1.009 / pH 6.0      Gluc >= 1000 mg/dL / Ketone Negative  / Bili Negative / Urobili <2 mg/dL       Blood Trace / Protein Trace / Leuk Est Negative / Nitrite Negative      RBC 1 / WBC 1 / Hyaline 0 / Gran  / Sq Epi  / Non Sq Epi 0 / Bacteria Negative    Urine Creatinine 69      [06-24-21 @ 04:29]  Urine Protein 43      [06-24-21 @ 04:29]  Urine Sodium 81      [06-24-21 @ 04:29]  Urine Potassium 28.6      [06-24-21 @ 04:29]  Urine Chloride 84      [06-24-21 @ 04:29]    PTH -- (Ca --)      [06-23-21 @ 08:23]   69  Vitamin D (25OH) 7.7      [06-23-21 @ 12:30]  Lipid: chol 101, , HDL 27, LDL --      [06-23-21 @ 08:23]

## 2021-06-27 LAB
ANION GAP SERPL CALC-SCNC: 10 MMOL/L — SIGNIFICANT CHANGE UP (ref 7–14)
BUN SERPL-MCNC: 18 MG/DL — SIGNIFICANT CHANGE UP (ref 7–23)
CALCIUM SERPL-MCNC: 8.2 MG/DL — LOW (ref 8.4–10.5)
CHLORIDE SERPL-SCNC: 110 MMOL/L — HIGH (ref 98–107)
CO2 SERPL-SCNC: 23 MMOL/L — SIGNIFICANT CHANGE UP (ref 22–31)
CREAT SERPL-MCNC: 1.43 MG/DL — HIGH (ref 0.5–1.3)
CULTURE RESULTS: SIGNIFICANT CHANGE UP
CULTURE RESULTS: SIGNIFICANT CHANGE UP
GLUCOSE BLDC GLUCOMTR-MCNC: 112 MG/DL — HIGH (ref 70–99)
GLUCOSE BLDC GLUCOMTR-MCNC: 142 MG/DL — HIGH (ref 70–99)
GLUCOSE BLDC GLUCOMTR-MCNC: 167 MG/DL — HIGH (ref 70–99)
GLUCOSE BLDC GLUCOMTR-MCNC: 203 MG/DL — HIGH (ref 70–99)
GLUCOSE SERPL-MCNC: 165 MG/DL — HIGH (ref 70–99)
HCT VFR BLD CALC: 30.7 % — LOW (ref 39–50)
HGB BLD-MCNC: 10.6 G/DL — LOW (ref 13–17)
MAGNESIUM SERPL-MCNC: 1.8 MG/DL — SIGNIFICANT CHANGE UP (ref 1.6–2.6)
MCHC RBC-ENTMCNC: 32 PG — SIGNIFICANT CHANGE UP (ref 27–34)
MCHC RBC-ENTMCNC: 34.5 GM/DL — SIGNIFICANT CHANGE UP (ref 32–36)
MCV RBC AUTO: 92.7 FL — SIGNIFICANT CHANGE UP (ref 80–100)
NRBC # BLD: 0 /100 WBCS — SIGNIFICANT CHANGE UP
NRBC # FLD: 0 K/UL — SIGNIFICANT CHANGE UP
PHOSPHATE SERPL-MCNC: 3.3 MG/DL — SIGNIFICANT CHANGE UP (ref 2.5–4.5)
PLATELET # BLD AUTO: 212 K/UL — SIGNIFICANT CHANGE UP (ref 150–400)
POTASSIUM SERPL-MCNC: 3.7 MMOL/L — SIGNIFICANT CHANGE UP (ref 3.5–5.3)
POTASSIUM SERPL-SCNC: 3.7 MMOL/L — SIGNIFICANT CHANGE UP (ref 3.5–5.3)
RBC # BLD: 3.31 M/UL — LOW (ref 4.2–5.8)
RBC # FLD: 12 % — SIGNIFICANT CHANGE UP (ref 10.3–14.5)
SARS-COV-2 RNA SPEC QL NAA+PROBE: SIGNIFICANT CHANGE UP
SODIUM SERPL-SCNC: 143 MMOL/L — SIGNIFICANT CHANGE UP (ref 135–145)
SPECIMEN SOURCE: SIGNIFICANT CHANGE UP
SPECIMEN SOURCE: SIGNIFICANT CHANGE UP
WBC # BLD: 7.58 K/UL — SIGNIFICANT CHANGE UP (ref 3.8–10.5)
WBC # FLD AUTO: 7.58 K/UL — SIGNIFICANT CHANGE UP (ref 3.8–10.5)

## 2021-06-27 RX ORDER — HYDRALAZINE HCL 50 MG
10 TABLET ORAL ONCE
Refills: 0 | Status: COMPLETED | OUTPATIENT
Start: 2021-06-27 | End: 2021-06-27

## 2021-06-27 RX ORDER — ONDANSETRON 8 MG/1
4 TABLET, FILM COATED ORAL ONCE
Refills: 0 | Status: COMPLETED | OUTPATIENT
Start: 2021-06-27 | End: 2021-06-27

## 2021-06-27 RX ORDER — HYDRALAZINE HCL 50 MG
5 TABLET ORAL ONCE
Refills: 0 | Status: COMPLETED | OUTPATIENT
Start: 2021-06-27 | End: 2021-06-27

## 2021-06-27 RX ADMIN — MUPIROCIN 1 APPLICATION(S): 20 OINTMENT TOPICAL at 07:46

## 2021-06-27 RX ADMIN — Medication 9 UNIT(S): at 18:45

## 2021-06-27 RX ADMIN — Medication 100 MILLIGRAM(S): at 18:45

## 2021-06-27 RX ADMIN — MUPIROCIN 1 APPLICATION(S): 20 OINTMENT TOPICAL at 18:45

## 2021-06-27 RX ADMIN — Medication 1: at 08:51

## 2021-06-27 RX ADMIN — Medication 100 MILLIGRAM(S): at 07:41

## 2021-06-27 RX ADMIN — Medication 5 MILLIGRAM(S): at 23:11

## 2021-06-27 RX ADMIN — ATORVASTATIN CALCIUM 20 MILLIGRAM(S): 80 TABLET, FILM COATED ORAL at 22:33

## 2021-06-27 RX ADMIN — ONDANSETRON 4 MILLIGRAM(S): 8 TABLET, FILM COATED ORAL at 02:45

## 2021-06-27 RX ADMIN — Medication 9 UNIT(S): at 08:52

## 2021-06-27 RX ADMIN — Medication 1300 MILLIGRAM(S): at 07:41

## 2021-06-27 RX ADMIN — OXYCODONE HYDROCHLORIDE 10 MILLIGRAM(S): 5 TABLET ORAL at 01:20

## 2021-06-27 RX ADMIN — AMLODIPINE BESYLATE 10 MILLIGRAM(S): 2.5 TABLET ORAL at 07:42

## 2021-06-27 RX ADMIN — CHLORHEXIDINE GLUCONATE 1 APPLICATION(S): 213 SOLUTION TOPICAL at 13:56

## 2021-06-27 RX ADMIN — OXYCODONE HYDROCHLORIDE 10 MILLIGRAM(S): 5 TABLET ORAL at 00:50

## 2021-06-27 RX ADMIN — Medication 166.67 MILLIGRAM(S): at 13:56

## 2021-06-27 RX ADMIN — INSULIN GLARGINE 30 UNIT(S): 100 INJECTION, SOLUTION SUBCUTANEOUS at 22:33

## 2021-06-27 RX ADMIN — Medication 10 MILLIGRAM(S): at 00:50

## 2021-06-27 RX ADMIN — TAMSULOSIN HYDROCHLORIDE 0.4 MILLIGRAM(S): 0.4 CAPSULE ORAL at 22:33

## 2021-06-27 NOTE — PROGRESS NOTE ADULT - SUBJECTIVE AND OBJECTIVE BOX
[facility]   MD DANIELLE QUINTANILLA MD RUORU WONG, PA    TEL:  OFFICE: [phone]  DR MONROE CELL: [phone]  FLORIAN JAFFE CELL: [phone]  DR. MYLES CELL: [phone]  DR. PORTILLO CELL: [phone]    FROM 5 PM - 7 AM PLEASE CALL ANSWERING SERVICE: [phone]    RENAL FOLLOW UP NOTE--Date of Service 06-27-21 @ 10:09  --------------------------------------------------------------------------------  HPI:      Pt seen and examined at bedside.       PAST HISTORY  --------------------------------------------------------------------------------  No significant changes to PMH, PSH, FHx, SHx, unless otherwise noted    ALLERGIES & MEDICATIONS  --------------------------------------------------------------------------------  Allergies    No Known Allergies    Intolerances      Standing Inpatient Medications  amLODIPine   Tablet 10 milliGRAM(s) Oral daily  atorvastatin 20 milliGRAM(s) Oral at bedtime  chlorhexidine 4% Liquid 1 Application(s) Topical daily  dextrose 40% Gel 15 Gram(s) Oral once  dextrose 50% Injectable 25 Gram(s) IV Push once  dextrose 50% Injectable 12.5 Gram(s) IV Push once  dextrose 50% Injectable 25 Gram(s) IV Push once  doxycycline hyclate Capsule 100 milliGRAM(s) Oral every 12 hours  ergocalciferol 57738 Unit(s) Oral <User Schedule>  glucagon  Injectable 1 milliGRAM(s) IntraMuscular once  insulin glargine Injectable (LANTUS) 30 Unit(s) SubCutaneous at bedtime  insulin lispro (ADMELOG) corrective regimen sliding scale   SubCutaneous at bedtime  insulin lispro (ADMELOG) corrective regimen sliding scale   SubCutaneous three times a day before meals  insulin lispro Injectable (ADMELOG) 9 Unit(s) SubCutaneous three times a day before meals  mupirocin 2% Ointment 1 Application(s) Topical two times a day  mupirocin 2% Ointment 1 Application(s) Topical two times a day  tamsulosin 0.4 milliGRAM(s) Oral at bedtime  vancomycin  IVPB 1250 milliGRAM(s) IV Intermittent daily    PRN Inpatient Medications  acetaminophen   Tablet .. 650 milliGRAM(s) Oral every 4 hours PRN  oxyCODONE    IR 5 milliGRAM(s) Oral every 4 hours PRN  oxyCODONE    IR 10 milliGRAM(s) Oral every 4 hours PRN      REVIEW OF SYSTEMS  --------------------------------------------------------------------------------  General: no fever    MSK: no edema     VITALS/PHYSICAL EXAM  --------------------------------------------------------------------------------  T(C): 36.5 (06-27-21 @ 07:38), Max: 37.2 (06-27-21 @ 02:01)  HR: 88 (06-27-21 @ 07:38) (80 - 97)  BP: 166/100 (06-27-21 @ 07:38) (136/72 - 180/104)  RR: 18 (06-27-21 @ 07:38) (17 - 18)  SpO2: 98% (06-27-21 @ 07:38) (95% - 98%)  Wt(kg): --        Physical Exam:  	Gen: NAD  	HEENT: MMM  	Pulm: CTA B/L  	CV: S1S2  	Abd: Soft, +BS  	Ext: No LE edema B/L                      Neuro: Awake   	Skin: Warm and Dry   	Vascular access: NO HD catheter            no  chakraborty  LABS/STUDIES  --------------------------------------------------------------------------------              10.6   7.58  >-----------<  212      [06-27-21 @ 07:44]              30.7     143  |  110  |  18  ----------------------------<  165      [06-27-21 @ 07:44]  3.7   |  23  |  1.43        Ca     8.2     [06-27-21 @ 07:44]      Mg     1.8     [06-27-21 @ 07:44]      Phos  3.3     [06-27-21 @ 07:44]            Creatinine Trend:  SCr 1.43 [06-27 @ 07:44]  SCr 1.34 [06-26 @ 09:42]  SCr 1.34 [06-25 @ 07:43]  SCr 1.41 [06-24 @ 09:35]  SCr 1.52 [06-23 @ 08:23]    Urinalysis - [06-22-21 @ 16:47]      Color Light Yellow / Appearance Clear / SG 1.009 / pH 6.0      Gluc >= 1000 mg/dL / Ketone Negative  / Bili Negative / Urobili <2 mg/dL       Blood Trace / Protein Trace / Leuk Est Negative / Nitrite Negative      RBC 1 / WBC 1 / Hyaline 0 / Gran  / Sq Epi  / Non Sq Epi 0 / Bacteria Negative    Urine Creatinine 69      [06-24-21 @ 04:29]  Urine Protein 43      [06-24-21 @ 04:29]  Urine Sodium 81      [06-24-21 @ 04:29]  Urine Potassium 28.6      [06-24-21 @ 04:29]  Urine Chloride 84      [06-24-21 @ 04:29]    PTH -- (Ca --)      [06-23-21 @ 08:23]   69  Vitamin D (25OH) 7.7      [06-23-21 @ 12:30]  Lipid: chol 101, , HDL 27, LDL --      [06-23-21 @ 08:23]

## 2021-06-27 NOTE — PROGRESS NOTE ADULT - ASSESSMENT
61M with PMH DM2, HTN, HLD, diabetic neuropathy, glaucoma/cataract with poor vision, HCV s/p [place] (cleared per patient) presented with left ear pain. seen by ENT likely malignant otitis externa and folliculitis on antibiotic. nephrology consulted for elvin    ELVIN  baseline ~ 1  ELVIN likely sec to GEMINI  s/p CT with contrast 6/20  FEna>1% suggested ATN  UA with proteinuria and hematuria  renal us without hydro  renal function stable  avoid nephrotoxic agents  optimize dm control. f/u endo  on vanco  monitor vanco level  monitor bmp and urine output    proteinuria/hematuria  renal us noted   p/c ratio <1g  likely sec to dm/htn  work up can be done outpatient  monitor    Acidosis  non AG  improved with oral bicarb  monitor serum Co2    htn  controlled   monitor    hypocalcemia  likely vit d def  On  weekly vit d 50000x12 dose  monitor    OM  malignant otitis externa on CT  f/u ENT/ID  antibiotic per team

## 2021-06-27 NOTE — PROGRESS NOTE ADULT - SUBJECTIVE AND OBJECTIVE BOX
Patient is a 61y old  Male who presents with a chief complaint of otitis externa, uncontrolled DM (2021 15:00)    DATE OF SERVICE: 21 @ 19:12  HPI:    Afebrile.Ear pain is improving  No new symptoms    PAST MEDICAL & SURGICAL HISTORY:  DM (diabetes mellitus)  On Insulin    HTN (hypertension)  medicxal managenent    No significant past surgical history        Review of Systems:   CONSTITUTIONAL: No fever, weight loss, or fatigue  EYES: No eye pain, visual disturbances, or discharge  ENMT:  No difficulty hearing, tinnitus, vertigo; No sinus or throat pain. Left ear plugged, wick in place.  NECK: No pain or stiffness  BREASTS: No pain, masses, or nipple discharge  RESPIRATORY: No cough, wheezing, chills or hemoptysis; No shortness of breath  CARDIOVASCULAR: No chest pain, palpitations, dizziness, or leg swelling  GASTROINTESTINAL: No abdominal or epigastric pain. No nausea, vomiting, or hematemesis; No diarrhea or constipation. No melena or hematochezia.  GENITOURINARY: No dysuria, frequency, hematuria, or incontinence  NEUROLOGICAL: No headaches, memory loss, loss of strength, numbness, or tremors  SKIN: No itching, burning, rashes, or lesions   LYMPH NODES: No enlarged glands  ENDOCRINE: No heat or cold intolerance; No hair loss  MUSCULOSKELETAL: No joint pain or swelling; No muscle, back, or extremity pain  PSYCHIATRIC: No depression, anxiety, mood swings, or difficulty sleeping  HEME/LYMPH: No easy bruising, or bleeding gums  ALLERY AND IMMUNOLOGIC: No hives or eczema    Allergies    No Known Allergies    Intolerances        Social History:     FAMILY HISTORY:  No pertinent family history in first degree relatives        MEDICATIONS  (STANDING):  amLODIPine   Tablet 10 milliGRAM(s) Oral daily  atorvastatin 20 milliGRAM(s) Oral at bedtime  ciprofloxacin   IVPB 400 milliGRAM(s) IV Intermittent every 12 hours  dextrose 40% Gel 15 Gram(s) Oral once  dextrose 50% Injectable 25 Gram(s) IV Push once  dextrose 50% Injectable 12.5 Gram(s) IV Push once  dextrose 50% Injectable 25 Gram(s) IV Push once  glucagon  Injectable 1 milliGRAM(s) IntraMuscular once  insulin glargine Injectable (LANTUS) 20 Unit(s) SubCutaneous at bedtime  insulin lispro (ADMELOG) corrective regimen sliding scale   SubCutaneous three times a day before meals  insulin lispro (ADMELOG) corrective regimen sliding scale   SubCutaneous at bedtime  insulin lispro Injectable (ADMELOG) 7 Unit(s) SubCutaneous three times a day before meals  ofloxacin 0.3% Solution 10 Drop(s) Left Ear two times a day  sodium chloride 0.9%. 1000 milliLiter(s) (100 mL/Hr) IV Continuous <Continuous>  tamsulosin 0.4 milliGRAM(s) Oral at bedtime  vancomycin  IVPB 1000 milliGRAM(s) IV Intermittent every 12 hours    MEDICATIONS  (PRN):  acetaminophen   Tablet .. 650 milliGRAM(s) Oral every 4 hours PRN Mild Pain (1 - 3)  oxyCODONE    IR 5 milliGRAM(s) Oral every 4 hours PRN Moderate Pain (4 - 6)  oxyCODONE    IR 10 milliGRAM(s) Oral every 4 hours PRN Severe Pain (7 - 10)        CAPILLARY BLOOD GLUCOSE      POCT Blood Glucose.: 272 mg/dL (2021 22:33)  POCT Blood Glucose.: 284 mg/dL (2021 17:48)  POCT Blood Glucose.: 274 mg/dL (2021 12:09)  POCT Blood Glucose.: 322 mg/dL (2021 08:11)    I&O's Summary    2021 07:01  -  2021 23:11  --------------------------------------------------------  IN: 0 mL / OUT: 525 mL / NET: -525 mL        PHYSICAL EXAM:  Vital Signs Last 24 Hrs  T(C): 37 (2021 22:11), Max: 37.1 (2021 05:31)  T(F): 98.6 (2021 22:11), Max: 98.7 (2021 05:31)  HR: 91 (2021 22:11) (86 - 91)  BP: 134/75 (2021 22:11) (134/75 - 157/72)  BP(mean): --  RR: 16 (2021 22:11) (16 - 16)  SpO2: 96% (2021 22:11) (96% - 100%)    GENERAL: NAD, well-developed  HEAD:  Atraumatic, Normocephalic  EYES: EOMI, PERRLA, conjunctiva and sclera clear  Ears: Left ear tender, cotton plug noted  NECK: Supple, No JVD  CHEST/LUNG: Clear to auscultation bilaterally; No wheeze  HEART: Regular rate and rhythm; No murmurs, rubs, or gallops  ABDOMEN: Soft, Nontender, Nondistended; Bowel sounds present  EXTREMITIES:  2+ Peripheral Pulses, No clubbing, cyanosis, or edema  PSYCH: AAOx3  NEUROLOGY: non-focal  SKIN: No rashes or lesions    LABS:                        11.8   7.89  )-----------( 208      ( 2021 06:35 )             35.8     06-21    136  |  102  |  20  ----------------------------<  329<H>  4.0   |  23  |  1.60<H>    Ca    8.4      2021 06:35  Phos  3.7     06-21  Mg     1.9     06-21            Urinalysis Basic - ( 2021 23:18 )    Color: Yellow / Appearance: Clear / S.015 / pH: x  Gluc: x / Ketone: Negative  / Bili: Negative / Urobili: Negative mg/dL   Blood: x / Protein: 500 mg/dL / Nitrite: Negative   Leuk Esterase: Negative / RBC: 3-5 /HPF / WBC 0-2   Sq Epi: x / Non Sq Epi: Occasional / Bacteria: Occasional        RADIOLOGY & ADDITIONAL TESTS:    Imaging Personally Reviewed:    Consultant(s) Notes Reviewed:      Care Discussed with Consultants/Other Providers:

## 2021-06-28 LAB
ANION GAP SERPL CALC-SCNC: 13 MMOL/L — SIGNIFICANT CHANGE UP (ref 7–14)
BUN SERPL-MCNC: 16 MG/DL — SIGNIFICANT CHANGE UP (ref 7–23)
CALCIUM SERPL-MCNC: 8.9 MG/DL — SIGNIFICANT CHANGE UP (ref 8.4–10.5)
CHLORIDE SERPL-SCNC: 110 MMOL/L — HIGH (ref 98–107)
CO2 SERPL-SCNC: 20 MMOL/L — LOW (ref 22–31)
CREAT SERPL-MCNC: 1.37 MG/DL — HIGH (ref 0.5–1.3)
GLUCOSE BLDC GLUCOMTR-MCNC: 112 MG/DL — HIGH (ref 70–99)
GLUCOSE BLDC GLUCOMTR-MCNC: 139 MG/DL — HIGH (ref 70–99)
GLUCOSE BLDC GLUCOMTR-MCNC: 88 MG/DL — SIGNIFICANT CHANGE UP (ref 70–99)
GLUCOSE BLDC GLUCOMTR-MCNC: 97 MG/DL — SIGNIFICANT CHANGE UP (ref 70–99)
GLUCOSE SERPL-MCNC: 122 MG/DL — HIGH (ref 70–99)
HCT VFR BLD CALC: 33.5 % — LOW (ref 39–50)
HGB BLD-MCNC: 11.3 G/DL — LOW (ref 13–17)
MAGNESIUM SERPL-MCNC: 1.9 MG/DL — SIGNIFICANT CHANGE UP (ref 1.6–2.6)
MCHC RBC-ENTMCNC: 31.3 PG — SIGNIFICANT CHANGE UP (ref 27–34)
MCHC RBC-ENTMCNC: 33.7 GM/DL — SIGNIFICANT CHANGE UP (ref 32–36)
MCV RBC AUTO: 92.8 FL — SIGNIFICANT CHANGE UP (ref 80–100)
NRBC # BLD: 0 /100 WBCS — SIGNIFICANT CHANGE UP
NRBC # FLD: 0 K/UL — SIGNIFICANT CHANGE UP
PHOSPHATE SERPL-MCNC: 3.6 MG/DL — SIGNIFICANT CHANGE UP (ref 2.5–4.5)
PLATELET # BLD AUTO: 243 K/UL — SIGNIFICANT CHANGE UP (ref 150–400)
POTASSIUM SERPL-MCNC: 4 MMOL/L — SIGNIFICANT CHANGE UP (ref 3.5–5.3)
POTASSIUM SERPL-SCNC: 4 MMOL/L — SIGNIFICANT CHANGE UP (ref 3.5–5.3)
RBC # BLD: 3.61 M/UL — LOW (ref 4.2–5.8)
RBC # FLD: 12.1 % — SIGNIFICANT CHANGE UP (ref 10.3–14.5)
SODIUM SERPL-SCNC: 143 MMOL/L — SIGNIFICANT CHANGE UP (ref 135–145)
WBC # BLD: 7.86 K/UL — SIGNIFICANT CHANGE UP (ref 3.8–10.5)
WBC # FLD AUTO: 7.86 K/UL — SIGNIFICANT CHANGE UP (ref 3.8–10.5)

## 2021-06-28 RX ORDER — OFLOXACIN 0.3 %
10 DROPS OPHTHALMIC (EYE)
Refills: 0 | Status: DISCONTINUED | OUTPATIENT
Start: 2021-06-28 | End: 2021-07-09

## 2021-06-28 RX ORDER — ONDANSETRON 8 MG/1
4 TABLET, FILM COATED ORAL ONCE
Refills: 0 | Status: COMPLETED | OUTPATIENT
Start: 2021-06-28 | End: 2021-06-28

## 2021-06-28 RX ADMIN — Medication 166.67 MILLIGRAM(S): at 11:49

## 2021-06-28 RX ADMIN — MUPIROCIN 1 APPLICATION(S): 20 OINTMENT TOPICAL at 06:17

## 2021-06-28 RX ADMIN — Medication 650 MILLIGRAM(S): at 21:43

## 2021-06-28 RX ADMIN — AMLODIPINE BESYLATE 10 MILLIGRAM(S): 2.5 TABLET ORAL at 06:18

## 2021-06-28 RX ADMIN — ONDANSETRON 4 MILLIGRAM(S): 8 TABLET, FILM COATED ORAL at 14:00

## 2021-06-28 RX ADMIN — OXYCODONE HYDROCHLORIDE 5 MILLIGRAM(S): 5 TABLET ORAL at 12:48

## 2021-06-28 RX ADMIN — INSULIN GLARGINE 30 UNIT(S): 100 INJECTION, SOLUTION SUBCUTANEOUS at 21:32

## 2021-06-28 RX ADMIN — MUPIROCIN 1 APPLICATION(S): 20 OINTMENT TOPICAL at 17:09

## 2021-06-28 RX ADMIN — Medication 9 UNIT(S): at 18:13

## 2021-06-28 RX ADMIN — Medication 9 UNIT(S): at 12:46

## 2021-06-28 RX ADMIN — CHLORHEXIDINE GLUCONATE 1 APPLICATION(S): 213 SOLUTION TOPICAL at 12:46

## 2021-06-28 RX ADMIN — Medication 10 DROP(S): at 21:33

## 2021-06-28 RX ADMIN — Medication 100 MILLIGRAM(S): at 17:09

## 2021-06-28 RX ADMIN — Medication 650 MILLIGRAM(S): at 20:43

## 2021-06-28 RX ADMIN — ATORVASTATIN CALCIUM 20 MILLIGRAM(S): 80 TABLET, FILM COATED ORAL at 21:32

## 2021-06-28 RX ADMIN — Medication 100 MILLIGRAM(S): at 06:18

## 2021-06-28 RX ADMIN — Medication 10 DROP(S): at 10:53

## 2021-06-28 RX ADMIN — OXYCODONE HYDROCHLORIDE 5 MILLIGRAM(S): 5 TABLET ORAL at 11:49

## 2021-06-28 RX ADMIN — TAMSULOSIN HYDROCHLORIDE 0.4 MILLIGRAM(S): 0.4 CAPSULE ORAL at 21:32

## 2021-06-28 RX ADMIN — Medication 9 UNIT(S): at 08:54

## 2021-06-28 NOTE — PROGRESS NOTE ADULT - ASSESSMENT
Patient is a 61M with PMH DM2, HTN, HLD, diabetic neuropathy, glaucoma/cataract with poor vision, HCV s/p [place] (cleared per patient) presented with left ear pain.  CT auditory ear canal shows:    Findings concerning for malignant left-sided otitis externa with bony erosive changes involving the external auditory canal. Superimposed osteomyelitis within the bony external auditory canal is a consideration given the patient's clinical information. An external auditory canal cholesteatoma cannot be excluded. A neoplastic process such as squamous cell carcinoma is also difficult to exclude.    2. Additional imaging findings compatible with left-sided otitis media and left-sided mastoiditis. No gross ossicular chain erosion or destructive changes. No evidence of venous sinus thrombosis.    3. Unremarkable right-sided temporal bone CT examination apart from cerumen in the external auditory canal.    Pt also c/o new painful bumps, draining pus, in b/l armpits, b/l groin and R perineal area.    ID consulted for further abx management.     Left sided otitis externa:    - Cont present abx, f/u L ear cultures.  Concern for superimposed OM of bony external auditory canal.   Cannot exclude cholesteatoma or squamous cell carcinoma.    - ENT f/u appreciated.  Continued outpt f/u to address further need for cultures, imaging, biopsy.      - Current ear cultures growing MRSA.  IV cipro d/c'd.  Cont IV vanco, and maintain trough between 15-20.     -  Cont abx ear gtt as per ENT    - Plan for  long term IV abx.  f/u blood cx x 2.  ESR, CRP    - Weekly cbc, sma-7, esr, crp, vanco trough.       r/o Hidradenitis suppurativa:    - c/o new nodular/pustular lesions in b/l armpits, b/l groin and perineum for past 1-3 weeks. States lesions are painful and drain pus.    - R Derm eval appreciated, ?hidradenitis suppurativa vs staph folliculitis.  Pt started on doxycycline and mupirocin for staph decolonization.      - Cont IV abx for now.  Cont to monitor and evaluate for need for further I&D.      * Plan for PICC line, cont IV vancomycin x 6 week course (6/20 through 7/31), maintain trough between 15-20.  Check weekly cbc, sma-7, esr, crp, vanco trough.  d/c planning.     d/w Medicine NP      Mena Osullivan  [phone]

## 2021-06-28 NOTE — PHYSICAL THERAPY INITIAL EVALUATION ADULT - DISCHARGE DISPOSITION, PT EVAL
Patient does not have any skilled PT needs, however safety concerns present secondary to vision precautions.

## 2021-06-28 NOTE — PROGRESS NOTE ADULT - SUBJECTIVE AND OBJECTIVE BOX
ENT FOLLOW UP CONSULT NOTE    Interval Events  6/23: NAEON - seen and examined bedside. Pt feels subjective improvement in pain on IV antibiotics and cipro drops in the ear. Afebrile and hemodynamically stable. WBC stable. No other complaints at this time.  6/24: naeon. wick fell out overnight  6/28: wick replaced last week but has now come out. been stable past few days - afebrile no white count. says pain is improved    Vital Signs Last 24 Hrs  T(C): 37.3 (24 Jun 2021 05:27), Max: 37.3 (24 Jun 2021 05:27)  T(F): 99.1 (24 Jun 2021 05:27), Max: 99.1 (24 Jun 2021 05:27)  HR: 89 (24 Jun 2021 05:27) (88 - 95)  BP: 161/92 (24 Jun 2021 05:27) (137/75 - 179/99)  BP(mean): --  RR: 18 (24 Jun 2021 05:27) (18 - 20)  SpO2: 98% (24 Jun 2021 05:27) (97% - 99%)    PHYSICAL EXAM:  Gen: NAD, A/Ox3  Breathing comfortably on RA  Voice: normal  Facial nerve fully intact  Ears: Right - ear canal cerumen, TM intact            Left - external ear canal mild swelling, purulent fluid, unable to see TM  Neck: Flat, supple, no lymphadenopathy    A/P  61M with DM2, HTN, HLD, diabetic neuropathy, glaucoma/cataract with poor vision, HCV s/p [place] (cleared per patient) presented 6/20 with left ear pain. Workup consistent with left malignant otitis externa  - wick is out  -CT temporal bone reviewed - no evidence of skull base invasion or sigmoid thrombosis. Facial nerve intact  -**continue oflox gtt (it fell off)  - ID consult for antibiotic regimen   -Strict glucose control  -Recommend seeing Dr. Feldman outpatient for microscopic exam and additional management (will require cx, imaging and biopsy as indicated of granulation tissue does not resolve with therapy)    Call/page with questions

## 2021-06-28 NOTE — PROGRESS NOTE ADULT - SUBJECTIVE AND OBJECTIVE BOX
Patient is a 61y old  Male who presents with a chief complaint of otitis externa, uncontrolled DM (2021 15:00)    DATE OF SERVICE: 21 @ 12:58    HPI:    Afebrile.   No new symptoms    PAST MEDICAL & SURGICAL HISTORY:  DM (diabetes mellitus)  On Insulin    HTN (hypertension)  medicxal managenent    No significant past surgical history        Review of Systems:   CONSTITUTIONAL: No fever, weight loss, or fatigue  EYES: No eye pain, visual disturbances, or discharge  ENMT:  No difficulty hearing, tinnitus, vertigo; No sinus or throat pain. Left ear plugged, wick in place.  NECK: No pain or stiffness  BREASTS: No pain, masses, or nipple discharge  RESPIRATORY: No cough, wheezing, chills or hemoptysis; No shortness of breath  CARDIOVASCULAR: No chest pain, palpitations, dizziness, or leg swelling  GASTROINTESTINAL: No abdominal or epigastric pain. No nausea, vomiting, or hematemesis; No diarrhea or constipation. No melena or hematochezia.  GENITOURINARY: No dysuria, frequency, hematuria, or incontinence  NEUROLOGICAL: No headaches, memory loss, loss of strength, numbness, or tremors  SKIN: No itching, burning, rashes, or lesions   LYMPH NODES: No enlarged glands  ENDOCRINE: No heat or cold intolerance; No hair loss  MUSCULOSKELETAL: No joint pain or swelling; No muscle, back, or extremity pain  PSYCHIATRIC: No depression, anxiety, mood swings, or difficulty sleeping  HEME/LYMPH: No easy bruising, or bleeding gums  ALLERY AND IMMUNOLOGIC: No hives or eczema    Allergies    No Known Allergies    Intolerances        Social History:     FAMILY HISTORY:  No pertinent family history in first degree relatives        MEDICATIONS  (STANDING):  amLODIPine   Tablet 10 milliGRAM(s) Oral daily  atorvastatin 20 milliGRAM(s) Oral at bedtime  ciprofloxacin   IVPB 400 milliGRAM(s) IV Intermittent every 12 hours  dextrose 40% Gel 15 Gram(s) Oral once  dextrose 50% Injectable 25 Gram(s) IV Push once  dextrose 50% Injectable 12.5 Gram(s) IV Push once  dextrose 50% Injectable 25 Gram(s) IV Push once  glucagon  Injectable 1 milliGRAM(s) IntraMuscular once  insulin glargine Injectable (LANTUS) 20 Unit(s) SubCutaneous at bedtime  insulin lispro (ADMELOG) corrective regimen sliding scale   SubCutaneous three times a day before meals  insulin lispro (ADMELOG) corrective regimen sliding scale   SubCutaneous at bedtime  insulin lispro Injectable (ADMELOG) 7 Unit(s) SubCutaneous three times a day before meals  ofloxacin 0.3% Solution 10 Drop(s) Left Ear two times a day  sodium chloride 0.9%. 1000 milliLiter(s) (100 mL/Hr) IV Continuous <Continuous>  tamsulosin 0.4 milliGRAM(s) Oral at bedtime  vancomycin  IVPB 1000 milliGRAM(s) IV Intermittent every 12 hours    MEDICATIONS  (PRN):  acetaminophen   Tablet .. 650 milliGRAM(s) Oral every 4 hours PRN Mild Pain (1 - 3)  oxyCODONE    IR 5 milliGRAM(s) Oral every 4 hours PRN Moderate Pain (4 - 6)  oxyCODONE    IR 10 milliGRAM(s) Oral every 4 hours PRN Severe Pain (7 - 10)        CAPILLARY BLOOD GLUCOSE      POCT Blood Glucose.: 272 mg/dL (2021 22:33)  POCT Blood Glucose.: 284 mg/dL (2021 17:48)  POCT Blood Glucose.: 274 mg/dL (2021 12:09)  POCT Blood Glucose.: 322 mg/dL (2021 08:11)    I&O's Summary    2021 07:01  -  2021 23:11  --------------------------------------------------------  IN: 0 mL / OUT: 525 mL / NET: -525 mL        PHYSICAL EXAM:  Vital Signs Last 24 Hrs  T(C): 37 (2021 22:11), Max: 37.1 (2021 05:31)  T(F): 98.6 (2021 22:11), Max: 98.7 (2021 05:31)  HR: 91 (2021 22:11) (86 - 91)  BP: 134/75 (2021 22:11) (134/75 - 157/72)  BP(mean): --  RR: 16 (2021 22:11) (16 - 16)  SpO2: 96% (2021 22:11) (96% - 100%)    GENERAL: NAD, well-developed  HEAD:  Atraumatic, Normocephalic  EYES: EOMI, PERRLA, conjunctiva and sclera clear  Ears: Left ear tender, cotton plug noted  NECK: Supple, No JVD  CHEST/LUNG: Clear to auscultation bilaterally; No wheeze  HEART: Regular rate and rhythm; No murmurs, rubs, or gallops  ABDOMEN: Soft, Nontender, Nondistended; Bowel sounds present  EXTREMITIES:  2+ Peripheral Pulses, No clubbing, cyanosis, or edema  PSYCH: AAOx3  NEUROLOGY: non-focal  SKIN: No rashes or lesions    LABS:                        11.8   7.89  )-----------( 208      ( 2021 06:35 )             35.8     06-21    136  |  102  |  20  ----------------------------<  329<H>  4.0   |  23  |  1.60<H>    Ca    8.4      2021 06:35  Phos  3.7     06-21  Mg     1.9     06-21            Urinalysis Basic - ( 2021 23:18 )    Color: Yellow / Appearance: Clear / S.015 / pH: x  Gluc: x / Ketone: Negative  / Bili: Negative / Urobili: Negative mg/dL   Blood: x / Protein: 500 mg/dL / Nitrite: Negative   Leuk Esterase: Negative / RBC: 3-5 /HPF / WBC 0-2   Sq Epi: x / Non Sq Epi: Occasional / Bacteria: Occasional        RADIOLOGY & ADDITIONAL TESTS:    Imaging Personally Reviewed:    Consultant(s) Notes Reviewed:      Care Discussed with Consultants/Other Providers:

## 2021-06-28 NOTE — PROGRESS NOTE ADULT - ASSESSMENT
61M with PMH DM2, HTN, HLD, diabetic neuropathy, glaucoma/cataract with poor vision, HCV s/p [place] (cleared per patient) presented with left ear pain. seen by ENT likely malignant otitis externa and folliculitis on antibiotic. nephrology consulted for elvin    ELVIN  baseline ~ 1  ELVIN likely sec to GEMINI  s/p CT with contrast 6/20  FEna>1% suggested ATN  UA with proteinuria and hematuria  renal us without hydro  renal function stable  avoid nephrotoxic agents  optimize dm control. f/u endo  on vanco  monitor vanco level  monitor bmp and urine output    proteinuria/hematuria  renal us noted   p/c ratio <1g  likely sec to dm/htn  work up can be done outpatient  monitor    Acidosis  non AG  improved with oral bicarb  monitor serum Co2    htn  controlled   monitor    hypocalcemia  likely vit d def  On  weekly vit d 50000x12 dose  monitor    OM  malignant otitis externa on CT  f/u ENT/ID  antibiotic per team

## 2021-06-28 NOTE — PROGRESS NOTE ADULT - SUBJECTIVE AND OBJECTIVE BOX
[facility]   MD ANITA QUINTANILLA DO ANAM SIDDIQUI ANGELA WONG, PA    TEL:  OFFICE: [phone]  DR MONROE CELL: [phone]  DR. PORTILLO CELL: [phone]  DR. MYLES CELL: [phone]  PEGGY JAFFE CELL: [phone]    From 5pm-7am Answering Service [phone]    -- RENAL FOLLOW UP NOTE ---Date of Service 06-28-21 @ 10:37    Patient is a 61y old  Male who presents with a chief complaint of otitis externa, uncontrolled DM (28 Jun 2021 07:21)      Patient seen and examined at bedside. No chest pain/sob    VITALS:  T(F): 97.8 (06-28-21 @ 06:26), Max: 98.5 (06-27-21 @ 12:59)  HR: 93 (06-28-21 @ 06:26)  BP: 161/91 (06-28-21 @ 06:26)  RR: 16 (06-28-21 @ 06:26)  SpO2: 94% (06-28-21 @ 06:26)  Wt(kg): --        PHYSICAL EXAM:  Constitutional: NAD  Neck: No JVD  Respiratory: CTAB, no wheezes, rales or rhonchi  Cardiovascular: S1, S2, RRR  Gastrointestinal: BS+, soft, NT/ND  Extremities: No peripheral edema    Hospital Medications:   MEDICATIONS  (STANDING):  amLODIPine   Tablet 10 milliGRAM(s) Oral daily  atorvastatin 20 milliGRAM(s) Oral at bedtime  chlorhexidine 4% Liquid 1 Application(s) Topical daily  dextrose 40% Gel 15 Gram(s) Oral once  dextrose 50% Injectable 25 Gram(s) IV Push once  dextrose 50% Injectable 12.5 Gram(s) IV Push once  dextrose 50% Injectable 25 Gram(s) IV Push once  doxycycline hyclate Capsule 100 milliGRAM(s) Oral every 12 hours  ergocalciferol 06259 Unit(s) Oral <User Schedule>  glucagon  Injectable 1 milliGRAM(s) IntraMuscular once  insulin glargine Injectable (LANTUS) 30 Unit(s) SubCutaneous at bedtime  insulin lispro (ADMELOG) corrective regimen sliding scale   SubCutaneous at bedtime  insulin lispro (ADMELOG) corrective regimen sliding scale   SubCutaneous three times a day before meals  insulin lispro Injectable (ADMELOG) 9 Unit(s) SubCutaneous three times a day before meals  mupirocin 2% Ointment 1 Application(s) Topical two times a day  mupirocin 2% Ointment 1 Application(s) Topical two times a day  ofloxacin 0.3% Solution 10 Drop(s) Left Ear two times a day  tamsulosin 0.4 milliGRAM(s) Oral at bedtime  vancomycin  IVPB 1250 milliGRAM(s) IV Intermittent daily      LABS:  06-28    143  |  110<H>  |  16  ----------------------------<  122<H>  4.0   |  20<L>  |  1.37<H>    Ca    8.9      28 Jun 2021 08:00  Phos  3.6     06-28  Mg     1.9     06-28      Creatinine Trend: 1.37 <--, 1.43 <--, 1.34 <--, 1.34 <--, 1.41 <--, 1.52 <--, 1.63 <--    Phosphorus Level, Serum: 3.6 mg/dL (06-28 @ 08:00)                              11.3   7.86  )-----------( 243      ( 28 Jun 2021 08:00 )             33.5     Urine Studies:  Urinalysis - [06-22-21 @ 16:47]      Color Light Yellow / Appearance Clear / SG 1.009 / pH 6.0      Gluc >= 1000 mg/dL / Ketone Negative  / Bili Negative / Urobili <2 mg/dL       Blood Trace / Protein Trace / Leuk Est Negative / Nitrite Negative      RBC 1 / WBC 1 / Hyaline 0 / Gran  / Sq Epi  / Non Sq Epi 0 / Bacteria Negative    Urine Creatinine 69      [06-24-21 @ 04:29]  Urine Protein 43      [06-24-21 @ 04:29]  Urine Sodium 81      [06-24-21 @ 04:29]  Urine Potassium 28.6      [06-24-21 @ 04:29]  Urine Chloride 84      [06-24-21 @ 04:29]    PTH -- (Ca --)      [06-23-21 @ 08:23]   69  Vitamin D (25OH) 7.7      [06-23-21 @ 12:30]  Lipid: chol 101, , HDL 27, LDL --      [06-23-21 @ 08:23]        RADIOLOGY & ADDITIONAL STUDIES:

## 2021-06-28 NOTE — PROGRESS NOTE ADULT - SUBJECTIVE AND OBJECTIVE BOX
Infectious Diseases progress note:    Subjective: Pt awake, alert, appears comfortable.  States ear pain has resolved.  Afebrile    ROS:  CONSTITUTIONAL:  No fever, chills, rigors  CARDIOVASCULAR:  No chest pain or palpitations  RESPIRATORY:   No SOB, cough, dyspnea on exertion.  No wheezing  GASTROINTESTINAL:  No abd pain, N/V, diarrhea/constipation  EXTREMITIES:  No swelling or joint pain  GENITOURINARY:  No burning on urination, increased frequency or urgency.  No flank pain  NEUROLOGIC:  No HA, visual disturbances  SKIN: No rashes    Allergies    No Known Allergies    Intolerances        ANTIBIOTICS/RELEVANT:  antimicrobials  doxycycline hyclate Capsule 100 milliGRAM(s) Oral every 12 hours  vancomycin  IVPB 1250 milliGRAM(s) IV Intermittent daily    immunologic:    OTHER:  acetaminophen   Tablet .. 650 milliGRAM(s) Oral every 4 hours PRN  amLODIPine   Tablet 10 milliGRAM(s) Oral daily  atorvastatin 20 milliGRAM(s) Oral at bedtime  chlorhexidine 4% Liquid 1 Application(s) Topical daily  dextrose 40% Gel 15 Gram(s) Oral once  dextrose 50% Injectable 25 Gram(s) IV Push once  dextrose 50% Injectable 12.5 Gram(s) IV Push once  dextrose 50% Injectable 25 Gram(s) IV Push once  ergocalciferol 70407 Unit(s) Oral <User Schedule>  glucagon  Injectable 1 milliGRAM(s) IntraMuscular once  insulin glargine Injectable (LANTUS) 30 Unit(s) SubCutaneous at bedtime  insulin lispro (ADMELOG) corrective regimen sliding scale   SubCutaneous three times a day before meals  insulin lispro (ADMELOG) corrective regimen sliding scale   SubCutaneous at bedtime  insulin lispro Injectable (ADMELOG) 9 Unit(s) SubCutaneous three times a day before meals  mupirocin 2% Ointment 1 Application(s) Topical two times a day  mupirocin 2% Ointment 1 Application(s) Topical two times a day  ofloxacin 0.3% Solution 10 Drop(s) Left Ear two times a day  oxyCODONE    IR 5 milliGRAM(s) Oral every 4 hours PRN  oxyCODONE    IR 10 milliGRAM(s) Oral every 4 hours PRN  tamsulosin 0.4 milliGRAM(s) Oral at bedtime      Objective:  Vital Signs Last 24 Hrs  T(C): 36.6 (28 Jun 2021 06:26), Max: 36.9 (27 Jun 2021 22:04)  T(F): 97.8 (28 Jun 2021 06:26), Max: 98.4 (27 Jun 2021 22:04)  HR: 93 (28 Jun 2021 06:26) (92 - 93)  BP: 161/91 (28 Jun 2021 06:26) (161/91 - 181/97)  BP(mean): --  RR: 16 (28 Jun 2021 06:26) (16 - 18)  SpO2: 94% (28 Jun 2021 06:26) (94% - 97%)    PHYSICAL EXAM:  Constitutional:NAD  Eyes:VINCENZO, EOMI  Ear/Nose/Throat: no thrush, mucositis.  Moist mucous membranes	  Neck:no JVD, no lymphadenopathy, supple  Respiratory: CTA sudheer  Cardiovascular: S1S2 RRR, no murmurs  Gastrointestinal:soft, nontender,  nondistended (+) BS  Extremities:no e/e/c  Skin:  no rashes, open wounds or ulcerations        LABS:                        11.3   7.86  )-----------( 243      ( 28 Jun 2021 08:00 )             33.5     06-28    143  |  110<H>  |  16  ----------------------------<  122<H>  4.0   |  20<L>  |  1.37<H>    Ca    8.9      28 Jun 2021 08:00  Phos  3.6     06-28  Mg     1.9     06-28                  Vancomycin Level, Trough: 12.1 ug/mL (06-26 @ 09:42)  Vancomycin Level, Trough: 12.0 ug/mL (06-24 @ 09:35)              MICROBIOLOGY:          RADIOLOGY & ADDITIONAL STUDIES:

## 2021-06-29 ENCOUNTER — TRANSCRIPTION ENCOUNTER (OUTPATIENT)
Age: 61
End: 2021-06-29

## 2021-06-29 LAB
ANION GAP SERPL CALC-SCNC: 10 MMOL/L — SIGNIFICANT CHANGE UP (ref 7–14)
BUN SERPL-MCNC: 17 MG/DL — SIGNIFICANT CHANGE UP (ref 7–23)
CALCIUM SERPL-MCNC: 8.5 MG/DL — SIGNIFICANT CHANGE UP (ref 8.4–10.5)
CHLORIDE SERPL-SCNC: 109 MMOL/L — HIGH (ref 98–107)
CO2 SERPL-SCNC: 24 MMOL/L — SIGNIFICANT CHANGE UP (ref 22–31)
CREAT SERPL-MCNC: 1.39 MG/DL — HIGH (ref 0.5–1.3)
GLUCOSE BLDC GLUCOMTR-MCNC: 153 MG/DL — HIGH (ref 70–99)
GLUCOSE BLDC GLUCOMTR-MCNC: 222 MG/DL — HIGH (ref 70–99)
GLUCOSE BLDC GLUCOMTR-MCNC: 274 MG/DL — HIGH (ref 70–99)
GLUCOSE BLDC GLUCOMTR-MCNC: 88 MG/DL — SIGNIFICANT CHANGE UP (ref 70–99)
GLUCOSE SERPL-MCNC: 99 MG/DL — SIGNIFICANT CHANGE UP (ref 70–99)
HCT VFR BLD CALC: 32.1 % — LOW (ref 39–50)
HGB BLD-MCNC: 10.9 G/DL — LOW (ref 13–17)
MCHC RBC-ENTMCNC: 31.4 PG — SIGNIFICANT CHANGE UP (ref 27–34)
MCHC RBC-ENTMCNC: 34 GM/DL — SIGNIFICANT CHANGE UP (ref 32–36)
MCV RBC AUTO: 92.5 FL — SIGNIFICANT CHANGE UP (ref 80–100)
NRBC # BLD: 0 /100 WBCS — SIGNIFICANT CHANGE UP
NRBC # FLD: 0 K/UL — SIGNIFICANT CHANGE UP
PLATELET # BLD AUTO: 233 K/UL — SIGNIFICANT CHANGE UP (ref 150–400)
POTASSIUM SERPL-MCNC: 3.5 MMOL/L — SIGNIFICANT CHANGE UP (ref 3.5–5.3)
POTASSIUM SERPL-SCNC: 3.5 MMOL/L — SIGNIFICANT CHANGE UP (ref 3.5–5.3)
RBC # BLD: 3.47 M/UL — LOW (ref 4.2–5.8)
RBC # FLD: 12.2 % — SIGNIFICANT CHANGE UP (ref 10.3–14.5)
SODIUM SERPL-SCNC: 143 MMOL/L — SIGNIFICANT CHANGE UP (ref 135–145)
VANCOMYCIN TROUGH SERPL-MCNC: 14.9 UG/ML — SIGNIFICANT CHANGE UP (ref 10–20)
WBC # BLD: 7.28 K/UL — SIGNIFICANT CHANGE UP (ref 3.8–10.5)
WBC # FLD AUTO: 7.28 K/UL — SIGNIFICANT CHANGE UP (ref 3.8–10.5)

## 2021-06-29 PROCEDURE — 99232 SBSQ HOSP IP/OBS MODERATE 35: CPT

## 2021-06-29 RX ORDER — INSULIN GLARGINE 100 [IU]/ML
25 INJECTION, SOLUTION SUBCUTANEOUS AT BEDTIME
Refills: 0 | Status: DISCONTINUED | OUTPATIENT
Start: 2021-06-29 | End: 2021-07-06

## 2021-06-29 RX ORDER — METOPROLOL TARTRATE 50 MG
25 TABLET ORAL
Refills: 0 | Status: DISCONTINUED | OUTPATIENT
Start: 2021-06-29 | End: 2021-07-03

## 2021-06-29 RX ADMIN — Medication 166.67 MILLIGRAM(S): at 13:36

## 2021-06-29 RX ADMIN — Medication 10 DROP(S): at 06:56

## 2021-06-29 RX ADMIN — MUPIROCIN 1 APPLICATION(S): 20 OINTMENT TOPICAL at 06:56

## 2021-06-29 RX ADMIN — Medication 1: at 13:42

## 2021-06-29 RX ADMIN — Medication 9 UNIT(S): at 13:43

## 2021-06-29 RX ADMIN — MUPIROCIN 1 APPLICATION(S): 20 OINTMENT TOPICAL at 17:57

## 2021-06-29 RX ADMIN — Medication 2: at 18:05

## 2021-06-29 RX ADMIN — Medication 1: at 22:16

## 2021-06-29 RX ADMIN — Medication 9 UNIT(S): at 08:23

## 2021-06-29 RX ADMIN — INSULIN GLARGINE 25 UNIT(S): 100 INJECTION, SOLUTION SUBCUTANEOUS at 22:17

## 2021-06-29 RX ADMIN — CHLORHEXIDINE GLUCONATE 1 APPLICATION(S): 213 SOLUTION TOPICAL at 13:41

## 2021-06-29 RX ADMIN — Medication 100 MILLIGRAM(S): at 21:17

## 2021-06-29 RX ADMIN — Medication 25 MILLIGRAM(S): at 17:59

## 2021-06-29 RX ADMIN — ATORVASTATIN CALCIUM 20 MILLIGRAM(S): 80 TABLET, FILM COATED ORAL at 21:18

## 2021-06-29 RX ADMIN — Medication 10 DROP(S): at 17:58

## 2021-06-29 RX ADMIN — TAMSULOSIN HYDROCHLORIDE 0.4 MILLIGRAM(S): 0.4 CAPSULE ORAL at 21:18

## 2021-06-29 RX ADMIN — AMLODIPINE BESYLATE 10 MILLIGRAM(S): 2.5 TABLET ORAL at 06:54

## 2021-06-29 RX ADMIN — Medication 9 UNIT(S): at 18:06

## 2021-06-29 RX ADMIN — Medication 100 MILLIGRAM(S): at 06:56

## 2021-06-29 NOTE — DISCHARGE NOTE PROVIDER - NSFOLLOWUPCLINICS_GEN_ALL_ED_FT
[facility] [place]  Internal Medicine  [address] [place] [address]  Phone: [phone]  Fax: [phone]

## 2021-06-29 NOTE — PROGRESS NOTE ADULT - SUBJECTIVE AND OBJECTIVE BOX
Infectious Diseases progress note:    Subjective: NAD, afebrile.  No acute o/n events.  L ear pain improved    ROS:  CONSTITUTIONAL:  No fever, chills, rigors  CARDIOVASCULAR:  No chest pain or palpitations  RESPIRATORY:   No SOB, cough, dyspnea on exertion.  No wheezing  GASTROINTESTINAL:  No abd pain, N/V, diarrhea/constipation  EXTREMITIES:  No swelling or joint pain  GENITOURINARY:  No burning on urination, increased frequency or urgency.  No flank pain  NEUROLOGIC:  No HA, visual disturbances  SKIN: No rashes    Allergies    No Known Allergies    Intolerances        ANTIBIOTICS/RELEVANT:  antimicrobials  doxycycline hyclate Capsule 100 milliGRAM(s) Oral every 12 hours  vancomycin  IVPB 1250 milliGRAM(s) IV Intermittent daily    immunologic:    OTHER:  acetaminophen   Tablet .. 650 milliGRAM(s) Oral every 4 hours PRN  amLODIPine   Tablet 10 milliGRAM(s) Oral daily  atorvastatin 20 milliGRAM(s) Oral at bedtime  chlorhexidine 4% Liquid 1 Application(s) Topical daily  dextrose 40% Gel 15 Gram(s) Oral once  dextrose 50% Injectable 25 Gram(s) IV Push once  dextrose 50% Injectable 12.5 Gram(s) IV Push once  dextrose 50% Injectable 25 Gram(s) IV Push once  ergocalciferol [id number] Unit(s) Oral <User Schedule>  glucagon  Injectable 1 milliGRAM(s) IntraMuscular once  insulin glargine Injectable (LANTUS) 25 Unit(s) SubCutaneous at bedtime  insulin lispro (ADMELOG) corrective regimen sliding scale   SubCutaneous three times a day before meals  insulin lispro (ADMELOG) corrective regimen sliding scale   SubCutaneous at bedtime  insulin lispro Injectable (ADMELOG) 9 Unit(s) SubCutaneous three times a day before meals  metoprolol tartrate 25 milliGRAM(s) Oral two times a day  mupirocin 2% Ointment 1 Application(s) Topical two times a day  ofloxacin 0.3% Solution 10 Drop(s) Left Ear two times a day  oxyCODONE    IR 5 milliGRAM(s) Oral every 4 hours PRN  oxyCODONE    IR 10 milliGRAM(s) Oral every 4 hours PRN  tamsulosin 0.4 milliGRAM(s) Oral at bedtime      Objective:  Vital Signs Last 24 Hrs  T(C): 37.2 (29 Jun 2021 13:24), Max: 37.2 (29 Jun 2021 13:24)  T(F): 98.9 (29 Jun 2021 13:24), Max: 98.9 (29 Jun 2021 13:24)  HR: 94 (29 Jun 2021 14:15) (91 - 95)  BP: 164/92 (29 Jun 2021 14:15) (163/94 - 183/98)  BP(mean): --  RR: 18 (28 Jun 2021 20:27) (18 - 18)  SpO2: 97% (29 Jun 2021 13:24) (95% - 97%)    PHYSICAL EXAM:  Constitutional:NAD  Eyes:VINCENZO, EOMI  Ear/Nose/Throat: no thrush, mucositis.  Moist mucous membranes	  Neck:no JVD, no lymphadenopathy, supple  Respiratory: CTA sudheer  Cardiovascular: S1S2 RRR, no murmurs  Gastrointestinal:soft, nontender,  nondistended (+) BS  Extremities:no e/e/c  Skin:  no rashes, open wounds or ulcerations        LABS:                        10.9   7.28  )-----------( 233      ( 29 Jun 2021 08:29 )             32.1     06-29    143  |  109<H>  |  17  ----------------------------<  99  3.5   |  24  |  1.39<H>    Ca    8.5      29 Jun 2021 08:29  Phos  3.6     06-28  Mg     1.9     06-28                  Vancomycin Level, Trough: 14.9 ug/mL (06-29 @ 11:47)  Vancomycin Level, Trough: 12.1 ug/mL (06-26 @ 09:42)              MICROBIOLOGY:    Culture - Blood (06.21.21 @ 18:39)   Specimen Source: .Blood Blood-Venous   Culture Results:   No Growth Final       RADIOLOGY & ADDITIONAL STUDIES:

## 2021-06-29 NOTE — PROGRESS NOTE ADULT - SUBJECTIVE AND OBJECTIVE BOX
Patient is a 61y old  Male who presents with a chief complaint of otitis externa, uncontrolled DM (2021 15:00)    DATE OF SERVICE: 21 @ 13:44    HPI:    Afebrile.   No new symptoms    PAST MEDICAL & SURGICAL HISTORY:  DM (diabetes mellitus)  On Insulin    HTN (hypertension)  medicxal managenent    No significant past surgical history        Review of Systems:   CONSTITUTIONAL: No fever, weight loss, or fatigue  EYES: No eye pain, visual disturbances, or discharge  ENMT:  No difficulty hearing, tinnitus, vertigo; No sinus or throat pain. Left ear plugged, wick in place.  NECK: No pain or stiffness  BREASTS: No pain, masses, or nipple discharge  RESPIRATORY: No cough, wheezing, chills or hemoptysis; No shortness of breath  CARDIOVASCULAR: No chest pain, palpitations, dizziness, or leg swelling  GASTROINTESTINAL: No abdominal or epigastric pain. No nausea, vomiting, or hematemesis; No diarrhea or constipation. No melena or hematochezia.  GENITOURINARY: No dysuria, frequency, hematuria, or incontinence  NEUROLOGICAL: No headaches, memory loss, loss of strength, numbness, or tremors  SKIN: No itching, burning, rashes, or lesions   LYMPH NODES: No enlarged glands  ENDOCRINE: No heat or cold intolerance; No hair loss  MUSCULOSKELETAL: No joint pain or swelling; No muscle, back, or extremity pain  PSYCHIATRIC: No depression, anxiety, mood swings, or difficulty sleeping  HEME/LYMPH: No easy bruising, or bleeding gums  ALLERY AND IMMUNOLOGIC: No hives or eczema    Allergies    No Known Allergies    Intolerances        Social History:     FAMILY HISTORY:  No pertinent family history in first degree relatives        MEDICATIONS  (STANDING):  amLODIPine   Tablet 10 milliGRAM(s) Oral daily  atorvastatin 20 milliGRAM(s) Oral at bedtime  ciprofloxacin   IVPB 400 milliGRAM(s) IV Intermittent every 12 hours  dextrose 40% Gel 15 Gram(s) Oral once  dextrose 50% Injectable 25 Gram(s) IV Push once  dextrose 50% Injectable 12.5 Gram(s) IV Push once  dextrose 50% Injectable 25 Gram(s) IV Push once  glucagon  Injectable 1 milliGRAM(s) IntraMuscular once  insulin glargine Injectable (LANTUS) 20 Unit(s) SubCutaneous at bedtime  insulin lispro (ADMELOG) corrective regimen sliding scale   SubCutaneous three times a day before meals  insulin lispro (ADMELOG) corrective regimen sliding scale   SubCutaneous at bedtime  insulin lispro Injectable (ADMELOG) 7 Unit(s) SubCutaneous three times a day before meals  ofloxacin 0.3% Solution 10 Drop(s) Left Ear two times a day  sodium chloride 0.9%. 1000 milliLiter(s) (100 mL/Hr) IV Continuous <Continuous>  tamsulosin 0.4 milliGRAM(s) Oral at bedtime  vancomycin  IVPB 1000 milliGRAM(s) IV Intermittent every 12 hours    MEDICATIONS  (PRN):  acetaminophen   Tablet .. 650 milliGRAM(s) Oral every 4 hours PRN Mild Pain (1 - 3)  oxyCODONE    IR 5 milliGRAM(s) Oral every 4 hours PRN Moderate Pain (4 - 6)  oxyCODONE    IR 10 milliGRAM(s) Oral every 4 hours PRN Severe Pain (7 - 10)        CAPILLARY BLOOD GLUCOSE      POCT Blood Glucose.: 272 mg/dL (2021 22:33)  POCT Blood Glucose.: 284 mg/dL (2021 17:48)  POCT Blood Glucose.: 274 mg/dL (2021 12:09)  POCT Blood Glucose.: 322 mg/dL (2021 08:11)    I&O's Summary    2021 07:01  -  2021 23:11  --------------------------------------------------------  IN: 0 mL / OUT: 525 mL / NET: -525 mL        PHYSICAL EXAM:  Vital Signs Last 24 Hrs  T(C): 37 (2021 22:11), Max: 37.1 (2021 05:31)  T(F): 98.6 (2021 22:11), Max: 98.7 (2021 05:31)  HR: 91 (2021 22:11) (86 - 91)  BP: 134/75 (2021 22:11) (134/75 - 157/72)  BP(mean): --  RR: 16 (2021 22:11) (16 - 16)  SpO2: 96% (2021 22:11) (96% - 100%)    GENERAL: NAD, well-developed  HEAD:  Atraumatic, Normocephalic  EYES: EOMI, PERRLA, conjunctiva and sclera clear  Ears: Left ear tender, cotton plug noted  NECK: Supple, No JVD  CHEST/LUNG: Clear to auscultation bilaterally; No wheeze  HEART: Regular rate and rhythm; No murmurs, rubs, or gallops  ABDOMEN: Soft, Nontender, Nondistended; Bowel sounds present  EXTREMITIES:  2+ Peripheral Pulses, No clubbing, cyanosis, or edema  PSYCH: AAOx3  NEUROLOGY: non-focal  SKIN: No rashes or lesions    LABS:                        11.8   7.89  )-----------( 208      ( 2021 06:35 )             35.8     06-21    136  |  102  |  20  ----------------------------<  329<H>  4.0   |  23  |  1.60<H>    Ca    8.4      2021 06:35  Phos  3.7     06-21  Mg     1.9     06-21            Urinalysis Basic - ( 2021 23:18 )    Color: Yellow / Appearance: Clear / S.015 / pH: x  Gluc: x / Ketone: Negative  / Bili: Negative / Urobili: Negative mg/dL   Blood: x / Protein: 500 mg/dL / Nitrite: Negative   Leuk Esterase: Negative / RBC: 3-5 /HPF / WBC 0-2   Sq Epi: x / Non Sq Epi: Occasional / Bacteria: Occasional        RADIOLOGY & ADDITIONAL TESTS:    Imaging Personally Reviewed:    Consultant(s) Notes Reviewed:      Care Discussed with Consultants/Other Providers:

## 2021-06-29 NOTE — DISCHARGE NOTE PROVIDER - NSDCFUADDAPPT_GEN_ALL_CORE_FT
ENT: Recommend seeing Dr. Feldman outpatient for microscopic exam and additional management (will require cx, imaging and biopsy as indicated of granulation tissue does not resolve with therapy) ENT: Recommend seeing Dr. Feldman outpatient for microscopic exam and additional management  ENT: Recommend seeing Dr. Feldman outpatient for microscopic exam and additional management with one to two weeks of discharge.

## 2021-06-29 NOTE — PROGRESS NOTE ADULT - ASSESSMENT
61M with PMH DM2, HTN, HLD, diabetic neuropathy, glaucoma/cataract with poor vision, HCV s/p [place] (cleared per patient) presented with left ear pain. seen by ENT likely malignant otitis externa and folliculitis on antibiotic. nephrology consulted for elvin    ELVIN  baseline ~ 1  ELVIN likely sec to GEMINI  s/p CT with contrast 6/20  FEna>1% suggested ATN  UA with proteinuria and hematuria  renal us without hydro  renal function stable ~ 1.3  ACEI still on hold   avoid nephrotoxic agents  optimize dm control. f/u endo  on vanco  monitor vanco level  monitor bmp and urine output    proteinuria/hematuria  renal us noted   p/c ratio <1g  likely sec to dm/htn  work up can be done outpatient  monitor    Acidosis  non AG  improved with oral bicarb  monitor serum Co2    htn  uncontrolled   on norvasc 10  ACEI on hold  start metoprolol 25 bid  monitor    hypocalcemia  likely vit d def  On  weekly vit d 50000x12 dose  monitor    OM  malignant otitis externa on CT  f/u ENT/ID  antibiotic per team

## 2021-06-29 NOTE — DISCHARGE NOTE PROVIDER - NSDCMRMEDTOKEN_GEN_ALL_CORE_FT
amLODIPine 10 mg oral tablet: 1 tab(s) orally once a day  atorvastatin:   Fortamet 1000 mg oral tablet, extended release: 1 tab(s) orally 2 times a day   insulin glargine: 14 unit(s) subcutaneous once a day   lisinopril 20 mg oral tablet: 1 tab(s) orally once a day  tamsulosin 0.4 mg oral capsule: 1 cap(s) orally once a day (at bedtime)   amLODIPine 10 mg oral tablet: 1 tab(s) orally once a day  atorvastatin 20 mg oral tablet: 1 tab(s) orally once a day (at bedtime)  Drisdol 50,000 intl units (1.25 mg) oral capsule: 1 cap(s) orally once a week   Lantus Solostar Pen 100 units/mL subcutaneous solution: 17 unit(s) subcutaneous once a day (at bedtime)   metoprolol succinate 100 mg oral tablet, extended release: 1 tab(s) orally once a day  mupirocin 2% topical ointment: 1 application topically 2 times a day  ofloxacin 0.3% ophthalmic solution: 10 drop(s) in the left ear 2 times a day  oxyCODONE 10 mg oral tablet: 1 tab(s) orally every 4 hours, As needed, Severe Pain (7 - 10)  oxyCODONE 5 mg oral tablet: 1 tab(s) orally every 4 hours, As needed, Moderate Pain (4 - 6)  Prandin 2 mg oral tablet: 1 tab(s) orally 3 times a day   tamsulosin 0.4 mg oral capsule: 1 cap(s) orally once a day (at bedtime)  vancomycin 1 g intravenous injection: 1 gram(s) intravenous once a day until 7/30/2021

## 2021-06-29 NOTE — PROGRESS NOTE ADULT - ASSESSMENT
61M with PMH DM2, HTN, HLD, diabetic neuropathy, glaucoma/cataract with poor vision, HCV s/p [place] (cleared per patient) presented with left ear pain. Patient initially presented to [facility] and transferred to [facility] for ENT evaluation. Consulted for uncontrolled T2DM with a1c 14.3%    1. Uncontrolled type 2 diabetes mellitus with hyperglycemia  T2DM uncontrolled a1c of 14.3 with barriers of poor vision (unable to see insulin units), living in shelter prior to admission    Inpatient BG target 100-180 mg/dl  Slightly below target on fasting glucose  Recommend to reduce Lantus to 25 units SQ qHS   Continue Admelog 9 units SQ TID before meals (Hold if NPO/not eating meal)   Continue Admelog low dose correctional scales before meals and bedtime as currently ordered  Check BG before meals and bedtime  Consistent carbohydrate diet, Nutrition consult done    Discharge Plan:  With A1c 14.3%, patient requires insulin for discharge. Patient UNABLE to successfully self-inject insulin due to poor vision  Per care coordination notes, possible discharge to LTC facility. This would be optimal so that insulin can be administered to patient by staff at facility  Therefore, recommend basal/bolus insulin with dose TBD  Contact endocrine to confirm dosing on day of discharge  Recommend PCP, optho, podiatry and endocrine followup as outpatient  [facility] ([facility] [place])   [address] [facility] [phone].    2. Essential hypertension  BP target less than 130/80  Managment per primary team    3. HLD  Recommend to check fasting lipid panel, goal LDL less than 70  Continue Atorvastatin 20 mg daily    4. Adrenal nodule  Adrenal nodule incidentally found  Aldosterone 3.0, no need to check renin activity  Plasma metanephrine (normal) and 24 hour urine cortisol (not elevated)  Low suspicion for hypersecretion of adrenal hormones      5. Vitamin D deficiency  Vitamin D 25 OH level noted to be 7.7  Recommend Ergocalciferol 50,000 units PO weekly x 8-12 weeks  Corrected calcium acceptable. Vitamin D deficiency likely contributing to low normal Ca    Emilie Lam  Nurse Practitioner  Division of Endocrinology & Diabetes  In house pager #[phone]/long range pager #[phone]    If before 9AM or after 6PM, or on weekends/holidays, please call endocrine answering service for assistance ([phone]).  For nonurgent matters email [email or web address] for assistance.

## 2021-06-29 NOTE — DISCHARGE NOTE PROVIDER - HOSPITAL COURSE
Patient is a 61M with PMH DM2, HTN, HLD, diabetic neuropathy, glaucoma/cataract with poor vision, HCV s/p [place] (cleared per patient) presented with left ear pain. Patient initially presented to [facility] and transferred to [facility] for ENT evaluation. Patient states he has been experiencing pain in his left ear for the past 1 week, with waxy, crusty discharges. associated with decreased hearing in the left ear. also endorsed chills. no n/v/d. Patient also reports multiple painful bumps over his b/l armpit, abdominal/chest and left groin. Patient was recently moved from a shelter in [place] to [place] in March and has not been able to get his medications, since the pharmacy and PCP were both in [place] and he is unable to travel due to his eye sight. He has not been taking any medications for the past 3 months. In ED, afebrile. vss. hypertensive. no respiratory distress. patient was evaluated and cultures send by ENT. CT head performed with concerning signs for OM. patient was started on Cipro and vanco.      Patient was admitted for acute malignant otitis externa of the left ear. CT head showed evidence of possible otomastoiditis. ENT was consulted and a left wick was placed. Left ear culture showed MRSA. CT IAC showed malignant left otitis externa with bony erosion involving the external auditory canal with probable superimposed osteomyelitis and left side otitis media and mastoiditis. CRP and ESR were elevated. ID was consulted. Patient was started on IV vanc and IV cipro. IV cipro was discontinued, but IV vanc to be continued until 7/30/2021 as per ID. The left ear wick was taken out by ENT on 6/28. Patient had PICC placed on 6/30 for long term antibiotics. Patient was found with rash. Diagnosis included staph furunculosis vs hidradenitis suppurativa. Dermatology was consulted. Patient was started on doxycycline PO for 14 days and mupirocin to armpits and groin. Lesions improved. Patient was found with ELVIN. Patient was given IVF and renal was consulted. Renal US showed trace b/l perinephric fluid, no renal abnormality noted. ELVIN improved. Patient was found with uncontrolled diabetes with HgbA1C of 14.3. Endocrinology was consulted and recommended to continue with basal/bolus insulin. Patient was found with shortness of breath. Chest x ray showed new bilateral small pleural effusions with likely associated passive atelectasis. Patient was given course of IV lasix and cardiology and pulmonology were consulted. CT chest showed small b/l pleural effusions. US b/l lower extremity were negative. US of the abdomen showed b/l pleural effusion and medical renal disease. TTE showed left pleural effusion, otherwise unremarkable. SOB improved with trial of IV lasix.     On ___ this case was reviewed with  ____, the patient is medically stable and optimized for discharge. All medications were reviewed and prescriptions were sent to mutually agreed upon pharmacy.                     61M with PMH DM2, HTN, HLD, diabetic neuropathy, glaucoma/cataract with poor vision, HCV s/p [place] (cleared per patient) presented with left ear pain. Patient initially presented to [facility] and transferred to [facility] for ENT evaluation. Admitted for acute malignant otitis externa of the left ear. CT head showed evidence of possible otomastoiditis. ENT was consulted and a left wick was placed. Left ear culture showed MRSA. CT IAC showed malignant left otitis externa with bony erosion involving the external auditory canal with probable superimposed osteomyelitis and left side otitis media and mastoiditis. CRP and ESR were elevated. ID was consulted. Patient was started on IV vancomycin and IV cipro. IV cipro was discontinued, but IV vanc to be continued until 7/30/2021 as per ID. The left ear wick was taken out by ENT on 6/28. Patient had PICC placed on 6/30 for long term antibiotics. Patient was found with rash. Diagnosis included staph furunculosis vs hidradenitis suppurativa. Dermatology was consulted. Patient was started on doxycycline PO for 14 days and mupirocin to armpits and groin. Lesions improved. Patient was found with ELVIN. Trialed IVF and renal was consulted. Renal US showed trace b/l perinephric fluid, no renal abnormality noted. ELVIN improved. Patient was found with uncontrolled diabetes with HgbA1C of 14.3. Endocrinology was consulted and recommended to continue with basal/bolus insulin. Patient was found with shortness of breath. Chest x ray showed new bilateral small pleural effusions with likely associated passive atelectasis. Patient was given course of IV lasix and cardiology and pulmonology were consulted. CT chest showed small b/l pleural effusions. US b/l lower extremity were negative. US of the abdomen showed b/l pleural effusion and medical renal disease. TTE showed left pleural effusion, otherwise unremarkable. SOB improved with trial of IV lasix.     Patient seen and evaluated. Reviewed discharge medications with patient and attending. All new medications requiring new prescriptions were sent to the pharmacy of patient's choice. Reviewed need for prescription for previous home medications and new prescriptions sent if requested. Medically cleared/stable for discharge as per Dr. Smith on 7/9/2021 with appropriate follow up. Patient understands and agrees with plan of care.                      61M with PMH DM2, HTN, HLD, diabetic neuropathy, glaucoma/cataract with poor vision, HCV s/p [place] (cleared per patient) presented with left ear pain. Patient initially presented to [facility] and transferred to [facility] for ENT evaluation. Admitted for acute malignant otitis externa of the left ear. CT head showed evidence of possible otomastoiditis. ENT was consulted and a left wick was placed. Left ear culture showed MRSA. CT IAC showed malignant left otitis externa with bony erosion involving the external auditory canal with probable superimposed osteomyelitis and left side otitis media and mastoiditis. CRP and ESR were elevated. ID was consulted. Patient was started on IV vancomycin and IV cipro. IV cipro was discontinued, but IV vanc to be continued until 7/30/2021 as per ID. The left ear wick was taken out by ENT on 6/28. Patient had PICC placed on 6/30 for long term antibiotics. Patient was found with rash. Diagnosis included staph furunculosis vs hidradenitis suppurativa. Dermatology was consulted. Patient was started on doxycycline PO for 14 days and mupirocin to armpits and groin. Lesions improved. Patient was found with ELVIN. Trialed IVF and renal was consulted. Renal US showed trace b/l perinephric fluid, no renal abnormality noted. ELVIN improved. Patient was found with uncontrolled diabetes with HgbA1C of 14.3. Endocrinology was consulted and recommended to continue with basal/bolus insulin. Patient having trouble self-administering insulin with plan to continue to practice in rehab. Patient was found with shortness of breath. Chest x ray showed new bilateral small pleural effusions with likely associated passive atelectasis. Patient was given course of IV lasix and cardiology and pulmonology were consulted. CT chest showed small b/l pleural effusions. US b/l lower extremity were negative. US of the abdomen showed b/l pleural effusion and medical renal disease. TTE showed left pleural effusion, otherwise unremarkable. SOB improved with trial of IV lasix.     Patient seen and evaluated. Reviewed discharge medications with patient and attending. All new medications requiring new prescriptions were sent to the pharmacy of patient's choice. Reviewed need for prescription for previous home medications and new prescriptions sent if requested. Medically cleared/stable for discharge as per Dr. Smith on 7/9/2021 with appropriate follow up. Patient understands and agrees with plan of care.

## 2021-06-29 NOTE — DISCHARGE NOTE PROVIDER - PROVIDER TOKENS
FREE:[LAST:[[facility] ([facility] at [place])],PHONE:[phone]],FAX:[(   )    -],ADDRESS:[[phone] [place] [address]]]

## 2021-06-29 NOTE — PROGRESS NOTE ADULT - SUBJECTIVE AND OBJECTIVE BOX
[facility]   MD ANITA QUINTANILLA DO ANAM SIDDIQUI ANGELA WONG, PA    TEL:  OFFICE: [phone]  DR MONROE CELL: [phone]  DR. PORTILLO CELL: [phone]  DR. MYLES CELL: [phone]  PEGGY JAFFE CELL: [phone]    From 5pm-7am Answering Service [phone]    -- RENAL FOLLOW UP NOTE ---Date of Service 06-29-21 @ 12:01    Patient is a 61y old  Male who presents with a chief complaint of otitis externa, uncontrolled DM (29 Jun 2021 06:23)      Patient seen and examined at bedside. No chest pain/sob    VITALS:  T(F): 98.1 (06-28-21 @ 20:27), Max: 98.1 (06-28-21 @ 20:27)  HR: 91 (06-28-21 @ 20:27)  BP: 163/94 (06-28-21 @ 20:27)  RR: 18 (06-28-21 @ 20:27)  SpO2: 95% (06-28-21 @ 20:27)  Wt(kg): --    06-28 @ 07:01  -  06-29 @ 07:00  --------------------------------------------------------  IN: 0 mL / OUT: 200 mL / NET: -200 mL          PHYSICAL EXAM:  Constitutional: NAD  Neck: No JVD  Respiratory: CTAB, no wheezes, rales or rhonchi  Cardiovascular: S1, S2, RRR  Gastrointestinal: BS+, soft, NT/ND  Extremities: No peripheral edema    Hospital Medications:   MEDICATIONS  (STANDING):  amLODIPine   Tablet 10 milliGRAM(s) Oral daily  atorvastatin 20 milliGRAM(s) Oral at bedtime  chlorhexidine 4% Liquid 1 Application(s) Topical daily  dextrose 40% Gel 15 Gram(s) Oral once  dextrose 50% Injectable 25 Gram(s) IV Push once  dextrose 50% Injectable 12.5 Gram(s) IV Push once  dextrose 50% Injectable 25 Gram(s) IV Push once  doxycycline hyclate Capsule 100 milliGRAM(s) Oral every 12 hours  ergocalciferol 02535 Unit(s) Oral <User Schedule>  glucagon  Injectable 1 milliGRAM(s) IntraMuscular once  insulin glargine Injectable (LANTUS) 30 Unit(s) SubCutaneous at bedtime  insulin lispro (ADMELOG) corrective regimen sliding scale   SubCutaneous three times a day before meals  insulin lispro (ADMELOG) corrective regimen sliding scale   SubCutaneous at bedtime  insulin lispro Injectable (ADMELOG) 9 Unit(s) SubCutaneous three times a day before meals  mupirocin 2% Ointment 1 Application(s) Topical two times a day  ofloxacin 0.3% Solution 10 Drop(s) Left Ear two times a day  tamsulosin 0.4 milliGRAM(s) Oral at bedtime  vancomycin  IVPB 1250 milliGRAM(s) IV Intermittent daily      LABS:  06-29    143  |  109<H>  |  17  ----------------------------<  99  3.5   |  24  |  1.39<H>    Ca    8.5      29 Jun 2021 08:29  Phos  3.6     06-28  Mg     1.9     06-28      Creatinine Trend: 1.39 <--, 1.37 <--, 1.43 <--, 1.34 <--, 1.34 <--, 1.41 <--, 1.52 <--                                10.9   7.28  )-----------( 233      ( 29 Jun 2021 08:29 )             32.1     Urine Studies:  Urinalysis - [06-22-21 @ 16:47]      Color Light Yellow / Appearance Clear / SG 1.009 / pH 6.0      Gluc >= 1000 mg/dL / Ketone Negative  / Bili Negative / Urobili <2 mg/dL       Blood Trace / Protein Trace / Leuk Est Negative / Nitrite Negative      RBC 1 / WBC 1 / Hyaline 0 / Gran  / Sq Epi  / Non Sq Epi 0 / Bacteria Negative    Urine Creatinine 69      [06-24-21 @ 04:29]  Urine Protein 43      [06-24-21 @ 04:29]  Urine Sodium 81      [06-24-21 @ 04:29]  Urine Potassium 28.6      [06-24-21 @ 04:29]  Urine Chloride 84      [06-24-21 @ 04:29]    PTH -- (Ca --)      [06-23-21 @ 08:23]   69  Vitamin D (25OH) 7.7      [06-23-21 @ 12:30]  Lipid: chol 101, , HDL 27, LDL --      [06-23-21 @ 08:23]        RADIOLOGY & ADDITIONAL STUDIES:

## 2021-06-29 NOTE — DISCHARGE NOTE PROVIDER - NSDCCPCAREPLAN_GEN_ALL_CORE_FT
PRINCIPAL DISCHARGE DIAGNOSIS  Diagnosis: Malignant otitis media of left ear  Assessment and Plan of Treatment: Complete IV antibiotics as prescribed, Vancomycin x 6 weeks (6/20-7/31). Check Weekly cbc, sma-7, esr, crp, vanco trough. maintain vanco trough btw 15-20.  Follow up with ENT Dr. Feldman outpatient for microscopic exam and additional management (will require culture, imaging and biopsy as indicated of granulation tissue does not resolve with therapy)      SECONDARY DISCHARGE DIAGNOSES  Diagnosis: Uncontrolled type 2 diabetes mellitus with hyperglycemia  Assessment and Plan of Treatment: follow up at [facility] endocrine clinic within 1-2 weeks of discharge, call to schedule an appointment    Diagnosis: Adrenal nodule  Assessment and Plan of Treatment: Adrenal nodule    Diagnosis: ARF (acute renal failure) with tubular necrosis  Assessment and Plan of Treatment: Follow up with Primary care provider at [facility]    Diagnosis: Vitamin D deficiency  Assessment and Plan of Treatment: continue Vitamin D supplement as prescribed    Diagnosis: Other hyperlipidemia  Assessment and Plan of Treatment: continue Lipitor as prescribed  Follow up with Primary care provider at [facility]    Diagnosis: Essential hypertension  Assessment and Plan of Treatment: continue amlodipine as prescribed  Follow up with Primary care provider at [facility]    Diagnosis: Furunculosis of multiple sites  Assessment and Plan of Treatment: Apply topical mupirocin ointment BID to affected areas until healed  Continue staphyl decolonization protocol with topical mupirocin applied to nares and umbilicus twice daily for the first week of the month for 3 months  Cleanse patient with chlorhexidine to axillae, groin, buttocks  Follow up at our outpatient Dermatology clinic in [facility] within 2 weeks. Call to schedule an appointment->Dermatology, [address] [place], [place], [address], [phone].    Diagnosis: Acidosis  Assessment and Plan of Treatment: Resolved  Follow up with Primary care provider at [facility]     PRINCIPAL DISCHARGE DIAGNOSIS  Diagnosis: Malignant otitis media of left ear  Assessment and Plan of Treatment: You were found to have a severe infection of your left ear known as malignant otitis externa. For this, you will require long term antibiotic therapy with Vancomycin through your PICC line. Your last day of antibiotics is 7/31/2021. You should follow up with ENT Dr. Feldman when discharged from the hospital. You will need further testing in the office. Please make an appointment within one week of discharge.      SECONDARY DISCHARGE DIAGNOSES  Diagnosis: Uncontrolled type 2 diabetes mellitus with hyperglycemia  Assessment and Plan of Treatment: Your diabetes is very poorly controlled with A1c 14.3%.    Continue your medication regimen and a consistent carbohydrate diet (Meaning eating the same amount of carbohydrates at the same time each day). Monitor blood glucose levels throughout the day before meals and at bedtime. Record blood sugars and bring to outpatient providers appointment in order to be reviewed by your doctor for management modifications. If your sugars are more than 400 or less than 70 you should contact your PCP immediately. Monitor for signs/symptoms of low blood glucose, such as, dizziness, altered mental status, or cool/clammy skin. In addition, monitor for signs/symptoms of high blood glucose, such as, feeling hot, dry, fatigued, or with increased thirst/urination. Make regular podiatry appointments in order to have feet checked for wounds and uncontrolled toe nail growth to prevent infections, as well as, appointments with an ophthalmologist to monitor your vision.    Diagnosis: Furunculosis of multiple sites  Assessment and Plan of Treatment: Apply topical mupirocin ointment BID to affected areas until healed  Continue staphyl decolonization protocol with topical mupirocin applied to nares and umbilicus twice daily for the first week of the month for 3 months  Cleanse patient with chlorhexidine to axillae, groin, buttocks  Follow up at our outpatient Dermatology clinic in [facility] within 2 weeks. Call to schedule an appointment->Dermatology, [address] [place], [address], [phone].    Diagnosis: Essential hypertension  Assessment and Plan of Treatment: Continue blood pressure medications as prescribed. Routine follow up with your PCP for blood pressure checks and medication management. A low salt diet is recommended.    Diagnosis: Adrenal nodule  Assessment and Plan of Treatment: You were incidentally found to have an adrenal nodule. Endocrinology evaluated you for this finding and you do not require further testing at this time. Please continue to follow up with endocrine for further care.    Diagnosis: ARF (acute renal failure) with tubular necrosis  Assessment and Plan of Treatment: Follow up with Primary care provider at [facility] facility    Diagnosis: Vitamin D deficiency  Assessment and Plan of Treatment: You were found to have vitamin D deficiency. It is recommended you take a weekly pill for Vitamin D for 8-12 weeks.    Diagnosis: Acidosis  Assessment and Plan of Treatment: Resolved  Follow up with Primary care provider at [facility] facility     PRINCIPAL DISCHARGE DIAGNOSIS  Diagnosis: Malignant otitis media of left ear  Assessment and Plan of Treatment: You were found to have a severe infection of your left ear known as malignant otitis externa. For this, you will require long term antibiotic therapy with Vancomycin through your PICC line. Your last day of antibiotics is 7/30/2021. Cotninue with ear drops in the left ear as ordered. You should follow up with ENT Dr. Feldman when discharged from the hospital. You will need further testing in the office. Please make an appointment within one week of discharge.      SECONDARY DISCHARGE DIAGNOSES  Diagnosis: Uncontrolled type 2 diabetes mellitus with hyperglycemia  Assessment and Plan of Treatment: Your diabetes is very poorly controlled with A1c 14.3%. YOU WERE STARTED ON INSULIN. You are having trouble administering your insulin- please continue to practice at rehab as the plan if for you to be discharged rom rehab with insulin and pills.   Continue your medication regimen and a consistent carbohydrate diet (Meaning eating the same amount of carbohydrates at the same time each day). Monitor blood glucose levels throughout the day before meals and at bedtime. Record blood sugars and bring to outpatient providers appointment in order to be reviewed by your doctor for management modifications. If your sugars are more than 400 or less than 70 you should contact your PCP immediately. Monitor for signs/symptoms of low blood glucose, such as, dizziness, altered mental status, or cool/clammy skin. In addition, monitor for signs/symptoms of high blood glucose, such as, feeling hot, dry, fatigued, or with increased thirst/urination. Make regular podiatry appointments in order to have feet checked for wounds and uncontrolled toe nail growth to prevent infections, as well as, appointments with an ophthalmologist to monitor your vision.    Diagnosis: Furunculosis of multiple sites  Assessment and Plan of Treatment: Apply topical mupirocin ointment BID to affected areas until healed  Continue staphyl decolonization protocol with topical mupirocin applied to nares and umbilicus twice daily for the first week of the month for 3 months.  Cleanse patient with chlorhexidine to axillae, groin, buttocks  Follow up at our outpatient Dermatology clinic in [facility] within 2 weeks. Call to schedule an appointment->Dermatology, [address] [place], [address], [phone].    Diagnosis: Essential hypertension  Assessment and Plan of Treatment: Continue blood pressure medications as prescribed. Routine follow up with your PCP for blood pressure checks and medication management. A low salt diet is recommended.    Diagnosis: Adrenal nodule  Assessment and Plan of Treatment: You were incidentally found to have an adrenal nodule. Endocrinology evaluated you for this finding and you do not require further testing at this time. Please continue to follow up with endocrine for further care.    Diagnosis: Vitamin D deficiency  Assessment and Plan of Treatment: You were found to have vitamin D deficiency. It is recommended you take a weekly pill for Vitamin D for 8-12 weeks.    Diagnosis: Shortness of breath  Assessment and Plan of Treatment: You experienced shortness of breath while in the hospital. You had an echocardiogram (ultrasound of the heart) and nuclear stress test which did not shwow any concerning findings. Your symptoms improved after given a water pill through the IV.

## 2021-06-29 NOTE — DISCHARGE NOTE PROVIDER - CARE PROVIDER_API CALL
[facility] ([facility] [place]),   [address] [facility] [address]  Phone: [phone]  Fax: (   )    -  Follow Up Time:

## 2021-06-29 NOTE — PROGRESS NOTE ADULT - SUBJECTIVE AND OBJECTIVE BOX
Chief Complaint: DM 2    History: Patient seen at bedside. Tightly controlled glucose this AM, 88 mg/dl now improved to 153 mg/dl. Reports he is eating meals, denies n/v, denies s/s of hypoglycemia.     MEDICATIONS  (STANDING):  amLODIPine   Tablet 10 milliGRAM(s) Oral daily  atorvastatin 20 milliGRAM(s) Oral at bedtime  chlorhexidine 4% Liquid 1 Application(s) Topical daily  dextrose 40% Gel 15 Gram(s) Oral once  dextrose 50% Injectable 25 Gram(s) IV Push once  dextrose 50% Injectable 12.5 Gram(s) IV Push once  dextrose 50% Injectable 25 Gram(s) IV Push once  doxycycline hyclate Capsule 100 milliGRAM(s) Oral every 12 hours  ergocalciferol 69080 Unit(s) Oral <User Schedule>  glucagon  Injectable 1 milliGRAM(s) IntraMuscular once  insulin glargine Injectable (LANTUS) 30 Unit(s) SubCutaneous at bedtime  insulin lispro (ADMELOG) corrective regimen sliding scale   SubCutaneous three times a day before meals  insulin lispro (ADMELOG) corrective regimen sliding scale   SubCutaneous at bedtime  insulin lispro Injectable (ADMELOG) 9 Unit(s) SubCutaneous three times a day before meals  metoprolol tartrate 25 milliGRAM(s) Oral two times a day  mupirocin 2% Ointment 1 Application(s) Topical two times a day  ofloxacin 0.3% Solution 10 Drop(s) Left Ear two times a day  tamsulosin 0.4 milliGRAM(s) Oral at bedtime  vancomycin  IVPB 1250 milliGRAM(s) IV Intermittent daily    MEDICATIONS  (PRN):  acetaminophen   Tablet .. 650 milliGRAM(s) Oral every 4 hours PRN Mild Pain (1 - 3)  oxyCODONE    IR 5 milliGRAM(s) Oral every 4 hours PRN Moderate Pain (4 - 6)  oxyCODONE    IR 10 milliGRAM(s) Oral every 4 hours PRN Severe Pain (7 - 10)    No Known Allergies    Review of Systems:  Cardiovascular: No chest pain  Respiratory: No SOB  GI: No nausea, vomiting  Endocrine: no hypoglycemia    PHYSICAL EXAM:  ICU Vital Signs Last 24 Hrs  T(C): 37.2 (29 Jun 2021 13:24), Max: 37.2 (29 Jun 2021 13:24)  T(F): 98.9 (29 Jun 2021 13:24), Max: 98.9 (29 Jun 2021 13:24)  HR: 95 (29 Jun 2021 13:24) (91 - 95)  BP: 183/98 (29 Jun 2021 13:24) (163/94 - 183/98)  BP(mean): --  ABP: --  ABP(mean): --  RR: 18 (28 Jun 2021 20:27) (18 - 18)  SpO2: 97% (29 Jun 2021 13:24) (95% - 97%)  Wt(kg): --  GENERAL: NAD  EYES: No proptosis, no lid lag, anicteric  HEENT:  Atraumatic, Normocephalic, moist mucous membranes  RESPIRATORY: unlabored respirations   PSYCH: Alert and oriented x 3    CAPILLARY BLOOD GLUCOSE  Most recent 153 mg/dl  **Unable to pull 24h results into note, please see [facility] results tab      06-29    143  |  109<H>  |  17  ----------------------------<  99  3.5   |  24  |  1.39<H>    Ca    8.5      29 Jun 2021 08:29  Phos  3.6     06-28  Mg     1.9     06-28        A1C with Estimated Average Glucose Result: 14.3 % (06-20-21 @ 03:47)  A1C with Estimated Average Glucose Result: 11.8 % (03-22-21 @ 14:20)    Diet, Regular:   Consistent Carbohydrate Evening Snack (CSTCHOSN) (06-20-21 @ 09:02)

## 2021-06-29 NOTE — PROGRESS NOTE ADULT - ASSESSMENT
Patient is a 61M with PMH DM2, HTN, HLD, diabetic neuropathy, glaucoma/cataract with poor vision, HCV s/p Refugio (cleared per patient) presented with left ear pain.  CT auditory ear canal shows:    Findings concerning for malignant left-sided otitis externa with bony erosive changes involving the external auditory canal. Superimposed osteomyelitis within the bony external auditory canal is a consideration given the patient's clinical information. An external auditory canal cholesteatoma cannot be excluded. A neoplastic process such as squamous cell carcinoma is also difficult to exclude.    2. Additional imaging findings compatible with left-sided otitis media and left-sided mastoiditis. No gross ossicular chain erosion or destructive changes. No evidence of venous sinus thrombosis.    3. Unremarkable right-sided temporal bone CT examination apart from cerumen in the external auditory canal.    Pt also c/o new painful bumps, draining pus, in b/l armpits, b/l groin and R perineal area.    ID consulted for further abx management.     Left sided otitis externa:    - Cont present abx, f/u L ear cultures.  Concern for superimposed OM of bony external auditory canal.   Cannot exclude cholesteatoma or squamous cell carcinoma.    - ENT f/u appreciated.  Continued outpt f/u to address further need for cultures, imaging, biopsy.      - Current ear cultures growing MRSA.  IV cipro d/c'd.  Cont IV vanco, and maintain trough between 15-20.     -  Cont abx ear gtt as per ENT    - Plan for  long term IV abx.  f/u blood cx x 2.  ESR, CRP    - Weekly cbc, sma-7, esr, crp, vanco trough.       r/o Hidradenitis suppurativa:    - c/o new nodular/pustular lesions in b/l armpits, b/l groin and perineum for past 1-3 weeks. States lesions are painful and drain pus.    - R Derm eval appreciated, ?hidradenitis suppurativa vs staph folliculitis.  Pt started on doxycycline and mupirocin for staph decolonization.      - Cont IV abx for now.  Cont to monitor and evaluate for need for further I&D - lesions currently stable      * Plan for PICC line, cont IV vancomycin x 6 week course (6/20 through 7/31), maintain trough between 15-20.  Current trough level is 14.9 (okay)  * Check weekly cbc, sma-7, esr, crp, vanco trough.  d/c planning.     d/w Medicine [facility]  [phone]

## 2021-06-29 NOTE — DISCHARGE NOTE PROVIDER - NSDCFUADDINST_GEN_ALL_CORE_FT
See above.     For new or worsening symptoms, contact your physician or return to the hospital.  FOR POST REHAB: Recommend basal/oral (to reduce burden of injections) - Lantus Solostar PEN 17 units SQ qHS and Prandin (Repaglinide) 2 mg PO TID before meals (Hold if not eating meal)   Ensure patient has supplies including glucometer, glucose test strips, lancets, alcohol swabs and insulin PEN needles     [facility] ([facility] [place])   [address] [facility] [phone].  ENDOCRINE APPT: 10/5/21 at 9:40 AM

## 2021-06-30 LAB
ANION GAP SERPL CALC-SCNC: 13 MMOL/L — SIGNIFICANT CHANGE UP (ref 7–14)
BUN SERPL-MCNC: 22 MG/DL — SIGNIFICANT CHANGE UP (ref 7–23)
CALCIUM SERPL-MCNC: 8.7 MG/DL — SIGNIFICANT CHANGE UP (ref 8.4–10.5)
CHLORIDE SERPL-SCNC: 106 MMOL/L — SIGNIFICANT CHANGE UP (ref 98–107)
CO2 SERPL-SCNC: 20 MMOL/L — LOW (ref 22–31)
CREAT SERPL-MCNC: 1.51 MG/DL — HIGH (ref 0.5–1.3)
GLUCOSE BLDC GLUCOMTR-MCNC: 157 MG/DL — HIGH (ref 70–99)
GLUCOSE BLDC GLUCOMTR-MCNC: 204 MG/DL — HIGH (ref 70–99)
GLUCOSE BLDC GLUCOMTR-MCNC: 210 MG/DL — HIGH (ref 70–99)
GLUCOSE BLDC GLUCOMTR-MCNC: 98 MG/DL — SIGNIFICANT CHANGE UP (ref 70–99)
GLUCOSE SERPL-MCNC: 181 MG/DL — HIGH (ref 70–99)
HCT VFR BLD CALC: 31.7 % — LOW (ref 39–50)
HGB BLD-MCNC: 10.6 G/DL — LOW (ref 13–17)
MCHC RBC-ENTMCNC: 31.5 PG — SIGNIFICANT CHANGE UP (ref 27–34)
MCHC RBC-ENTMCNC: 33.4 GM/DL — SIGNIFICANT CHANGE UP (ref 32–36)
MCV RBC AUTO: 94.1 FL — SIGNIFICANT CHANGE UP (ref 80–100)
NRBC # BLD: 0 /100 WBCS — SIGNIFICANT CHANGE UP
NRBC # FLD: 0 K/UL — SIGNIFICANT CHANGE UP
PLATELET # BLD AUTO: 244 K/UL — SIGNIFICANT CHANGE UP (ref 150–400)
POTASSIUM SERPL-MCNC: 4.4 MMOL/L — SIGNIFICANT CHANGE UP (ref 3.5–5.3)
POTASSIUM SERPL-SCNC: 4.4 MMOL/L — SIGNIFICANT CHANGE UP (ref 3.5–5.3)
RBC # BLD: 3.37 M/UL — LOW (ref 4.2–5.8)
RBC # FLD: 12.1 % — SIGNIFICANT CHANGE UP (ref 10.3–14.5)
SARS-COV-2 RNA SPEC QL NAA+PROBE: SIGNIFICANT CHANGE UP
SODIUM SERPL-SCNC: 139 MMOL/L — SIGNIFICANT CHANGE UP (ref 135–145)
WBC # BLD: 7.93 K/UL — SIGNIFICANT CHANGE UP (ref 3.8–10.5)
WBC # FLD AUTO: 7.93 K/UL — SIGNIFICANT CHANGE UP (ref 3.8–10.5)

## 2021-06-30 PROCEDURE — 36573 INSJ PICC RS&I 5 YR+: CPT

## 2021-06-30 RX ORDER — SODIUM CHLORIDE 9 MG/ML
10 INJECTION INTRAMUSCULAR; INTRAVENOUS; SUBCUTANEOUS
Refills: 0 | Status: DISCONTINUED | OUTPATIENT
Start: 2021-06-30 | End: 2021-07-09

## 2021-06-30 RX ORDER — INSULIN LISPRO 100/ML
7 VIAL (ML) SUBCUTANEOUS
Refills: 0 | Status: DISCONTINUED | OUTPATIENT
Start: 2021-06-30 | End: 2021-07-01

## 2021-06-30 RX ORDER — SODIUM CHLORIDE 9 MG/ML
1000 INJECTION INTRAMUSCULAR; INTRAVENOUS; SUBCUTANEOUS
Refills: 0 | Status: DISCONTINUED | OUTPATIENT
Start: 2021-06-30 | End: 2021-07-01

## 2021-06-30 RX ADMIN — Medication 25 MILLIGRAM(S): at 06:09

## 2021-06-30 RX ADMIN — ATORVASTATIN CALCIUM 20 MILLIGRAM(S): 80 TABLET, FILM COATED ORAL at 21:06

## 2021-06-30 RX ADMIN — Medication 9 UNIT(S): at 09:00

## 2021-06-30 RX ADMIN — Medication 2: at 18:32

## 2021-06-30 RX ADMIN — MUPIROCIN 1 APPLICATION(S): 20 OINTMENT TOPICAL at 06:10

## 2021-06-30 RX ADMIN — Medication 100 MILLIGRAM(S): at 06:09

## 2021-06-30 RX ADMIN — INSULIN GLARGINE 25 UNIT(S): 100 INJECTION, SOLUTION SUBCUTANEOUS at 23:06

## 2021-06-30 RX ADMIN — Medication 25 MILLIGRAM(S): at 18:35

## 2021-06-30 RX ADMIN — Medication 10 DROP(S): at 18:31

## 2021-06-30 RX ADMIN — MUPIROCIN 1 APPLICATION(S): 20 OINTMENT TOPICAL at 18:33

## 2021-06-30 RX ADMIN — AMLODIPINE BESYLATE 10 MILLIGRAM(S): 2.5 TABLET ORAL at 06:09

## 2021-06-30 RX ADMIN — SODIUM CHLORIDE 75 MILLILITER(S): 9 INJECTION INTRAMUSCULAR; INTRAVENOUS; SUBCUTANEOUS at 15:51

## 2021-06-30 RX ADMIN — Medication 10 DROP(S): at 06:10

## 2021-06-30 RX ADMIN — TAMSULOSIN HYDROCHLORIDE 0.4 MILLIGRAM(S): 0.4 CAPSULE ORAL at 21:06

## 2021-06-30 RX ADMIN — Medication 1: at 09:00

## 2021-06-30 RX ADMIN — Medication 166.67 MILLIGRAM(S): at 11:06

## 2021-06-30 RX ADMIN — Medication 100 MILLIGRAM(S): at 18:30

## 2021-06-30 RX ADMIN — ERGOCALCIFEROL 50000 UNIT(S): 1.25 CAPSULE ORAL at 18:31

## 2021-06-30 RX ADMIN — Medication 7 UNIT(S): at 18:32

## 2021-06-30 RX ADMIN — Medication 9 UNIT(S): at 12:48

## 2021-06-30 RX ADMIN — CHLORHEXIDINE GLUCONATE 1 APPLICATION(S): 213 SOLUTION TOPICAL at 11:06

## 2021-06-30 NOTE — PROGRESS NOTE ADULT - SUBJECTIVE AND OBJECTIVE BOX
Infectious Diseases progress note:    Subjective: NAD, afebrile.  Planned for PICC placement    ROS:  CONSTITUTIONAL:  No fever, chills, rigors  CARDIOVASCULAR:  No chest pain or palpitations  RESPIRATORY:   No SOB, cough, dyspnea on exertion.  No wheezing  GASTROINTESTINAL:  No abd pain, N/V, diarrhea/constipation  EXTREMITIES:  No swelling or joint pain  GENITOURINARY:  No burning on urination, increased frequency or urgency.  No flank pain  NEUROLOGIC:  No HA, visual disturbances  SKIN: No rashes    Allergies    No Known Allergies    Intolerances        ANTIBIOTICS/RELEVANT:  antimicrobials  doxycycline hyclate Capsule 100 milliGRAM(s) Oral every 12 hours  vancomycin  IVPB 1250 milliGRAM(s) IV Intermittent daily    immunologic:    OTHER:  acetaminophen   Tablet .. 650 milliGRAM(s) Oral every 4 hours PRN  amLODIPine   Tablet 10 milliGRAM(s) Oral daily  atorvastatin 20 milliGRAM(s) Oral at bedtime  chlorhexidine 4% Liquid 1 Application(s) Topical daily  dextrose 40% Gel 15 Gram(s) Oral once  dextrose 50% Injectable 25 Gram(s) IV Push once  dextrose 50% Injectable 12.5 Gram(s) IV Push once  dextrose 50% Injectable 25 Gram(s) IV Push once  ergocalciferol 98602 Unit(s) Oral <User Schedule>  glucagon  Injectable 1 milliGRAM(s) IntraMuscular once  insulin glargine Injectable (LANTUS) 25 Unit(s) SubCutaneous at bedtime  insulin lispro (ADMELOG) corrective regimen sliding scale   SubCutaneous three times a day before meals  insulin lispro (ADMELOG) corrective regimen sliding scale   SubCutaneous at bedtime  insulin lispro Injectable (ADMELOG) 9 Unit(s) SubCutaneous three times a day before meals  metoprolol tartrate 25 milliGRAM(s) Oral two times a day  mupirocin 2% Ointment 1 Application(s) Topical two times a day  ofloxacin 0.3% Solution 10 Drop(s) Left Ear two times a day  oxyCODONE    IR 5 milliGRAM(s) Oral every 4 hours PRN  oxyCODONE    IR 10 milliGRAM(s) Oral every 4 hours PRN  sodium chloride 0.9%. 1000 milliLiter(s) IV Continuous <Continuous>  tamsulosin 0.4 milliGRAM(s) Oral at bedtime      Objective:  Vital Signs Last 24 Hrs  T(C): 36.8 (30 Jun 2021 14:02), Max: 37.2 (29 Jun 2021 21:15)  T(F): 98.2 (30 Jun 2021 14:02), Max: 98.9 (29 Jun 2021 21:15)  HR: 80 (30 Jun 2021 14:02) (80 - 87)  BP: 159/81 (30 Jun 2021 14:02) (152/90 - 166/98)  BP(mean): --  RR: 18 (30 Jun 2021 14:02) (17 - 18)  SpO2: 95% (30 Jun 2021 14:02) (95% - 96%)    PHYSICAL EXAM:  Constitutional:NAD  Eyes:VINCENZO, EOMI  Ear/Nose/Throat: no thrush, mucositis.  Moist mucous membranes	  Neck:no JVD, no lymphadenopathy, supple  Respiratory: CTA sudheer  Cardiovascular: S1S2 RRR, no murmurs  Gastrointestinal:soft, nontender,  nondistended (+) BS  Extremities:no e/e/c  Skin:  no rashes, open wounds or ulcerations        LABS:                        10.6   7.93  )-----------( 244      ( 30 Jun 2021 07:28 )             31.7     06-30    139  |  106  |  22  ----------------------------<  181<H>  4.4   |  20<L>  |  1.51<H>    Ca    8.7      30 Jun 2021 07:28                  Vancomycin Level, Trough: 14.9 ug/mL (06-29 @ 11:47)  Vancomycin Level, Trough: 12.1 ug/mL (06-26 @ 09:42)              MICROBIOLOGY:          RADIOLOGY & ADDITIONAL STUDIES:

## 2021-06-30 NOTE — PROGRESS NOTE ADULT - SUBJECTIVE AND OBJECTIVE BOX
[facility]   MD ANITA QUINTANILLA DO ANAM SIDDIQUI ANGELA WONG, PA    TEL:  OFFICE: [phone]  DR MONROE CELL: [phone]  DR. PORTILLO CELL: [phone]  DR. MYLES CELL: [phone]  PEGGY JAFFE CELL: [phone]    From 5pm-7am Answering Service [phone]    -- RENAL FOLLOW UP NOTE ---Date of Service 06-30-21 @ 13:58    Patient is a 61y old  Male who presents with a chief complaint of otitis externa, uncontrolled DM (30 Jun 2021 13:12)      Patient seen and examined at bedside. No chest pain/sob    VITALS:  T(F): 98.7 (06-30-21 @ 06:06), Max: 98.9 (06-29-21 @ 21:15)  HR: 87 (06-30-21 @ 06:06)  BP: 152/90 (06-30-21 @ 06:06)  RR: 17 (06-30-21 @ 06:06)  SpO2: 96% (06-30-21 @ 06:06)  Wt(kg): --    06-29 @ 07:01  -  06-30 @ 07:00  --------------------------------------------------------  IN: 1980 mL / OUT: 2050 mL / NET: -70 mL          PHYSICAL EXAM:  Constitutional: NAD  Neck: No JVD  Respiratory: CTAB, no wheezes, rales or rhonchi  Cardiovascular: S1, S2, RRR  Gastrointestinal: BS+, soft, NT/ND  Extremities: No peripheral edema    Hospital Medications:   MEDICATIONS  (STANDING):  amLODIPine   Tablet 10 milliGRAM(s) Oral daily  atorvastatin 20 milliGRAM(s) Oral at bedtime  chlorhexidine 4% Liquid 1 Application(s) Topical daily  dextrose 40% Gel 15 Gram(s) Oral once  dextrose 50% Injectable 25 Gram(s) IV Push once  dextrose 50% Injectable 12.5 Gram(s) IV Push once  dextrose 50% Injectable 25 Gram(s) IV Push once  doxycycline hyclate Capsule 100 milliGRAM(s) Oral every 12 hours  ergocalciferol 66218 Unit(s) Oral <User Schedule>  glucagon  Injectable 1 milliGRAM(s) IntraMuscular once  insulin glargine Injectable (LANTUS) 25 Unit(s) SubCutaneous at bedtime  insulin lispro (ADMELOG) corrective regimen sliding scale   SubCutaneous three times a day before meals  insulin lispro (ADMELOG) corrective regimen sliding scale   SubCutaneous at bedtime  insulin lispro Injectable (ADMELOG) 9 Unit(s) SubCutaneous three times a day before meals  metoprolol tartrate 25 milliGRAM(s) Oral two times a day  mupirocin 2% Ointment 1 Application(s) Topical two times a day  ofloxacin 0.3% Solution 10 Drop(s) Left Ear two times a day  sodium chloride 0.9%. 1000 milliLiter(s) (75 mL/Hr) IV Continuous <Continuous>  tamsulosin 0.4 milliGRAM(s) Oral at bedtime  vancomycin  IVPB 1250 milliGRAM(s) IV Intermittent daily      LABS:  06-30    139  |  106  |  22  ----------------------------<  181<H>  4.4   |  20<L>  |  1.51<H>    Ca    8.7      30 Jun 2021 07:28      Creatinine Trend: 1.51 <--, 1.39 <--, 1.37 <--, 1.43 <--, 1.34 <--, 1.34 <--, 1.41 <--                                10.6   7.93  )-----------( 244      ( 30 Jun 2021 07:28 )             31.7     Urine Studies:  Urinalysis - [06-22-21 @ 16:47]      Color Light Yellow / Appearance Clear / SG 1.009 / pH 6.0      Gluc >= 1000 mg/dL / Ketone Negative  / Bili Negative / Urobili <2 mg/dL       Blood Trace / Protein Trace / Leuk Est Negative / Nitrite Negative      RBC 1 / WBC 1 / Hyaline 0 / Gran  / Sq Epi  / Non Sq Epi 0 / Bacteria Negative    Urine Creatinine 69      [06-24-21 @ 04:29]  Urine Protein 43      [06-24-21 @ 04:29]  Urine Sodium 81      [06-24-21 @ 04:29]  Urine Potassium 28.6      [06-24-21 @ 04:29]  Urine Chloride 84      [06-24-21 @ 04:29]    PTH -- (Ca --)      [06-23-21 @ 08:23]   69  Vitamin D (25OH) 7.7      [06-23-21 @ 12:30]  Lipid: chol 101, , HDL 27, LDL --      [06-23-21 @ 08:23]        RADIOLOGY & ADDITIONAL STUDIES:

## 2021-06-30 NOTE — PROGRESS NOTE ADULT - ASSESSMENT
Patient is a 61M with PMH DM2, HTN, HLD, diabetic neuropathy, glaucoma/cataract with poor vision, HCV s/p [place] (cleared per patient) presented with left ear pain.  CT auditory ear canal shows:    Findings concerning for malignant left-sided otitis externa with bony erosive changes involving the external auditory canal. Superimposed osteomyelitis within the bony external auditory canal is a consideration given the patient's clinical information. An external auditory canal cholesteatoma cannot be excluded. A neoplastic process such as squamous cell carcinoma is also difficult to exclude.    2. Additional imaging findings compatible with left-sided otitis media and left-sided mastoiditis. No gross ossicular chain erosion or destructive changes. No evidence of venous sinus thrombosis.    3. Unremarkable right-sided temporal bone CT examination apart from cerumen in the external auditory canal.    Pt also c/o new painful bumps, draining pus, in b/l armpits, b/l groin and R perineal area.    ID consulted for further abx management.     Left sided otitis externa:    - Cont present abx, f/u L ear cultures.  Concern for superimposed OM of bony external auditory canal.   Cannot exclude cholesteatoma or squamous cell carcinoma.    - ENT f/u appreciated.  Continued outpt f/u to address further need for cultures, imaging, biopsy.      - Current ear cultures growing MRSA.  IV cipro d/c'd.  Cont IV vanco, and maintain trough between 15-20.     -  Cont abx ear gtt as per ENT    - Plan for  long term IV abx.  f/u blood cx x 2.  ESR, CRP    - Weekly cbc, sma-7, esr, crp, vanco trough.       r/o Hidradenitis suppurativa:    - c/o new nodular/pustular lesions in b/l armpits, b/l groin and perineum for past 1-3 weeks. States lesions are painful and drain pus.    - R Derm eval appreciated, ?hidradenitis suppurativa vs staph folliculitis.  Pt started on doxycycline and mupirocin for staph decolonization.  For continued outpt follow up    - Cont IV abx for now.  Cont to monitor and evaluate for need for further I&D - lesions currently stable      * Plan for PICC line, cont IV vancomycin x 6 week course (6/20 through 7/31), maintain trough between 15-20.  Current trough level is 14.9 (okay)  * Check weekly cbc, sma-7, esr, crp, vanco trough.  d/c planning once picc in place        Mena Osullivan  [phone]

## 2021-06-30 NOTE — PROGRESS NOTE ADULT - ASSESSMENT
61M with PMH DM2, HTN, HLD, diabetic neuropathy, glaucoma/cataract with poor vision, HCV s/p [place] (cleared per patient) presented with left ear pain. seen by ENT likely malignant otitis externa and folliculitis on antibiotic. nephrology consulted for elvin    ELVIN  baseline ~ 1  ELVIN likely sec to GEMINI  s/p CT with contrast 6/20  FEna>1% suggested ATN  UA with proteinuria and hematuria  renal us without hydro  renal function stable ~ 1.3 worsen again today possible prerenal. (pt c/o multiple episode of loose stool)  ACEI still on hold   NS @ 75x1 day.   avoid nephrotoxic agents  optimize dm control. f/u endo  on vanco  monitor vanco level  monitor bmp and urine output    proteinuria/hematuria  renal us noted   p/c ratio <1g  likely sec to dm/htn  work up can be done outpatient  monitor    Acidosis  non AG  improved with oral bicarb  monitor serum Co2    htn  acceptable  on norvasc 10  ACEI on hold  start metoprolol 25 bid  monitor    hypocalcemia  likely vit d def  On  weekly vit d 50000x12 dose  monitor    OM  malignant otitis externa on CT  f/u ENT/ID  antibiotic per team

## 2021-06-30 NOTE — PROGRESS NOTE ADULT - SUBJECTIVE AND OBJECTIVE BOX
Patient is a 61y old  Male who presents with a chief complaint of otitis externa, uncontrolled DM (2021 15:00)    DATE OF SERVICE: 21 @ 13:13    HPI:  Afebrile.   No new symptoms    PAST MEDICAL & SURGICAL HISTORY:  DM (diabetes mellitus)  On Insulin    HTN (hypertension)  medicxal managenent    No significant past surgical history        Review of Systems:   CONSTITUTIONAL: No fever, weight loss, or fatigue  EYES: No eye pain, visual disturbances, or discharge  ENMT:  No difficulty hearing, tinnitus, vertigo; No sinus or throat pain. Left ear plugged, wick in place.  NECK: No pain or stiffness  BREASTS: No pain, masses, or nipple discharge  RESPIRATORY: No cough, wheezing, chills or hemoptysis; No shortness of breath  CARDIOVASCULAR: No chest pain, palpitations, dizziness, or leg swelling  GASTROINTESTINAL: No abdominal or epigastric pain. No nausea, vomiting, or hematemesis; No diarrhea or constipation. No melena or hematochezia.  GENITOURINARY: No dysuria, frequency, hematuria, or incontinence  NEUROLOGICAL: No headaches, memory loss, loss of strength, numbness, or tremors  SKIN: No itching, burning, rashes, or lesions   LYMPH NODES: No enlarged glands  ENDOCRINE: No heat or cold intolerance; No hair loss  MUSCULOSKELETAL: No joint pain or swelling; No muscle, back, or extremity pain  PSYCHIATRIC: No depression, anxiety, mood swings, or difficulty sleeping  HEME/LYMPH: No easy bruising, or bleeding gums  ALLERY AND IMMUNOLOGIC: No hives or eczema    Allergies    No Known Allergies    Intolerances        Social History:     FAMILY HISTORY:  No pertinent family history in first degree relatives        MEDICATIONS  (STANDING):  amLODIPine   Tablet 10 milliGRAM(s) Oral daily  atorvastatin 20 milliGRAM(s) Oral at bedtime  ciprofloxacin   IVPB 400 milliGRAM(s) IV Intermittent every 12 hours  dextrose 40% Gel 15 Gram(s) Oral once  dextrose 50% Injectable 25 Gram(s) IV Push once  dextrose 50% Injectable 12.5 Gram(s) IV Push once  dextrose 50% Injectable 25 Gram(s) IV Push once  glucagon  Injectable 1 milliGRAM(s) IntraMuscular once  insulin glargine Injectable (LANTUS) 20 Unit(s) SubCutaneous at bedtime  insulin lispro (ADMELOG) corrective regimen sliding scale   SubCutaneous three times a day before meals  insulin lispro (ADMELOG) corrective regimen sliding scale   SubCutaneous at bedtime  insulin lispro Injectable (ADMELOG) 7 Unit(s) SubCutaneous three times a day before meals  ofloxacin 0.3% Solution 10 Drop(s) Left Ear two times a day  sodium chloride 0.9%. 1000 milliLiter(s) (100 mL/Hr) IV Continuous <Continuous>  tamsulosin 0.4 milliGRAM(s) Oral at bedtime  vancomycin  IVPB 1000 milliGRAM(s) IV Intermittent every 12 hours    MEDICATIONS  (PRN):  acetaminophen   Tablet .. 650 milliGRAM(s) Oral every 4 hours PRN Mild Pain (1 - 3)  oxyCODONE    IR 5 milliGRAM(s) Oral every 4 hours PRN Moderate Pain (4 - 6)  oxyCODONE    IR 10 milliGRAM(s) Oral every 4 hours PRN Severe Pain (7 - 10)        CAPILLARY BLOOD GLUCOSE      POCT Blood Glucose.: 272 mg/dL (2021 22:33)  POCT Blood Glucose.: 284 mg/dL (2021 17:48)  POCT Blood Glucose.: 274 mg/dL (2021 12:09)  POCT Blood Glucose.: 322 mg/dL (2021 08:11)    I&O's Summary    2021 07:01  -  2021 23:11  --------------------------------------------------------  IN: 0 mL / OUT: 525 mL / NET: -525 mL        PHYSICAL EXAM:  Vital Signs Last 24 Hrs  T(C): 37 (2021 22:11), Max: 37.1 (2021 05:31)  T(F): 98.6 (2021 22:11), Max: 98.7 (2021 05:31)  HR: 91 (2021 22:11) (86 - 91)  BP: 134/75 (2021 22:11) (134/75 - 157/72)  BP(mean): --  RR: 16 (2021 22:11) (16 - 16)  SpO2: 96% (2021 22:11) (96% - 100%)    GENERAL: NAD, well-developed  HEAD:  Atraumatic, Normocephalic  EYES: EOMI, PERRLA, conjunctiva and sclera clear  Ears: Left ear tender, cotton plug noted  NECK: Supple, No JVD  CHEST/LUNG: Clear to auscultation bilaterally; No wheeze  HEART: Regular rate and rhythm; No murmurs, rubs, or gallops  ABDOMEN: Soft, Nontender, Nondistended; Bowel sounds present  EXTREMITIES:  2+ Peripheral Pulses, No clubbing, cyanosis, or edema  PSYCH: AAOx3  NEUROLOGY: non-focal  SKIN: No rashes or lesions    LABS:                        11.8   7.89  )-----------( 208      ( 2021 06:35 )             35.8     06-21    136  |  102  |  20  ----------------------------<  329<H>  4.0   |  23  |  1.60<H>    Ca    8.4      2021 06:35  Phos  3.7     06-21  Mg     1.9     06-21            Urinalysis Basic - ( 2021 23:18 )    Color: Yellow / Appearance: Clear / S.015 / pH: x  Gluc: x / Ketone: Negative  / Bili: Negative / Urobili: Negative mg/dL   Blood: x / Protein: 500 mg/dL / Nitrite: Negative   Leuk Esterase: Negative / RBC: 3-5 /HPF / WBC 0-2   Sq Epi: x / Non Sq Epi: Occasional / Bacteria: Occasional        RADIOLOGY & ADDITIONAL TESTS:    Imaging Personally Reviewed:    Consultant(s) Notes Reviewed:      Care Discussed with Consultants/Other Providers:

## 2021-07-01 LAB
ANION GAP SERPL CALC-SCNC: 9 MMOL/L — SIGNIFICANT CHANGE UP (ref 7–14)
BASOPHILS # BLD AUTO: 0.04 K/UL — SIGNIFICANT CHANGE UP (ref 0–0.2)
BASOPHILS NFR BLD AUTO: 0.5 % — SIGNIFICANT CHANGE UP (ref 0–2)
BUN SERPL-MCNC: 23 MG/DL — SIGNIFICANT CHANGE UP (ref 7–23)
CALCIUM SERPL-MCNC: 8.5 MG/DL — SIGNIFICANT CHANGE UP (ref 8.4–10.5)
CHLORIDE SERPL-SCNC: 111 MMOL/L — HIGH (ref 98–107)
CO2 SERPL-SCNC: 23 MMOL/L — SIGNIFICANT CHANGE UP (ref 22–31)
CREAT ?TM UR-MCNC: 180 MG/DL — SIGNIFICANT CHANGE UP
CREAT SERPL-MCNC: 1.52 MG/DL — HIGH (ref 0.5–1.3)
EOSINOPHIL # BLD AUTO: 0.19 K/UL — SIGNIFICANT CHANGE UP (ref 0–0.5)
EOSINOPHIL NFR BLD AUTO: 2.4 % — SIGNIFICANT CHANGE UP (ref 0–6)
GLUCOSE BLDC GLUCOMTR-MCNC: 148 MG/DL — HIGH (ref 70–99)
GLUCOSE BLDC GLUCOMTR-MCNC: 158 MG/DL — HIGH (ref 70–99)
GLUCOSE BLDC GLUCOMTR-MCNC: 189 MG/DL — HIGH (ref 70–99)
GLUCOSE BLDC GLUCOMTR-MCNC: 225 MG/DL — HIGH (ref 70–99)
GLUCOSE BLDC GLUCOMTR-MCNC: 77 MG/DL — SIGNIFICANT CHANGE UP (ref 70–99)
GLUCOSE BLDC GLUCOMTR-MCNC: 81 MG/DL — SIGNIFICANT CHANGE UP (ref 70–99)
GLUCOSE SERPL-MCNC: 180 MG/DL — HIGH (ref 70–99)
HCT VFR BLD CALC: 34.3 % — LOW (ref 39–50)
HGB BLD-MCNC: 11.3 G/DL — LOW (ref 13–17)
IANC: 5.19 K/UL — SIGNIFICANT CHANGE UP (ref 1.5–8.5)
IMM GRANULOCYTES NFR BLD AUTO: 0.2 % — SIGNIFICANT CHANGE UP (ref 0–1.5)
LYMPHOCYTES # BLD AUTO: 1.82 K/UL — SIGNIFICANT CHANGE UP (ref 1–3.3)
LYMPHOCYTES # BLD AUTO: 22.6 % — SIGNIFICANT CHANGE UP (ref 13–44)
MCHC RBC-ENTMCNC: 30.9 PG — SIGNIFICANT CHANGE UP (ref 27–34)
MCHC RBC-ENTMCNC: 32.9 GM/DL — SIGNIFICANT CHANGE UP (ref 32–36)
MCV RBC AUTO: 93.7 FL — SIGNIFICANT CHANGE UP (ref 80–100)
MONOCYTES # BLD AUTO: 0.8 K/UL — SIGNIFICANT CHANGE UP (ref 0–0.9)
MONOCYTES NFR BLD AUTO: 9.9 % — SIGNIFICANT CHANGE UP (ref 2–14)
NEUTROPHILS # BLD AUTO: 5.19 K/UL — SIGNIFICANT CHANGE UP (ref 1.8–7.4)
NEUTROPHILS NFR BLD AUTO: 64.4 % — SIGNIFICANT CHANGE UP (ref 43–77)
NRBC # BLD: 0 /100 WBCS — SIGNIFICANT CHANGE UP
NRBC # FLD: 0 K/UL — SIGNIFICANT CHANGE UP
PLATELET # BLD AUTO: 245 K/UL — SIGNIFICANT CHANGE UP (ref 150–400)
POTASSIUM SERPL-MCNC: 3.8 MMOL/L — SIGNIFICANT CHANGE UP (ref 3.5–5.3)
POTASSIUM SERPL-SCNC: 3.8 MMOL/L — SIGNIFICANT CHANGE UP (ref 3.5–5.3)
RBC # BLD: 3.66 M/UL — LOW (ref 4.2–5.8)
RBC # FLD: 12 % — SIGNIFICANT CHANGE UP (ref 10.3–14.5)
SODIUM SERPL-SCNC: 143 MMOL/L — SIGNIFICANT CHANGE UP (ref 135–145)
SODIUM UR-SCNC: 65 MMOL/L — SIGNIFICANT CHANGE UP
WBC # BLD: 8.03 K/UL — SIGNIFICANT CHANGE UP (ref 3.8–10.5)
WBC # FLD AUTO: 8.03 K/UL — SIGNIFICANT CHANGE UP (ref 3.8–10.5)

## 2021-07-01 PROCEDURE — 71045 X-RAY EXAM CHEST 1 VIEW: CPT | Mod: 26

## 2021-07-01 RX ORDER — OXYCODONE HYDROCHLORIDE 5 MG/1
5 TABLET ORAL EVERY 4 HOURS
Refills: 0 | Status: DISCONTINUED | OUTPATIENT
Start: 2021-07-01 | End: 2021-07-08

## 2021-07-01 RX ORDER — IPRATROPIUM/ALBUTEROL SULFATE 18-103MCG
3 AEROSOL WITH ADAPTER (GRAM) INHALATION EVERY 12 HOURS
Refills: 0 | Status: DISCONTINUED | OUTPATIENT
Start: 2021-07-01 | End: 2021-07-09

## 2021-07-01 RX ORDER — OXYCODONE HYDROCHLORIDE 5 MG/1
10 TABLET ORAL EVERY 4 HOURS
Refills: 0 | Status: DISCONTINUED | OUTPATIENT
Start: 2021-07-01 | End: 2021-07-08

## 2021-07-01 RX ORDER — INSULIN LISPRO 100/ML
6 VIAL (ML) SUBCUTANEOUS
Refills: 0 | Status: DISCONTINUED | OUTPATIENT
Start: 2021-07-01 | End: 2021-07-09

## 2021-07-01 RX ORDER — IPRATROPIUM/ALBUTEROL SULFATE 18-103MCG
3 AEROSOL WITH ADAPTER (GRAM) INHALATION EVERY 6 HOURS
Refills: 0 | Status: DISCONTINUED | OUTPATIENT
Start: 2021-07-01 | End: 2021-07-01

## 2021-07-01 RX ADMIN — Medication 3 MILLILITER(S): at 05:23

## 2021-07-01 RX ADMIN — TAMSULOSIN HYDROCHLORIDE 0.4 MILLIGRAM(S): 0.4 CAPSULE ORAL at 22:19

## 2021-07-01 RX ADMIN — Medication 10 DROP(S): at 05:16

## 2021-07-01 RX ADMIN — Medication 3 MILLILITER(S): at 09:48

## 2021-07-01 RX ADMIN — ATORVASTATIN CALCIUM 20 MILLIGRAM(S): 80 TABLET, FILM COATED ORAL at 22:19

## 2021-07-01 RX ADMIN — Medication 1: at 08:39

## 2021-07-01 RX ADMIN — Medication 166.67 MILLIGRAM(S): at 14:03

## 2021-07-01 RX ADMIN — Medication 25 MILLIGRAM(S): at 05:15

## 2021-07-01 RX ADMIN — INSULIN GLARGINE 25 UNIT(S): 100 INJECTION, SOLUTION SUBCUTANEOUS at 22:19

## 2021-07-01 RX ADMIN — Medication 10 DROP(S): at 17:37

## 2021-07-01 RX ADMIN — Medication 100 MILLIGRAM(S): at 17:35

## 2021-07-01 RX ADMIN — AMLODIPINE BESYLATE 10 MILLIGRAM(S): 2.5 TABLET ORAL at 05:15

## 2021-07-01 RX ADMIN — Medication 100 MILLIGRAM(S): at 05:15

## 2021-07-01 RX ADMIN — MUPIROCIN 1 APPLICATION(S): 20 OINTMENT TOPICAL at 05:16

## 2021-07-01 RX ADMIN — Medication 3 MILLILITER(S): at 22:01

## 2021-07-01 RX ADMIN — Medication 6 UNIT(S): at 18:00

## 2021-07-01 RX ADMIN — Medication 7 UNIT(S): at 08:49

## 2021-07-01 RX ADMIN — Medication 2: at 17:35

## 2021-07-01 RX ADMIN — CHLORHEXIDINE GLUCONATE 1 APPLICATION(S): 213 SOLUTION TOPICAL at 12:00

## 2021-07-01 RX ADMIN — MUPIROCIN 1 APPLICATION(S): 20 OINTMENT TOPICAL at 17:38

## 2021-07-01 RX ADMIN — Medication 25 MILLIGRAM(S): at 18:00

## 2021-07-01 NOTE — PROVIDER CONTACT NOTE (OTHER) - ACTION/TREATMENT ORDERED:
PA Made aware.  Will come and examine patient.
Bang Bergman notified. No new orders. Will continue to monitor.

## 2021-07-01 NOTE — PROGRESS NOTE ADULT - SUBJECTIVE AND OBJECTIVE BOX
Patient is a 61y old  Male who presents with a chief complaint of otitis externa, uncontrolled DM (2021 15:00)    DATE OF SERVICE: 21 @ 15:04    HPI:  Afebrile.   No new symptoms    PAST MEDICAL & SURGICAL HISTORY:  DM (diabetes mellitus)  On Insulin    HTN (hypertension)  medicxal managenent    No significant past surgical history        Review of Systems:   CONSTITUTIONAL: No fever, weight loss, or fatigue  EYES: No eye pain, visual disturbances, or discharge  ENMT:  No difficulty hearing, tinnitus, vertigo; No sinus or throat pain. Left ear plugged, wick in place.  NECK: No pain or stiffness  BREASTS: No pain, masses, or nipple discharge  RESPIRATORY: No cough, wheezing, chills or hemoptysis; No shortness of breath  CARDIOVASCULAR: No chest pain, palpitations, dizziness, or leg swelling  GASTROINTESTINAL: No abdominal or epigastric pain. No nausea, vomiting, or hematemesis; No diarrhea or constipation. No melena or hematochezia.  GENITOURINARY: No dysuria, frequency, hematuria, or incontinence  NEUROLOGICAL: No headaches, memory loss, loss of strength, numbness, or tremors  SKIN: No itching, burning, rashes, or lesions   LYMPH NODES: No enlarged glands  ENDOCRINE: No heat or cold intolerance; No hair loss  MUSCULOSKELETAL: No joint pain or swelling; No muscle, back, or extremity pain  PSYCHIATRIC: No depression, anxiety, mood swings, or difficulty sleeping  HEME/LYMPH: No easy bruising, or bleeding gums  ALLERY AND IMMUNOLOGIC: No hives or eczema    Allergies    No Known Allergies    Intolerances        Social History:     FAMILY HISTORY:  No pertinent family history in first degree relatives        MEDICATIONS  (STANDING):  amLODIPine   Tablet 10 milliGRAM(s) Oral daily  atorvastatin 20 milliGRAM(s) Oral at bedtime  ciprofloxacin   IVPB 400 milliGRAM(s) IV Intermittent every 12 hours  dextrose 40% Gel 15 Gram(s) Oral once  dextrose 50% Injectable 25 Gram(s) IV Push once  dextrose 50% Injectable 12.5 Gram(s) IV Push once  dextrose 50% Injectable 25 Gram(s) IV Push once  glucagon  Injectable 1 milliGRAM(s) IntraMuscular once  insulin glargine Injectable (LANTUS) 20 Unit(s) SubCutaneous at bedtime  insulin lispro (ADMELOG) corrective regimen sliding scale   SubCutaneous three times a day before meals  insulin lispro (ADMELOG) corrective regimen sliding scale   SubCutaneous at bedtime  insulin lispro Injectable (ADMELOG) 7 Unit(s) SubCutaneous three times a day before meals  ofloxacin 0.3% Solution 10 Drop(s) Left Ear two times a day  sodium chloride 0.9%. 1000 milliLiter(s) (100 mL/Hr) IV Continuous <Continuous>  tamsulosin 0.4 milliGRAM(s) Oral at bedtime  vancomycin  IVPB 1000 milliGRAM(s) IV Intermittent every 12 hours    MEDICATIONS  (PRN):  acetaminophen   Tablet .. 650 milliGRAM(s) Oral every 4 hours PRN Mild Pain (1 - 3)  oxyCODONE    IR 5 milliGRAM(s) Oral every 4 hours PRN Moderate Pain (4 - 6)  oxyCODONE    IR 10 milliGRAM(s) Oral every 4 hours PRN Severe Pain (7 - 10)        CAPILLARY BLOOD GLUCOSE      POCT Blood Glucose.: 272 mg/dL (2021 22:33)  POCT Blood Glucose.: 284 mg/dL (2021 17:48)  POCT Blood Glucose.: 274 mg/dL (2021 12:09)  POCT Blood Glucose.: 322 mg/dL (2021 08:11)    I&O's Summary    2021 07:01  -  2021 23:11  --------------------------------------------------------  IN: 0 mL / OUT: 525 mL / NET: -525 mL        PHYSICAL EXAM:  Vital Signs Last 24 Hrs  T(C): 37 (2021 22:11), Max: 37.1 (2021 05:31)  T(F): 98.6 (2021 22:11), Max: 98.7 (2021 05:31)  HR: 91 (2021 22:11) (86 - 91)  BP: 134/75 (2021 22:11) (134/75 - 157/72)  BP(mean): --  RR: 16 (2021 22:11) (16 - 16)  SpO2: 96% (2021 22:11) (96% - 100%)    GENERAL: NAD, well-developed  HEAD:  Atraumatic, Normocephalic  EYES: EOMI, PERRLA, conjunctiva and sclera clear  Ears: Left ear tender, cotton plug noted  NECK: Supple, No JVD  CHEST/LUNG: Clear to auscultation bilaterally; No wheeze  HEART: Regular rate and rhythm; No murmurs, rubs, or gallops  ABDOMEN: Soft, Nontender, Nondistended; Bowel sounds present  EXTREMITIES:  2+ Peripheral Pulses, No clubbing, cyanosis, or edema  PSYCH: AAOx3  NEUROLOGY: non-focal  SKIN: No rashes or lesions    LABS:                        11.8   7.89  )-----------( 208      ( 2021 06:35 )             35.8     06-21    136  |  102  |  20  ----------------------------<  329<H>  4.0   |  23  |  1.60<H>    Ca    8.4      2021 06:35  Phos  3.7     06-21  Mg     1.9     06-21            Urinalysis Basic - ( 2021 23:18 )    Color: Yellow / Appearance: Clear / S.015 / pH: x  Gluc: x / Ketone: Negative  / Bili: Negative / Urobili: Negative mg/dL   Blood: x / Protein: 500 mg/dL / Nitrite: Negative   Leuk Esterase: Negative / RBC: 3-5 /HPF / WBC 0-2   Sq Epi: x / Non Sq Epi: Occasional / Bacteria: Occasional        RADIOLOGY & ADDITIONAL TESTS:    Imaging Personally Reviewed:    Consultant(s) Notes Reviewed:      Care Discussed with Consultants/Other Providers:

## 2021-07-01 NOTE — PROGRESS NOTE ADULT - SUBJECTIVE AND OBJECTIVE BOX
[facility]   MD ANITA QUINTANILLA DO ANAM SIDDIQUI ANGELA WONG, PA    TEL:  OFFICE: [phone]  DR MONROE CELL: [phone]  DR. PORTILLO CELL: [phone]  DR. MYLES CELL: [phone]  PEGGY JAFFE CELL: [phone]    From 5pm-7am Answering Service [phone]    -- RENAL FOLLOW UP NOTE ---Date of Service 07-01-21 @ 10:02    Patient is a 61y old  Male who presents with a chief complaint of otitis externa, uncontrolled DM (30 Jun 2021 16:02)      Patient seen and examined at bedside. No chest pain/sob    VITALS:  T(F): 98.2 (07-01-21 @ 05:00), Max: 98.2 (06-30-21 @ 14:02)  HR: 78 (07-01-21 @ 09:50)  BP: 152/91 (07-01-21 @ 07:19)  RR: 18 (07-01-21 @ 05:00)  SpO2: 97% (07-01-21 @ 09:50)  Wt(kg): --        PHYSICAL EXAM:  Constitutional: NAD  Neck: No JVD  Respiratory: decrease bs at base  Cardiovascular: S1, S2, RRR  Gastrointestinal: BS+, soft, NT/ND  Extremities: 1+ peripheral edema    Hospital Medications:   MEDICATIONS  (STANDING):  albuterol/ipratropium for Nebulization 3 milliLiter(s) Nebulizer every 6 hours  amLODIPine   Tablet 10 milliGRAM(s) Oral daily  atorvastatin 20 milliGRAM(s) Oral at bedtime  chlorhexidine 4% Liquid 1 Application(s) Topical daily  dextrose 40% Gel 15 Gram(s) Oral once  dextrose 50% Injectable 25 Gram(s) IV Push once  dextrose 50% Injectable 12.5 Gram(s) IV Push once  dextrose 50% Injectable 25 Gram(s) IV Push once  doxycycline hyclate Capsule 100 milliGRAM(s) Oral every 12 hours  ergocalciferol 97210 Unit(s) Oral <User Schedule>  glucagon  Injectable 1 milliGRAM(s) IntraMuscular once  insulin glargine Injectable (LANTUS) 25 Unit(s) SubCutaneous at bedtime  insulin lispro (ADMELOG) corrective regimen sliding scale   SubCutaneous three times a day before meals  insulin lispro (ADMELOG) corrective regimen sliding scale   SubCutaneous at bedtime  insulin lispro Injectable (ADMELOG) 7 Unit(s) SubCutaneous three times a day before meals  metoprolol tartrate 25 milliGRAM(s) Oral two times a day  mupirocin 2% Ointment 1 Application(s) Topical two times a day  ofloxacin 0.3% Solution 10 Drop(s) Left Ear two times a day  tamsulosin 0.4 milliGRAM(s) Oral at bedtime  vancomycin  IVPB 1250 milliGRAM(s) IV Intermittent daily      LABS:  07-01    143  |  111<H>  |  23  ----------------------------<  180<H>  3.8   |  23  |  1.52<H>    Ca    8.5      01 Jul 2021 06:57      Creatinine Trend: 1.52 <--, 1.51 <--, 1.39 <--, 1.37 <--, 1.43 <--, 1.34 <--, 1.34 <--                                11.3   8.03  )-----------( 245      ( 01 Jul 2021 06:57 )             34.3     Urine Studies:  Urinalysis - [06-22-21 @ 16:47]      Color Light Yellow / Appearance Clear / SG 1.009 / pH 6.0      Gluc >= 1000 mg/dL / Ketone Negative  / Bili Negative / Urobili <2 mg/dL       Blood Trace / Protein Trace / Leuk Est Negative / Nitrite Negative      RBC 1 / WBC 1 / Hyaline 0 / Gran  / Sq Epi  / Non Sq Epi 0 / Bacteria Negative      PTH -- (Ca --)      [06-23-21 @ 08:23]   69  Vitamin D (25OH) 7.7      [06-23-21 @ 12:30]  Lipid: chol 101, , HDL 27, LDL --      [06-23-21 @ 08:23]        RADIOLOGY & ADDITIONAL STUDIES:   [facility] [place]   MD ANITA QUINTANILLA DO ANAM SIDDIQUI ANGELA WONG, PA    TEL:  OFFICE: [phone]  DR MONROE CELL: [phone]  DR. PORTILLO CELL: [phone]  DR. MYLES CELL: [phone]  PEGGY JAFFE CELL: [phone]    From 5pm-7am Answering Service [phone]    -- RENAL FOLLOW UP NOTE ---Date of Service 07-01-21 @ 10:02    Patient is a 61y old  Male who presents with a chief complaint of otitis externa, uncontrolled DM (30 Jun 2021 16:02)      Patient seen and examined at bedside. c/o worsen sob wheeze and LE edema    VITALS:  T(F): 98.2 (07-01-21 @ 05:00), Max: 98.2 (06-30-21 @ 14:02)  HR: 78 (07-01-21 @ 09:50)  BP: 152/91 (07-01-21 @ 07:19)  RR: 18 (07-01-21 @ 05:00)  SpO2: 97% (07-01-21 @ 09:50)  Wt(kg): --        PHYSICAL EXAM:  Constitutional: NAD  Neck: No JVD  Respiratory: decrease bs at base  Cardiovascular: S1, S2, RRR  Gastrointestinal: BS+, soft, NT/ND  Extremities: 1+ peripheral edema    Hospital Medications:   MEDICATIONS  (STANDING):  albuterol/ipratropium for Nebulization 3 milliLiter(s) Nebulizer every 6 hours  amLODIPine   Tablet 10 milliGRAM(s) Oral daily  atorvastatin 20 milliGRAM(s) Oral at bedtime  chlorhexidine 4% Liquid 1 Application(s) Topical daily  dextrose 40% Gel 15 Gram(s) Oral once  dextrose 50% Injectable 25 Gram(s) IV Push once  dextrose 50% Injectable 12.5 Gram(s) IV Push once  dextrose 50% Injectable 25 Gram(s) IV Push once  doxycycline hyclate Capsule 100 milliGRAM(s) Oral every 12 hours  ergocalciferol 54671 Unit(s) Oral <User Schedule>  glucagon  Injectable 1 milliGRAM(s) IntraMuscular once  insulin glargine Injectable (LANTUS) 25 Unit(s) SubCutaneous at bedtime  insulin lispro (ADMELOG) corrective regimen sliding scale   SubCutaneous three times a day before meals  insulin lispro (ADMELOG) corrective regimen sliding scale   SubCutaneous at bedtime  insulin lispro Injectable (ADMELOG) 7 Unit(s) SubCutaneous three times a day before meals  metoprolol tartrate 25 milliGRAM(s) Oral two times a day  mupirocin 2% Ointment 1 Application(s) Topical two times a day  ofloxacin 0.3% Solution 10 Drop(s) Left Ear two times a day  tamsulosin 0.4 milliGRAM(s) Oral at bedtime  vancomycin  IVPB 1250 milliGRAM(s) IV Intermittent daily      LABS:  07-01    143  |  111<H>  |  23  ----------------------------<  180<H>  3.8   |  23  |  1.52<H>    Ca    8.5      01 Jul 2021 06:57      Creatinine Trend: 1.52 <--, 1.51 <--, 1.39 <--, 1.37 <--, 1.43 <--, 1.34 <--, 1.34 <--                                11.3   8.03  )-----------( 245      ( 01 Jul 2021 06:57 )             34.3     Urine Studies:  Urinalysis - [06-22-21 @ 16:47]      Color Light Yellow / Appearance Clear / SG 1.009 / pH 6.0      Gluc >= 1000 mg/dL / Ketone Negative  / Bili Negative / Urobili <2 mg/dL       Blood Trace / Protein Trace / Leuk Est Negative / Nitrite Negative      RBC 1 / WBC 1 / Hyaline 0 / Gran  / Sq Epi  / Non Sq Epi 0 / Bacteria Negative      PTH -- (Ca --)      [06-23-21 @ 08:23]   69  Vitamin D (25OH) 7.7      [06-23-21 @ 12:30]  Lipid: chol 101, , HDL 27, LDL --      [06-23-21 @ 08:23]        RADIOLOGY & ADDITIONAL STUDIES:

## 2021-07-01 NOTE — PROGRESS NOTE ADULT - ASSESSMENT
61M with PMH DM2, HTN, HLD, diabetic neuropathy, glaucoma/cataract with poor vision, HCV s/p [place] (cleared per patient) presented with left ear pain. seen by ENT likely malignant otitis externa and folliculitis on antibiotic. nephrology consulted for elvin    ELVIN  baseline ~ 1  ELVIN likely sec to GEMINI  s/p CT with contrast 6/20  FEna>1% suggested ATN  UA with proteinuria and hematuria  renal us without hydro  renal function stable ~ 1.3 worsen again. not improving with IVF. now c/o wheeze and worsen sob and LE edema. dc IVF. check chest xray. consider echo  check urine cr, na, bladder scan  ACEI still on hold   avoid nephrotoxic agents  optimize dm control. f/u endo  on vanco  monitor vanco level  monitor bmp and urine output    proteinuria/hematuria  renal us noted   p/c ratio <1g  likely sec to dm/htn  work up can be done outpatient  monitor    Acidosis  non AG  improved with oral bicarb  monitor serum Co2    htn  acceptable  on norvasc 10  ACEI on hold  start metoprolol 25 bid  monitor    hypocalcemia  likely vit d def  On  weekly vit d 50000x12 dose  monitor    OM  malignant otitis externa on CT  f/u ENT/ID  antibiotic per team     61M with PMH DM2, HTN, HLD, diabetic neuropathy, glaucoma/cataract with poor vision, HCV s/p [place] (cleared per patient) presented with left ear pain. seen by ENT likely malignant otitis externa and folliculitis on antibiotic. nephrology consulted for elvin    ELVIN  baseline ~ 1  ELVIN likely sec to GEMINI  s/p CT with contrast 6/20  FEna>1% suggested ATN  UA with proteinuria and hematuria  renal us without hydro  renal function stable ~ 1.3 worsen again. not improving with IVF. now c/o wheeze and worsen sob and LE edema. dc IVF. check chest xray. consider echo  check urine cr, na, bladder scan, cbc with diff.   on vanco. check vanco level  ACEI still on hold   avoid nephrotoxic agents  optimize dm control. f/u endo  on vanco  monitor vanco level  monitor bmp and urine output    proteinuria/hematuria  renal us noted   p/c ratio <1g  likely sec to dm/htn  work up can be done outpatient  monitor    Acidosis  non AG  improved with oral bicarb  monitor serum Co2    htn  acceptable  on norvasc 10  ACEI on hold  start metoprolol 25 bid  monitor    hypocalcemia  likely vit d def  On  weekly vit d 50000x12 dose  monitor    OM  malignant otitis externa on CT  f/u ENT/ID  antibiotic per team

## 2021-07-02 LAB
ALBUMIN SERPL ELPH-MCNC: 3.4 G/DL — SIGNIFICANT CHANGE UP (ref 3.3–5)
ALBUMIN SERPL ELPH-MCNC: 3.4 G/DL — SIGNIFICANT CHANGE UP (ref 3.3–5)
ALP SERPL-CCNC: 58 U/L — SIGNIFICANT CHANGE UP (ref 40–120)
ALP SERPL-CCNC: 63 U/L — SIGNIFICANT CHANGE UP (ref 40–120)
ALT FLD-CCNC: 25 U/L — SIGNIFICANT CHANGE UP (ref 4–41)
ALT FLD-CCNC: 29 U/L — SIGNIFICANT CHANGE UP (ref 4–41)
ANION GAP SERPL CALC-SCNC: 10 MMOL/L — SIGNIFICANT CHANGE UP (ref 7–14)
ANION GAP SERPL CALC-SCNC: 14 MMOL/L — SIGNIFICANT CHANGE UP (ref 7–14)
AST SERPL-CCNC: 25 U/L — SIGNIFICANT CHANGE UP (ref 4–40)
AST SERPL-CCNC: 26 U/L — SIGNIFICANT CHANGE UP (ref 4–40)
BASOPHILS # BLD AUTO: 0.04 K/UL — SIGNIFICANT CHANGE UP (ref 0–0.2)
BASOPHILS NFR BLD AUTO: 0.4 % — SIGNIFICANT CHANGE UP (ref 0–2)
BILIRUB SERPL-MCNC: <0.2 MG/DL — SIGNIFICANT CHANGE UP (ref 0.2–1.2)
BILIRUB SERPL-MCNC: <0.2 MG/DL — SIGNIFICANT CHANGE UP (ref 0.2–1.2)
BUN SERPL-MCNC: 25 MG/DL — HIGH (ref 7–23)
BUN SERPL-MCNC: 25 MG/DL — HIGH (ref 7–23)
CALCIUM SERPL-MCNC: 8.5 MG/DL — SIGNIFICANT CHANGE UP (ref 8.4–10.5)
CALCIUM SERPL-MCNC: 8.9 MG/DL — SIGNIFICANT CHANGE UP (ref 8.4–10.5)
CHLORIDE SERPL-SCNC: 110 MMOL/L — HIGH (ref 98–107)
CHLORIDE SERPL-SCNC: 110 MMOL/L — HIGH (ref 98–107)
CO2 SERPL-SCNC: 20 MMOL/L — LOW (ref 22–31)
CO2 SERPL-SCNC: 22 MMOL/L — SIGNIFICANT CHANGE UP (ref 22–31)
CREAT SERPL-MCNC: 1.55 MG/DL — HIGH (ref 0.5–1.3)
CREAT SERPL-MCNC: 1.62 MG/DL — HIGH (ref 0.5–1.3)
EOSINOPHIL # BLD AUTO: 0.24 K/UL — SIGNIFICANT CHANGE UP (ref 0–0.5)
EOSINOPHIL NFR BLD AUTO: 2.5 % — SIGNIFICANT CHANGE UP (ref 0–6)
GLUCOSE BLDC GLUCOMTR-MCNC: 123 MG/DL — HIGH (ref 70–99)
GLUCOSE BLDC GLUCOMTR-MCNC: 129 MG/DL — HIGH (ref 70–99)
GLUCOSE BLDC GLUCOMTR-MCNC: 137 MG/DL — HIGH (ref 70–99)
GLUCOSE BLDC GLUCOMTR-MCNC: 153 MG/DL — HIGH (ref 70–99)
GLUCOSE SERPL-MCNC: 159 MG/DL — HIGH (ref 70–99)
GLUCOSE SERPL-MCNC: 226 MG/DL — HIGH (ref 70–99)
HCT VFR BLD CALC: 34.2 % — LOW (ref 39–50)
HGB BLD-MCNC: 11.1 G/DL — LOW (ref 13–17)
IANC: 6.15 K/UL — SIGNIFICANT CHANGE UP (ref 1.5–8.5)
IMM GRANULOCYTES NFR BLD AUTO: 0.3 % — SIGNIFICANT CHANGE UP (ref 0–1.5)
LYMPHOCYTES # BLD AUTO: 2.1 K/UL — SIGNIFICANT CHANGE UP (ref 1–3.3)
LYMPHOCYTES # BLD AUTO: 22 % — SIGNIFICANT CHANGE UP (ref 13–44)
MAGNESIUM SERPL-MCNC: 1.8 MG/DL — SIGNIFICANT CHANGE UP (ref 1.6–2.6)
MCHC RBC-ENTMCNC: 31.3 PG — SIGNIFICANT CHANGE UP (ref 27–34)
MCHC RBC-ENTMCNC: 32.5 GM/DL — SIGNIFICANT CHANGE UP (ref 32–36)
MCV RBC AUTO: 96.3 FL — SIGNIFICANT CHANGE UP (ref 80–100)
MONOCYTES # BLD AUTO: 0.97 K/UL — HIGH (ref 0–0.9)
MONOCYTES NFR BLD AUTO: 10.2 % — SIGNIFICANT CHANGE UP (ref 2–14)
NEUTROPHILS # BLD AUTO: 6.15 K/UL — SIGNIFICANT CHANGE UP (ref 1.8–7.4)
NEUTROPHILS NFR BLD AUTO: 64.6 % — SIGNIFICANT CHANGE UP (ref 43–77)
NRBC # BLD: 0 /100 WBCS — SIGNIFICANT CHANGE UP
NRBC # FLD: 0 K/UL — SIGNIFICANT CHANGE UP
NT-PROBNP SERPL-SCNC: 491 PG/ML — HIGH
PHOSPHATE SERPL-MCNC: 3.5 MG/DL — SIGNIFICANT CHANGE UP (ref 2.5–4.5)
PLATELET # BLD AUTO: 236 K/UL — SIGNIFICANT CHANGE UP (ref 150–400)
POTASSIUM SERPL-MCNC: 4 MMOL/L — SIGNIFICANT CHANGE UP (ref 3.5–5.3)
POTASSIUM SERPL-MCNC: 4.3 MMOL/L — SIGNIFICANT CHANGE UP (ref 3.5–5.3)
POTASSIUM SERPL-SCNC: 4 MMOL/L — SIGNIFICANT CHANGE UP (ref 3.5–5.3)
POTASSIUM SERPL-SCNC: 4.3 MMOL/L — SIGNIFICANT CHANGE UP (ref 3.5–5.3)
PROT SERPL-MCNC: 6.3 G/DL — SIGNIFICANT CHANGE UP (ref 6–8.3)
PROT SERPL-MCNC: 6.8 G/DL — SIGNIFICANT CHANGE UP (ref 6–8.3)
RBC # BLD: 3.55 M/UL — LOW (ref 4.2–5.8)
RBC # FLD: 12.5 % — SIGNIFICANT CHANGE UP (ref 10.3–14.5)
SODIUM SERPL-SCNC: 142 MMOL/L — SIGNIFICANT CHANGE UP (ref 135–145)
SODIUM SERPL-SCNC: 144 MMOL/L — SIGNIFICANT CHANGE UP (ref 135–145)
VANCOMYCIN FLD-MCNC: 17.5 UG/ML — SIGNIFICANT CHANGE UP
WBC # BLD: 9.53 K/UL — SIGNIFICANT CHANGE UP (ref 3.8–10.5)
WBC # FLD AUTO: 9.53 K/UL — SIGNIFICANT CHANGE UP (ref 3.8–10.5)

## 2021-07-02 PROCEDURE — 71250 CT THORAX DX C-: CPT | Mod: 26

## 2021-07-02 PROCEDURE — 71045 X-RAY EXAM CHEST 1 VIEW: CPT | Mod: 26

## 2021-07-02 RX ORDER — FUROSEMIDE 40 MG
40 TABLET ORAL DAILY
Refills: 0 | Status: DISCONTINUED | OUTPATIENT
Start: 2021-07-03 | End: 2021-07-06

## 2021-07-02 RX ORDER — FUROSEMIDE 40 MG
40 TABLET ORAL ONCE
Refills: 0 | Status: COMPLETED | OUTPATIENT
Start: 2021-07-02 | End: 2021-07-02

## 2021-07-02 RX ADMIN — Medication 3 MILLILITER(S): at 22:02

## 2021-07-02 RX ADMIN — CHLORHEXIDINE GLUCONATE 1 APPLICATION(S): 213 SOLUTION TOPICAL at 12:50

## 2021-07-02 RX ADMIN — Medication 100 MILLIGRAM(S): at 17:59

## 2021-07-02 RX ADMIN — Medication 25 MILLIGRAM(S): at 06:03

## 2021-07-02 RX ADMIN — Medication 1: at 17:58

## 2021-07-02 RX ADMIN — Medication 6 UNIT(S): at 12:50

## 2021-07-02 RX ADMIN — Medication 6 UNIT(S): at 08:43

## 2021-07-02 RX ADMIN — INSULIN GLARGINE 25 UNIT(S): 100 INJECTION, SOLUTION SUBCUTANEOUS at 21:40

## 2021-07-02 RX ADMIN — AMLODIPINE BESYLATE 10 MILLIGRAM(S): 2.5 TABLET ORAL at 06:02

## 2021-07-02 RX ADMIN — MUPIROCIN 1 APPLICATION(S): 20 OINTMENT TOPICAL at 18:00

## 2021-07-02 RX ADMIN — Medication 3 MILLILITER(S): at 07:54

## 2021-07-02 RX ADMIN — ATORVASTATIN CALCIUM 20 MILLIGRAM(S): 80 TABLET, FILM COATED ORAL at 21:40

## 2021-07-02 RX ADMIN — Medication 166.67 MILLIGRAM(S): at 16:04

## 2021-07-02 RX ADMIN — Medication 40 MILLIGRAM(S): at 12:49

## 2021-07-02 RX ADMIN — Medication 10 DROP(S): at 18:00

## 2021-07-02 RX ADMIN — Medication 6 UNIT(S): at 17:58

## 2021-07-02 RX ADMIN — MUPIROCIN 1 APPLICATION(S): 20 OINTMENT TOPICAL at 06:02

## 2021-07-02 RX ADMIN — Medication 10 DROP(S): at 06:03

## 2021-07-02 RX ADMIN — Medication 25 MILLIGRAM(S): at 18:00

## 2021-07-02 RX ADMIN — TAMSULOSIN HYDROCHLORIDE 0.4 MILLIGRAM(S): 0.4 CAPSULE ORAL at 21:40

## 2021-07-02 RX ADMIN — Medication 100 MILLIGRAM(S): at 06:03

## 2021-07-02 NOTE — PROGRESS NOTE ADULT - SUBJECTIVE AND OBJECTIVE BOX
Patient is a 61y old  Male who presents with a chief complaint of otitis externa, uncontrolled DM (2021 15:00)    DATE OF SERVICE: 21 @ 12:55  HPI:  Afebrile.   SOB +    PAST MEDICAL & SURGICAL HISTORY:  DM (diabetes mellitus)  On Insulin    HTN (hypertension)  medicxal managenent    No significant past surgical history        Review of Systems:   CONSTITUTIONAL: No fever, weight loss, or fatigue  EYES: No eye pain, visual disturbances, or discharge  ENMT:  No difficulty hearing, tinnitus, vertigo; No sinus or throat pain. Left ear plugged, wick in place.  NECK: No pain or stiffness  BREASTS: No pain, masses, or nipple discharge  RESPIRATORY: No cough, wheezing, chills or hemoptysis; +shortness of breath  CARDIOVASCULAR: No chest pain, palpitations, dizziness, or leg swelling  GASTROINTESTINAL: No abdominal or epigastric pain. No nausea, vomiting, or hematemesis; No diarrhea or constipation. No melena or hematochezia.  GENITOURINARY: No dysuria, frequency, hematuria, or incontinence  NEUROLOGICAL: No headaches, memory loss, loss of strength, numbness, or tremors  SKIN: No itching, burning, rashes, or lesions   LYMPH NODES: No enlarged glands  ENDOCRINE: No heat or cold intolerance; No hair loss  MUSCULOSKELETAL: No joint pain or swelling; No muscle, back, or extremity pain  PSYCHIATRIC: No depression, anxiety, mood swings, or difficulty sleeping  HEME/LYMPH: No easy bruising, or bleeding gums  ALLERY AND IMMUNOLOGIC: No hives or eczema    Allergies    No Known Allergies    Intolerances        Social History:     FAMILY HISTORY:  No pertinent family history in first degree relatives        MEDICATIONS  (STANDING):  amLODIPine   Tablet 10 milliGRAM(s) Oral daily  atorvastatin 20 milliGRAM(s) Oral at bedtime  ciprofloxacin   IVPB 400 milliGRAM(s) IV Intermittent every 12 hours  dextrose 40% Gel 15 Gram(s) Oral once  dextrose 50% Injectable 25 Gram(s) IV Push once  dextrose 50% Injectable 12.5 Gram(s) IV Push once  dextrose 50% Injectable 25 Gram(s) IV Push once  glucagon  Injectable 1 milliGRAM(s) IntraMuscular once  insulin glargine Injectable (LANTUS) 20 Unit(s) SubCutaneous at bedtime  insulin lispro (ADMELOG) corrective regimen sliding scale   SubCutaneous three times a day before meals  insulin lispro (ADMELOG) corrective regimen sliding scale   SubCutaneous at bedtime  insulin lispro Injectable (ADMELOG) 7 Unit(s) SubCutaneous three times a day before meals  ofloxacin 0.3% Solution 10 Drop(s) Left Ear two times a day  sodium chloride 0.9%. 1000 milliLiter(s) (100 mL/Hr) IV Continuous <Continuous>  tamsulosin 0.4 milliGRAM(s) Oral at bedtime  vancomycin  IVPB 1000 milliGRAM(s) IV Intermittent every 12 hours    MEDICATIONS  (PRN):  acetaminophen   Tablet .. 650 milliGRAM(s) Oral every 4 hours PRN Mild Pain (1 - 3)  oxyCODONE    IR 5 milliGRAM(s) Oral every 4 hours PRN Moderate Pain (4 - 6)  oxyCODONE    IR 10 milliGRAM(s) Oral every 4 hours PRN Severe Pain (7 - 10)        CAPILLARY BLOOD GLUCOSE      POCT Blood Glucose.: 272 mg/dL (2021 22:33)  POCT Blood Glucose.: 284 mg/dL (2021 17:48)  POCT Blood Glucose.: 274 mg/dL (2021 12:09)  POCT Blood Glucose.: 322 mg/dL (2021 08:11)    I&O's Summary    2021 07:01  -  2021 23:11  --------------------------------------------------------  IN: 0 mL / OUT: 525 mL / NET: -525 mL        PHYSICAL EXAM:  Vital Signs Last 24 Hrs  T(C): 37 (2021 22:11), Max: 37.1 (2021 05:31)  T(F): 98.6 (2021 22:11), Max: 98.7 (2021 05:31)  HR: 91 (2021 22:11) (86 - 91)  BP: 134/75 (2021 22:11) (134/75 - 157/72)  BP(mean): --  RR: 16 (2021 22:11) (16 - 16)  SpO2: 96% (2021 22:11) (96% - 100%)    GENERAL: NAD, well-developed  HEAD:  Atraumatic, Normocephalic  EYES: EOMI, PERRLA, conjunctiva and sclera clear  Ears: Left ear tender, cotton plug noted  NECK: Supple, No JVD  CHEST/LUNG: Clear to auscultation bilaterally; No wheeze  HEART: Regular rate and rhythm; No murmurs, rubs, or gallops  ABDOMEN: Soft, Nontender, Nondistended; Bowel sounds present  EXTREMITIES:  2+ Peripheral Pulses, No clubbing, cyanosis, or edema  PSYCH: AAOx3  NEUROLOGY: non-focal  SKIN: No rashes or lesions    LABS:                        11.8   7.89  )-----------( 208      ( 2021 06:35 )             35.8     06-21    136  |  102  |  20  ----------------------------<  329<H>  4.0   |  23  |  1.60<H>    Ca    8.4      2021 06:35  Phos  3.7     06-21  Mg     1.9     06-21            Urinalysis Basic - ( 2021 23:18 )    Color: Yellow / Appearance: Clear / S.015 / pH: x  Gluc: x / Ketone: Negative  / Bili: Negative / Urobili: Negative mg/dL   Blood: x / Protein: 500 mg/dL / Nitrite: Negative   Leuk Esterase: Negative / RBC: 3-5 /HPF / WBC 0-2   Sq Epi: x / Non Sq Epi: Occasional / Bacteria: Occasional        RADIOLOGY & ADDITIONAL TESTS:    Imaging Personally Reviewed:    Consultant(s) Notes Reviewed:      Care Discussed with Consultants/Other Providers:

## 2021-07-02 NOTE — PROGRESS NOTE ADULT - SUBJECTIVE AND OBJECTIVE BOX
Infectious Diseases progress note:    Subjective: NAD, afebrile.  No new complaints.  Denies cp/sob/cough/abd pain.  States lesions in b/l armpits and groin much better    ROS:  CONSTITUTIONAL:  No fever, chills, rigors  CARDIOVASCULAR:  No chest pain or palpitations  RESPIRATORY:   No SOB, cough, dyspnea on exertion.  No wheezing  GASTROINTESTINAL:  No abd pain, N/V, diarrhea/constipation  EXTREMITIES:  No swelling or joint pain  GENITOURINARY:  No burning on urination, increased frequency or urgency.  No flank pain  NEUROLOGIC:  No HA, visual disturbances  SKIN: No rashes    Allergies    No Known Allergies    Intolerances        ANTIBIOTICS/RELEVANT:  antimicrobials  doxycycline hyclate Capsule 100 milliGRAM(s) Oral every 12 hours  vancomycin  IVPB 1250 milliGRAM(s) IV Intermittent daily    immunologic:    OTHER:  acetaminophen   Tablet .. 650 milliGRAM(s) Oral every 4 hours PRN  albuterol/ipratropium for Nebulization 3 milliLiter(s) Nebulizer every 12 hours  amLODIPine   Tablet 10 milliGRAM(s) Oral daily  atorvastatin 20 milliGRAM(s) Oral at bedtime  chlorhexidine 4% Liquid 1 Application(s) Topical daily  dextrose 40% Gel 15 Gram(s) Oral once  dextrose 50% Injectable 25 Gram(s) IV Push once  dextrose 50% Injectable 12.5 Gram(s) IV Push once  dextrose 50% Injectable 25 Gram(s) IV Push once  ergocalciferol 42070 Unit(s) Oral <User Schedule>  glucagon  Injectable 1 milliGRAM(s) IntraMuscular once  insulin glargine Injectable (LANTUS) 25 Unit(s) SubCutaneous at bedtime  insulin lispro (ADMELOG) corrective regimen sliding scale   SubCutaneous three times a day before meals  insulin lispro (ADMELOG) corrective regimen sliding scale   SubCutaneous at bedtime  insulin lispro Injectable (ADMELOG) 6 Unit(s) SubCutaneous three times a day before meals  metoprolol tartrate 25 milliGRAM(s) Oral two times a day  mupirocin 2% Ointment 1 Application(s) Topical two times a day  ofloxacin 0.3% Solution 10 Drop(s) Left Ear two times a day  oxyCODONE    IR 5 milliGRAM(s) Oral every 4 hours PRN  oxyCODONE    IR 10 milliGRAM(s) Oral every 4 hours PRN  sodium chloride 0.9% lock flush 10 milliLiter(s) IV Push every 1 hour PRN  tamsulosin 0.4 milliGRAM(s) Oral at bedtime      Objective:  Vital Signs Last 24 Hrs  T(C): 37 (02 Jul 2021 17:18), Max: 37.2 (01 Jul 2021 21:19)  T(F): 98.6 (02 Jul 2021 17:18), Max: 98.9 (01 Jul 2021 21:19)  HR: 85 (02 Jul 2021 17:18) (74 - 90)  BP: 160/89 (02 Jul 2021 17:18) (160/80 - 169/99)  BP(mean): --  RR: 18 (02 Jul 2021 17:18) (17 - 19)  SpO2: 96% (02 Jul 2021 17:18) (95% - 97%)    PHYSICAL EXAM:  Constitutional:NAD  Eyes:VINCENZO, EOMI  Ear/Nose/Throat: no thrush, mucositis.  Moist mucous membranes	  Neck:no JVD, no lymphadenopathy, supple  Respiratory: CTA sudheer  Cardiovascular: S1S2 RRR, no murmurs  Gastrointestinal:soft, nontender,  nondistended (+) BS  Extremities:no e/e/c  Skin:  decreased size of nodular lesions in b/l armpits and groin.          LABS:                        11.1   9.53  )-----------( 236      ( 02 Jul 2021 16:09 )             34.2     07-02    142  |  110<H>  |  25<H>  ----------------------------<  159<H>  4.3   |  22  |  1.62<H>    Ca    8.9      02 Jul 2021 16:09  Phos  3.5     07-02  Mg     1.80     07-02    TPro  6.8  /  Alb  3.4  /  TBili  <0.2  /  DBili  x   /  AST  25  /  ALT  29  /  AlkPhos  63  07-02            Vancomycin Level, Random:  ug/mL (07-02 @ 07:23)      Vancomycin Level, Trough: 14.9 ug/mL (06-29 @ 11:47)              MICROBIOLOGY:          RADIOLOGY & ADDITIONAL STUDIES:    < from: Xray Chest 1 View- PORTABLE-Urgent (Xray Chest 1 View- PORTABLE-Urgent .) (07.02.21 @ 00:30) >    ******PRELIMINARY REPORT******    ******PRELIMINARY REPORT******            EXAM:  XR CHEST PORTABLE URGENT 1V        PROCEDURE DATE:  Jul 2 2021     ******PRELIMINARY REPORT******    ******PRELIMINARY REPORT******            INTERPRETATION:  lm:  unchanged small b/l pleural effusions. mild pulmonary edema.    < end of copied text >

## 2021-07-02 NOTE — CONSULT NOTE ADULT - CONSULT REQUESTED DATE/TIME
02-Jul-2021 17:28
20-Jun-2021 16:20
21-Jun-2021 15:00
20-Jun-2021 03:25
22-Jun-2021 15:20
02-Jul-2021 14:54
22-Jun-2021 13:07

## 2021-07-02 NOTE — CONSULT NOTE ADULT - SUBJECTIVE AND OBJECTIVE BOX
Requesting Physician : Dr. Smith     Reason for Consultation: Dyspnea     HISTORY OF PRESENT ILLNESS:   Patient is a 61M with PMH DM2, HTN, HLD, diabetic neuropathy, glaucoma/cataract with poor vision, HCV s/p [place] (cleared per patient) who is being seen for dyspnea.  The patient initially presented to the ED complaining of left ear pain for 1 week.  The patient was admitted and diagnosed with acute otitis externa and started on antibiotics.  During his hospital stay the patient developed dyspnea for which cardiology was consulted for.  The patient reports worsening dyspnea at night and also reports LE edema.  The patient denies chest pain or anginal symptoms.  CXR demonstrated bilateral pleural effusions and pulmonary edema.       PAST MEDICAL & SURGICAL HISTORY:  DM (diabetes mellitus)  On Insulin    HTN (hypertension)  medicxal managenent    No significant past surgical history            MEDICATIONS:  MEDICATIONS  (STANDING):  albuterol/ipratropium for Nebulization 3 milliLiter(s) Nebulizer every 12 hours  amLODIPine   Tablet 10 milliGRAM(s) Oral daily  atorvastatin 20 milliGRAM(s) Oral at bedtime  chlorhexidine 4% Liquid 1 Application(s) Topical daily  dextrose 40% Gel 15 Gram(s) Oral once  dextrose 50% Injectable 25 Gram(s) IV Push once  dextrose 50% Injectable 12.5 Gram(s) IV Push once  dextrose 50% Injectable 25 Gram(s) IV Push once  doxycycline hyclate Capsule 100 milliGRAM(s) Oral every 12 hours  ergocalciferol 29021 Unit(s) Oral <User Schedule>  glucagon  Injectable 1 milliGRAM(s) IntraMuscular once  insulin glargine Injectable (LANTUS) 25 Unit(s) SubCutaneous at bedtime  insulin lispro (ADMELOG) corrective regimen sliding scale   SubCutaneous three times a day before meals  insulin lispro (ADMELOG) corrective regimen sliding scale   SubCutaneous at bedtime  insulin lispro Injectable (ADMELOG) 6 Unit(s) SubCutaneous three times a day before meals  metoprolol tartrate 25 milliGRAM(s) Oral two times a day  mupirocin 2% Ointment 1 Application(s) Topical two times a day  ofloxacin 0.3% Solution 10 Drop(s) Left Ear two times a day  tamsulosin 0.4 milliGRAM(s) Oral at bedtime  vancomycin  IVPB 1250 milliGRAM(s) IV Intermittent daily      Allergies    No Known Allergies    Intolerances        FAMILY HISTORY:  No pertinent family history in first degree relatives      Non-contributary for premature coronary disease or sudden cardiac death    SOCIAL HISTORY:    [x ] Non-smoker  [ ] Smoker  [ ] Alcohol      REVIEW OF SYSTEMS:  [ ]chest pain  [ x ]shortness of breath  [  ]palpitations  [  ]syncope  [ ]near syncope [ ]upper extremity weakness   [ ] lower extremity weakness  [  ]diplopia  [  ]altered mental status   [  ]fevers  [ ]chills [ ]nausea  [ ]vomitting  [  ]dysphagia    [ ]abdominal pain  [ ]melena  [ ]BRBPR    [  ]epistaxis  [  ]rash    [ x]lower extremity edema        [x ] All others negative	  [ ] Unable to obtain    PHYSICAL EXAM:  T(C): 37 (07-02-21 @ 17:18), Max: 37.2 (07-01-21 @ 21:19)  HR: 85 (07-02-21 @ 17:18) (74 - 91)  BP: 160/89 (07-02-21 @ 17:18) (160/80 - 169/99)  RR: 18 (07-02-21 @ 17:18) (17 - 19)  SpO2: 96% (07-02-21 @ 17:18) (95% - 97%)  Wt(kg): --  I&O's Summary    01 Jul 2021 07:01  -  02 Jul 2021 07:00  --------------------------------------------------------  IN: 420 mL / OUT: 240 mL / NET: 180 mL          HEENT:   Normal oral mucosa, PERRL, EOMI	  Lymphatic: No lymphadenopathy , + edema in LE bilaterally   Cardiovascular: Normal S1 S2, No JVD, No murmurs , Peripheral pulses palpable 2+ bilaterally  Respiratory: Lungs clear to auscultation, normal effort 	  Gastrointestinal:  Soft, Non-tender, + BS	  Skin: No rashes, No ecchymoses, No cyanosis, warm to touch  Musculoskeletal: Normal range of motion, normal strength  Psychiatry:  Mood & affect appropriate      TELEMETRY: 	    ECG: SR, lateral TWI  	  RADIOLOGY:  OTHER:     DIAGNOSTIC TESTING:  [ ] Echocardiogram:   [ ]  Catheterization:  [ ] Stress Test:    	  	  LABS:	 	    CARDIAC MARKERS:                              11.1   9.53  )-----------( 236      ( 02 Jul 2021 16:09 )             34.2     07-02    144  |  110<H>  |  25<H>  ----------------------------<  226<H>  4.0   |  20<L>  |  1.55<H>    Ca    8.5      02 Jul 2021 01:30  Phos  3.5     07-02  Mg     1.80     07-02    TPro  6.3  /  Alb  3.4  /  TBili  <0.2  /  DBili  x   /  AST  26  /  ALT  25  /  AlkPhos  58  07-02    proBNP: Serum Pro-Brain Natriuretic Peptide: 491 pg/mL (07-02 @ 01:27)    Lipid Profile:   HgA1c:   TSH:     ASSESSMENT/PLAN: 61M with PMH DM2, HTN, HLD, diabetic neuropathy, glaucoma/cataract with poor vision, HCV s/p [place] (cleared per patient) who is being seen for dyspnea.    -pt. with pulmonary edema on CXR and LE edema  -would start IV diuresis if ok from renal perspective to keep O > I  -check TTE to evaluate LV function   -follow up pulmonary   -ABx per primary team  -further workup pending above    Truman Ramos MD

## 2021-07-02 NOTE — CONSULT NOTE ADULT - REASON FOR ADMISSION
otitis externa, uncontrolled DM

## 2021-07-02 NOTE — PROGRESS NOTE ADULT - SUBJECTIVE AND OBJECTIVE BOX
[facility]   MD ANITA QUINTANILLA DO ANAM SIDDIQUI ANGELA WONG, PA    TEL:  OFFICE: [phone]  DR MONROE CELL: [phone]  DR. PORTILLO CELL: [phone]  DR. MYLES CELL: [phone]  PEGGY JAFFE CELL: [phone]    From 5pm-7am Answering Service [phone]    -- RENAL FOLLOW UP NOTE ---Date of Service 07-02-21 @ 12:53    Patient is a 61y old  Male who presents with a chief complaint of otitis externa, uncontrolled DM (01 Jul 2021 15:04)      Patient seen and examined at bedside. sob/ wheeze unable to lay flat at night    VITALS:  T(F): 98.3 (07-02-21 @ 05:55), Max: 98.9 (07-01-21 @ 21:19)  HR: 89 (07-02-21 @ 07:55)  BP: 161/86 (07-02-21 @ 05:55)  RR: 17 (07-02-21 @ 05:55)  SpO2: 95% (07-02-21 @ 07:55)  Wt(kg): --    07-01 @ 07:01  -  07-02 @ 07:00  --------------------------------------------------------  IN: 420 mL / OUT: 240 mL / NET: 180 mL          PHYSICAL EXAM:  Constitutional: NAD  Neck: No JVD  Respiratory: decrease basilar bs  Cardiovascular: S1, S2, RRR  Gastrointestinal: BS+, soft, NT/ND  Extremities:2+ peripheral edema    Hospital Medications:   MEDICATIONS  (STANDING):  albuterol/ipratropium for Nebulization 3 milliLiter(s) Nebulizer every 12 hours  amLODIPine   Tablet 10 milliGRAM(s) Oral daily  atorvastatin 20 milliGRAM(s) Oral at bedtime  chlorhexidine 4% Liquid 1 Application(s) Topical daily  dextrose 40% Gel 15 Gram(s) Oral once  dextrose 50% Injectable 25 Gram(s) IV Push once  dextrose 50% Injectable 12.5 Gram(s) IV Push once  dextrose 50% Injectable 25 Gram(s) IV Push once  doxycycline hyclate Capsule 100 milliGRAM(s) Oral every 12 hours  ergocalciferol 78516 Unit(s) Oral <User Schedule>  glucagon  Injectable 1 milliGRAM(s) IntraMuscular once  insulin glargine Injectable (LANTUS) 25 Unit(s) SubCutaneous at bedtime  insulin lispro (ADMELOG) corrective regimen sliding scale   SubCutaneous three times a day before meals  insulin lispro (ADMELOG) corrective regimen sliding scale   SubCutaneous at bedtime  insulin lispro Injectable (ADMELOG) 6 Unit(s) SubCutaneous three times a day before meals  metoprolol tartrate 25 milliGRAM(s) Oral two times a day  mupirocin 2% Ointment 1 Application(s) Topical two times a day  ofloxacin 0.3% Solution 10 Drop(s) Left Ear two times a day  tamsulosin 0.4 milliGRAM(s) Oral at bedtime  vancomycin  IVPB 1250 milliGRAM(s) IV Intermittent daily      LABS:  07-02    144  |  110<H>  |  25<H>  ----------------------------<  226<H>  4.0   |  20<L>  |  1.55<H>    Ca    8.5      02 Jul 2021 01:30  Phos  3.5     07-02  Mg     1.80     07-02    TPro  6.3  /  Alb  3.4  /  TBili  <0.2  /  DBili      /  AST  26  /  ALT  25  /  AlkPhos  58  07-02    Creatinine Trend: 1.55 <--, 1.52 <--, 1.51 <--, 1.39 <--, 1.37 <--, 1.43 <--, 1.34 <--    Albumin, Serum: 3.4 g/dL (07-02 @ 01:30)  Phosphorus Level, Serum: 3.5 mg/dL (07-02 @ 01:30)                              11.3   8.03  )-----------( 245      ( 01 Jul 2021 06:57 )             34.3     Urine Studies:  Urinalysis - [06-22-21 @ 16:47]      Color Light Yellow / Appearance Clear / SG 1.009 / pH 6.0      Gluc >= 1000 mg/dL / Ketone Negative  / Bili Negative / Urobili <2 mg/dL       Blood Trace / Protein Trace / Leuk Est Negative / Nitrite Negative      RBC 1 / WBC 1 / Hyaline 0 / Gran  / Sq Epi  / Non Sq Epi 0 / Bacteria Negative    Urine Creatinine 180      [07-01-21 @ 14:47]  Urine Sodium 65      [07-01-21 @ 14:47]    PTH -- (Ca --)      [06-23-21 @ 08:23]   69  Vitamin D (25OH) 7.7      [06-23-21 @ 12:30]  Lipid: chol 101, , HDL 27, LDL --      [06-23-21 @ 08:23]        RADIOLOGY & ADDITIONAL STUDIES:

## 2021-07-02 NOTE — PROGRESS NOTE ADULT - ASSESSMENT
61M with PMH DM2, HTN, HLD, diabetic neuropathy, glaucoma/cataract with poor vision, HCV s/p [place] (cleared per patient) presented with left ear pain. seen by ENT likely malignant otitis externa and folliculitis on antibiotic. nephrology consulted for elvin    ELVIN  baseline ~ 1  ELVIN likely sec to GEMINI  s/p CT with contrast 6/20  FEna>1% suggested ATN  UA with proteinuria and hematuria  renal us without hydro  renal function stable ~ 1.3 worsen again. not improving with IVF. now c/o wheeze and worsen sob and LE edema. dc IVF 7/1.  chest xray with new pulm effusion. will give lasix 40mg iv x 1 today. consider echo and cardio eval  on vanco. check vanco level  ACEI still on hold   avoid nephrotoxic agents  optimize dm control. f/u endo  on vanco  monitor vanco level  monitor bmp and urine output    proteinuria/hematuria  renal us noted   p/c ratio <1g  likely sec to dm/htn  work up can be done outpatient  monitor    Acidosis  non AG  improved with oral bicarb  monitor serum Co2    htn  acceptable  on norvasc 10  ACEI on hold  start metoprolol 25 bid  lasix 40x1 tyday  monitor    hypocalcemia  likely vit d def  On  weekly vit d 50000x12 dose  monitor    OM  malignant otitis externa on CT  f/u ENT/ID  antibiotic per team

## 2021-07-02 NOTE — CONSULT NOTE ADULT - ASSESSMENT
61M with PMH uncontrolled DM, with neuropathy, cataract, glaucoma presented with malignant otitis externa and folliculitis.     Malignant otitis media:  SOB:  Folliculitis  Uncontrolled DM:  HTN      7/2:    Malignant otitis media: cont antibiotics: he has no ear pain now:    SOB: he is not that SOB at this time: his lungs are clear: chest xray suggests mild fluid overload: ct scan chest is pending: do doppler bilateral LE and 2 decho :  Folliculitis: On antibiotics  Uncontrolled DM: defer to eron cortez team   HTN: mildly increased:   start dvt prophylaxis  DW ACP and PMD

## 2021-07-02 NOTE — CONSULT NOTE ADULT - SUBJECTIVE AND OBJECTIVE BOX
07-02-21 @ 14:54    Patient is a 61y old  Male who presents with a chief complaint of otitis externa, uncontrolled DM (02 Jul 2021 12:54)      HPI:  Patient is a 61M with PMH DM2, HTN, HLD, diabetic neuropathy, glaucoma/cataract with poor vision, HCV s/p [place] (cleared per patient) presented with left ear pain. Patient initially presented to [facility] and transferred to [facility] for ENT evaluation. Patient states he has been experiencing pain in his left ear for the past 1 week, with waxy, crusty discharges. associated with decreased hearing in the left ear. also endorsed chills. no n/v/d. Patient also reports multiple painful bumps over his b/l armpit, abdominal/chest and left groin. Patient was recently moved from a shelter in [place] to [place] in March and has not been able to get his medications, since the pharmacy and PCP were both in [place] and he is unable to travel due to his eye sight. He has not been taking any medications for the past 3 months.   In ED, afebrile. vss. hypertensive. no respiratory distress. patient was evaluated and cultures send by ENT. CT head performed with concerning signs for OM. patient was started on Cipro and vanco.  (20 Jun 2021 10:13)    while in hospital he started feeling Some sob : and hence pulmonary called:  Pt has no underlying lung disease:  but he used to smoke a lot as wellas used to drink alot:  ex iv drug abuse      ?FOLLOWING PRESENT  [ x] Hx of PE/DVT, [x ] Hx COPD, x[ ] Hx of Asthma, [y ] Hx of Hospitalization, [ x]  Hx of BiPAP/CPAP use, [ x] Hx of JUVENCIO    Allergies    No Known Allergies    Intolerances        PAST MEDICAL & SURGICAL HISTORY:  DM (diabetes mellitus)  On Insulin    HTN (hypertension)  medicxal management    No significant past surgical history        FAMILY HISTORY:  No pertinent family history in first degree relatives        Social History: [> 30 pk years:   ] TOBACCO                  [ ex drinker:  ] ETOH                                 [ ex ivda:  ] IVDA/DRUGS    REVIEW OF SYSTEMS      General:	x    Skin/Breast:x  	  Ophthalmologic:x  	  ENMT:	x    Respiratory and Thorax: sob, no cough   	  Cardiovascular:	x    Gastrointestinal:	distended    Genitourinary:	x    Musculoskeletal:	x    Neurological:	x    Psychiatric:	x    Hematology/Lymphatics:	x    Endocrine:	x    Allergic/Immunologic:	x    MEDICATIONS  (STANDING):  albuterol/ipratropium for Nebulization 3 milliLiter(s) Nebulizer every 12 hours  amLODIPine   Tablet 10 milliGRAM(s) Oral daily  atorvastatin 20 milliGRAM(s) Oral at bedtime  chlorhexidine 4% Liquid 1 Application(s) Topical daily  dextrose 40% Gel 15 Gram(s) Oral once  dextrose 50% Injectable 25 Gram(s) IV Push once  dextrose 50% Injectable 12.5 Gram(s) IV Push once  dextrose 50% Injectable 25 Gram(s) IV Push once  doxycycline hyclate Capsule 100 milliGRAM(s) Oral every 12 hours  ergocalciferol 10380 Unit(s) Oral <User Schedule>  glucagon  Injectable 1 milliGRAM(s) IntraMuscular once  insulin glargine Injectable (LANTUS) 25 Unit(s) SubCutaneous at bedtime  insulin lispro (ADMELOG) corrective regimen sliding scale   SubCutaneous three times a day before meals  insulin lispro (ADMELOG) corrective regimen sliding scale   SubCutaneous at bedtime  insulin lispro Injectable (ADMELOG) 6 Unit(s) SubCutaneous three times a day before meals  metoprolol tartrate 25 milliGRAM(s) Oral two times a day  mupirocin 2% Ointment 1 Application(s) Topical two times a day  ofloxacin 0.3% Solution 10 Drop(s) Left Ear two times a day  tamsulosin 0.4 milliGRAM(s) Oral at bedtime  vancomycin  IVPB 1250 milliGRAM(s) IV Intermittent daily    MEDICATIONS  (PRN):  acetaminophen   Tablet .. 650 milliGRAM(s) Oral every 4 hours PRN Mild Pain (1 - 3)  oxyCODONE    IR 5 milliGRAM(s) Oral every 4 hours PRN Moderate Pain (4 - 6)  oxyCODONE    IR 10 milliGRAM(s) Oral every 4 hours PRN Severe Pain (7 - 10)  sodium chloride 0.9% lock flush 10 milliLiter(s) IV Push every 1 hour PRN Pre/post blood products, medications, blood draw, and to maintain line patency       Vital Signs Last 24 Hrs  T(C): 36.8 (02 Jul 2021 05:55), Max: 37.2 (01 Jul 2021 21:19)  T(F): 98.3 (02 Jul 2021 05:55), Max: 98.9 (01 Jul 2021 21:19)  HR: 89 (02 Jul 2021 07:55) (74 - 91)  BP: 161/86 (02 Jul 2021 05:55) (160/80 - 169/99)  BP(mean): --  RR: 17 (02 Jul 2021 05:55) (17 - 19)  SpO2: 95% (02 Jul 2021 07:55) (95% - 97%)Orthostatic VS          I&O's Summary    01 Jul 2021 07:01  -  02 Jul 2021 07:00  --------------------------------------------------------  IN: 420 mL / OUT: 240 mL / NET: 180 mL        Physical Exam:   GENERAL: NAD, well-groomed, well-developed  HEENT: [place]/   Atraumatic, Normocephalic  ENMT: No tonsillar erythema, exudates, or enlargement; Moist mucous membranes, Good dentition, No lesions  NECK: Supple, No JVD, Normal thyroid  CHEST/LUNG: Clear to auscultation bilaterally  CVS: Regular rate and rhythm; No murmurs, rubs, or gallops  GI: :distended abdomen:   NERVOUS SYSTEM:  Alert & Oriented X3  EXTREMITIES: Mild edema  LYMPH: No lymphadenopathy noted  SKIN: No rashes or lesions  ENDOCRINOLOGY: No Thyromegaly  PSYCH: Appropriate    Labs:  COVID-19 PCR: NotDetec (30 Jun 2021 18:31)  COVID-19 PCR: NotDetec (27 Jun 2021 14:38)                              11.3   8.03  )-----------( 245      ( 01 Jul 2021 06:57 )             34.3                         10.6   7.93  )-----------( 244      ( 30 Jun 2021 07:28 )             31.7                         10.9   7.28  )-----------( 233      ( 29 Jun 2021 08:29 )             32.1     07-02    144  |  110<H>  |  25<H>  ----------------------------<  226<H>  4.0   |  20<L>  |  1.55<H>  07-01    143  |  111<H>  |  23  ----------------------------<  180<H>  3.8   |  23  |  1.52<H>  06-30    139  |  106  |  22  ----------------------------<  181<H>  4.4   |  20<L>  |  1.51<H>  06-29    143  |  109<H>  |  17  ----------------------------<  99  3.5   |  24  |  1.39<H>    Ca    8.5      02 Jul 2021 01:30  Ca    8.5      01 Jul 2021 06:57  Phos  3.5     07-02  Mg     1.80     07-02    TPro  6.3  /  Alb  3.4  /  TBili  <0.2  /  DBili  x   /  AST  26  /  ALT  25  /  AlkPhos  58  07-02    CAPILLARY BLOOD GLUCOSE      POCT Blood Glucose.: 123 mg/dL (02 Jul 2021 12:11)  POCT Blood Glucose.: 129 mg/dL (02 Jul 2021 08:26)  POCT Blood Glucose.: 189 mg/dL (01 Jul 2021 22:04)  POCT Blood Glucose.: 225 mg/dL (01 Jul 2021 17:27)    LIVER FUNCTIONS - ( 02 Jul 2021 01:30 )  Alb: 3.4 g/dL / Pro: 6.3 g/dL / ALK PHOS: 58 U/L / ALT: 25 U/L / AST: 26 U/L / GGT: x               D DImer  Serum Pro-Brain Natriuretic Peptide: 491 pg/mL (07-02 @ 01:27)      Studies  Chest X-RAY  CT SCAN Chest   CT Abdomen  Venous Dopplers: LE:   Others    < from: Xray Chest 1 View- PORTABLE-Urgent (Xray Chest 1 View- PORTABLE-Urgent .) (07.02.21 @ 00:30) >  PRELIMINARY REPORT******            EXAM:  XR CHEST PORTABLE URGENT 1V        PROCEDURE DATE:  Jul 2 2021     ******PRELIMINARY REPORT******    ******PRELIMINARY REPORT******            INTERPRETATION:  lm:  unchanged small b/l pleural effusions. mild pulmonary edema.    d/w PA Emanuel          ******PRELIMINARY REPORT******    ******PRELIMINARY REPORT******          ELLIE HILTON MD; Resident Radiology    < end of copied text >      < from: Xray Chest 1 View AP/PA (07.01.21 @ 13:35) >      INTERPRETATION:  TIME OF EXAM: July 1, 2021 at 1:35 PM.    CLINICAL INFORMATION: Acute kidney injury. Worsening shortness of breath.    COMPARISON:  June 19, 2021.    TECHNIQUE:   AP Portable chest x-ray.    INTERPRETATION:    Heart size cannot be accurately evaluated on this image.  There is a left upper extremity PICC line with tip in the SVC.  There is mild pulmonary vascular redistribution/congestion.  There are new bilateral small pleural effusions with likely associated passive atelectasis. The right-sided effusion may be loculated or extending into the major fissure.  No pneumothorax seen.  No acute bony abnormality.      IMPRESSION:  Mild pulmonary vascular redistribution/congestion.    New bilateral small pleural effusions with likely associated passive atelectasis. The right-sided effusion may be loculated or extending into the major fissure.                MARTHA ESCAMILLA MD; Attending Radiologist  This document has been electronically signed. Jul 1 2021  4:23PM    < end of copied text >    < from: CT Abdomen and Pelvis w/ IV Cont (03.21.21 @ 09:12) >  CONTRAST/COMPLICATIONS:  IV Contrast: Omnipaque 350  96 cc administered 4 cc discarded  Oral Contrast: NONE  Complications: None reported at time of study completion    PROCEDURE:  CT of the Abdomen and Pelvis was performed.  Sagittal and coronal reformats were performed.    FINDINGS:  LOWER CHEST: Basilar atelectasis.    LIVER: Within normal limits.  BILE DUCTS: Normal caliber.  GALLBLADDER: Within normal limits.  SPLEEN: Within normal limits.  PANCREAS: Within normal limits.  ADRENALS: 1 cm indeterminate left adrenal nodule.  KIDNEYS/URETERS: Nonspecific perinephric stranding and fluid. No hydronephrosis.    BLADDER: Within normal limits.  REPRODUCTIVE ORGANS: Prostate within normal limits.    BOWEL: No bowel obstruction. Appendix is normal. Colonic diverticulosis.  PERITONEUM: No ascites.  VESSELS: Within normal limits.  RETROPERITONEUM/LYMPH NODES: No lymphadenopathy.  ABDOMINAL WALL: Fat-containing umbilical hernia. Bilateral fat-containing inguinal hernias.  BONES: Mild degenerative changes.    IMPRESSION:  Colonic diverticulosis without CT evidence ofacute diverticulitis.  No abscess identified.              CHERIE TAPIA MD; Attending Radiologist  This document has been electronically signed. Mar 21 2021  9:39AM    < end of copied text >

## 2021-07-02 NOTE — PROGRESS NOTE ADULT - ASSESSMENT
Patient is a 61M with PMH DM2, HTN, HLD, diabetic neuropathy, glaucoma/cataract with poor vision, HCV s/p [place] (cleared per patient) presented with left ear pain.  CT auditory ear canal shows:    Findings concerning for malignant left-sided otitis externa with bony erosive changes involving the external auditory canal. Superimposed osteomyelitis within the bony external auditory canal is a consideration given the patient's clinical information. An external auditory canal cholesteatoma cannot be excluded. A neoplastic process such as squamous cell carcinoma is also difficult to exclude.    2. Additional imaging findings compatible with left-sided otitis media and left-sided mastoiditis. No gross ossicular chain erosion or destructive changes. No evidence of venous sinus thrombosis.    3. Unremarkable right-sided temporal bone CT examination apart from cerumen in the external auditory canal.    Pt also c/o new painful bumps, draining pus, in b/l armpits, b/l groin and R perineal area.    ID consulted for further abx management.     Left sided otitis externa:    - Cont present abx, f/u L ear cultures.  Concern for superimposed OM of bony external auditory canal.   Cannot exclude cholesteatoma or squamous cell carcinoma.    - ENT f/u appreciated.  Continued outpt f/u to address further need for cultures, imaging, biopsy.      - Current ear cultures growing MRSA.  IV cipro d/c'd.  Cont IV vanco, and maintain trough between 15-20.     -  Cont abx ear gtt as per ENT    - Plan for  long term IV abx.  f/u blood cx x 2.  ESR, CRP    - Weekly cbc, sma-7, esr, crp, vanco trough.       r/o Hidradenitis suppurativa:    - c/o new nodular/pustular lesions in b/l armpits, b/l groin and perineum for past 1-3 weeks. States lesions are painful and drain pus.    - R Derm eval appreciated, ?hidradenitis suppurativa vs staph folliculitis.  Pt started on doxycycline and mupirocin for staph decolonization.      - Cont IV abx for now.  Cont to monitor and evaluate for need for further I&D - lesions have improved      Pulm edema:    - cxr 7/2 with small b/l pleural effusions and congestion    - Pt started on diuresis.  Planned for echo.    - Pulm and Cardiology f/u appreciated       * cont IV vancomycin x 6 week course (6/20 through 7/31), maintain trough between 15-20.  Monitor trough levels  * Check weekly cbc, sma-7, esr, crp, vanco trough.            Mena Osullivan  [phone]

## 2021-07-03 LAB
ALBUMIN SERPL ELPH-MCNC: 3.2 G/DL — LOW (ref 3.3–5)
ALP SERPL-CCNC: 60 U/L — SIGNIFICANT CHANGE UP (ref 40–120)
ALT FLD-CCNC: 23 U/L — SIGNIFICANT CHANGE UP (ref 4–41)
ANION GAP SERPL CALC-SCNC: 12 MMOL/L — SIGNIFICANT CHANGE UP (ref 7–14)
AST SERPL-CCNC: 20 U/L — SIGNIFICANT CHANGE UP (ref 4–40)
BASOPHILS # BLD AUTO: 0.04 K/UL — SIGNIFICANT CHANGE UP (ref 0–0.2)
BASOPHILS NFR BLD AUTO: 0.5 % — SIGNIFICANT CHANGE UP (ref 0–2)
BILIRUB SERPL-MCNC: 0.2 MG/DL — SIGNIFICANT CHANGE UP (ref 0.2–1.2)
BUN SERPL-MCNC: 24 MG/DL — HIGH (ref 7–23)
CALCIUM SERPL-MCNC: 8.8 MG/DL — SIGNIFICANT CHANGE UP (ref 8.4–10.5)
CHLORIDE SERPL-SCNC: 111 MMOL/L — HIGH (ref 98–107)
CO2 SERPL-SCNC: 20 MMOL/L — LOW (ref 22–31)
CREAT SERPL-MCNC: 1.57 MG/DL — HIGH (ref 0.5–1.3)
EOSINOPHIL # BLD AUTO: 0.21 K/UL — SIGNIFICANT CHANGE UP (ref 0–0.5)
EOSINOPHIL NFR BLD AUTO: 2.6 % — SIGNIFICANT CHANGE UP (ref 0–6)
GLUCOSE BLDC GLUCOMTR-MCNC: 107 MG/DL — HIGH (ref 70–99)
GLUCOSE BLDC GLUCOMTR-MCNC: 150 MG/DL — HIGH (ref 70–99)
GLUCOSE BLDC GLUCOMTR-MCNC: 173 MG/DL — HIGH (ref 70–99)
GLUCOSE BLDC GLUCOMTR-MCNC: 245 MG/DL — HIGH (ref 70–99)
GLUCOSE SERPL-MCNC: 165 MG/DL — HIGH (ref 70–99)
HCT VFR BLD CALC: 31.7 % — LOW (ref 39–50)
HGB BLD-MCNC: 10.4 G/DL — LOW (ref 13–17)
IANC: 4.76 K/UL — SIGNIFICANT CHANGE UP (ref 1.5–8.5)
IMM GRANULOCYTES NFR BLD AUTO: 0.3 % — SIGNIFICANT CHANGE UP (ref 0–1.5)
LYMPHOCYTES # BLD AUTO: 2.09 K/UL — SIGNIFICANT CHANGE UP (ref 1–3.3)
LYMPHOCYTES # BLD AUTO: 26.2 % — SIGNIFICANT CHANGE UP (ref 13–44)
MAGNESIUM SERPL-MCNC: 1.7 MG/DL — SIGNIFICANT CHANGE UP (ref 1.6–2.6)
MCHC RBC-ENTMCNC: 31.1 PG — SIGNIFICANT CHANGE UP (ref 27–34)
MCHC RBC-ENTMCNC: 32.8 GM/DL — SIGNIFICANT CHANGE UP (ref 32–36)
MCV RBC AUTO: 94.9 FL — SIGNIFICANT CHANGE UP (ref 80–100)
MONOCYTES # BLD AUTO: 0.86 K/UL — SIGNIFICANT CHANGE UP (ref 0–0.9)
MONOCYTES NFR BLD AUTO: 10.8 % — SIGNIFICANT CHANGE UP (ref 2–14)
NEUTROPHILS # BLD AUTO: 4.76 K/UL — SIGNIFICANT CHANGE UP (ref 1.8–7.4)
NEUTROPHILS NFR BLD AUTO: 59.6 % — SIGNIFICANT CHANGE UP (ref 43–77)
NRBC # BLD: 0 /100 WBCS — SIGNIFICANT CHANGE UP
NRBC # FLD: 0 K/UL — SIGNIFICANT CHANGE UP
PHOSPHATE SERPL-MCNC: 3.9 MG/DL — SIGNIFICANT CHANGE UP (ref 2.5–4.5)
PLATELET # BLD AUTO: 235 K/UL — SIGNIFICANT CHANGE UP (ref 150–400)
POTASSIUM SERPL-MCNC: 3.8 MMOL/L — SIGNIFICANT CHANGE UP (ref 3.5–5.3)
POTASSIUM SERPL-SCNC: 3.8 MMOL/L — SIGNIFICANT CHANGE UP (ref 3.5–5.3)
PROT SERPL-MCNC: 6.8 G/DL — SIGNIFICANT CHANGE UP (ref 6–8.3)
RBC # BLD: 3.34 M/UL — LOW (ref 4.2–5.8)
RBC # FLD: 12.5 % — SIGNIFICANT CHANGE UP (ref 10.3–14.5)
SODIUM SERPL-SCNC: 143 MMOL/L — SIGNIFICANT CHANGE UP (ref 135–145)
VANCOMYCIN TROUGH SERPL-MCNC: 16.2 UG/ML — SIGNIFICANT CHANGE UP (ref 10–20)
WBC # BLD: 7.98 K/UL — SIGNIFICANT CHANGE UP (ref 3.8–10.5)
WBC # FLD AUTO: 7.98 K/UL — SIGNIFICANT CHANGE UP (ref 3.8–10.5)

## 2021-07-03 PROCEDURE — 99232 SBSQ HOSP IP/OBS MODERATE 35: CPT

## 2021-07-03 RX ORDER — HEPARIN SODIUM 5000 [USP'U]/ML
5000 INJECTION INTRAVENOUS; SUBCUTANEOUS EVERY 12 HOURS
Refills: 0 | Status: DISCONTINUED | OUTPATIENT
Start: 2021-07-03 | End: 2021-07-09

## 2021-07-03 RX ORDER — METOPROLOL TARTRATE 50 MG
100 TABLET ORAL DAILY
Refills: 0 | Status: DISCONTINUED | OUTPATIENT
Start: 2021-07-03 | End: 2021-07-09

## 2021-07-03 RX ADMIN — Medication 1: at 18:21

## 2021-07-03 RX ADMIN — Medication 6 UNIT(S): at 09:01

## 2021-07-03 RX ADMIN — Medication 10 DROP(S): at 05:37

## 2021-07-03 RX ADMIN — Medication 40 MILLIGRAM(S): at 05:36

## 2021-07-03 RX ADMIN — MUPIROCIN 1 APPLICATION(S): 20 OINTMENT TOPICAL at 05:37

## 2021-07-03 RX ADMIN — INSULIN GLARGINE 25 UNIT(S): 100 INJECTION, SOLUTION SUBCUTANEOUS at 22:16

## 2021-07-03 RX ADMIN — AMLODIPINE BESYLATE 10 MILLIGRAM(S): 2.5 TABLET ORAL at 05:37

## 2021-07-03 RX ADMIN — Medication 166.67 MILLIGRAM(S): at 15:39

## 2021-07-03 RX ADMIN — CHLORHEXIDINE GLUCONATE 1 APPLICATION(S): 213 SOLUTION TOPICAL at 13:23

## 2021-07-03 RX ADMIN — Medication 10 DROP(S): at 18:25

## 2021-07-03 RX ADMIN — ATORVASTATIN CALCIUM 20 MILLIGRAM(S): 80 TABLET, FILM COATED ORAL at 22:16

## 2021-07-03 RX ADMIN — Medication 6 UNIT(S): at 13:23

## 2021-07-03 RX ADMIN — TAMSULOSIN HYDROCHLORIDE 0.4 MILLIGRAM(S): 0.4 CAPSULE ORAL at 22:16

## 2021-07-03 RX ADMIN — Medication 3 MILLILITER(S): at 22:49

## 2021-07-03 RX ADMIN — HEPARIN SODIUM 5000 UNIT(S): 5000 INJECTION INTRAVENOUS; SUBCUTANEOUS at 18:21

## 2021-07-03 RX ADMIN — Medication 100 MILLIGRAM(S): at 18:22

## 2021-07-03 RX ADMIN — Medication 100 MILLIGRAM(S): at 06:03

## 2021-07-03 RX ADMIN — MUPIROCIN 1 APPLICATION(S): 20 OINTMENT TOPICAL at 18:22

## 2021-07-03 RX ADMIN — Medication 3 MILLILITER(S): at 11:03

## 2021-07-03 RX ADMIN — Medication 6 UNIT(S): at 18:21

## 2021-07-03 RX ADMIN — Medication 25 MILLIGRAM(S): at 05:37

## 2021-07-03 NOTE — PROVIDER CONTACT NOTE (OTHER) - DATE AND TIME:
01-Jul-2021 23:05
Script eprescribed to pharmacy   Patient stated she did not receive the refill to [facility] and it may have gone to her old address. I informed her that I sent in the new script and if she receives the mail order one she should send package back as to avoid insurance coverage issues.   
23-Jun-2021 22:45
03-Jul-2021 07:07

## 2021-07-03 NOTE — PROGRESS NOTE ADULT - SUBJECTIVE AND OBJECTIVE BOX
Patient is a 61y old  Male who presents with a chief complaint of otitis externa, uncontrolled DM (2021 15:00)    DATE OF SERVICE: 21 @ 17:50  HPI:  Afebrile.   SOB better, does have some orthopnea.  Abdominal distension noted    PAST MEDICAL & SURGICAL HISTORY:  DM (diabetes mellitus)  On Insulin    HTN (hypertension)  medicxal managenent    No significant past surgical history        Review of Systems:   CONSTITUTIONAL: No fever, weight loss, or fatigue  EYES: No eye pain, visual disturbances, or discharge  ENMT:  No difficulty hearing, tinnitus, vertigo; No sinus or throat pain.   NECK: No pain or stiffness  BREASTS: No pain, masses, or nipple discharge  RESPIRATORY: No cough, wheezing, chills or hemoptysis; +shortness of breath  CARDIOVASCULAR: No chest pain, palpitations, dizziness, or leg swelling  GASTROINTESTINAL: No abdominal or epigastric pain. No nausea, vomiting, or hematemesis; No diarrhea or constipation. No melena or hematochezia.  GENITOURINARY: No dysuria, frequency, hematuria, or incontinence  NEUROLOGICAL: No headaches, memory loss, loss of strength, numbness, or tremors  SKIN: No itching, burning, rashes, or lesions   LYMPH NODES: No enlarged glands  ENDOCRINE: No heat or cold intolerance; No hair loss  MUSCULOSKELETAL: No joint pain or swelling; No muscle, back, or extremity pain  PSYCHIATRIC: No depression, anxiety, mood swings, or difficulty sleeping  HEME/LYMPH: No easy bruising, or bleeding gums  ALLERY AND IMMUNOLOGIC: No hives or eczema    Allergies    No Known Allergies    Intolerances        Social History:     FAMILY HISTORY:  No pertinent family history in first degree relatives        MEDICATIONS  (STANDING):  amLODIPine   Tablet 10 milliGRAM(s) Oral daily  atorvastatin 20 milliGRAM(s) Oral at bedtime  ciprofloxacin   IVPB 400 milliGRAM(s) IV Intermittent every 12 hours  dextrose 40% Gel 15 Gram(s) Oral once  dextrose 50% Injectable 25 Gram(s) IV Push once  dextrose 50% Injectable 12.5 Gram(s) IV Push once  dextrose 50% Injectable 25 Gram(s) IV Push once  glucagon  Injectable 1 milliGRAM(s) IntraMuscular once  insulin glargine Injectable (LANTUS) 20 Unit(s) SubCutaneous at bedtime  insulin lispro (ADMELOG) corrective regimen sliding scale   SubCutaneous three times a day before meals  insulin lispro (ADMELOG) corrective regimen sliding scale   SubCutaneous at bedtime  insulin lispro Injectable (ADMELOG) 7 Unit(s) SubCutaneous three times a day before meals  ofloxacin 0.3% Solution 10 Drop(s) Left Ear two times a day  sodium chloride 0.9%. 1000 milliLiter(s) (100 mL/Hr) IV Continuous <Continuous>  tamsulosin 0.4 milliGRAM(s) Oral at bedtime  vancomycin  IVPB 1000 milliGRAM(s) IV Intermittent every 12 hours    MEDICATIONS  (PRN):  acetaminophen   Tablet .. 650 milliGRAM(s) Oral every 4 hours PRN Mild Pain (1 - 3)  oxyCODONE    IR 5 milliGRAM(s) Oral every 4 hours PRN Moderate Pain (4 - 6)  oxyCODONE    IR 10 milliGRAM(s) Oral every 4 hours PRN Severe Pain (7 - 10)        CAPILLARY BLOOD GLUCOSE      POCT Blood Glucose.: 272 mg/dL (2021 22:33)  POCT Blood Glucose.: 284 mg/dL (2021 17:48)  POCT Blood Glucose.: 274 mg/dL (2021 12:09)  POCT Blood Glucose.: 322 mg/dL (2021 08:11)    I&O's Summary    2021 07:01  -  2021 23:11  --------------------------------------------------------  IN: 0 mL / OUT: 525 mL / NET: -525 mL        PHYSICAL EXAM:  Vital Signs Last 24 Hrs  T(C): 37 (2021 22:11), Max: 37.1 (2021 05:31)  T(F): 98.6 (2021 22:11), Max: 98.7 (2021 05:31)  HR: 91 (2021 22:11) (86 - 91)  BP: 134/75 (2021 22:11) (134/75 - 157/72)  BP(mean): --  RR: 16 (2021 22:11) (16 - 16)  SpO2: 96% (2021 22:11) (96% - 100%)    GENERAL: NAD, well-developed  HEAD:  Atraumatic, Normocephalic  EYES: EOMI, PERRLA, conjunctiva and sclera clear  Ears: Left ear tender, cotton plug noted  NECK: Supple, No JVD  CHEST/LUNG: Clear to auscultation bilaterally; No wheeze  HEART: Regular rate and rhythm; No murmurs, rubs, or gallops  ABDOMEN: Soft, Nontender, distended; Bowel sounds present  EXTREMITIES:  2+ Peripheral Pulses, No clubbing, cyanosis, or edema  PSYCH: AAOx3  NEUROLOGY: non-focal  SKIN: No rashes or lesions    LABS:                        11.8   7.89  )-----------( 208      ( 2021 06:35 )             35.8     06-21    136  |  102  |  20  ----------------------------<  329<H>  4.0   |  23  |  1.60<H>    Ca    8.4      2021 06:35  Phos  3.7     06-21  Mg     1.9     06-21            Urinalysis Basic - ( 2021 23:18 )    Color: Yellow / Appearance: Clear / S.015 / pH: x  Gluc: x / Ketone: Negative  / Bili: Negative / Urobili: Negative mg/dL   Blood: x / Protein: 500 mg/dL / Nitrite: Negative   Leuk Esterase: Negative / RBC: 3-5 /HPF / WBC 0-2   Sq Epi: x / Non Sq Epi: Occasional / Bacteria: Occasional        RADIOLOGY & ADDITIONAL TESTS:    Imaging Personally Reviewed:    Consultant(s) Notes Reviewed:      Care Discussed with Consultants/Other Providers:

## 2021-07-03 NOTE — PROVIDER CONTACT NOTE (OTHER) - BACKGROUND
Dx: pleural effusion, left otitis media
Patient admitted for Left malignant otitis externa. H/o DM and HTN.
Left otitis media

## 2021-07-03 NOTE — PROGRESS NOTE ADULT - ASSESSMENT
61M with PMH DM2, HTN, HLD, diabetic neuropathy, glaucoma/cataract with poor vision, HCV s/p [place] (cleared per patient) presented with left ear pain. Patient initially presented to [facility] and transferred to [facility] for ENT evaluation. Consulted for uncontrolled T2DM with a1c 14.3%    1. Uncontrolled type 2 diabetes mellitus with hyperglycemia  T2DM uncontrolled a1c of 14.3 with barriers of poor vision (unable to see insulin units), living in shelter prior to admission    Inpatient BG target 100-180 mg/dl  At goal at present time  Continue Lantus to 25 units SQ qHS   Continue Admelog 9 units SQ TID before meals (Hold if NPO/not eating meal)   Continue Admelog low dose correctional scales before meals and bedtime as currently ordered  Check BG before meals and bedtime  Consistent carbohydrate diet, Nutrition consult done    Discharge Plan:  With A1c 14.3%, patient requires insulin for discharge. Patient UNABLE to successfully self-inject insulin due to poor vision  Per care coordination notes, possible discharge to LTC facility. This would be optimal so that insulin can be administered to patient by staff at facility  Therefore, recommend basal/bolus insulin with dose TBD  Contact endocrine to confirm dosing on day of discharge  Recommend PCP, optho, podiatry and endocrine followup as outpatient  [facility] ([facility] [place])   [address] [facility] [phone].    2. Essential hypertension  BP target less than 130/80  Managment per primary team    3. HLD  Recommend to check fasting lipid panel, goal LDL less than 70  Continue Atorvastatin 20 mg daily    4. Adrenal nodule  Adrenal nodule incidentally found  Aldosterone 3.0, no need to check renin activity  Plasma metanephrine (normal) and 24 hour urine cortisol (not elevated)  Low suspicion for hypersecretion of adrenal hormones      5. Vitamin D deficiency  Vitamin D 25 OH level noted to be 7.7  Recommend Ergocalciferol 50,000 units PO weekly x 8-12 weeks  Corrected calcium acceptable. Vitamin D deficiency likely contributing to low normal Ca    MYA Lewis-BC  Nurse Practitioner   Division of Endocrinology  Pager: ORAL pager [phone]    If out of hospital/unavailable when paged, please note: patient will be cared for by another provider on the endocrine service.  Please call the endocrine answering service for assistance to reach covering provider ([phone]). For non-urgent matters, please email [email or web address] for assistance.      61M with PMH DM2, HTN, HLD, diabetic neuropathy, glaucoma/cataract with poor vision, HCV s/p [place] (cleared per patient) presented with left ear pain. Patient initially presented to [facility] and transferred to [facility] for ENT evaluation. Consulted for uncontrolled T2DM with a1c 14.3%    1. Uncontrolled type 2 diabetes mellitus with hyperglycemia  T2DM uncontrolled a1c of 14.3 with barriers of poor vision (unable to see insulin units), living in shelter prior to admission    Inpatient BG target 100-180 mg/dl  At goal at present time  Continue Lantus to 25 units SQ qHS   Continue Admelog 6 units SQ TID before meals (Hold if NPO/not eating meal)   Continue Admelog low dose correctional scales before meals and bedtime as currently ordered  Check BG before meals and bedtime  Consistent carbohydrate diet, Nutrition consult done    Discharge Plan:  With A1c 14.3%, patient requires insulin for discharge. Patient UNABLE to successfully self-inject insulin due to poor vision  Per care coordination notes, possible discharge to LTC facility. This would be optimal so that insulin can be administered to patient by staff at facility  Therefore, recommend basal/bolus insulin with dose TBD  Contact endocrine to confirm dosing on day of discharge  Recommend PCP, optho, podiatry and endocrine followup as outpatient  [facility] ([facility] [place])   [address] [facility] [phone].    2. Essential hypertension  BP target less than 130/80  Managment per primary team    3. HLD  Recommend to check fasting lipid panel, goal LDL less than 70  Continue Atorvastatin 20 mg daily    4. Adrenal nodule  Adrenal nodule incidentally found  Aldosterone 3.0, no need to check renin activity  Plasma metanephrine (normal) and 24 hour urine cortisol (not elevated)  Low suspicion for hypersecretion of adrenal hormones      5. Vitamin D deficiency  Vitamin D 25 OH level noted to be 7.7  Recommend Ergocalciferol 50,000 units PO weekly x 8-12 weeks  Corrected calcium acceptable. Vitamin D deficiency likely contributing to low normal Ca    MYA Lewis-BC  Nurse Practitioner   Division of Endocrinology  Pager: ORAL pager [phone]    If out of hospital/unavailable when paged, please note: patient will be cared for by another provider on the endocrine service.  Please call the endocrine answering service for assistance to reach covering provider ([phone]). For non-urgent matters, please email [email or web address] for assistance.

## 2021-07-03 NOTE — PROGRESS NOTE ADULT - ASSESSMENT
61M with PMH uncontrolled DM, with neuropathy, cataract, glaucoma presented with malignant otitis externa and folliculitis.     Malignant otitis media:  SOB:  Folliculitis  Uncontrolled DM:  HTN      7/2:    Malignant otitis media: cont antibiotics: he has no ear pain now:    SOB: he is not that SOB at this time: his lungs are clear: chest xray suggests mild fluid overload: ct scan chest is pending: do doppler bilateral LE and 2 decho :  Folliculitis: On antibiotics  Uncontrolled DM: defer to p [facility] team   HTN: mildly increased:   start dvt prophylaxis  DW ACP and PMD     7/3:    Malignant otitis media: cont antibiotics: he has no ear pain now:    SOB: he is not that SOB at this time: his lungs are clear: ct scan chest is suggestive of pulm edema: with bl effusions and PVC's: to cont lasix   Folliculitis: On antibiotics  Uncontrolled DM: defer to p [facility] team   HTN: mildly increased:   start dvt prophylaxis  DW ACP

## 2021-07-03 NOTE — PROGRESS NOTE ADULT - SUBJECTIVE AND OBJECTIVE BOX
Chief Complaint: DM 2    History: Glucose trending at goal. No events reports.     MEDICATIONS  (STANDING):  albuterol/ipratropium for Nebulization 3 milliLiter(s) Nebulizer every 12 hours  amLODIPine   Tablet 10 milliGRAM(s) Oral daily  atorvastatin 20 milliGRAM(s) Oral at bedtime  chlorhexidine 4% Liquid 1 Application(s) Topical daily  dextrose 40% Gel 15 Gram(s) Oral once  dextrose 50% Injectable 25 Gram(s) IV Push once  dextrose 50% Injectable 12.5 Gram(s) IV Push once  dextrose 50% Injectable 25 Gram(s) IV Push once  doxycycline hyclate Capsule 100 milliGRAM(s) Oral every 12 hours  ergocalciferol 87999 Unit(s) Oral <User Schedule>  furosemide   Injectable 40 milliGRAM(s) IV Push daily  glucagon  Injectable 1 milliGRAM(s) IntraMuscular once  heparin   Injectable 5000 Unit(s) SubCutaneous every 12 hours  insulin glargine Injectable (LANTUS) 25 Unit(s) SubCutaneous at bedtime  insulin lispro (ADMELOG) corrective regimen sliding scale   SubCutaneous three times a day before meals  insulin lispro (ADMELOG) corrective regimen sliding scale   SubCutaneous at bedtime  insulin lispro Injectable (ADMELOG) 6 Unit(s) SubCutaneous three times a day before meals  metoprolol succinate  milliGRAM(s) Oral daily  mupirocin 2% Ointment 1 Application(s) Topical two times a day  ofloxacin 0.3% Solution 10 Drop(s) Left Ear two times a day  tamsulosin 0.4 milliGRAM(s) Oral at bedtime  vancomycin  IVPB 1250 milliGRAM(s) IV Intermittent daily      No Known Allergies    Review of Systems:  Cardiovascular: No chest pain  Respiratory: No SOB  GI: No nausea, vomiting  Endocrine: no hypoglycemia    PHYSICAL EXAM:  Vital Signs Last 24 Hrs  T(C): 36.7 (03 Jul 2021 07:05), Max: 37 (02 Jul 2021 17:18)  T(F): 98 (03 Jul 2021 07:05), Max: 98.6 (02 Jul 2021 17:18)  HR: 83 (03 Jul 2021 07:05) (83 - 88)  BP: 158/82 (03 Jul 2021 07:05) (158/80 - 169/93)  BP(mean): --  RR: 18 (03 Jul 2021 07:05) (18 - 18)  SpO2: 96% (03 Jul 2021 07:05) (95% - 96%)  GENERAL: NAD  EYES: No proptosis, no lid lag, anicteric  HEENT:  Atraumatic, Normocephalic, moist mucous membranes  RESPIRATORY: unlabored respirations   PSYCH: Alert and oriented x 3    CAPILLARY BLOOD GLUCOSE      POCT Blood Glucose.: 107 mg/dL (03 Jul 2021 12:33)  POCT Blood Glucose.: 150 mg/dL (03 Jul 2021 08:36)  POCT Blood Glucose.: 137 mg/dL (02 Jul 2021 21:31)  POCT Blood Glucose.: 153 mg/dL (02 Jul 2021 17:36)    07-03    143  |  111<H>  |  24<H>  ----------------------------<  165<H>  3.8   |  20<L>  |  1.57<H>    Ca    8.8      03 Jul 2021 07:25  Phos  3.9     07-03  Mg     1.70     07-03    TPro  6.8  /  Alb  3.2<L>  /  TBili  0.2  /  DBili  x   /  AST  20  /  ALT  23  /  AlkPhos  60  07-03      A1C with Estimated Average Glucose Result: 14.3 % (06-20-21 @ 03:47)  A1C with Estimated Average Glucose Result: 11.8 % (03-22-21 @ 14:20)    Diet, Regular:   Consistent Carbohydrate Evening Snack (CSTCHOSN) (06-20-21 @ 09:02)

## 2021-07-03 NOTE — PROGRESS NOTE ADULT - ASSESSMENT
Patient seen and examined, agree with above assessment and plan as transcribed above.    - awaiting echo    Cale Mccann MD, [facility]  BEEPER [phone]

## 2021-07-03 NOTE — PROVIDER CONTACT NOTE (OTHER) - ASSESSMENT
Patient's /88, other VS stable. Patient asymptomatic.
pt asymptomatic. no distress noted. VS stable.
Patient alert, appears in no acute distress. NO c/o chest pain. VS obtained. Lung sounds CTA.

## 2021-07-03 NOTE — PROGRESS NOTE ADULT - SUBJECTIVE AND OBJECTIVE BOX
pt seen and examined, no complaints, ROS - .      acetaminophen   Tablet .. 650 milliGRAM(s) Oral every 4 hours PRN  albuterol/ipratropium for Nebulization 3 milliLiter(s) Nebulizer every 12 hours  amLODIPine   Tablet 10 milliGRAM(s) Oral daily  atorvastatin 20 milliGRAM(s) Oral at bedtime  chlorhexidine 4% Liquid 1 Application(s) Topical daily  dextrose 40% Gel 15 Gram(s) Oral once  dextrose 50% Injectable 25 Gram(s) IV Push once  dextrose 50% Injectable 12.5 Gram(s) IV Push once  dextrose 50% Injectable 25 Gram(s) IV Push once  doxycycline hyclate Capsule 100 milliGRAM(s) Oral every 12 hours  ergocalciferol 99120 Unit(s) Oral <User Schedule>  furosemide   Injectable 40 milliGRAM(s) IV Push daily  glucagon  Injectable 1 milliGRAM(s) IntraMuscular once  insulin glargine Injectable (LANTUS) 25 Unit(s) SubCutaneous at bedtime  insulin lispro (ADMELOG) corrective regimen sliding scale   SubCutaneous three times a day before meals  insulin lispro (ADMELOG) corrective regimen sliding scale   SubCutaneous at bedtime  insulin lispro Injectable (ADMELOG) 6 Unit(s) SubCutaneous three times a day before meals  metoprolol tartrate 25 milliGRAM(s) Oral two times a day  mupirocin 2% Ointment 1 Application(s) Topical two times a day  ofloxacin 0.3% Solution 10 Drop(s) Left Ear two times a day  oxyCODONE    IR 5 milliGRAM(s) Oral every 4 hours PRN  oxyCODONE    IR 10 milliGRAM(s) Oral every 4 hours PRN  sodium chloride 0.9% lock flush 10 milliLiter(s) IV Push every 1 hour PRN  tamsulosin 0.4 milliGRAM(s) Oral at bedtime  vancomycin  IVPB 1250 milliGRAM(s) IV Intermittent daily                            10.4   7.98  )-----------( 235      ( 03 Jul 2021 07:25 )             31.7       Hemoglobin: 10.4 g/dL (07-03 @ 07:25)  Hemoglobin: 11.1 g/dL (07-02 @ 16:09)  Hemoglobin: 11.3 g/dL (07-01 @ 06:57)  Hemoglobin: 10.6 g/dL (06-30 @ 07:28)  Hemoglobin: 10.9 g/dL (06-29 @ 08:29)      07-03    143  |  111<H>  |  24<H>  ----------------------------<  165<H>  3.8   |  20<L>  |  1.57<H>    Ca    8.8      03 Jul 2021 07:25  Phos  3.9     07-03  Mg     1.70     07-03    TPro  6.8  /  Alb  3.2<L>  /  TBili  0.2  /  DBili  x   /  AST  20  /  ALT  23  /  AlkPhos  60  07-03    Creatinine Trend: 1.57<--, 1.62<--, 1.55<--, 1.52<--, 1.51<--, 1.39<--    COAGS:           T(C): 36.7 (07-03-21 @ 07:05), Max: 37 (07-02-21 @ 17:18)  HR: 83 (07-03-21 @ 07:05) (83 - 88)  BP: 158/82 (07-03-21 @ 07:05) (158/80 - 169/93)  RR: 18 (07-03-21 @ 07:05) (18 - 18)  SpO2: 96% (07-03-21 @ 07:05) (95% - 96%)  Wt(kg): --    I&O's Summary    02 Jul 2021 07:01  -  03 Jul 2021 07:00  --------------------------------------------------------  IN: 500 mL / OUT: 3 mL / NET: 497 mL    HEENT:   Normal oral mucosa, PERRL, EOMI	  Lymphatic: No lymphadenopathy , + edema in LE bilaterally   Cardiovascular: Normal S1 S2, No JVD, No murmurs , Peripheral pulses palpable 2+ bilaterally  Respiratory: Lungs clear to auscultation, normal effort 	  Gastrointestinal:  Soft, Non-tender, + BS	  Skin: No rashes, No ecchymoses, No cyanosis, warm to touch  Musculoskeletal: Normal range of motion, normal strength  Psychiatry:  Mood & affect appropriate    ASSESSMENT/PLAN: 61M with PMH DM2, HTN, HLD, diabetic neuropathy, glaucoma/cataract with poor vision, HCV s/p Cannon (cleared per patient) who is being seen for dyspnea.    -pt. with pulmonary edema on CXR and LE edema  - cont diuresis to keep net neg IV lasix   - cont BB/ Norvasc , BP stable   -check TTE to evaluate LV function   -ABx per primary team

## 2021-07-03 NOTE — PROGRESS NOTE ADULT - SUBJECTIVE AND OBJECTIVE BOX
Date of Service: 07-03-21 @ 13:18    Patient is a 61y old  Male who presents with a chief complaint of otitis externa, uncontrolled DM (03 Jul 2021 10:16)      Any change in ROS: Ptis doing ok : no SOB :   on rooma ir     MEDICATIONS  (STANDING):  albuterol/ipratropium for Nebulization 3 milliLiter(s) Nebulizer every 12 hours  amLODIPine   Tablet 10 milliGRAM(s) Oral daily  atorvastatin 20 milliGRAM(s) Oral at bedtime  chlorhexidine 4% Liquid 1 Application(s) Topical daily  dextrose 40% Gel 15 Gram(s) Oral once  dextrose 50% Injectable 25 Gram(s) IV Push once  dextrose 50% Injectable 12.5 Gram(s) IV Push once  dextrose 50% Injectable 25 Gram(s) IV Push once  doxycycline hyclate Capsule 100 milliGRAM(s) Oral every 12 hours  ergocalciferol 45090 Unit(s) Oral <User Schedule>  furosemide   Injectable 40 milliGRAM(s) IV Push daily  glucagon  Injectable 1 milliGRAM(s) IntraMuscular once  insulin glargine Injectable (LANTUS) 25 Unit(s) SubCutaneous at bedtime  insulin lispro (ADMELOG) corrective regimen sliding scale   SubCutaneous three times a day before meals  insulin lispro (ADMELOG) corrective regimen sliding scale   SubCutaneous at bedtime  insulin lispro Injectable (ADMELOG) 6 Unit(s) SubCutaneous three times a day before meals  metoprolol tartrate 25 milliGRAM(s) Oral two times a day  mupirocin 2% Ointment 1 Application(s) Topical two times a day  ofloxacin 0.3% Solution 10 Drop(s) Left Ear two times a day  tamsulosin 0.4 milliGRAM(s) Oral at bedtime  vancomycin  IVPB 1250 milliGRAM(s) IV Intermittent daily    MEDICATIONS  (PRN):  acetaminophen   Tablet .. 650 milliGRAM(s) Oral every 4 hours PRN Mild Pain (1 - 3)  oxyCODONE    IR 5 milliGRAM(s) Oral every 4 hours PRN Moderate Pain (4 - 6)  oxyCODONE    IR 10 milliGRAM(s) Oral every 4 hours PRN Severe Pain (7 - 10)  sodium chloride 0.9% lock flush 10 milliLiter(s) IV Push every 1 hour PRN Pre/post blood products, medications, blood draw, and to maintain line patency    Vital Signs Last 24 Hrs  T(C): 36.7 (03 Jul 2021 07:05), Max: 37 (02 Jul 2021 17:18)  T(F): 98 (03 Jul 2021 07:05), Max: 98.6 (02 Jul 2021 17:18)  HR: 83 (03 Jul 2021 07:05) (83 - 88)  BP: 158/82 (03 Jul 2021 07:05) (158/80 - 169/93)  BP(mean): --  RR: 18 (03 Jul 2021 07:05) (18 - 18)  SpO2: 96% (03 Jul 2021 07:05) (95% - 96%)    I&O's Summary    02 Jul 2021 07:01  -  03 Jul 2021 07:00  --------------------------------------------------------  IN: 500 mL / OUT: 3 mL / NET: 497 mL    03 Jul 2021 07:01  -  03 Jul 2021 13:18  --------------------------------------------------------  IN: 118 mL / OUT: 1150 mL / NET: -1032 mL          Physical Exam:   GENERAL: NAD, well-groomed, well-developed  HEENT: VINCENZO/   Atraumatic, Normocephalic  ENMT: No tonsillar erythema, exudates, or enlargement; Moist mucous membranes, Good dentition, No lesions  NECK: Supple, No JVD, Normal thyroid  CHEST/LUNG: Clear to auscultaion, ; No rales, rhonchi, wheezing, or rubs  CVS: Regular rate and rhythm; No murmurs, rubs, or gallops  GI: : distended  NERVOUS SYSTEM:  Alert & Oriented X3  EXTREMITIES: -edema  LYMPH: No lymphadenopathy noted  SKIN: No rashes or lesions  ENDOCRINOLOGY: No Thyromegaly  PSYCH: Appropriate    Labs:                              10.4   7.98  )-----------( 235      ( 03 Jul 2021 07:25 )             31.7                         11.1   9.53  )-----------( 236      ( 02 Jul 2021 16:09 )             34.2                         11.3   8.03  )-----------( 245      ( 01 Jul 2021 06:57 )             34.3                         10.6   7.93  )-----------( 244      ( 30 Jun 2021 07:28 )             31.7     07-03    143  |  111<H>  |  24<H>  ----------------------------<  165<H>  3.8   |  20<L>  |  1.57<H>  07-02    142  |  110<H>  |  25<H>  ----------------------------<  159<H>  4.3   |  22  |  1.62<H>  07-02    144  |  110<H>  |  25<H>  ----------------------------<  226<H>  4.0   |  20<L>  |  1.55<H>  07-01    143  |  111<H>  |  23  ----------------------------<  180<H>  3.8   |  23  |  1.52<H>  06-30    139  |  106  |  22  ----------------------------<  181<H>  4.4   |  20<L>  |  1.51<H>    Ca    8.8      03 Jul 2021 07:25  Ca    8.9      02 Jul 2021 16:09  Ca    8.5      02 Jul 2021 01:30  Phos  3.9     07-03  Phos  3.5     07-02  Mg     1.70     07-03  Mg     1.80     07-02    TPro  6.8  /  Alb  3.2<L>  /  TBili  0.2  /  DBili  x   /  AST  20  /  ALT  23  /  AlkPhos  60  07-03  TPro  6.8  /  Alb  3.4  /  TBili  <0.2  /  DBili  x   /  AST  25  /  ALT  29  /  AlkPhos  63  07-02  TPro  6.3  /  Alb  3.4  /  TBili  <0.2  /  DBili  x   /  AST  26  /  ALT  25  /  AlkPhos  58  07-02    CAPILLARY BLOOD GLUCOSE      POCT Blood Glucose.: 107 mg/dL (03 Jul 2021 12:33)  POCT Blood Glucose.: 150 mg/dL (03 Jul 2021 08:36)  POCT Blood Glucose.: 137 mg/dL (02 Jul 2021 21:31)  POCT Blood Glucose.: 153 mg/dL (02 Jul 2021 17:36)      LIVER FUNCTIONS - ( 03 Jul 2021 07:25 )  Alb: 3.2 g/dL / Pro: 6.8 g/dL / ALK PHOS: 60 U/L / ALT: 23 U/L / AST: 20 U/L / GGT: x               Serum Pro-Brain Natriuretic Peptide: 491 pg/mL (07-02 @ 01:27)    r< from: CT Chest No Cont (07.02.21 @ 19:02) >  CONTRAST/COMPLICATIONS:  IV Contrast: None  Oral Contrast: None.  Complications: None.    PROCEDURE:  CT of the Chest was performed.  Sagittal and coronal reformats were performed.    FINDINGS: The quality of the images are degraded by motion.  LINES/TUBES: Left PICC with tip in the SVC.  LUNGS/AIRWAYS/PLEURA: Patent central airways. Bilateral small pleural effusions with atelectasis of basilar lower lobes. Mild interlobular septal thickening likely representing interstitial edema.  MEDIASTINUM AND DELROY: No lymphadenopathy.  VESSELS: Coronary calcifications.  HEART: Cardiomegaly. pericardial effusion.  CHEST WALL AND LOWER NECK: Within normal limits.  VISUALIZED UPPER ABDOMEN: Within normal limits.  BONES: Within normal limits.    IMPRESSION:  Small pleural effusions and atelectasis of basilar lower lobes. Mild interlobular septal thickening representing interstitial edema.            SARIAH NASH MD; Resident Radiology  This document has been electronically signed.  NINOSKA GIVENS MD; Attending Radiologist  This document has been electronically signed. Jul  3 2021 12:52PM    < end of copied text >      RECENT CULTURES:        RESPIRATORY CULTURES:          Studies  Chest X-RAY  CT SCAN Chest   Venous Dopplers: LE:   CT Abdomen  Others

## 2021-07-03 NOTE — PROGRESS NOTE ADULT - ASSESSMENT
61M with PMH DM2, HTN, HLD, diabetic neuropathy, glaucoma/cataract with poor vision, HCV s/p Story (cleared per patient) presented with left ear pain. seen by ENT likely malignant otitis externa and folliculitis on antibiotic. nephrology consulted for elvin    ELVIN  baseline ~ 1  ELVIN likely sec to GEMINI  s/p CT with contrast 6/20  FEna>1% suggested ATN  UA with proteinuria and hematuria  renal us without hydro  Renal function improving  Cardiology following   agree w/. diuretics   Vanc dosed by level   ACEI still on hold   avoid nephrotoxic agents  optimize dm control. f/u endo  on vanco  monitor vanco level  monitor bmp and urine output    proteinuria/hematuria  renal us noted  p/c ratio <1g  likely sec to dm/htn  work up can be done outpatient  monitor    Acidosis  non AG  improved with oral bicarb  monitor serum Co2    htn  acceptable  on norvasc 10  ACEI on hold  start metoprolol 25 bid  lasix 40x1 tyday  monitor    hypocalcemia  likely vit d def  On  weekly vit d 50000x12 dose  monitor    OM  malignant otitis externa on CT  f/u ENT/ID  antibiotic per team

## 2021-07-04 LAB
ALBUMIN SERPL ELPH-MCNC: 3.2 G/DL — LOW (ref 3.3–5)
ALP SERPL-CCNC: 60 U/L — SIGNIFICANT CHANGE UP (ref 40–120)
ALT FLD-CCNC: 23 U/L — SIGNIFICANT CHANGE UP (ref 4–41)
ANION GAP SERPL CALC-SCNC: 12 MMOL/L — SIGNIFICANT CHANGE UP (ref 7–14)
AST SERPL-CCNC: 17 U/L — SIGNIFICANT CHANGE UP (ref 4–40)
BASOPHILS # BLD AUTO: 0.03 K/UL — SIGNIFICANT CHANGE UP (ref 0–0.2)
BASOPHILS NFR BLD AUTO: 0.4 % — SIGNIFICANT CHANGE UP (ref 0–2)
BILIRUB SERPL-MCNC: 0.2 MG/DL — SIGNIFICANT CHANGE UP (ref 0.2–1.2)
BUN SERPL-MCNC: 22 MG/DL — SIGNIFICANT CHANGE UP (ref 7–23)
CALCIUM SERPL-MCNC: 8.9 MG/DL — SIGNIFICANT CHANGE UP (ref 8.4–10.5)
CHLORIDE SERPL-SCNC: 110 MMOL/L — HIGH (ref 98–107)
CO2 SERPL-SCNC: 20 MMOL/L — LOW (ref 22–31)
CREAT SERPL-MCNC: 1.55 MG/DL — HIGH (ref 0.5–1.3)
EOSINOPHIL # BLD AUTO: 0.2 K/UL — SIGNIFICANT CHANGE UP (ref 0–0.5)
EOSINOPHIL NFR BLD AUTO: 2.8 % — SIGNIFICANT CHANGE UP (ref 0–6)
GLUCOSE BLDC GLUCOMTR-MCNC: 100 MG/DL — HIGH (ref 70–99)
GLUCOSE BLDC GLUCOMTR-MCNC: 103 MG/DL — HIGH (ref 70–99)
GLUCOSE BLDC GLUCOMTR-MCNC: 119 MG/DL — HIGH (ref 70–99)
GLUCOSE BLDC GLUCOMTR-MCNC: 124 MG/DL — HIGH (ref 70–99)
GLUCOSE SERPL-MCNC: 131 MG/DL — HIGH (ref 70–99)
HCT VFR BLD CALC: 33.1 % — LOW (ref 39–50)
HGB BLD-MCNC: 10.7 G/DL — LOW (ref 13–17)
IANC: 3.89 K/UL — SIGNIFICANT CHANGE UP (ref 1.5–8.5)
IMM GRANULOCYTES NFR BLD AUTO: 0.3 % — SIGNIFICANT CHANGE UP (ref 0–1.5)
LYMPHOCYTES # BLD AUTO: 2.36 K/UL — SIGNIFICANT CHANGE UP (ref 1–3.3)
LYMPHOCYTES # BLD AUTO: 32.7 % — SIGNIFICANT CHANGE UP (ref 13–44)
MAGNESIUM SERPL-MCNC: 1.7 MG/DL — SIGNIFICANT CHANGE UP (ref 1.6–2.6)
MCHC RBC-ENTMCNC: 31.2 PG — SIGNIFICANT CHANGE UP (ref 27–34)
MCHC RBC-ENTMCNC: 32.3 GM/DL — SIGNIFICANT CHANGE UP (ref 32–36)
MCV RBC AUTO: 96.5 FL — SIGNIFICANT CHANGE UP (ref 80–100)
MONOCYTES # BLD AUTO: 0.71 K/UL — SIGNIFICANT CHANGE UP (ref 0–0.9)
MONOCYTES NFR BLD AUTO: 9.8 % — SIGNIFICANT CHANGE UP (ref 2–14)
NEUTROPHILS # BLD AUTO: 3.89 K/UL — SIGNIFICANT CHANGE UP (ref 1.8–7.4)
NEUTROPHILS NFR BLD AUTO: 54 % — SIGNIFICANT CHANGE UP (ref 43–77)
NRBC # BLD: 0 /100 WBCS — SIGNIFICANT CHANGE UP
NRBC # FLD: 0 K/UL — SIGNIFICANT CHANGE UP
PHOSPHATE SERPL-MCNC: 4.2 MG/DL — SIGNIFICANT CHANGE UP (ref 2.5–4.5)
PLATELET # BLD AUTO: 219 K/UL — SIGNIFICANT CHANGE UP (ref 150–400)
POTASSIUM SERPL-MCNC: 3.6 MMOL/L — SIGNIFICANT CHANGE UP (ref 3.5–5.3)
POTASSIUM SERPL-SCNC: 3.6 MMOL/L — SIGNIFICANT CHANGE UP (ref 3.5–5.3)
PROT SERPL-MCNC: 6.7 G/DL — SIGNIFICANT CHANGE UP (ref 6–8.3)
RBC # BLD: 3.43 M/UL — LOW (ref 4.2–5.8)
RBC # FLD: 12.3 % — SIGNIFICANT CHANGE UP (ref 10.3–14.5)
SODIUM SERPL-SCNC: 142 MMOL/L — SIGNIFICANT CHANGE UP (ref 135–145)
WBC # BLD: 7.21 K/UL — SIGNIFICANT CHANGE UP (ref 3.8–10.5)
WBC # FLD AUTO: 7.21 K/UL — SIGNIFICANT CHANGE UP (ref 3.8–10.5)

## 2021-07-04 PROCEDURE — 93306 TTE W/DOPPLER COMPLETE: CPT | Mod: 26

## 2021-07-04 PROCEDURE — 93970 EXTREMITY STUDY: CPT | Mod: 26

## 2021-07-04 PROCEDURE — 76700 US EXAM ABDOM COMPLETE: CPT | Mod: 26

## 2021-07-04 RX ADMIN — Medication 10 DROP(S): at 17:56

## 2021-07-04 RX ADMIN — Medication 100 MILLIGRAM(S): at 06:22

## 2021-07-04 RX ADMIN — Medication 166.67 MILLIGRAM(S): at 12:41

## 2021-07-04 RX ADMIN — AMLODIPINE BESYLATE 10 MILLIGRAM(S): 2.5 TABLET ORAL at 06:21

## 2021-07-04 RX ADMIN — MUPIROCIN 1 APPLICATION(S): 20 OINTMENT TOPICAL at 17:56

## 2021-07-04 RX ADMIN — Medication 40 MILLIGRAM(S): at 06:22

## 2021-07-04 RX ADMIN — INSULIN GLARGINE 25 UNIT(S): 100 INJECTION, SOLUTION SUBCUTANEOUS at 22:10

## 2021-07-04 RX ADMIN — CHLORHEXIDINE GLUCONATE 1 APPLICATION(S): 213 SOLUTION TOPICAL at 12:32

## 2021-07-04 RX ADMIN — ATORVASTATIN CALCIUM 20 MILLIGRAM(S): 80 TABLET, FILM COATED ORAL at 22:10

## 2021-07-04 RX ADMIN — HEPARIN SODIUM 5000 UNIT(S): 5000 INJECTION INTRAVENOUS; SUBCUTANEOUS at 17:59

## 2021-07-04 RX ADMIN — MUPIROCIN 1 APPLICATION(S): 20 OINTMENT TOPICAL at 06:23

## 2021-07-04 RX ADMIN — TAMSULOSIN HYDROCHLORIDE 0.4 MILLIGRAM(S): 0.4 CAPSULE ORAL at 22:09

## 2021-07-04 RX ADMIN — Medication 100 MILLIGRAM(S): at 17:56

## 2021-07-04 RX ADMIN — Medication 6 UNIT(S): at 12:31

## 2021-07-04 RX ADMIN — Medication 6 UNIT(S): at 17:58

## 2021-07-04 RX ADMIN — Medication 3 MILLILITER(S): at 20:11

## 2021-07-04 RX ADMIN — Medication 6 UNIT(S): at 09:00

## 2021-07-04 RX ADMIN — Medication 10 DROP(S): at 06:22

## 2021-07-04 RX ADMIN — HEPARIN SODIUM 5000 UNIT(S): 5000 INJECTION INTRAVENOUS; SUBCUTANEOUS at 06:22

## 2021-07-04 RX ADMIN — Medication 3 MILLILITER(S): at 08:47

## 2021-07-04 RX ADMIN — Medication 100 MILLIGRAM(S): at 06:21

## 2021-07-04 NOTE — PROGRESS NOTE ADULT - SUBJECTIVE AND OBJECTIVE BOX
Date of Service: 07-04-21 @ 14:27    Patient is a 61y old  Male who presents with a chief complaint of otitis externa, uncontrolled DM (04 Jul 2021 12:44)      Any change in ROS: doing very good: no SOB :     MEDICATIONS  (STANDING):  albuterol/ipratropium for Nebulization 3 milliLiter(s) Nebulizer every 12 hours  amLODIPine   Tablet 10 milliGRAM(s) Oral daily  atorvastatin 20 milliGRAM(s) Oral at bedtime  chlorhexidine 4% Liquid 1 Application(s) Topical daily  dextrose 40% Gel 15 Gram(s) Oral once  dextrose 50% Injectable 25 Gram(s) IV Push once  dextrose 50% Injectable 12.5 Gram(s) IV Push once  dextrose 50% Injectable 25 Gram(s) IV Push once  doxycycline hyclate Capsule 100 milliGRAM(s) Oral every 12 hours  ergocalciferol 40833 Unit(s) Oral <User Schedule>  furosemide   Injectable 40 milliGRAM(s) IV Push daily  glucagon  Injectable 1 milliGRAM(s) IntraMuscular once  heparin   Injectable 5000 Unit(s) SubCutaneous every 12 hours  insulin glargine Injectable (LANTUS) 25 Unit(s) SubCutaneous at bedtime  insulin lispro (ADMELOG) corrective regimen sliding scale   SubCutaneous three times a day before meals  insulin lispro (ADMELOG) corrective regimen sliding scale   SubCutaneous at bedtime  insulin lispro Injectable (ADMELOG) 6 Unit(s) SubCutaneous three times a day before meals  metoprolol succinate  milliGRAM(s) Oral daily  mupirocin 2% Ointment 1 Application(s) Topical two times a day  ofloxacin 0.3% Solution 10 Drop(s) Left Ear two times a day  tamsulosin 0.4 milliGRAM(s) Oral at bedtime  vancomycin  IVPB 1250 milliGRAM(s) IV Intermittent daily    MEDICATIONS  (PRN):  acetaminophen   Tablet .. 650 milliGRAM(s) Oral every 4 hours PRN Mild Pain (1 - 3)  oxyCODONE    IR 5 milliGRAM(s) Oral every 4 hours PRN Moderate Pain (4 - 6)  oxyCODONE    IR 10 milliGRAM(s) Oral every 4 hours PRN Severe Pain (7 - 10)  sodium chloride 0.9% lock flush 10 milliLiter(s) IV Push every 1 hour PRN Pre/post blood products, medications, blood draw, and to maintain line patency    Vital Signs Last 24 Hrs  T(C): 36.5 (04 Jul 2021 06:15), Max: 36.9 (03 Jul 2021 18:17)  T(F): 97.7 (04 Jul 2021 06:15), Max: 98.5 (03 Jul 2021 18:17)  HR: 77 (04 Jul 2021 06:15) (77 - 86)  BP: 162/93 (04 Jul 2021 06:15) (158/86 - 162/93)  BP(mean): --  RR: 19 (04 Jul 2021 06:15) (17 - 19)  SpO2: 96% (04 Jul 2021 06:15) (96% - 98%)    I&O's Summary    03 Jul 2021 07:01  -  04 Jul 2021 07:00  --------------------------------------------------------  IN: 676 mL / OUT: 2350 mL / NET: -1674 mL    04 Jul 2021 07:01  -  04 Jul 2021 14:27  --------------------------------------------------------  IN: 0 mL / OUT: 500 mL / NET: -500 mL          Physical Exam:   GENERAL: NAD, well-groomed, well-developed  HEENT: VINCENZO/   Atraumatic, Normocephalic  ENMT: No tonsillar erythema, exudates, or enlargement; Moist mucous membranes, Good dentition, No lesions  NECK: Supple, No JVD, Normal thyroid  CHEST/LUNG: decreased air entery left base:   CVS: Regular rate and rhythm; No murmurs, rubs, or gallops  GI: : Soft, Nontender, Nondistended; Bowel sounds present  NERVOUS SYSTEM:  Alert & Oriented X3  EXTREMITIES:  - edema  LYMPH: No lymphadenopathy noted  SKIN: No rashes or lesions  ENDOCRINOLOGY: No Thyromegaly  PSYCH: Appropriate    Labs:                              10.7   7.21  )-----------( 219      ( 04 Jul 2021 07:01 )             33.1                         10.4   7.98  )-----------( 235      ( 03 Jul 2021 07:25 )             31.7                         11.1   9.53  )-----------( 236      ( 02 Jul 2021 16:09 )             34.2                         11.3   8.03  )-----------( 245      ( 01 Jul 2021 06:57 )             34.3     07-04    142  |  110<H>  |  22  ----------------------------<  131<H>  3.6   |  20<L>  |  1.55<H>  07-03    143  |  111<H>  |  24<H>  ----------------------------<  165<H>  3.8   |  20<L>  |  1.57<H>  07-02    142  |  110<H>  |  25<H>  ----------------------------<  159<H>  4.3   |  22  |  1.62<H>  07-02    144  |  110<H>  |  25<H>  ----------------------------<  226<H>  4.0   |  20<L>  |  1.55<H>  07-01    143  |  111<H>  |  23  ----------------------------<  180<H>  3.8   |  23  |  1.52<H>    Ca    8.9      04 Jul 2021 07:01  Ca    8.8      03 Jul 2021 07:25  Ca    8.9      02 Jul 2021 16:09  Phos  4.2     07-04  Phos  3.9     07-03  Mg     1.70     07-04  Mg     1.70     07-03    TPro  6.7  /  Alb  3.2<L>  /  TBili  0.2  /  DBili  x   /  AST  17  /  ALT  23  /  AlkPhos  60  07-04  TPro  6.8  /  Alb  3.2<L>  /  TBili  0.2  /  DBili  x   /  AST  20  /  ALT  23  /  AlkPhos  60  07-03  TPro  6.8  /  Alb  3.4  /  TBili  <0.2  /  DBili  x   /  AST  25  /  ALT  29  /  AlkPhos  63  07-02  TPro  6.3  /  Alb  3.4  /  TBili  <0.2  /  DBili  x   /  AST  26  /  ALT  25  /  AlkPhos  58  07-02    CAPILLARY BLOOD GLUCOSE      POCT Blood Glucose.: 103 mg/dL (04 Jul 2021 12:19)  POCT Blood Glucose.: 119 mg/dL (04 Jul 2021 08:33)  POCT Blood Glucose.: 245 mg/dL (03 Jul 2021 21:38)  POCT Blood Glucose.: 173 mg/dL (03 Jul 2021 17:40)      LIVER FUNCTIONS - ( 04 Jul 2021 07:01 )  Alb: 3.2 g/dL / Pro: 6.7 g/dL / ALK PHOS: 60 U/L / ALT: 23 U/L / AST: 17 U/L / GGT: x               Serum Pro-Brain Natriuretic Peptide: 491 pg/mL (07-02 @ 01:27)        RECENT CULTURES:        RESPIRATORY CULTURES:    rr< from: CT Chest No Cont (07.02.21 @ 19:02) >    INTERPRETATION:  CLINICAL INFORMATION: Shortness of breath. Uncontrolled diabetes with neuropathy.    COMPARISON: Chest radiograph 7/2/2021.    CONTRAST/COMPLICATIONS:  IV Contrast: None  Oral Contrast: None.  Complications: None.    PROCEDURE:  CT of the Chest was performed.  Sagittal and coronal reformats were performed.    FINDINGS: The quality of the images are degraded by motion.  LINES/TUBES: Left PICC with tip in the SVC.  LUNGS/AIRWAYS/PLEURA: Patent central airways. Bilateral small pleural effusions with atelectasis of basilar lower lobes. Mild interlobular septal thickening likely representing interstitial edema.  MEDIASTINUM AND DELROY: No lymphadenopathy.  VESSELS: Coronary calcifications.  HEART: Cardiomegaly. pericardial effusion.  CHEST WALL AND LOWER NECK: Within normal limits.  VISUALIZED UPPER ABDOMEN: Within normal limits.  BONES: Within normal limits.    IMPRESSION:  Small pleural effusions and atelectasis of basilar lower lobes. Mild interlobular septal thickening representing interstitial edema.            SARIAH NASH MD; Resident Radiology  This document has been electronically signed.  NINOSKA GIVENS MD; Attending Radiologist  This document has been electronically signed. Jul  3 2021 12:52PM    < end of copied text >        Studies  Chest X-RAY  CT SCAN Chest   Venous Dopplers: LE:   CT Abdomen  Others      < from: Transthoracic Echocardiogram (07.04.21 @ 09:23) >   function. Normal tricuspid valve. Pulmonic valve  not well visualized.  Pericardium/PleuraSmall pericardial effusion. Left pleural  effusion.  Hemodynamic: Inadequate tricuspid regurgitation Doppler  envelope precludes estimation of RVSP.  ------------------------------------------------------------------------  CONCLUSIONS:  1. Normal mitral valve. Mild mitral regurgitation.  2. Mildly dilated left atrium.  3. Normal left ventricular internal dimensions and wall  thicknesses.  4. Normal left ventricular systolic function. No segmental  wall motion abnormalities.  5. The right ventricle is not well visualized; grossly  normal right ventricular systolic function.  6. Small pericardial effusion.  7. Left pleural effusion.  *** No previous Echo exam.  ------------------------------------------------------------------------  Confirmed on  7/4/2021 - 10:33:57 by Eulogio Kelly MD, FACC,  YUMIKO, RPVI    < end of copied text >

## 2021-07-04 NOTE — PROGRESS NOTE ADULT - SUBJECTIVE AND OBJECTIVE BOX
Infectious Diseases progress note:    Subjective: NAD, afebrile.  Events noted.  No resp distress.  Pending Abd US for evaluation of ascites.  Pt on diuresis for fluid overload.   No ear pain    ROS:  CONSTITUTIONAL:  No fever, chills, rigors  CARDIOVASCULAR:  No chest pain or palpitations  RESPIRATORY:   No SOB, cough, dyspnea on exertion.  No wheezing  GASTROINTESTINAL:  No abd pain, N/V, diarrhea/constipation  EXTREMITIES:  No swelling or joint pain  GENITOURINARY:  No burning on urination, increased frequency or urgency.  No flank pain  NEUROLOGIC:  No HA, visual disturbances  SKIN: No rashes    Allergies    No Known Allergies    Intolerances        ANTIBIOTICS/RELEVANT:  antimicrobials  doxycycline hyclate Capsule 100 milliGRAM(s) Oral every 12 hours  vancomycin  IVPB 1250 milliGRAM(s) IV Intermittent daily    immunologic:    OTHER:  acetaminophen   Tablet .. 650 milliGRAM(s) Oral every 4 hours PRN  albuterol/ipratropium for Nebulization 3 milliLiter(s) Nebulizer every 12 hours  amLODIPine   Tablet 10 milliGRAM(s) Oral daily  atorvastatin 20 milliGRAM(s) Oral at bedtime  chlorhexidine 4% Liquid 1 Application(s) Topical daily  dextrose 40% Gel 15 Gram(s) Oral once  dextrose 50% Injectable 25 Gram(s) IV Push once  dextrose 50% Injectable 12.5 Gram(s) IV Push once  dextrose 50% Injectable 25 Gram(s) IV Push once  ergocalciferol 07001 Unit(s) Oral <User Schedule>  furosemide   Injectable 40 milliGRAM(s) IV Push daily  glucagon  Injectable 1 milliGRAM(s) IntraMuscular once  heparin   Injectable 5000 Unit(s) SubCutaneous every 12 hours  insulin glargine Injectable (LANTUS) 25 Unit(s) SubCutaneous at bedtime  insulin lispro (ADMELOG) corrective regimen sliding scale   SubCutaneous three times a day before meals  insulin lispro (ADMELOG) corrective regimen sliding scale   SubCutaneous at bedtime  insulin lispro Injectable (ADMELOG) 6 Unit(s) SubCutaneous three times a day before meals  metoprolol succinate  milliGRAM(s) Oral daily  mupirocin 2% Ointment 1 Application(s) Topical two times a day  ofloxacin 0.3% Solution 10 Drop(s) Left Ear two times a day  oxyCODONE    IR 5 milliGRAM(s) Oral every 4 hours PRN  oxyCODONE    IR 10 milliGRAM(s) Oral every 4 hours PRN  sodium chloride 0.9% lock flush 10 milliLiter(s) IV Push every 1 hour PRN  tamsulosin 0.4 milliGRAM(s) Oral at bedtime      Objective:  Vital Signs Last 24 Hrs  T(C): 36.5 (04 Jul 2021 06:15), Max: 36.9 (03 Jul 2021 18:17)  T(F): 97.7 (04 Jul 2021 06:15), Max: 98.5 (03 Jul 2021 18:17)  HR: 77 (04 Jul 2021 06:15) (77 - 86)  BP: 162/93 (04 Jul 2021 06:15) (145/67 - 162/93)  BP(mean): --  RR: 19 (04 Jul 2021 06:15) (17 - 19)  SpO2: 96% (04 Jul 2021 06:15) (96% - 98%)    PHYSICAL EXAM:  Constitutional:NAD  Eyes:VINCENZO, EOMI  Ear/Nose/Throat: no thrush, mucositis.  Moist mucous membranes	  Neck:no JVD, no lymphadenopathy, supple  Respiratory: CTA sudheer  Cardiovascular: S1S2 RRR, no murmurs  Gastrointestinal:soft, nontender,  nondistended (+) BS  Extremities:no e/e/c  Skin:  no rashes, open wounds or ulcerations, picc line in place        LABS:                        10.7   7.21  )-----------( 219      ( 04 Jul 2021 07:01 )             33.1     07-04    142  |  110<H>  |  22  ----------------------------<  131<H>  3.6   |  20<L>  |  1.55<H>    Ca    8.9      04 Jul 2021 07:01  Phos  4.2     07-04  Mg     1.70     07-04    TPro  6.7  /  Alb  3.2<L>  /  TBili  0.2  /  DBili  x   /  AST  17  /  ALT  23  /  AlkPhos  60  07-04            Vancomycin Level, Random:  ug/mL (07-02 @ 07:23)      Vancomycin Level, Trough: 16.2 ug/mL (07-03 @ 14:12)              MICROBIOLOGY:    Culture - Blood (06.21.21 @ 18:39)   Specimen Source: .Blood Blood-Venous   Culture Results:   No Growth Final     RADIOLOGY & ADDITIONAL STUDIES:    < from: CT Chest No Cont (07.02.21 @ 19:02) >    EXAM:  CT CHEST        PROCEDURE DATE:  Jul 2 2021         INTERPRETATION:  CLINICAL INFORMATION: Shortness of breath. Uncontrolled diabetes with neuropathy.    COMPARISON: Chest radiograph 7/2/2021.    CONTRAST/COMPLICATIONS:  IV Contrast: None  Oral Contrast: None.  Complications: None.    PROCEDURE:  CT of the Chest was performed.  Sagittal and coronal reformats were performed.    FINDINGS: The quality of the images are degraded by motion.  LINES/TUBES: Left PICC with tip in the SVC.  LUNGS/AIRWAYS/PLEURA: Patent central airways. Bilateral small pleural effusions with atelectasis of basilar lower lobes. Mild interlobular septal thickening likely representing interstitial edema.  MEDIASTINUM AND DELROY: No lymphadenopathy.  VESSELS: Coronary calcifications.  HEART: Cardiomegaly. pericardial effusion.  CHEST WALL AND LOWER NECK: Within normal limits.  VISUALIZED UPPER ABDOMEN: Within normal limits.  BONES: Within normal limits.    IMPRESSION:  Small pleural effusions and atelectasis of basilar lower lobes. Mild interlobular septal thickening representing interstitial edema.    < end of copied text >

## 2021-07-04 NOTE — PROGRESS NOTE ADULT - ASSESSMENT
Patient is a 61M with PMH DM2, HTN, HLD, diabetic neuropathy, glaucoma/cataract with poor vision, HCV s/p [place] (cleared per patient) presented with left ear pain.  CT auditory ear canal shows:    Findings concerning for malignant left-sided otitis externa with bony erosive changes involving the external auditory canal. Superimposed osteomyelitis within the bony external auditory canal is a consideration given the patient's clinical information. An external auditory canal cholesteatoma cannot be excluded. A neoplastic process such as squamous cell carcinoma is also difficult to exclude.    2. Additional imaging findings compatible with left-sided otitis media and left-sided mastoiditis. No gross ossicular chain erosion or destructive changes. No evidence of venous sinus thrombosis.    3. Unremarkable right-sided temporal bone CT examination apart from cerumen in the external auditory canal.    Pt also c/o new painful bumps, draining pus, in b/l armpits, b/l groin and R perineal area.    ID consulted for further abx management.     Left sided otitis externa:    - Cont present abx, f/u L ear cultures.  Concern for superimposed OM of bony external auditory canal.   Cannot exclude cholesteatoma or squamous cell carcinoma.    - ENT f/u appreciated.  Continued outpt f/u to address further need for cultures, imaging, biopsy.      - Current ear cultures growing MRSA.  IV cipro d/c'd.  Cont IV vanco, and maintain trough between 15-20.     -  Cont abx ear gtt as per ENT    - Plan for  long term IV abx.  f/u blood cx x 2.  ESR, CRP    - Weekly cbc, sma-7, esr, crp, vanco trough.       r/o Hidradenitis suppurativa:    - c/o new nodular/pustular lesions in b/l armpits, b/l groin and perineum for past 1-3 weeks. States lesions are painful and drain pus.    - R Derm eval appreciated, ?hidradenitis suppurativa vs staph folliculitis.  Pt started on doxycycline and mupirocin for staph decolonization.      - Cont IV abx for now.  Cont to monitor and evaluate for need for further I&D - lesions have improved      Pulm edema/fluid overload:    - cxr 7/2 with small b/l pleural effusions and congestion    - Pt started on diuresis.  Echo results noted.  Pt with incr abd distention, f/u Abd US    - Pulm and Cardiology f/u appreciated       * cont IV vancomycin x 6 week course (6/20 through 7/31), maintain trough between 15-20.  Monitor trough levels  * Check weekly cbc, sma-7, esr, crp, vanco trough.      Mena Osullivan  [phone]

## 2021-07-04 NOTE — PROGRESS NOTE ADULT - SUBJECTIVE AND OBJECTIVE BOX
[facility] – [place] [facility]   MD Thom Hernandez MD, D.O. Ruoru Wong, PA    From 7 AM - 5 PM:  OFFICE: [phone]  Dr. Hernandez cell: [phone]  Dr. Rene cell: [phone]  Dr. Crowe cell: [phone]  KATH Robin cell: [phone]    From 5 PM - 7 AM: Answering Service: [phone]  Date of service: 07-04-21 @ 15:08    RENAL FOLLOW UP NOTE  --------------------------------------------------------------------------------  HPI:  Pt seen and examined at bedside.   Odilon[place] SOB, chest pain     PAST HISTORY  --------------------------------------------------------------------------------  No significant changes to PMH, PSH, FHx, SHx, unless otherwise noted    ALLERGIES & MEDICATIONS  --------------------------------------------------------------------------------  Allergies    No Known Allergies    Intolerances      Standing Inpatient Medications  albuterol/ipratropium for Nebulization 3 milliLiter(s) Nebulizer every 12 hours  amLODIPine   Tablet 10 milliGRAM(s) Oral daily  atorvastatin 20 milliGRAM(s) Oral at bedtime  chlorhexidine 4% Liquid 1 Application(s) Topical daily  dextrose 40% Gel 15 Gram(s) Oral once  dextrose 50% Injectable 25 Gram(s) IV Push once  dextrose 50% Injectable 12.5 Gram(s) IV Push once  dextrose 50% Injectable 25 Gram(s) IV Push once  doxycycline hyclate Capsule 100 milliGRAM(s) Oral every 12 hours  ergocalciferol 69343 Unit(s) Oral <User Schedule>  furosemide   Injectable 40 milliGRAM(s) IV Push daily  glucagon  Injectable 1 milliGRAM(s) IntraMuscular once  heparin   Injectable 5000 Unit(s) SubCutaneous every 12 hours  insulin glargine Injectable (LANTUS) 25 Unit(s) SubCutaneous at bedtime  insulin lispro (ADMELOG) corrective regimen sliding scale   SubCutaneous three times a day before meals  insulin lispro (ADMELOG) corrective regimen sliding scale   SubCutaneous at bedtime  insulin lispro Injectable (ADMELOG) 6 Unit(s) SubCutaneous three times a day before meals  metoprolol succinate  milliGRAM(s) Oral daily  mupirocin 2% Ointment 1 Application(s) Topical two times a day  ofloxacin 0.3% Solution 10 Drop(s) Left Ear two times a day  tamsulosin 0.4 milliGRAM(s) Oral at bedtime  vancomycin  IVPB 1250 milliGRAM(s) IV Intermittent daily    PRN Inpatient Medications  acetaminophen   Tablet .. 650 milliGRAM(s) Oral every 4 hours PRN  oxyCODONE    IR 5 milliGRAM(s) Oral every 4 hours PRN  oxyCODONE    IR 10 milliGRAM(s) Oral every 4 hours PRN  sodium chloride 0.9% lock flush 10 milliLiter(s) IV Push every 1 hour PRN      REVIEW OF SYSTEMS  --------------------------------------------------------------------------------  General: no fever  CVS: no chest pain  RESP: no sob, no cough  ABD: no abdominal pain  : no dysuria,  MSK: no edema     VITALS/PHYSICAL EXAM  --------------------------------------------------------------------------------  T(C): 36.5 (07-04-21 @ 06:15), Max: 36.9 (07-03-21 @ 18:17)  HR: 77 (07-04-21 @ 06:15) (77 - 86)  BP: 162/93 (07-04-21 @ 06:15) (158/86 - 162/93)  RR: 19 (07-04-21 @ 06:15) (17 - 19)  SpO2: 96% (07-04-21 @ 06:15) (96% - 98%)  Wt(kg): --        07-03-21 @ 07:01  -  07-04-21 @ 07:00  --------------------------------------------------------  IN: 676 mL / OUT: 2350 mL / NET: -1674 mL    07-04-21 @ 07:01  -  07-04-21 @ 15:08  --------------------------------------------------------  IN: 0 mL / OUT: 500 mL / NET: -500 mL      Physical Exam:  	Gen: NAD  	HEENT: MMM  	Pulm: CTA B/L  	CV: S1S2  	Abd: Soft, +BS  	Ext: + LE edema B/L                      Neuro: Awake   	Skin: Warm and Dry       LABS/STUDIES  --------------------------------------------------------------------------------              10.7   7.21  >-----------<  219      [07-04-21 @ 07:01]              33.1     142  |  110  |  22  ----------------------------<  131      [07-04-21 @ 07:01]  3.6   |  20  |  1.55        Ca     8.9     [07-04-21 @ 07:01]      Mg     1.70     [07-04-21 @ 07:01]      Phos  4.2     [07-04-21 @ 07:01]    TPro  6.7  /  Alb  3.2  /  TBili  0.2  /  DBili  x   /  AST  17  /  ALT  23  /  AlkPhos  60  [07-04-21 @ 07:01]    Creatinine Trend:  SCr 1.55 [07-04 @ 07:01]  SCr 1.57 [07-03 @ 07:25]  SCr 1.62 [07-02 @ 16:09]  SCr 1.55 [07-02 @ 01:30]  SCr 1.52 [07-01 @ 06:57]    Urinalysis - [06-22-21 @ 16:47]      Color Light Yellow / Appearance Clear / SG 1.009 / pH 6.0      Gluc >= 1000 mg/dL / Ketone Negative  / Bili Negative / Urobili <2 mg/dL       Blood Trace / Protein Trace / Leuk Est Negative / Nitrite Negative      RBC 1 / WBC 1 / Hyaline 0 / Gran  / Sq Epi  / Non Sq Epi 0 / Bacteria Negative    Urine Creatinine 180      [07-01-21 @ 14:47]  Urine Sodium 65      [07-01-21 @ 14:47]    PTH -- (Ca --)      [06-23-21 @ 08:23]   69  Vitamin D (25OH) 7.7      [06-23-21 @ 12:30]  Lipid: chol 101, , HDL 27, LDL --      [06-23-21 @ 08:23]

## 2021-07-04 NOTE — PROGRESS NOTE ADULT - SUBJECTIVE AND OBJECTIVE BOX
S: Less SOB, denies chest pain. Review of systems otherwise (-)      MEDICATIONS  (STANDING):  albuterol/ipratropium for Nebulization 3 milliLiter(s) Nebulizer every 12 hours  amLODIPine   Tablet 10 milliGRAM(s) Oral daily  atorvastatin 20 milliGRAM(s) Oral at bedtime  chlorhexidine 4% Liquid 1 Application(s) Topical daily  dextrose 40% Gel 15 Gram(s) Oral once  dextrose 50% Injectable 25 Gram(s) IV Push once  dextrose 50% Injectable 12.5 Gram(s) IV Push once  dextrose 50% Injectable 25 Gram(s) IV Push once  doxycycline hyclate Capsule 100 milliGRAM(s) Oral every 12 hours  ergocalciferol 54825 Unit(s) Oral <User Schedule>  furosemide   Injectable 40 milliGRAM(s) IV Push daily  glucagon  Injectable 1 milliGRAM(s) IntraMuscular once  heparin   Injectable 5000 Unit(s) SubCutaneous every 12 hours  insulin glargine Injectable (LANTUS) 25 Unit(s) SubCutaneous at bedtime  insulin lispro (ADMELOG) corrective regimen sliding scale   SubCutaneous three times a day before meals  insulin lispro (ADMELOG) corrective regimen sliding scale   SubCutaneous at bedtime  insulin lispro Injectable (ADMELOG) 6 Unit(s) SubCutaneous three times a day before meals  metoprolol succinate  milliGRAM(s) Oral daily  mupirocin 2% Ointment 1 Application(s) Topical two times a day  ofloxacin 0.3% Solution 10 Drop(s) Left Ear two times a day  tamsulosin 0.4 milliGRAM(s) Oral at bedtime  vancomycin  IVPB 1250 milliGRAM(s) IV Intermittent daily    MEDICATIONS  (PRN):  acetaminophen   Tablet .. 650 milliGRAM(s) Oral every 4 hours PRN Mild Pain (1 - 3)  oxyCODONE    IR 5 milliGRAM(s) Oral every 4 hours PRN Moderate Pain (4 - 6)  oxyCODONE    IR 10 milliGRAM(s) Oral every 4 hours PRN Severe Pain (7 - 10)  sodium chloride 0.9% lock flush 10 milliLiter(s) IV Push every 1 hour PRN Pre/post blood products, medications, blood draw, and to maintain line patency      LABS:                          10.7   7.21  )-----------( 219      ( 04 Jul 2021 07:01 )             33.1     Hemoglobin: 10.7 g/dL (07-04 @ 07:01)  Hemoglobin: 10.4 g/dL (07-03 @ 07:25)  Hemoglobin: 11.1 g/dL (07-02 @ 16:09)  Hemoglobin: 11.3 g/dL (07-01 @ 06:57)  Hemoglobin: 10.6 g/dL (06-30 @ 07:28)    07-04    142  |  110<H>  |  22  ----------------------------<  131<H>  3.6   |  20<L>  |  1.55<H>    Ca    8.9      04 Jul 2021 07:01  Phos  4.2     07-04  Mg     1.70     07-04    TPro  6.7  /  Alb  3.2<L>  /  TBili  0.2  /  DBili  x   /  AST  17  /  ALT  23  /  AlkPhos  60  07-04    Creatinine Trend: 1.55<--, 1.57<--, 1.62<--, 1.55<--, 1.52<--, 1.51<--             07-03-21 @ 07:01  -  07-04-21 @ 07:00  --------------------------------------------------------  IN: 676 mL / OUT: 2350 mL / NET: -1674 mL    07-04-21 @ 07:01  -  07-04-21 @ 14:25  --------------------------------------------------------  IN: 0 mL / OUT: 500 mL / NET: -500 mL        PHYSICAL EXAM  Vital Signs Last 24 Hrs  T(C): 36.5 (04 Jul 2021 06:15), Max: 36.9 (03 Jul 2021 18:17)  T(F): 97.7 (04 Jul 2021 06:15), Max: 98.5 (03 Jul 2021 18:17)  HR: 77 (04 Jul 2021 06:15) (77 - 86)  BP: 162/93 (04 Jul 2021 06:15) (158/86 - 162/93)  BP(mean): --  RR: 19 (04 Jul 2021 06:15) (17 - 19)  SpO2: 96% (04 Jul 2021 06:15) (96% - 98%)      HEENT:   Normal oral mucosa, PERRL, EOMI	  Lymphatic: No lymphadenopathy , + edema in LE bilaterally   Cardiovascular: Normal S1 S2, No JVD, No murmurs , Peripheral pulses palpable 2+ bilaterally  Respiratory: Lungs clear to auscultation, normal effort 	  Gastrointestinal:  Soft, Non-tender, + BS	  Skin: No rashes, No ecchymoses, No cyanosis, warm to touch  Musculoskeletal: Normal range of motion, normal strength  Psychiatry:  Mood & affect appropriate    TELEMETRY: SR 80s    < from: Transthoracic Echocardiogram (07.04.21 @ 09:23) >  CONCLUSIONS:  1. Normal mitral valve. Mild mitral regurgitation.  2. Mildly dilated left atrium.  3. Normal left ventricular internal dimensions and wall  thicknesses.  4. Normal left ventricular systolic function. No segmental  wall motion abnormalities.  5. The right ventricle is not well visualized; grossly  normal right ventricular systolic function.  6. Small pericardial effusion.  7. Left pleural effusion.  *** No previous Echo exam.    < end of copied text >      ASSESSMENT/PLAN: 61M with PMH DM2, HTN, HLD, diabetic neuropathy, glaucoma/cataract with poor vision, HCV s/p [place] (cleared per patient) who is being seen for dyspnea.    - pt. with pulmonary edema on CXR and LE edema  - cont diuresis to keep net neg IV lasix   - cont BB/ Norvasc , BP stable   - TTE noted with normal LV/RV function, small pericardial effusion  - ABx per ID    Harvinder Gomes PA-C  Pager: [phone]

## 2021-07-04 NOTE — PROGRESS NOTE ADULT - ASSESSMENT
61M with PMH DM2, HTN, HLD, diabetic neuropathy, glaucoma/cataract with poor vision, HCV s/p [place] (cleared per patient) presented with left ear pain. seen by ENT likely malignant otitis externa and folliculitis on antibiotic. nephrology consulted for elvin    ELVIN  baseline ~ 1  ELVIN likely sec to GEMINI  s/p CT with contrast 6/20  FEna>1% suggested ATN  UA with proteinuria and hematuria  renal us without hydro  Renal function improving  Cardiology following   agree w/ diuretics   Vanc dosed by level   ACEI still on hold   avoid nephrotoxic agents  optimize dm control. f/u endo  on vanco  monitor vanco level  monitor bmp and urine output    proteinuria/hematuria  renal us noted  p/c ratio <1g  likely sec to dm/htn  work up can be done outpatient  monitor    Acidosis  non AG  improved with oral bicarb  monitor serum Co2    htn  improving   ACEI on hold  BP meds per cardio  monitor    hypocalcemia  likely vit d def  On weekly vit d 50000x12 dose  monitor    OM  malignant otitis externa on CT  f/u ENT/ID  antibiotic per team

## 2021-07-04 NOTE — PROGRESS NOTE ADULT - SUBJECTIVE AND OBJECTIVE BOX
Patient is a 61y old  Male who presents with a chief complaint of otitis externa, uncontrolled DM (2021 15:00)    DATE OF SERVICE: 21 @ 18:37    HPI:  Afebrile.   SOB better,    PAST MEDICAL & SURGICAL HISTORY:  DM (diabetes mellitus)  On Insulin    HTN (hypertension)  medicxal managenent    No significant past surgical history        Review of Systems:   CONSTITUTIONAL: No fever, weight loss, or fatigue  EYES: No eye pain, visual disturbances, or discharge  ENMT:  No difficulty hearing, tinnitus, vertigo; No sinus or throat pain.   NECK: No pain or stiffness  BREASTS: No pain, masses, or nipple discharge  RESPIRATORY: No cough, wheezing, chills or hemoptysis; +shortness of breath  CARDIOVASCULAR: No chest pain, palpitations, dizziness, or leg swelling  GASTROINTESTINAL: No abdominal or epigastric pain. No nausea, vomiting, or hematemesis; No diarrhea or constipation. No melena or hematochezia.  GENITOURINARY: No dysuria, frequency, hematuria, or incontinence  NEUROLOGICAL: No headaches, memory loss, loss of strength, numbness, or tremors  SKIN: No itching, burning, rashes, or lesions   LYMPH NODES: No enlarged glands  ENDOCRINE: No heat or cold intolerance; No hair loss  MUSCULOSKELETAL: No joint pain or swelling; No muscle, back, or extremity pain  PSYCHIATRIC: No depression, anxiety, mood swings, or difficulty sleeping  HEME/LYMPH: No easy bruising, or bleeding gums  ALLERY AND IMMUNOLOGIC: No hives or eczema    Allergies    No Known Allergies    Intolerances        Social History:     FAMILY HISTORY:  No pertinent family history in first degree relatives        MEDICATIONS  (STANDING):  amLODIPine   Tablet 10 milliGRAM(s) Oral daily  atorvastatin 20 milliGRAM(s) Oral at bedtime  ciprofloxacin   IVPB 400 milliGRAM(s) IV Intermittent every 12 hours  dextrose 40% Gel 15 Gram(s) Oral once  dextrose 50% Injectable 25 Gram(s) IV Push once  dextrose 50% Injectable 12.5 Gram(s) IV Push once  dextrose 50% Injectable 25 Gram(s) IV Push once  glucagon  Injectable 1 milliGRAM(s) IntraMuscular once  insulin glargine Injectable (LANTUS) 20 Unit(s) SubCutaneous at bedtime  insulin lispro (ADMELOG) corrective regimen sliding scale   SubCutaneous three times a day before meals  insulin lispro (ADMELOG) corrective regimen sliding scale   SubCutaneous at bedtime  insulin lispro Injectable (ADMELOG) 7 Unit(s) SubCutaneous three times a day before meals  ofloxacin 0.3% Solution 10 Drop(s) Left Ear two times a day  sodium chloride 0.9%. 1000 milliLiter(s) (100 mL/Hr) IV Continuous <Continuous>  tamsulosin 0.4 milliGRAM(s) Oral at bedtime  vancomycin  IVPB 1000 milliGRAM(s) IV Intermittent every 12 hours    MEDICATIONS  (PRN):  acetaminophen   Tablet .. 650 milliGRAM(s) Oral every 4 hours PRN Mild Pain (1 - 3)  oxyCODONE    IR 5 milliGRAM(s) Oral every 4 hours PRN Moderate Pain (4 - 6)  oxyCODONE    IR 10 milliGRAM(s) Oral every 4 hours PRN Severe Pain (7 - 10)        CAPILLARY BLOOD GLUCOSE      POCT Blood Glucose.: 272 mg/dL (2021 22:33)  POCT Blood Glucose.: 284 mg/dL (2021 17:48)  POCT Blood Glucose.: 274 mg/dL (2021 12:09)  POCT Blood Glucose.: 322 mg/dL (2021 08:11)    I&O's Summary    2021 07:01  -  2021 23:11  --------------------------------------------------------  IN: 0 mL / OUT: 525 mL / NET: -525 mL        PHYSICAL EXAM:  Vital Signs Last 24 Hrs  T(C): 37 (2021 22:11), Max: 37.1 (2021 05:31)  T(F): 98.6 (2021 22:11), Max: 98.7 (2021 05:31)  HR: 91 (2021 22:11) (86 - 91)  BP: 134/75 (2021 22:11) (134/75 - 157/72)  BP(mean): --  RR: 16 (2021 22:11) (16 - 16)  SpO2: 96% (2021 22:11) (96% - 100%)    GENERAL: NAD, well-developed  HEAD:  Atraumatic, Normocephalic  EYES: EOMI, PERRLA, conjunctiva and sclera clear  Ears: Left ear tender, cotton plug noted  NECK: Supple, No JVD  CHEST/LUNG: Clear to auscultation bilaterally; No wheeze  HEART: Regular rate and rhythm; No murmurs, rubs, or gallops  ABDOMEN: Soft, Nontender, distended; Bowel sounds present  EXTREMITIES:  2+ Peripheral Pulses, No clubbing, cyanosis, or edema  PSYCH: AAOx3  NEUROLOGY: non-focal  SKIN: No rashes or lesions    LABS:                        11.8   7.89  )-----------( 208      ( 2021 06:35 )             35.8     06-21    136  |  102  |  20  ----------------------------<  329<H>  4.0   |  23  |  1.60<H>    Ca    8.4      2021 06:35  Phos  3.7     06-21  Mg     1.9     06-21            Urinalysis Basic - ( 2021 23:18 )    Color: Yellow / Appearance: Clear / S.015 / pH: x  Gluc: x / Ketone: Negative  / Bili: Negative / Urobili: Negative mg/dL   Blood: x / Protein: 500 mg/dL / Nitrite: Negative   Leuk Esterase: Negative / RBC: 3-5 /HPF / WBC 0-2   Sq Epi: x / Non Sq Epi: Occasional / Bacteria: Occasional        RADIOLOGY & ADDITIONAL TESTS:    Imaging Personally Reviewed:    Consultant(s) Notes Reviewed:      Care Discussed with Consultants/Other Providers:

## 2021-07-05 LAB
ALBUMIN SERPL ELPH-MCNC: 3.3 G/DL — SIGNIFICANT CHANGE UP (ref 3.3–5)
ALP SERPL-CCNC: 55 U/L — SIGNIFICANT CHANGE UP (ref 40–120)
ALT FLD-CCNC: 28 U/L — SIGNIFICANT CHANGE UP (ref 4–41)
ANION GAP SERPL CALC-SCNC: 12 MMOL/L — SIGNIFICANT CHANGE UP (ref 7–14)
AST SERPL-CCNC: 25 U/L — SIGNIFICANT CHANGE UP (ref 4–40)
BASOPHILS # BLD AUTO: 0.04 K/UL — SIGNIFICANT CHANGE UP (ref 0–0.2)
BASOPHILS NFR BLD AUTO: 0.6 % — SIGNIFICANT CHANGE UP (ref 0–2)
BILIRUB SERPL-MCNC: <0.2 MG/DL — SIGNIFICANT CHANGE UP (ref 0.2–1.2)
BUN SERPL-MCNC: 28 MG/DL — HIGH (ref 7–23)
CALCIUM SERPL-MCNC: 9.2 MG/DL — SIGNIFICANT CHANGE UP (ref 8.4–10.5)
CHLORIDE SERPL-SCNC: 107 MMOL/L — SIGNIFICANT CHANGE UP (ref 98–107)
CO2 SERPL-SCNC: 23 MMOL/L — SIGNIFICANT CHANGE UP (ref 22–31)
CREAT SERPL-MCNC: 1.62 MG/DL — HIGH (ref 0.5–1.3)
EOSINOPHIL # BLD AUTO: 0.17 K/UL — SIGNIFICANT CHANGE UP (ref 0–0.5)
EOSINOPHIL NFR BLD AUTO: 2.7 % — SIGNIFICANT CHANGE UP (ref 0–6)
GLUCOSE BLDC GLUCOMTR-MCNC: 102 MG/DL — HIGH (ref 70–99)
GLUCOSE BLDC GLUCOMTR-MCNC: 208 MG/DL — HIGH (ref 70–99)
GLUCOSE BLDC GLUCOMTR-MCNC: 219 MG/DL — HIGH (ref 70–99)
GLUCOSE BLDC GLUCOMTR-MCNC: 90 MG/DL — SIGNIFICANT CHANGE UP (ref 70–99)
GLUCOSE SERPL-MCNC: 185 MG/DL — HIGH (ref 70–99)
HCT VFR BLD CALC: 32.8 % — LOW (ref 39–50)
HGB BLD-MCNC: 10.7 G/DL — LOW (ref 13–17)
IANC: 3.22 K/UL — SIGNIFICANT CHANGE UP (ref 1.5–8.5)
IMM GRANULOCYTES NFR BLD AUTO: 0.3 % — SIGNIFICANT CHANGE UP (ref 0–1.5)
LYMPHOCYTES # BLD AUTO: 2.05 K/UL — SIGNIFICANT CHANGE UP (ref 1–3.3)
LYMPHOCYTES # BLD AUTO: 33.1 % — SIGNIFICANT CHANGE UP (ref 13–44)
MAGNESIUM SERPL-MCNC: 1.8 MG/DL — SIGNIFICANT CHANGE UP (ref 1.6–2.6)
MCHC RBC-ENTMCNC: 30.8 PG — SIGNIFICANT CHANGE UP (ref 27–34)
MCHC RBC-ENTMCNC: 32.6 GM/DL — SIGNIFICANT CHANGE UP (ref 32–36)
MCV RBC AUTO: 94.5 FL — SIGNIFICANT CHANGE UP (ref 80–100)
MONOCYTES # BLD AUTO: 0.69 K/UL — SIGNIFICANT CHANGE UP (ref 0–0.9)
MONOCYTES NFR BLD AUTO: 11.1 % — SIGNIFICANT CHANGE UP (ref 2–14)
NEUTROPHILS # BLD AUTO: 3.22 K/UL — SIGNIFICANT CHANGE UP (ref 1.8–7.4)
NEUTROPHILS NFR BLD AUTO: 52.2 % — SIGNIFICANT CHANGE UP (ref 43–77)
NRBC # BLD: 0 /100 WBCS — SIGNIFICANT CHANGE UP
NRBC # FLD: 0 K/UL — SIGNIFICANT CHANGE UP
PHOSPHATE SERPL-MCNC: 4.3 MG/DL — SIGNIFICANT CHANGE UP (ref 2.5–4.5)
PLATELET # BLD AUTO: 219 K/UL — SIGNIFICANT CHANGE UP (ref 150–400)
POTASSIUM SERPL-MCNC: 3.5 MMOL/L — SIGNIFICANT CHANGE UP (ref 3.5–5.3)
POTASSIUM SERPL-SCNC: 3.5 MMOL/L — SIGNIFICANT CHANGE UP (ref 3.5–5.3)
PROT SERPL-MCNC: 6.5 G/DL — SIGNIFICANT CHANGE UP (ref 6–8.3)
RBC # BLD: 3.47 M/UL — LOW (ref 4.2–5.8)
RBC # FLD: 12.2 % — SIGNIFICANT CHANGE UP (ref 10.3–14.5)
SODIUM SERPL-SCNC: 142 MMOL/L — SIGNIFICANT CHANGE UP (ref 135–145)
VANCOMYCIN TROUGH SERPL-MCNC: 18.8 UG/ML — SIGNIFICANT CHANGE UP (ref 10–20)
WBC # BLD: 6.19 K/UL — SIGNIFICANT CHANGE UP (ref 3.8–10.5)
WBC # FLD AUTO: 6.19 K/UL — SIGNIFICANT CHANGE UP (ref 3.8–10.5)

## 2021-07-05 RX ADMIN — Medication 2: at 08:52

## 2021-07-05 RX ADMIN — Medication 10 DROP(S): at 18:04

## 2021-07-05 RX ADMIN — HEPARIN SODIUM 5000 UNIT(S): 5000 INJECTION INTRAVENOUS; SUBCUTANEOUS at 05:37

## 2021-07-05 RX ADMIN — Medication 100 MILLIGRAM(S): at 05:38

## 2021-07-05 RX ADMIN — Medication 6 UNIT(S): at 18:06

## 2021-07-05 RX ADMIN — Medication 100 MILLIGRAM(S): at 05:40

## 2021-07-05 RX ADMIN — Medication 100 MILLIGRAM(S): at 18:04

## 2021-07-05 RX ADMIN — AMLODIPINE BESYLATE 10 MILLIGRAM(S): 2.5 TABLET ORAL at 05:39

## 2021-07-05 RX ADMIN — Medication 3 MILLILITER(S): at 09:14

## 2021-07-05 RX ADMIN — Medication 166.67 MILLIGRAM(S): at 14:15

## 2021-07-05 RX ADMIN — Medication 40 MILLIGRAM(S): at 05:36

## 2021-07-05 RX ADMIN — MUPIROCIN 1 APPLICATION(S): 20 OINTMENT TOPICAL at 05:39

## 2021-07-05 RX ADMIN — CHLORHEXIDINE GLUCONATE 1 APPLICATION(S): 213 SOLUTION TOPICAL at 13:02

## 2021-07-05 RX ADMIN — INSULIN GLARGINE 25 UNIT(S): 100 INJECTION, SOLUTION SUBCUTANEOUS at 22:23

## 2021-07-05 RX ADMIN — Medication 10 DROP(S): at 05:38

## 2021-07-05 RX ADMIN — MUPIROCIN 1 APPLICATION(S): 20 OINTMENT TOPICAL at 18:05

## 2021-07-05 RX ADMIN — Medication 6 UNIT(S): at 08:52

## 2021-07-05 RX ADMIN — TAMSULOSIN HYDROCHLORIDE 0.4 MILLIGRAM(S): 0.4 CAPSULE ORAL at 22:23

## 2021-07-05 RX ADMIN — Medication 3 MILLILITER(S): at 21:51

## 2021-07-05 RX ADMIN — Medication 6 UNIT(S): at 13:01

## 2021-07-05 RX ADMIN — HEPARIN SODIUM 5000 UNIT(S): 5000 INJECTION INTRAVENOUS; SUBCUTANEOUS at 18:05

## 2021-07-05 RX ADMIN — ATORVASTATIN CALCIUM 20 MILLIGRAM(S): 80 TABLET, FILM COATED ORAL at 22:23

## 2021-07-05 NOTE — PROGRESS NOTE ADULT - SUBJECTIVE AND OBJECTIVE BOX
[facility] [place] NEPHROLOGY PRACTICE   MD Thom Hernandez MD, D.O. Ruoru Wong, PA    From 7 AM - 5 PM:  OFFICE: [phone]  Dr. Hernandez cell: [phone]  Dr. Rene cell: [phone]  Dr. Crowe cell: [phone]  KATH Robin cell: [phone]    From 5 PM - 7 AM: Answering Service: [phone]  Date of service: 07-05-21 @ 15:28    RENAL FOLLOW UP NOTE  --------------------------------------------------------------------------------  HPI:  Pt seen and examined at bedside.   [place] SOB, chest pain     PAST HISTORY  --------------------------------------------------------------------------------  No significant changes to PMH, PSH, FHx, SHx, unless otherwise noted    ALLERGIES & MEDICATIONS  --------------------------------------------------------------------------------  Allergies    No Known Allergies    Intolerances      Standing Inpatient Medications  albuterol/ipratropium for Nebulization 3 milliLiter(s) Nebulizer every 12 hours  amLODIPine   Tablet 10 milliGRAM(s) Oral daily  atorvastatin 20 milliGRAM(s) Oral at bedtime  chlorhexidine 4% Liquid 1 Application(s) Topical daily  dextrose 40% Gel 15 Gram(s) Oral once  dextrose 50% Injectable 25 Gram(s) IV Push once  dextrose 50% Injectable 12.5 Gram(s) IV Push once  dextrose 50% Injectable 25 Gram(s) IV Push once  doxycycline hyclate Capsule 100 milliGRAM(s) Oral every 12 hours  ergocalciferol 85372 Unit(s) Oral <User Schedule>  furosemide   Injectable 40 milliGRAM(s) IV Push daily  glucagon  Injectable 1 milliGRAM(s) IntraMuscular once  heparin   Injectable 5000 Unit(s) SubCutaneous every 12 hours  insulin glargine Injectable (LANTUS) 25 Unit(s) SubCutaneous at bedtime  insulin lispro (ADMELOG) corrective regimen sliding scale   SubCutaneous three times a day before meals  insulin lispro (ADMELOG) corrective regimen sliding scale   SubCutaneous at bedtime  insulin lispro Injectable (ADMELOG) 6 Unit(s) SubCutaneous three times a day before meals  metoprolol succinate  milliGRAM(s) Oral daily  mupirocin 2% Ointment 1 Application(s) Topical two times a day  ofloxacin 0.3% Solution 10 Drop(s) Left Ear two times a day  tamsulosin 0.4 milliGRAM(s) Oral at bedtime  vancomycin  IVPB 1250 milliGRAM(s) IV Intermittent daily    PRN Inpatient Medications  acetaminophen   Tablet .. 650 milliGRAM(s) Oral every 4 hours PRN  oxyCODONE    IR 5 milliGRAM(s) Oral every 4 hours PRN  oxyCODONE    IR 10 milliGRAM(s) Oral every 4 hours PRN  sodium chloride 0.9% lock flush 10 milliLiter(s) IV Push every 1 hour PRN      REVIEW OF SYSTEMS  --------------------------------------------------------------------------------  General: no fever  CVS: no chest pain  RESP: no sob, no cough  ABD: no abdominal pain  : no dysuria,  MSK: no edema     VITALS/PHYSICAL EXAM  --------------------------------------------------------------------------------  T(C): 36.8 (07-05-21 @ 05:37), Max: 36.8 (07-05-21 @ 05:37)  HR: 78 (07-05-21 @ 09:14) (78 - 85)  BP: 156/85 (07-05-21 @ 05:37) (156/85 - 160/87)  RR: 18 (07-05-21 @ 05:37) (18 - 18)  SpO2: 96% (07-05-21 @ 09:14) (95% - 96%)  Wt(kg): --        07-04-21 @ 07:01  -  07-05-21 @ 07:00  --------------------------------------------------------  IN: 620 mL / OUT: 1500 mL / NET: -880 mL      Physical Exam:  	Gen: NAD  	HEENT: MMM  	Pulm: CTA B/L  	CV: S1S2  	Abd: Soft, +BS  	Ext: No LE edema B/L                      Neuro: Awake   	Skin: Warm and Dry       LABS/STUDIES  --------------------------------------------------------------------------------              10.7   6.19  >-----------<  219      [07-05-21 @ 06:52]              32.8     142  |  107  |  28  ----------------------------<  185      [07-05-21 @ 06:52]  3.5   |  23  |  1.62        Ca     9.2     [07-05-21 @ 06:52]      Mg     1.80     [07-05-21 @ 06:52]      Phos  4.3     [07-05-21 @ 06:52]    TPro  6.5  /  Alb  3.3  /  TBili  <0.2  /  DBili  x   /  AST  25  /  ALT  28  /  AlkPhos  55  [07-05-21 @ 06:52]    Creatinine Trend:  SCr 1.62 [07-05 @ 06:52]  SCr 1.55 [07-04 @ 07:01]  SCr 1.57 [07-03 @ 07:25]  SCr 1.62 [07-02 @ 16:09]  SCr 1.55 [07-02 @ 01:30]    Urinalysis - [06-22-21 @ 16:47]      Color Light Yellow / Appearance Clear / SG 1.009 / pH 6.0      Gluc >= 1000 mg/dL / Ketone Negative  / Bili Negative / Urobili <2 mg/dL       Blood Trace / Protein Trace / Leuk Est Negative / Nitrite Negative      RBC 1 / WBC 1 / Hyaline 0 / Gran  / Sq Epi  / Non Sq Epi 0 / Bacteria Negative    Urine Creatinine 180      [07-01-21 @ 14:47]  Urine Sodium 65      [07-01-21 @ 14:47]    PTH -- (Ca --)      [06-23-21 @ 08:23]   69  Vitamin D (25OH) 7.7      [06-23-21 @ 12:30]  Lipid: chol 101, , HDL 27, LDL --      [06-23-21 @ 08:23]

## 2021-07-05 NOTE — PROGRESS NOTE ADULT - SUBJECTIVE AND OBJECTIVE BOX
Date of Service: 07-05-21 @ 14:03    Patient is a 61y old  Male who presents with a chief complaint of otitis externa, uncontrolled DM (05 Jul 2021 12:08)      Any change in ROS: Seems to be doing ok : no SOB: says his breathing has improved     MEDICATIONS  (STANDING):  albuterol/ipratropium for Nebulization 3 milliLiter(s) Nebulizer every 12 hours  amLODIPine   Tablet 10 milliGRAM(s) Oral daily  atorvastatin 20 milliGRAM(s) Oral at bedtime  chlorhexidine 4% Liquid 1 Application(s) Topical daily  dextrose 40% Gel 15 Gram(s) Oral once  dextrose 50% Injectable 25 Gram(s) IV Push once  dextrose 50% Injectable 12.5 Gram(s) IV Push once  dextrose 50% Injectable 25 Gram(s) IV Push once  doxycycline hyclate Capsule 100 milliGRAM(s) Oral every 12 hours  ergocalciferol 37017 Unit(s) Oral <User Schedule>  furosemide   Injectable 40 milliGRAM(s) IV Push daily  glucagon  Injectable 1 milliGRAM(s) IntraMuscular once  heparin   Injectable 5000 Unit(s) SubCutaneous every 12 hours  insulin glargine Injectable (LANTUS) 25 Unit(s) SubCutaneous at bedtime  insulin lispro (ADMELOG) corrective regimen sliding scale   SubCutaneous three times a day before meals  insulin lispro (ADMELOG) corrective regimen sliding scale   SubCutaneous at bedtime  insulin lispro Injectable (ADMELOG) 6 Unit(s) SubCutaneous three times a day before meals  metoprolol succinate  milliGRAM(s) Oral daily  mupirocin 2% Ointment 1 Application(s) Topical two times a day  ofloxacin 0.3% Solution 10 Drop(s) Left Ear two times a day  tamsulosin 0.4 milliGRAM(s) Oral at bedtime  vancomycin  IVPB 1250 milliGRAM(s) IV Intermittent daily    MEDICATIONS  (PRN):  acetaminophen   Tablet .. 650 milliGRAM(s) Oral every 4 hours PRN Mild Pain (1 - 3)  oxyCODONE    IR 5 milliGRAM(s) Oral every 4 hours PRN Moderate Pain (4 - 6)  oxyCODONE    IR 10 milliGRAM(s) Oral every 4 hours PRN Severe Pain (7 - 10)  sodium chloride 0.9% lock flush 10 milliLiter(s) IV Push every 1 hour PRN Pre/post blood products, medications, blood draw, and to maintain line patency    Vital Signs Last 24 Hrs  T(C): 36.8 (05 Jul 2021 05:37), Max: 36.8 (05 Jul 2021 05:37)  T(F): 98.3 (05 Jul 2021 05:37), Max: 98.3 (05 Jul 2021 05:37)  HR: 78 (05 Jul 2021 09:14) (78 - 85)  BP: 156/85 (05 Jul 2021 05:37) (156/85 - 160/87)  BP(mean): --  RR: 18 (05 Jul 2021 05:37) (18 - 18)  SpO2: 96% (05 Jul 2021 09:14) (95% - 96%)    I&O's Summary    04 Jul 2021 07:01  -  05 Jul 2021 07:00  --------------------------------------------------------  IN: 620 mL / OUT: 1500 mL / NET: -880 mL          Physical Exam:   GENERAL: NAD, well-groomed, well-developed  HEENT: [place]/   Atraumatic, Normocephalic  ENMT: No tonsillar erythema, exudates, or enlargement; Moist mucous membranes, Good dentition, No lesions  NECK: Supple, No JVD, Normal thyroid  CHEST/LUNG: Clear to auscultaion  CVS: Regular rate and rhythm; No murmurs, rubs, or gallops  GI: : Soft, Nontender, Nondistended; Bowel sounds present  NERVOUS SYSTEM:  Alert & Oriented X3  EXTREMITIES: mild edema  LYMPH: No lymphadenopathy noted  SKIN: No rashes or lesions  ENDOCRINOLOGY: No Thyromegaly  PSYCH: Appropriate    Labs:                              10.7   6.19  )-----------( 219      ( 05 Jul 2021 06:52 )             32.8                         10.7   7.21  )-----------( 219      ( 04 Jul 2021 07:01 )             33.1                         10.4   7.98  )-----------( 235      ( 03 Jul 2021 07:25 )             31.7                         11.1   9.53  )-----------( 236      ( 02 Jul 2021 16:09 )             34.2     07-05    142  |  107  |  28<H>  ----------------------------<  185<H>  3.5   |  23  |  1.62<H>  07-04    142  |  110<H>  |  22  ----------------------------<  131<H>  3.6   |  20<L>  |  1.55<H>  07-03    143  |  111<H>  |  24<H>  ----------------------------<  165<H>  3.8   |  20<L>  |  1.57<H>  07-02    142  |  110<H>  |  25<H>  ----------------------------<  159<H>  4.3   |  22  |  1.62<H>  07-02    144  |  110<H>  |  25<H>  ----------------------------<  226<H>  4.0   |  20<L>  |  1.55<H>    Ca    9.2      05 Jul 2021 06:52  Ca    8.9      04 Jul 2021 07:01  Phos  4.3     07-05  Phos  4.2     07-04  Mg     1.80     07-05  Mg     1.70     07-04    TPro  6.5  /  Alb  3.3  /  TBili  <0.2  /  DBili  x   /  AST  25  /  ALT  28  /  AlkPhos  55  07-05  TPro  6.7  /  Alb  3.2<L>  /  TBili  0.2  /  DBili  x   /  AST  17  /  ALT  23  /  AlkPhos  60  07-04  TPro  6.8  /  Alb  3.2<L>  /  TBili  0.2  /  DBili  x   /  AST  20  /  ALT  23  /  AlkPhos  60  07-03  TPro  6.8  /  Alb  3.4  /  TBili  <0.2  /  DBili  x   /  AST  25  /  ALT  29  /  AlkPhos  63  07-02  TPro  6.3  /  Alb  3.4  /  TBili  <0.2  /  DBili  x   /  AST  26  /  ALT  25  /  AlkPhos  58  07-02    CAPILLARY BLOOD GLUCOSE      POCT Blood Glucose.: 90 mg/dL (05 Jul 2021 12:29)  POCT Blood Glucose.: 219 mg/dL (05 Jul 2021 08:30)  POCT Blood Glucose.: 124 mg/dL (04 Jul 2021 22:07)  POCT Blood Glucose.: 100 mg/dL (04 Jul 2021 17:42)      LIVER FUNCTIONS - ( 05 Jul 2021 06:52 )  Alb: 3.3 g/dL / Pro: 6.5 g/dL / ALK PHOS: 55 U/L / ALT: 28 U/L / AST: 25 U/L / GGT: x                 r< from: US Duplex Venous Lower Ext Complete, Bilateral (07.04.21 @ 14:34) >  EXAM:  US DPLX LWR EXT VEINS COMPL BI        PROCEDURE DATE:  Jul 4 2021         INTERPRETATION:  CLINICAL INFORMATION: Leg swelling.    COMPARISON: None available.    TECHNIQUE: Duplex sonography of the BILATERAL LOWER extremity veins with color and spectral Doppler, with and without compression.    FINDINGS:    RIGHT:  Normal compressibility of the RIGHT common femoral, femoral and popliteal veins.  Doppler examination shows normal spontaneous and phasic flow.  No RIGHT calf vein thrombosis is detected.    LEFT:  Normal compressibility of the LEFT common femoral, femoral and popliteal veins.  Doppler examination shows normal spontaneous and phasic flow.  No LEFT calf vein thrombosis is detected.    IMPRESSION:  No evidence of deep venousthrombosis in either lower extremity.                  SUJATA ISLAS MD; Attending Radiologist  This document has been electronically signed. Jul 4 2021  2:54PM    < end of copied text >  < from: US Abdomen Complete (US Abdomen Complete .) (07.04.21 @ 14:32) >    EXAM:  US ABDOMEN COMPLETE        PROCEDURE DATE:  Jul 4 2021         INTERPRETATION:  CLINICAL INFORMATION: Alcohol abuse. Abdominal distention.    COMPARISON: Chest CT dated 07/02/2021 and renal ultrasound dated 06/23/2021.    TECHNIQUE: Sonography of the abdomen.    FINDINGS:    Liver: Within normal limits.  Bile ducts: Normal caliber. Common bile duct measures 4 mm.  Gallbladder: Within normal limits.  Pancreas: Poorly visualized.  Spleen: 7.4 cm. Within normal limits.  Right kidney: 11.8 cm. No hydronephrosis. Increased renal echogenicity.  Left kidney: 10.2 cm.  No hydronephrosis. Increased renal echogenicity.  Ascites: None.  There are mild bilateral pleural effusions.  Aorta and IVC: Visualized portions are within normal limits.    IMPRESSION:  Bilateral pleural effusions.    Bilateral increased renal echogenicity suggestive of underlying medical renal disease.                SUJATA ISLAS MD; Attending Radiologist  This document has been electronically signed. Jul 4 2021  4:02PM    < end of copied text >      RECENT CULTURES:        RESPIRATORY CULTURES:          Studies  Chest X-RAY  CT SCAN Chest   Venous Dopplers: LE:   CT Abdomen  Others

## 2021-07-05 NOTE — PROGRESS NOTE ADULT - ASSESSMENT
Patient seen and examined, agree with above assessment and plan as transcribed above.    - Presentation of pulmonary edema out ot proportion to echo findings  - Will review records  - May need ischemic eval if not recently done    Cale Mccann MD, [facility]  BEEPER [phone]

## 2021-07-05 NOTE — PROGRESS NOTE ADULT - SUBJECTIVE AND OBJECTIVE BOX
S: SOB resolved, denies chest pain. Review of systems otherwise (-)      MEDICATIONS  (STANDING):  albuterol/ipratropium for Nebulization 3 milliLiter(s) Nebulizer every 12 hours  amLODIPine   Tablet 10 milliGRAM(s) Oral daily  atorvastatin 20 milliGRAM(s) Oral at bedtime  chlorhexidine 4% Liquid 1 Application(s) Topical daily  dextrose 40% Gel 15 Gram(s) Oral once  dextrose 50% Injectable 25 Gram(s) IV Push once  dextrose 50% Injectable 12.5 Gram(s) IV Push once  dextrose 50% Injectable 25 Gram(s) IV Push once  doxycycline hyclate Capsule 100 milliGRAM(s) Oral every 12 hours  ergocalciferol 71104 Unit(s) Oral <User Schedule>  furosemide   Injectable 40 milliGRAM(s) IV Push daily  glucagon  Injectable 1 milliGRAM(s) IntraMuscular once  heparin   Injectable 5000 Unit(s) SubCutaneous every 12 hours  insulin glargine Injectable (LANTUS) 25 Unit(s) SubCutaneous at bedtime  insulin lispro (ADMELOG) corrective regimen sliding scale   SubCutaneous three times a day before meals  insulin lispro (ADMELOG) corrective regimen sliding scale   SubCutaneous at bedtime  insulin lispro Injectable (ADMELOG) 6 Unit(s) SubCutaneous three times a day before meals  metoprolol succinate  milliGRAM(s) Oral daily  mupirocin 2% Ointment 1 Application(s) Topical two times a day  ofloxacin 0.3% Solution 10 Drop(s) Left Ear two times a day  tamsulosin 0.4 milliGRAM(s) Oral at bedtime  vancomycin  IVPB 1250 milliGRAM(s) IV Intermittent daily    MEDICATIONS  (PRN):  acetaminophen   Tablet .. 650 milliGRAM(s) Oral every 4 hours PRN Mild Pain (1 - 3)  oxyCODONE    IR 5 milliGRAM(s) Oral every 4 hours PRN Moderate Pain (4 - 6)  oxyCODONE    IR 10 milliGRAM(s) Oral every 4 hours PRN Severe Pain (7 - 10)  sodium chloride 0.9% lock flush 10 milliLiter(s) IV Push every 1 hour PRN Pre/post blood products, medications, blood draw, and to maintain line patency      LABS:                          10.7   6.19  )-----------( 219      ( 05 Jul 2021 06:52 )             32.8     Hemoglobin: 10.7 g/dL (07-05 @ 06:52)  Hemoglobin: 10.7 g/dL (07-04 @ 07:01)  Hemoglobin: 10.4 g/dL (07-03 @ 07:25)  Hemoglobin: 11.1 g/dL (07-02 @ 16:09)  Hemoglobin: 11.3 g/dL (07-01 @ 06:57)    07-05    142  |  107  |  28<H>  ----------------------------<  185<H>  3.5   |  23  |  1.62<H>    Ca    9.2      05 Jul 2021 06:52  Phos  4.3     07-05  Mg     1.80     07-05    TPro  6.5  /  Alb  3.3  /  TBili  <0.2  /  DBili  x   /  AST  25  /  ALT  28  /  AlkPhos  55  07-05    Creatinine Trend: 1.62<--, 1.55<--, 1.57<--, 1.62<--, 1.55<--, 1.52<--             07-04-21 @ 07:01  -  07-05-21 @ 07:00  --------------------------------------------------------  IN: 620 mL / OUT: 1500 mL / NET: -880 mL        PHYSICAL EXAM  Vital Signs Last 24 Hrs  T(C): 36.8 (05 Jul 2021 05:37), Max: 36.8 (05 Jul 2021 05:37)  T(F): 98.3 (05 Jul 2021 05:37), Max: 98.3 (05 Jul 2021 05:37)  HR: 78 (05 Jul 2021 09:14) (78 - 85)  BP: 156/85 (05 Jul 2021 05:37) (145/78 - 160/87)  BP(mean): --  RR: 18 (05 Jul 2021 05:37) (18 - 18)  SpO2: 96% (05 Jul 2021 09:14) (95% - 97%)    Gen: NAD  HEENT:   Normal oral mucosa, PERRL, EOMI	  Lymphatic: No lymphadenopathy , no edema  Cardiovascular: Normal S1 S2, No JVD, No murmurs , Peripheral pulses palpable 2+ bilaterally  Respiratory: Lungs clear to auscultation, normal effort 	  Gastrointestinal:  Soft, Non-tender, + BS	  Skin: No rashes, No ecchymoses, No cyanosis, warm to touch  Musculoskeletal: Normal range of motion, normal strength  Psychiatry:  Mood & affect appropriate    TELEMETRY: SR 70-80s    < from: Transthoracic Echocardiogram (07.04.21 @ 09:23) >  CONCLUSIONS:  1. Normal mitral valve. Mild mitral regurgitation.  2. Mildly dilated left atrium.  3. Normal left ventricular internal dimensions and wall  thicknesses.  4. Normal left ventricular systolic function. No segmental  wall motion abnormalities.  5. The right ventricle is not well visualized; grossly  normal right ventricular systolic function.  6. Small pericardial effusion.  7. Left pleural effusion.  *** No previous Echo exam.    < end of copied text >      ASSESSMENT/PLAN: 61M with PMH DM2, HTN, HLD, diabetic neuropathy, glaucoma/cataract with poor vision, HCV s/p [place] (cleared per patient) who is being seen for dyspnea.    - pt. with pulmonary edema on CXR/CT and LE edema  - volume status improved - keep net negative with IV lasix, will discuss transitioning to PO Lasix  - cont BB/ Norvasc , BP stable   - TTE noted with normal LV/RV function, small pericardial effusion  - ABx per ID    Harvinder Gomes PA-C  Pager: [phone]

## 2021-07-05 NOTE — PROGRESS NOTE ADULT - ASSESSMENT
61M with PMH DM2, HTN, HLD, diabetic neuropathy, glaucoma/cataract with poor vision, HCV s/p [place] (cleared per patient) presented with left ear pain. seen by ENT likely malignant otitis externa and folliculitis on antibiotic. nephrology consulted for elvin    ELVIN  baseline ~ 1  ELVIN likely sec to GEMINI  s/p CT with contrast 6/20  FEna>1% suggested ATN  UA with proteinuria and hematuria  renal us without hydro  Renal function improving  Cardiology following   agree w/ diuretics   Vanc dosed by level   ACEI still on hold   avoid nephrotoxic agents  optimize dm control. f/u endo  on vanco  monitor vanco level  monitor bmp and urine output    proteinuria/hematuria  renal us noted  p/c ratio <1g  likely sec to dm/htn  work up can be done outpatient  monitor    Acidosis  non AG  improved with oral bicarb  monitor serum Co2    htn  improving   ACEI on hold  BP meds per cardio  monitor    hypocalcemia  likely vit d def  On weekly vit d 50000x12 dose  monitor    OM  malignant otitis externa on CT  f/u ENT/ID  antibiotic per team     61M with PMH DM2, HTN, HLD, diabetic neuropathy, glaucoma/cataract with poor vision, HCV s/p [place] (cleared per patient) presented with left ear pain. seen by ENT likely malignant otitis externa and folliculitis on antibiotic. nephrology consulted for elvin    ELVIN  baseline ~ 1  ELVIN likely sec to GEMINI  s/p CT with contrast 6/20  FEna>1% suggested ATN  UA with proteinuria and hematuria  renal us without hydro  Renal function stable  Cardiology following   agree w/ diuretics   Vanc dosed by level   ACEI still on hold   avoid nephrotoxic agents  optimize dm control. f/u endo  on vanco  monitor vanco level  monitor bmp and urine output    proteinuria/hematuria  renal us noted  p/c ratio <1g  likely sec to dm/htn  work up can be done outpatient  monitor    Acidosis  non AG  improved with oral bicarb  monitor serum Co2    htn  improving   ACEI on hold  BP meds per cardio  monitor    hypocalcemia  likely vit d def  On weekly vit d 50000x12 dose  monitor    OM  malignant otitis externa on CT  f/u ENT/ID  antibiotic per team

## 2021-07-05 NOTE — PROGRESS NOTE ADULT - SUBJECTIVE AND OBJECTIVE BOX
Patient is a 61y old  Male who presents with a chief complaint of otitis externa, uncontrolled DM (2021 15:00)    DATE OF SERVICE: 21 @ 23:07    HPI:  Afebrile.   SOB better,    PAST MEDICAL & SURGICAL HISTORY:  DM (diabetes mellitus)  On Insulin    HTN (hypertension)  medicxal managenent    No significant past surgical history        Review of Systems:   CONSTITUTIONAL: No fever, weight loss, or fatigue  EYES: No eye pain, visual disturbances, or discharge  ENMT:  No difficulty hearing, tinnitus, vertigo; No sinus or throat pain.   NECK: No pain or stiffness  BREASTS: No pain, masses, or nipple discharge  RESPIRATORY: No cough, wheezing, chills or hemoptysis; +shortness of breath  CARDIOVASCULAR: No chest pain, palpitations, dizziness, or leg swelling  GASTROINTESTINAL: No abdominal or epigastric pain. No nausea, vomiting, or hematemesis; No diarrhea or constipation. No melena or hematochezia.  GENITOURINARY: No dysuria, frequency, hematuria, or incontinence  NEUROLOGICAL: No headaches, memory loss, loss of strength, numbness, or tremors  SKIN: No itching, burning, rashes, or lesions   LYMPH NODES: No enlarged glands  ENDOCRINE: No heat or cold intolerance; No hair loss  MUSCULOSKELETAL: No joint pain or swelling; No muscle, back, or extremity pain  PSYCHIATRIC: No depression, anxiety, mood swings, or difficulty sleeping  HEME/LYMPH: No easy bruising, or bleeding gums  ALLERY AND IMMUNOLOGIC: No hives or eczema    Allergies    No Known Allergies    Intolerances        Social History:     FAMILY HISTORY:  No pertinent family history in first degree relatives        MEDICATIONS  (STANDING):  amLODIPine   Tablet 10 milliGRAM(s) Oral daily  atorvastatin 20 milliGRAM(s) Oral at bedtime  ciprofloxacin   IVPB 400 milliGRAM(s) IV Intermittent every 12 hours  dextrose 40% Gel 15 Gram(s) Oral once  dextrose 50% Injectable 25 Gram(s) IV Push once  dextrose 50% Injectable 12.5 Gram(s) IV Push once  dextrose 50% Injectable 25 Gram(s) IV Push once  glucagon  Injectable 1 milliGRAM(s) IntraMuscular once  insulin glargine Injectable (LANTUS) 20 Unit(s) SubCutaneous at bedtime  insulin lispro (ADMELOG) corrective regimen sliding scale   SubCutaneous three times a day before meals  insulin lispro (ADMELOG) corrective regimen sliding scale   SubCutaneous at bedtime  insulin lispro Injectable (ADMELOG) 7 Unit(s) SubCutaneous three times a day before meals  ofloxacin 0.3% Solution 10 Drop(s) Left Ear two times a day  sodium chloride 0.9%. 1000 milliLiter(s) (100 mL/Hr) IV Continuous <Continuous>  tamsulosin 0.4 milliGRAM(s) Oral at bedtime  vancomycin  IVPB 1000 milliGRAM(s) IV Intermittent every 12 hours    MEDICATIONS  (PRN):  acetaminophen   Tablet .. 650 milliGRAM(s) Oral every 4 hours PRN Mild Pain (1 - 3)  oxyCODONE    IR 5 milliGRAM(s) Oral every 4 hours PRN Moderate Pain (4 - 6)  oxyCODONE    IR 10 milliGRAM(s) Oral every 4 hours PRN Severe Pain (7 - 10)        CAPILLARY BLOOD GLUCOSE      POCT Blood Glucose.: 272 mg/dL (2021 22:33)  POCT Blood Glucose.: 284 mg/dL (2021 17:48)  POCT Blood Glucose.: 274 mg/dL (2021 12:09)  POCT Blood Glucose.: 322 mg/dL (2021 08:11)    I&O's Summary    2021 07:01  -  2021 23:11  --------------------------------------------------------  IN: 0 mL / OUT: 525 mL / NET: -525 mL        PHYSICAL EXAM:  Vital Signs Last 24 Hrs  T(C): 37 (2021 22:11), Max: 37.1 (2021 05:31)  T(F): 98.6 (2021 22:11), Max: 98.7 (2021 05:31)  HR: 91 (2021 22:11) (86 - 91)  BP: 134/75 (2021 22:11) (134/75 - 157/72)  BP(mean): --  RR: 16 (2021 22:11) (16 - 16)  SpO2: 96% (2021 22:11) (96% - 100%)    GENERAL: NAD, well-developed  HEAD:  Atraumatic, Normocephalic  EYES: EOMI, PERRLA, conjunctiva and sclera clear  Ears: Left ear tender, cotton plug noted  NECK: Supple, No JVD  CHEST/LUNG: Clear to auscultation bilaterally; No wheeze  HEART: Regular rate and rhythm; No murmurs, rubs, or gallops  ABDOMEN: Soft, Nontender, distended; Bowel sounds present  EXTREMITIES:  2+ Peripheral Pulses, No clubbing, cyanosis, or edema  PSYCH: AAOx3  NEUROLOGY: non-focal  SKIN: No rashes or lesions    LABS:                        11.8   7.89  )-----------( 208      ( 2021 06:35 )             35.8     06-21    136  |  102  |  20  ----------------------------<  329<H>  4.0   |  23  |  1.60<H>    Ca    8.4      2021 06:35  Phos  3.7     06-21  Mg     1.9     06-21            Urinalysis Basic - ( 2021 23:18 )    Color: Yellow / Appearance: Clear / S.015 / pH: x  Gluc: x / Ketone: Negative  / Bili: Negative / Urobili: Negative mg/dL   Blood: x / Protein: 500 mg/dL / Nitrite: Negative   Leuk Esterase: Negative / RBC: 3-5 /HPF / WBC 0-2   Sq Epi: x / Non Sq Epi: Occasional / Bacteria: Occasional        RADIOLOGY & ADDITIONAL TESTS:    Imaging Personally Reviewed:    Consultant(s) Notes Reviewed:      Care Discussed with Consultants/Other Providers:

## 2021-07-05 NOTE — PROGRESS NOTE ADULT - ASSESSMENT
61M with PMH uncontrolled DM, with neuropathy, cataract, glaucoma presented with malignant otitis externa and folliculitis.     Malignant otitis media:  SOB:  Folliculitis  Uncontrolled DM:  HTN      7/2:    Malignant otitis media: cont antibiotics: he has no ear pain now:    SOB: he is not that SOB at this time: his lungs are clear: chest xray suggests mild fluid overload: ct scan chest is pending: do doppler bilateral LE and 2 decho :  Folliculitis: On antibiotics  Uncontrolled DM: defer to p [facility] team   HTN: mildly increased:   start dvt prophylaxis  DW ACP and PMD     7/3:    Malignant otitis media: cont antibiotics: he has no ear pain now:    SOB: he is not that SOB at this time: his lungs are clear: ct scan chest is suggestive of pulm edema: with bl effusions and PVC's: to cont lasix   Folliculitis: On antibiotics  Uncontrolled DM: defer to p [facility] team   HTN: mildly increased:   start dvt prophylaxis  DW ACP     7/4:    Malignant otitis media: cont antibiotics: he has no ear pain now:    SOB: he is not that SOB at this time: his lungs are clear: ct scan chest is suggestive of pulm edema: with bl effusions and PVC's: to cont lasix - he feels much better: echo noted:  Folliculitis: On antibiotics  Uncontrolled DM: defer to primary team   HTN: mildly increased:   start dvt prophylaxis  DW ACP     7/5:    Malignant otitis media: cont antibiotics: he has no ear pain now:    SOB: he is not that SOB at this time: his lungs are clear: ct scan chest is suggestive of pulm edema: with bl effusions and PVC's: to cont lasix - he feels much better: echo noted:  now for dc to placement   Folliculitis: On antibiotics  Uncontrolled DM: defer to primary team   HTN: mildly increased:   start dvt prophylaxis  DW ACP

## 2021-07-06 ENCOUNTER — NON-APPOINTMENT (OUTPATIENT)
Age: 61
End: 2021-07-06

## 2021-07-06 PROBLEM — Z00.00 ENCOUNTER FOR PREVENTIVE HEALTH EXAMINATION: Status: ACTIVE | Noted: 2021-07-06

## 2021-07-06 LAB
ALBUMIN SERPL ELPH-MCNC: 3.5 G/DL — SIGNIFICANT CHANGE UP (ref 3.3–5)
ALP SERPL-CCNC: 60 U/L — SIGNIFICANT CHANGE UP (ref 40–120)
ALT FLD-CCNC: 24 U/L — SIGNIFICANT CHANGE UP (ref 4–41)
ANION GAP SERPL CALC-SCNC: 15 MMOL/L — HIGH (ref 7–14)
AST SERPL-CCNC: 17 U/L — SIGNIFICANT CHANGE UP (ref 4–40)
BASOPHILS # BLD AUTO: 0.05 K/UL — SIGNIFICANT CHANGE UP (ref 0–0.2)
BASOPHILS NFR BLD AUTO: 0.7 % — SIGNIFICANT CHANGE UP (ref 0–2)
BILIRUB SERPL-MCNC: 0.2 MG/DL — SIGNIFICANT CHANGE UP (ref 0.2–1.2)
BUN SERPL-MCNC: 23 MG/DL — SIGNIFICANT CHANGE UP (ref 7–23)
CALCIUM SERPL-MCNC: 9.4 MG/DL — SIGNIFICANT CHANGE UP (ref 8.4–10.5)
CHLORIDE SERPL-SCNC: 103 MMOL/L — SIGNIFICANT CHANGE UP (ref 98–107)
CO2 SERPL-SCNC: 22 MMOL/L — SIGNIFICANT CHANGE UP (ref 22–31)
CREAT SERPL-MCNC: 1.47 MG/DL — HIGH (ref 0.5–1.3)
EOSINOPHIL # BLD AUTO: 0.19 K/UL — SIGNIFICANT CHANGE UP (ref 0–0.5)
EOSINOPHIL NFR BLD AUTO: 2.8 % — SIGNIFICANT CHANGE UP (ref 0–6)
GLUCOSE BLDC GLUCOMTR-MCNC: 107 MG/DL — HIGH (ref 70–99)
GLUCOSE BLDC GLUCOMTR-MCNC: 128 MG/DL — HIGH (ref 70–99)
GLUCOSE BLDC GLUCOMTR-MCNC: 187 MG/DL — HIGH (ref 70–99)
GLUCOSE BLDC GLUCOMTR-MCNC: 208 MG/DL — HIGH (ref 70–99)
GLUCOSE SERPL-MCNC: 221 MG/DL — HIGH (ref 70–99)
HCT VFR BLD CALC: 34.2 % — LOW (ref 39–50)
HGB BLD-MCNC: 11.4 G/DL — LOW (ref 13–17)
IANC: 3.71 K/UL — SIGNIFICANT CHANGE UP (ref 1.5–8.5)
IMM GRANULOCYTES NFR BLD AUTO: 0.3 % — SIGNIFICANT CHANGE UP (ref 0–1.5)
LYMPHOCYTES # BLD AUTO: 2.03 K/UL — SIGNIFICANT CHANGE UP (ref 1–3.3)
LYMPHOCYTES # BLD AUTO: 30.3 % — SIGNIFICANT CHANGE UP (ref 13–44)
MAGNESIUM SERPL-MCNC: 1.8 MG/DL — SIGNIFICANT CHANGE UP (ref 1.6–2.6)
MCHC RBC-ENTMCNC: 31.1 PG — SIGNIFICANT CHANGE UP (ref 27–34)
MCHC RBC-ENTMCNC: 33.3 GM/DL — SIGNIFICANT CHANGE UP (ref 32–36)
MCV RBC AUTO: 93.4 FL — SIGNIFICANT CHANGE UP (ref 80–100)
MONOCYTES # BLD AUTO: 0.69 K/UL — SIGNIFICANT CHANGE UP (ref 0–0.9)
MONOCYTES NFR BLD AUTO: 10.3 % — SIGNIFICANT CHANGE UP (ref 2–14)
NEUTROPHILS # BLD AUTO: 3.71 K/UL — SIGNIFICANT CHANGE UP (ref 1.8–7.4)
NEUTROPHILS NFR BLD AUTO: 55.6 % — SIGNIFICANT CHANGE UP (ref 43–77)
NRBC # BLD: 0 /100 WBCS — SIGNIFICANT CHANGE UP
NRBC # FLD: 0 K/UL — SIGNIFICANT CHANGE UP
PHOSPHATE SERPL-MCNC: 4.3 MG/DL — SIGNIFICANT CHANGE UP (ref 2.5–4.5)
PLATELET # BLD AUTO: 228 K/UL — SIGNIFICANT CHANGE UP (ref 150–400)
POTASSIUM SERPL-MCNC: 3.3 MMOL/L — LOW (ref 3.5–5.3)
POTASSIUM SERPL-SCNC: 3.3 MMOL/L — LOW (ref 3.5–5.3)
PROT SERPL-MCNC: 6.9 G/DL — SIGNIFICANT CHANGE UP (ref 6–8.3)
RBC # BLD: 3.66 M/UL — LOW (ref 4.2–5.8)
RBC # FLD: 12.1 % — SIGNIFICANT CHANGE UP (ref 10.3–14.5)
SODIUM SERPL-SCNC: 140 MMOL/L — SIGNIFICANT CHANGE UP (ref 135–145)
WBC # BLD: 6.69 K/UL — SIGNIFICANT CHANGE UP (ref 3.8–10.5)
WBC # FLD AUTO: 6.69 K/UL — SIGNIFICANT CHANGE UP (ref 3.8–10.5)

## 2021-07-06 PROCEDURE — 99232 SBSQ HOSP IP/OBS MODERATE 35: CPT

## 2021-07-06 RX ORDER — POTASSIUM CHLORIDE 20 MEQ
40 PACKET (EA) ORAL ONCE
Refills: 0 | Status: COMPLETED | OUTPATIENT
Start: 2021-07-06 | End: 2021-07-06

## 2021-07-06 RX ORDER — FUROSEMIDE 40 MG
40 TABLET ORAL DAILY
Refills: 0 | Status: DISCONTINUED | OUTPATIENT
Start: 2021-07-06 | End: 2021-07-07

## 2021-07-06 RX ORDER — VANCOMYCIN HCL 1 G
1250 VIAL (EA) INTRAVENOUS DAILY
Refills: 0 | Status: DISCONTINUED | OUTPATIENT
Start: 2021-07-07 | End: 2021-07-07

## 2021-07-06 RX ORDER — INSULIN GLARGINE 100 [IU]/ML
26 INJECTION, SOLUTION SUBCUTANEOUS AT BEDTIME
Refills: 0 | Status: DISCONTINUED | OUTPATIENT
Start: 2021-07-06 | End: 2021-07-07

## 2021-07-06 RX ADMIN — Medication 10 DROP(S): at 18:23

## 2021-07-06 RX ADMIN — MUPIROCIN 1 APPLICATION(S): 20 OINTMENT TOPICAL at 06:13

## 2021-07-06 RX ADMIN — Medication 40 MILLIEQUIVALENT(S): at 13:28

## 2021-07-06 RX ADMIN — Medication 6 UNIT(S): at 13:29

## 2021-07-06 RX ADMIN — Medication 3 MILLILITER(S): at 10:11

## 2021-07-06 RX ADMIN — CHLORHEXIDINE GLUCONATE 1 APPLICATION(S): 213 SOLUTION TOPICAL at 12:31

## 2021-07-06 RX ADMIN — TAMSULOSIN HYDROCHLORIDE 0.4 MILLIGRAM(S): 0.4 CAPSULE ORAL at 22:03

## 2021-07-06 RX ADMIN — Medication 10 DROP(S): at 06:13

## 2021-07-06 RX ADMIN — AMLODIPINE BESYLATE 10 MILLIGRAM(S): 2.5 TABLET ORAL at 06:14

## 2021-07-06 RX ADMIN — Medication 100 MILLIGRAM(S): at 06:13

## 2021-07-06 RX ADMIN — Medication 166.67 MILLIGRAM(S): at 13:28

## 2021-07-06 RX ADMIN — HEPARIN SODIUM 5000 UNIT(S): 5000 INJECTION INTRAVENOUS; SUBCUTANEOUS at 18:22

## 2021-07-06 RX ADMIN — ATORVASTATIN CALCIUM 20 MILLIGRAM(S): 80 TABLET, FILM COATED ORAL at 22:03

## 2021-07-06 RX ADMIN — Medication 40 MILLIGRAM(S): at 06:13

## 2021-07-06 RX ADMIN — Medication 2: at 09:12

## 2021-07-06 RX ADMIN — MUPIROCIN 1 APPLICATION(S): 20 OINTMENT TOPICAL at 18:23

## 2021-07-06 RX ADMIN — INSULIN GLARGINE 26 UNIT(S): 100 INJECTION, SOLUTION SUBCUTANEOUS at 22:04

## 2021-07-06 RX ADMIN — Medication 6 UNIT(S): at 18:22

## 2021-07-06 RX ADMIN — Medication 1: at 13:29

## 2021-07-06 RX ADMIN — Medication 6 UNIT(S): at 09:11

## 2021-07-06 NOTE — PROGRESS NOTE ADULT - SUBJECTIVE AND OBJECTIVE BOX
Infectious Diseases progress note:    Subjective:  NAD, feels well, afebrile.  No sob, cough, abd pain.  No ear pain    ROS:  CONSTITUTIONAL:  No fever, chills, rigors  CARDIOVASCULAR:  No chest pain or palpitations  RESPIRATORY:   No SOB, cough, dyspnea on exertion.  No wheezing  GASTROINTESTINAL:  No abd pain, N/V, diarrhea/constipation  EXTREMITIES:  No swelling or joint pain  GENITOURINARY:  No burning on urination, increased frequency or urgency.  No flank pain  NEUROLOGIC:  No HA, visual disturbances  SKIN: No rashes    Allergies    No Known Allergies    Intolerances        ANTIBIOTICS/RELEVANT:  antimicrobials  vancomycin  IVPB 1250 milliGRAM(s) IV Intermittent daily    immunologic:    OTHER:  acetaminophen   Tablet .. 650 milliGRAM(s) Oral every 4 hours PRN  albuterol/ipratropium for Nebulization 3 milliLiter(s) Nebulizer every 12 hours  amLODIPine   Tablet 10 milliGRAM(s) Oral daily  atorvastatin 20 milliGRAM(s) Oral at bedtime  chlorhexidine 4% Liquid 1 Application(s) Topical daily  dextrose 40% Gel 15 Gram(s) Oral once  dextrose 50% Injectable 25 Gram(s) IV Push once  dextrose 50% Injectable 12.5 Gram(s) IV Push once  dextrose 50% Injectable 25 Gram(s) IV Push once  ergocalciferol 26804 Unit(s) Oral <User Schedule>  furosemide    Tablet 40 milliGRAM(s) Oral daily  glucagon  Injectable 1 milliGRAM(s) IntraMuscular once  heparin   Injectable 5000 Unit(s) SubCutaneous every 12 hours  insulin glargine Injectable (LANTUS) 25 Unit(s) SubCutaneous at bedtime  insulin lispro (ADMELOG) corrective regimen sliding scale   SubCutaneous three times a day before meals  insulin lispro (ADMELOG) corrective regimen sliding scale   SubCutaneous at bedtime  insulin lispro Injectable (ADMELOG) 6 Unit(s) SubCutaneous three times a day before meals  metoprolol succinate  milliGRAM(s) Oral daily  mupirocin 2% Ointment 1 Application(s) Topical two times a day  ofloxacin 0.3% Solution 10 Drop(s) Left Ear two times a day  oxyCODONE    IR 5 milliGRAM(s) Oral every 4 hours PRN  oxyCODONE    IR 10 milliGRAM(s) Oral every 4 hours PRN  sodium chloride 0.9% lock flush 10 milliLiter(s) IV Push every 1 hour PRN  tamsulosin 0.4 milliGRAM(s) Oral at bedtime      Objective:  Vital Signs Last 24 Hrs  T(C): 36.9 (06 Jul 2021 13:30), Max: 36.9 (06 Jul 2021 13:30)  T(F): 98.4 (06 Jul 2021 13:30), Max: 98.4 (06 Jul 2021 13:30)  HR: 83 (06 Jul 2021 13:30) (72 - 83)  BP: 140/72 (06 Jul 2021 13:30) (140/72 - 145/82)  BP(mean): --  RR: 18 (06 Jul 2021 13:30) (17 - 18)  SpO2: 93% (06 Jul 2021 13:30) (93% - 99%)    PHYSICAL EXAM:  Constitutional:NAD  Eyes:VINCENZO, EOMI  Ear/Nose/Throat: no thrush, mucositis.  Moist mucous membranes	  Neck:no JVD, no lymphadenopathy, supple  Respiratory: CTA sudheer  Cardiovascular: S1S2 RRR, no murmurs  Gastrointestinal:soft, nontender,  nondistended (+) BS  Extremities:no e/e/c  Skin:  no rashes, open wounds or ulcerations, PICC line        LABS:                        11.4   6.69  )-----------( 228      ( 06 Jul 2021 07:40 )             34.2     07-06    140  |  103  |  23  ----------------------------<  221<H>  3.3<L>   |  22  |  1.47<H>    Ca    9.4      06 Jul 2021 07:40  Phos  4.3     07-06  Mg     1.80     07-06    TPro  6.9  /  Alb  3.5  /  TBili  0.2  /  DBili  x   /  AST  17  /  ALT  24  /  AlkPhos  60  07-06            Vancomycin Level, Random:  ug/mL (07-02 @ 07:23)      Vancomycin Level, Trough: 18.8 ug/mL (07-05 @ 12:44)  Vancomycin Level, Trough: 16.2 ug/mL (07-03 @ 14:12)              MICROBIOLOGY:    Culture - Blood (06.21.21 @ 18:39)   Specimen Source: .Blood Blood-Venous   Culture Results:   No Growth Final     Culture - Urine (06.20.21 @ 12:24)   Specimen Source: .Urine Clean Catch (Midstream)   Culture Results:   <10,000 CFU/mL Normal Urogenital Lena       RADIOLOGY & ADDITIONAL STUDIES:    < from: US Duplex Venous Lower Ext Complete, Bilateral (07.04.21 @ 14:34) >  FINDINGS:    RIGHT:  Normal compressibility of the RIGHT common femoral, femoral and popliteal veins.  Doppler examination shows normal spontaneous and phasic flow.  No RIGHT calf vein thrombosis is detected.    LEFT:  Normal compressibility of the LEFT common femoral, femoral and popliteal veins.  Doppler examination shows normal spontaneous and phasic flow.  No LEFT calf vein thrombosis is detected.    IMPRESSION:  No evidence of deep venousthrombosis in either lower extremity.    < end of copied text >      < from: US Abdomen Complete (US Abdomen Complete .) (07.04.21 @ 14:32) >    FINDINGS:    Liver: Within normal limits.  Bile ducts: Normal caliber. Common bile duct measures 4 mm.  Gallbladder: Within normal limits.  Pancreas: Poorly visualized.  Spleen: 7.4 cm. Within normal limits.  Right kidney: 11.8 cm. No hydronephrosis. Increased renal echogenicity.  Left kidney: 10.2 cm.  No hydronephrosis. Increased renal echogenicity.  Ascites: None.  There are mild bilateral pleural effusions.  Aorta and IVC: Visualized portions are within normal limits.    IMPRESSION:  Bilateral pleural effusions.    Bilateral increased renal echogenicity suggestive of underlying medical renal disease.    < end of copied text >

## 2021-07-06 NOTE — PROGRESS NOTE ADULT - ASSESSMENT
61M with PMH DM2, HTN, HLD, diabetic neuropathy, glaucoma/cataract with poor vision, HCV s/p [place] (cleared per patient) presented with left ear pain. seen by ENT likely malignant otitis externa and folliculitis on antibiotic. nephrology consulted for elvin    ELVIN  baseline ~ 1  ELVIN likely sec to GEMINI  s/p CT with contrast 6/20  FEna>1% suggested ATN  UA with proteinuria and hematuria  renal us without hydro  Renal function better today  fluid overloaded on lasix 40iv daily, echo normal. fluid status much better today. change to po lasix. plan d/w pa  Vanc dosed by level   ACEI still on hold   avoid nephrotoxic agents  optimize dm control. f/u endo  on vanco  monitor vanco level  monitor bmp and urine output    proteinuria/hematuria  renal us noted  p/c ratio <1g  likely sec to dm/htn  work up can be done outpatient  monitor    Acidosis  non AG  improved with oral bicarb  monitor serum Co2    htn  improving   ACEI on hold  BP meds per cardio  monitor    hypocalcemia  likely vit d def  On weekly vit d [id number] dose  monitor    OM  malignant otitis externa on CT  f/u ENT/ID  antibiotic per team    Hypokalemia  likely sec to diuresing  supplement  monitor    epigastric pain  ? etiology  improving after eating  monitor  get ct and gi eval if symptoms worsen

## 2021-07-06 NOTE — PROGRESS NOTE ADULT - ASSESSMENT
61M with PMH uncontrolled DM, with neuropathy, cataract, glaucoma presented with malignant otitis externa and folliculitis.     Malignant otitis media:  SOB:  Folliculitis  Uncontrolled DM:  HTN      7/2:    Malignant otitis media: cont antibiotics: he has no ear pain now:    SOB: he is not that SOB at this time: his lungs are clear: chest xray suggests mild fluid overload: ct scan chest is pending: do doppler bilateral LE and 2 decho :  Folliculitis: On antibiotics  Uncontrolled DM: defer to p [facility] team   HTN: mildly increased:   start dvt prophylaxis  DW ACP and PMD     7/3:    Malignant otitis media: cont antibiotics: he has no ear pain now:    SOB: he is not that SOB at this time: his lungs are clear: ct scan chest is suggestive of pulm edema: with bl effusions and PVC's: to cont lasix   Folliculitis: On antibiotics  Uncontrolled DM: defer to p [facility] team   HTN: mildly increased:   start dvt prophylaxis  DW ACP     7/4:    Malignant otitis media: cont antibiotics: he has no ear pain now:    SOB: he is not that SOB at this time: his lungs are clear: ct scan chest is suggestive of pulm edema: with bl effusions and PVC's: to cont lasix - he feels much better: echo noted:  Folliculitis: On antibiotics  Uncontrolled DM: defer to primary team   HTN: mildly increased:   start dvt prophylaxis  DW ACP     7/5:    Malignant otitis media: cont antibiotics: he has no ear pain now:    SOB: he is not that SOB at this time: his lungs are clear: ct scan chest is suggestive of pulm edema: with bl effusions and PVC's: to cont lasix - he feels much better: echo noted:  now for dc to placement   Folliculitis: On antibiotics  Uncontrolled DM: defer to primary team   HTN: mildly increased:   start dvt prophylaxis  DW ACP     7/5:    Malignant otitis media: cont antibiotics: he has no ear pain now:  ent following : cont antibiotics per ID duration:   SOB: he is not that SOB at this time: his lungs are clear: ct scan chest is suggestive of pulm edema: with bl effusions and PVC's: to cont lasix - he feels much better: echo noted:  now for dc to placement : switched to po lasix   Folliculitis: On antibiotics: per ID  Uncontrolled DM: defer to primary team   HTN: mildly increased:   start dvt prophylaxis  DW ACP : he is on rom air at this time

## 2021-07-06 NOTE — CHART NOTE - NSCHARTNOTEFT_GEN_A_CORE
61M with PMH DM2, HTN, HLD, diabetic neuropathy, glaucoma/cataract with poor vision, HCV s/p [place] (cleared per patient) presented with left ear pain. Patient initially presented to [facility] and transferred to [facility] for ENT evaluation. Endocrine following for uncontrolled T2DM with a1c 14.3.      Uncontrolled type 2 diabetes mellitus with hyperglycemia.   T2DM uncontrolled a1c of 14.3 with barriers of poor vision (unable to see insulin units) and lives at shelter  Recommend increase Lantus to 20 units qhs   Recommend increase Admelog to 7 units tidac, hold if NPO   Continue Admelog low dose correction scale tidac and qhs  Nutrition consult  Clinical Pharmacist saw patient reviewed insulin pen teaching     Discharge   Provider to RN insulin pen teaching (see if patient can give himself insulin by counting the number of clicks of the pen)   Patient states that he is unable to do basal/bolus insulin, will tentatively discharge on Novolog 70/30 pen BID dose TBD.   [facility] ([facility] [place]) (office and aquiles emailed)   [address] [facility] [phone].    Adrenal nodule incidentally found  -check aldosterone, renin activity (currently restarted on lisinopril)   -check metanephrine  -24 hour urine cortisol.     Jairo Soto MD  Endocrine Fellow   Pager  on weekdays 9am- 5pm   Please call  after hours and on weekends
61M with PMH DM2, HTN, HLD, diabetic neuropathy, glaucoma/cataract with poor vision, HCV s/p [place] (cleared per patient) presented with left ear pain. Patient initially presented to [facility] and transferred to [facility] for ENT evaluation. Consulted for uncontrolled T2DM with a1c 14.3%    BG, insulin administration and chart reviewed  Patient noted with tight glucose control at lunch, no hypoglycemia  Recommend to reduce Admelog to 7 units SQ TID before meals (Hold if NPO/not eating meal)  Continue Lantus 25 units SQ qHS   Continue Admelog low dose correctional scales before meals and bedtime as currently ordered  Check BG before meals and bedtime  Consistent carbohydrate diet, Nutrition consult done  Discharge Plan: Basal/bolus insulin pens with dose TBD. Contact endocrine to confirm dosing on day of discharge  See prior endocrine progress notes for complete plan of care. Will continue to follow    CAPILLARY BLOOD GLUCOSE    POCT Blood Glucose.: 98 mg/dL (30 Jun 2021 12:19)  POCT Blood Glucose.: 157 mg/dL (30 Jun 2021 08:56)  POCT Blood Glucose.: 274 mg/dL (29 Jun 2021 22:11)  POCT Blood Glucose.: 222 mg/dL (29 Jun 2021 17:56)    06-30    139  |  106  |  22  ----------------------------<  181<H>  4.4   |  20<L>  |  1.51<H>    Ca    8.7      30 Jun 2021 07:28    MEDICATIONS  (STANDING):  amLODIPine   Tablet 10 milliGRAM(s) Oral daily  atorvastatin 20 milliGRAM(s) Oral at bedtime  chlorhexidine 4% Liquid 1 Application(s) Topical daily  dextrose 40% Gel 15 Gram(s) Oral once  dextrose 50% Injectable 25 Gram(s) IV Push once  dextrose 50% Injectable 12.5 Gram(s) IV Push once  dextrose 50% Injectable 25 Gram(s) IV Push once  doxycycline hyclate Capsule 100 milliGRAM(s) Oral every 12 hours  ergocalciferol 00624 Unit(s) Oral <User Schedule>  glucagon  Injectable 1 milliGRAM(s) IntraMuscular once  insulin glargine Injectable (LANTUS) 25 Unit(s) SubCutaneous at bedtime  insulin lispro (ADMELOG) corrective regimen sliding scale   SubCutaneous three times a day before meals  insulin lispro (ADMELOG) corrective regimen sliding scale   SubCutaneous at bedtime  insulin lispro Injectable (ADMELOG) 7 Unit(s) SubCutaneous three times a day before meals  metoprolol tartrate 25 milliGRAM(s) Oral two times a day  mupirocin 2% Ointment 1 Application(s) Topical two times a day  ofloxacin 0.3% Solution 10 Drop(s) Left Ear two times a day  sodium chloride 0.9%. 1000 milliLiter(s) (75 mL/Hr) IV Continuous <Continuous>  tamsulosin 0.4 milliGRAM(s) Oral at bedtime  vancomycin  IVPB 1250 milliGRAM(s) IV Intermittent daily    Diet, Regular:   Consistent Carbohydrate {Evening Snack} (CSTCHOSN) (06-20-21 @ 09:02)    A1C with Estimated Average Glucose Result: 14.3 % (06-20-21 @ 03:47)  A1C with Estimated Average Glucose Result: 11.8 % (03-22-21 @ 14:20)    Emilie Lam  Nurse Practitioner  Division of Endocrinology & Diabetes  In house pager #[phone]/long range pager #[phone]    If before 9AM or after 6PM, or on weekends/holidays, please call endocrine answering service for assistance ([phone]).  For nonurgent matters email [email or web address] for assistance.
Dermatology consulted for possible Hidradenitis suppurativa.   Nephrology consulted for ELVIN.   Will f/u recs.
ENT recommended switching to tobramycin ear gtt. Discussed with pharmacy, not available. Re-discussed with ENT, will consider Ocuflox gtt. ID attg made aware.
ID, Dr. Osullivan, consulted. Will f/u recs.
Overnight patient complaining of shortness of breath. Patient seen at bedside. Patient states shortness of breath only occurs at night when he lays down. Patient also states that he feels as though something is in his throat. Patient denies difficulty swallowing, chest pain and abdominal pain. During interview patient able to speak full sentences without labored or increased breathing.  On exam:  Lungs: Clear to auscultation bilaterally. No increased work of breathing. No crackles or wheezing heard.  Cardio: S1,S2 heard, no murmurs, rubs or gallops.  Abdomen: soft, normoactive bowel sounds heard.    Vital Signs Last 24 Hrs  T(C): 37.2 (01 Jul 2021 21:19), Max: 37.2 (01 Jul 2021 21:19)  T(F): 98.9 (01 Jul 2021 21:19), Max: 98.9 (01 Jul 2021 21:19)  HR: 89 (01 Jul 2021 23:04) (74 - 96)  BP: 162/87 (01 Jul 2021 23:04) (139/71 - 173/100)  BP(mean): --  RR: 18 (01 Jul 2021 23:04) (18 - 19)  SpO2: 95% (01 Jul 2021 23:04) (95% - 97%)    CXR done earlier in day showed mild pulmonary vascular redistribution/congestion. New bilateral small pleural effusions with likely associated passive atelectasis. The right-sided effusion may be loculated or extending into the major fissure.    Repeat CXR ordered. ProBNP ordered with AM labs.  Will continue to monitor.
Source: Patient last seen by RDN on 6/22, now for nutrition follow up. Spoke with pt and obtained subjective information from extensive chart review.      Current Diet : Diet, Regular:   Consistent Carbohydrate {Evening Snack} (CSTCHOSN) (06-20-21 @ 09:02)    PO intake:  100%   Current Weight: Unavailable      Nutrition Interval Events: Pt says he has been eating well. No GI distress noted. Reinforced Consistent Carbohydrate diet instruction previously given. Pt with good understanding and able to teach back 3 diet principles. Pt without additional nutrition related issues or concerns at this time. Request obtaining new weight to assess trend and determine adequacy of PO intake. RDN services to remain available as needed.       __________________ Pertinent Medications__________________   MEDICATIONS  (STANDING):  albuterol/ipratropium for Nebulization 3 milliLiter(s) Nebulizer every 12 hours  amLODIPine   Tablet 10 milliGRAM(s) Oral daily  atorvastatin 20 milliGRAM(s) Oral at bedtime  chlorhexidine 4% Liquid 1 Application(s) Topical daily  dextrose 40% Gel 15 Gram(s) Oral once  dextrose 50% Injectable 25 Gram(s) IV Push once  dextrose 50% Injectable 12.5 Gram(s) IV Push once  dextrose 50% Injectable 25 Gram(s) IV Push once  ergocalciferol 53411 Unit(s) Oral <User Schedule>  furosemide    Tablet 40 milliGRAM(s) Oral daily  glucagon  Injectable 1 milliGRAM(s) IntraMuscular once  heparin   Injectable 5000 Unit(s) SubCutaneous every 12 hours  insulin glargine Injectable (LANTUS) 25 Unit(s) SubCutaneous at bedtime  insulin lispro (ADMELOG) corrective regimen sliding scale   SubCutaneous three times a day before meals  insulin lispro (ADMELOG) corrective regimen sliding scale   SubCutaneous at bedtime  insulin lispro Injectable (ADMELOG) 6 Unit(s) SubCutaneous three times a day before meals  metoprolol succinate  milliGRAM(s) Oral daily  mupirocin 2% Ointment 1 Application(s) Topical two times a day  ofloxacin 0.3% Solution 10 Drop(s) Left Ear two times a day  tamsulosin 0.4 milliGRAM(s) Oral at bedtime  vancomycin  IVPB 1250 milliGRAM(s) IV Intermittent daily    MEDICATIONS  (PRN):  acetaminophen   Tablet .. 650 milliGRAM(s) Oral every 4 hours PRN Mild Pain (1 - 3)  oxyCODONE    IR 5 milliGRAM(s) Oral every 4 hours PRN Moderate Pain (4 - 6)  oxyCODONE    IR 10 milliGRAM(s) Oral every 4 hours PRN Severe Pain (7 - 10)  sodium chloride 0.9% lock flush 10 milliLiter(s) IV Push every 1 hour PRN Pre/post blood products, medications, blood draw, and to maintain line patency      __________________ Pertinent Labs__________________   07-06 Na140 mmol/L Glu 221 mg/dL<H> K+ 3.3 mmol/L<L> Cr  1.47 mg/dL<H> BUN 23 mg/dL 07-06 Phos 4.3 mg/dL 07-06 Alb 3.5 g/dL 06-23 Chol 101 mg/dL LDL --    HDL 27 mg/dL<L> Trig 108 mg/dL      Estimated Needs:   [x] no change since previous assessment      Previous Nutrition Diagnosis:     Altered Related Lab:       Nutrition Diagnosis is [x] ongoing        Goal(s):  1. Patient to meet > 75% estimated energy needs    Recommendations:   1. Continue with Nutrition Care Plan    Monitoring and Evaluation:   1. Monitor weights, labs, BMs, skin integrity, PO intake and edema.  2. RD services to remain available. Request obtaining new weight to assess trend and determine adequacy of PO intake.
Discussed with Nephrology KELLI Perdomo to order Lasix 40 mg Daily. Will continue to monitor patients status.
Patient Age: 61    Patient Gender: M    Procedure (including site/side if known): IR PICC    Diagnosis/Indication: Malignant Otitis Externa     Interventional Radiology Attending Physician: N/A     Ordering Attending Physician: Dr. Smith     Pertinent medical history: 61M with PMH uncontrolled DM, with neuropathy, cataract, glaucoma presented with malignant otitis externa and folliculitis.    Pertinent Labs:                        10.6   7.93  )-----------( 244      ( 30 Jun 2021 07:28 )             31.7     06-30    139  |  106  |  22  ----------------------------<  181<H>  4.4   |  20<L>  |  1.51<H>    Ca    8.7      30 Jun 2021 07:28        Patient and Family aware? yes       Attending/Resident/NP/KATH BONILLA     Contact: [phone]    Date: 6/30/21       time: 12:19      Discussed with KATH Valladares. Approved.

## 2021-07-06 NOTE — PROGRESS NOTE ADULT - ASSESSMENT
Patient is a 61M with PMH DM2, HTN, HLD, diabetic neuropathy, glaucoma/cataract with poor vision, HCV s/p [place] (cleared per patient) presented with left ear pain.  CT auditory ear canal shows:    Findings concerning for malignant left-sided otitis externa with bony erosive changes involving the external auditory canal. Superimposed osteomyelitis within the bony external auditory canal is a consideration given the patient's clinical information. An external auditory canal cholesteatoma cannot be excluded. A neoplastic process such as squamous cell carcinoma is also difficult to exclude.    2. Additional imaging findings compatible with left-sided otitis media and left-sided mastoiditis. No gross ossicular chain erosion or destructive changes. No evidence of venous sinus thrombosis.    3. Unremarkable right-sided temporal bone CT examination apart from cerumen in the external auditory canal.    Pt also c/o new painful bumps, draining pus, in b/l armpits, b/l groin and R perineal area.    ID consulted for further abx management.     Left sided otitis externa:    - Cont present abx, f/u L ear cultures.  Concern for superimposed OM of bony external auditory canal.   Cannot exclude cholesteatoma or squamous cell carcinoma.    - ENT f/u appreciated.  Continued outpt f/u to address further need for cultures, imaging, biopsy.      - Current ear cultures growing MRSA.  IV cipro d/c'd.  Cont IV vanco, and maintain trough between 15-20.     -  Cont abx ear gtt as per ENT    - Plan for  long term IV abx.  f/u blood cx x 2.  ESR, CRP    - Weekly cbc, sma-7, esr, crp, vanco trough.       r/o Hidradenitis suppurativa:    - c/o new nodular/pustular lesions in b/l armpits, b/l groin and perineum for past 1-3 weeks. States lesions are painful and drain pus.    - R Derm eval appreciated, ?hidradenitis suppurativa vs staph folliculitis.  Pt started on doxycycline and mupirocin for staph decolonization.      - Cont IV abx for now.  Cont to monitor and evaluate for need for further I&D - lesions have improved      Pulm edema/fluid overload:    - cxr 7/2 with small b/l pleural effusions and congestion    - Pt started on diuresis.  Echo results noted.  Pt with incr abd distention, f/u Abd US    - Pulm and Cardiology f/u appreciated       * cont IV vancomycin x 6 week course (6/20 through 7/31), maintain trough between 15-20.  Monitor trough levels  * Check weekly cbc, sma-7, esr, crp, vanco trough.      d/c planning in place    Mena Osullivan  [phone]

## 2021-07-06 NOTE — PROGRESS NOTE ADULT - SUBJECTIVE AND OBJECTIVE BOX
Chief Complaint: DM2    History: tolerating po  denies hypoglycemia symptoms  ongoing poor vision due to cataracts  picc placed    MEDICATIONS  (STANDING):  albuterol/ipratropium for Nebulization 3 milliLiter(s) Nebulizer every 12 hours  amLODIPine   Tablet 10 milliGRAM(s) Oral daily  atorvastatin 20 milliGRAM(s) Oral at bedtime  chlorhexidine 4% Liquid 1 Application(s) Topical daily  dextrose 40% Gel 15 Gram(s) Oral once  dextrose 50% Injectable 25 Gram(s) IV Push once  dextrose 50% Injectable 12.5 Gram(s) IV Push once  dextrose 50% Injectable 25 Gram(s) IV Push once  ergocalciferol 08448 Unit(s) Oral <User Schedule>  furosemide    Tablet 40 milliGRAM(s) Oral daily  glucagon  Injectable 1 milliGRAM(s) IntraMuscular once  heparin   Injectable 5000 Unit(s) SubCutaneous every 12 hours  insulin glargine Injectable (LANTUS) 25 Unit(s) SubCutaneous at bedtime  insulin lispro (ADMELOG) corrective regimen sliding scale   SubCutaneous three times a day before meals  insulin lispro (ADMELOG) corrective regimen sliding scale   SubCutaneous at bedtime  insulin lispro Injectable (ADMELOG) 6 Unit(s) SubCutaneous three times a day before meals  metoprolol succinate  milliGRAM(s) Oral daily  mupirocin 2% Ointment 1 Application(s) Topical two times a day  ofloxacin 0.3% Solution 10 Drop(s) Left Ear two times a day  tamsulosin 0.4 milliGRAM(s) Oral at bedtime  vancomycin  IVPB 1250 milliGRAM(s) IV Intermittent daily    MEDICATIONS  (PRN):  acetaminophen   Tablet .. 650 milliGRAM(s) Oral every 4 hours PRN Mild Pain (1 - 3)  oxyCODONE    IR 5 milliGRAM(s) Oral every 4 hours PRN Moderate Pain (4 - 6)  oxyCODONE    IR 10 milliGRAM(s) Oral every 4 hours PRN Severe Pain (7 - 10)  sodium chloride 0.9% lock flush 10 milliLiter(s) IV Push every 1 hour PRN Pre/post blood products, medications, blood draw, and to maintain line patency      Allergies    No Known Allergies    Intolerances      Review of Systems:  ALL OTHER SYSTEMS REVIEWED AND NEGATIVE      PHYSICAL EXAM:  VITALS: T(C): 36.9 (07-06-21 @ 13:30)  T(F): 98.4 (07-06-21 @ 13:30), Max: 98.4 (07-06-21 @ 13:30)  HR: 83 (07-06-21 @ 13:30) (72 - 83)  BP: 140/72 (07-06-21 @ 13:30) (140/72 - 145/82)  RR:  (17 - 18)  SpO2:  (93% - 99%)  Wt(kg): --  GENERAL: NAD, well-groomed, well-developed  EYES: No proptosis, cataracts, poor vision  HEENT:  Atraumatic, Normocephalic, moist mucous membranes  RESPIRATORY: nonlabored respirations, no wheezing  PSYCH: Alert and oriented x 3, normal affect, normal mood    CAPILLARY BLOOD GLUCOSE      POCT Blood Glucose.: 128 mg/dL (06 Jul 2021 17:45)  POCT Blood Glucose.: 187 mg/dL (06 Jul 2021 13:06)  POCT Blood Glucose.: 208 mg/dL (06 Jul 2021 08:30)  POCT Blood Glucose.: 208 mg/dL (05 Jul 2021 22:14)      07-06    140  |  103  |  23  ----------------------------<  221<H>  3.3<L>   |  22  |  1.47<H>    EGFR if : 60  EGFR if non : 51<L>    Ca    9.4      07-06  Mg     1.80     07-06  Phos  4.3     07-06    TPro  6.9  /  Alb  3.5  /  TBili  0.2  /  DBili  x   /  AST  17  /  ALT  24  /  AlkPhos  60  07-06      A1C with Estimated Average Glucose Result: 14.3 % (06-20-21 @ 03:47)  A1C with Estimated Average Glucose Result: 11.8 % (03-22-21 @ 14:20)      Thyroid Function Tests:

## 2021-07-06 NOTE — PROGRESS NOTE ADULT - ASSESSMENT
61M with PMH DM2, HTN, HLD, diabetic neuropathy, glaucoma/cataract with poor vision, HCV s/p [place] (cleared per patient) presented with left ear pain. Patient initially presented to [facility] and transferred to [facility] for ENT evaluation. Consulted for uncontrolled T2DM with a1c 14.3%    1. Uncontrolled type 2 diabetes mellitus with hyperglycemia  T2DM uncontrolled a1c of 14.3 with barriers of poor vision (unable to see insulin units), living in shelter prior to admission    Inpatient BG target 100-180 mg/dl  AM glucose above goal and glucose in range later in the day  Increase Lantus to 26 units SQ qHS   Continue Admelog 6 units SQ TID before meals (Hold if NPO/not eating meal)   Continue Admelog low dose correctional scales before meals and bedtime as currently ordered  Check BG before meals and bedtime  Consistent carbohydrate diet, Nutrition consult done    Discharge Plan:  With A1c 14.3%, patient requires insulin for discharge. Patient UNABLE to successfully self-inject insulin due to poor vision  Per care coordination notes, possible discharge to LTC facility. This would be optimal so that insulin can be administered to patient by staff at facility  Therefore, recommend basal/bolus insulin with dose TBD  Contact endocrine to confirm dosing on day of discharge  Recommend PCP, optho, podiatry and endocrine followup as outpatient  [facility] ([facility] [place])   [address] [facility] [phone].    2. Essential hypertension  BP target less than 130/80  Managment per primary team    3. HLD  Recommend to check fasting lipid panel, goal LDL less than 70  Continue Atorvastatin 20 mg daily    4. Adrenal nodule  Adrenal nodule incidentally found  Aldosterone 3.0, no need to check renin activity  Plasma metanephrine (normal) and 24 hour urine cortisol (not elevated)  Low suspicion for hypersecretion of adrenal hormones      5. Vitamin D deficiency  Vitamin D 25 OH level noted to be 7.7  Recommend Ergocalciferol 50,000 units PO weekly x 8-12 weeks  Corrected calcium acceptable. Vitamin D deficiency likely contributing to low normal Ca    Kailyn Ronquillo MD  Division of Endocrinology  Pager: [phone]    If after 6PM or before 9AM, or on weekends/holidays, please call endocrine answering service for assistance ([phone]).  For nonurgent matters email [email or web address] for assistance.

## 2021-07-06 NOTE — PROGRESS NOTE ADULT - SUBJECTIVE AND OBJECTIVE BOX
Date of Service: 07-06-21 @ 14:24    Patient is a 61y old  Male who presents with a chief complaint of otitis externa, uncontrolled DM (06 Jul 2021 11:49)      Any change in ROS: Seems to be doing ok : no SOB :     MEDICATIONS  (STANDING):  albuterol/ipratropium for Nebulization 3 milliLiter(s) Nebulizer every 12 hours  amLODIPine   Tablet 10 milliGRAM(s) Oral daily  atorvastatin 20 milliGRAM(s) Oral at bedtime  chlorhexidine 4% Liquid 1 Application(s) Topical daily  dextrose 40% Gel 15 Gram(s) Oral once  dextrose 50% Injectable 25 Gram(s) IV Push once  dextrose 50% Injectable 12.5 Gram(s) IV Push once  dextrose 50% Injectable 25 Gram(s) IV Push once  ergocalciferol 70020 Unit(s) Oral <User Schedule>  furosemide    Tablet 40 milliGRAM(s) Oral daily  glucagon  Injectable 1 milliGRAM(s) IntraMuscular once  heparin   Injectable 5000 Unit(s) SubCutaneous every 12 hours  insulin glargine Injectable (LANTUS) 25 Unit(s) SubCutaneous at bedtime  insulin lispro (ADMELOG) corrective regimen sliding scale   SubCutaneous three times a day before meals  insulin lispro (ADMELOG) corrective regimen sliding scale   SubCutaneous at bedtime  insulin lispro Injectable (ADMELOG) 6 Unit(s) SubCutaneous three times a day before meals  metoprolol succinate  milliGRAM(s) Oral daily  mupirocin 2% Ointment 1 Application(s) Topical two times a day  ofloxacin 0.3% Solution 10 Drop(s) Left Ear two times a day  tamsulosin 0.4 milliGRAM(s) Oral at bedtime  vancomycin  IVPB 1250 milliGRAM(s) IV Intermittent daily    MEDICATIONS  (PRN):  acetaminophen   Tablet .. 650 milliGRAM(s) Oral every 4 hours PRN Mild Pain (1 - 3)  oxyCODONE    IR 5 milliGRAM(s) Oral every 4 hours PRN Moderate Pain (4 - 6)  oxyCODONE    IR 10 milliGRAM(s) Oral every 4 hours PRN Severe Pain (7 - 10)  sodium chloride 0.9% lock flush 10 milliLiter(s) IV Push every 1 hour PRN Pre/post blood products, medications, blood draw, and to maintain line patency    Vital Signs Last 24 Hrs  T(C): 36.9 (06 Jul 2021 13:30), Max: 36.9 (06 Jul 2021 13:30)  T(F): 98.4 (06 Jul 2021 13:30), Max: 98.4 (06 Jul 2021 13:30)  HR: 83 (06 Jul 2021 13:30) (72 - 83)  BP: 140/72 (06 Jul 2021 13:30) (140/72 - 145/82)  BP(mean): --  RR: 18 (06 Jul 2021 13:30) (17 - 18)  SpO2: 93% (06 Jul 2021 13:30) (93% - 99%)    I&O's Summary    05 Jul 2021 07:01  -  06 Jul 2021 07:00  --------------------------------------------------------  IN: 400 mL / OUT: 0 mL / NET: 400 mL          Physical Exam:   GENERAL: NAD, well-groomed, well-developed  HEENT: [place]/   Atraumatic, Normocephalic  ENMT: No tonsillar erythema, exudates, or enlargement; Moist mucous membranes, Good dentition, No lesions  NECK: Supple, No JVD, Normal thyroid  CHEST/LUNG: bibaisalr cracekls+  CVS: Regular rate and rhythm; No murmurs, rubs, or gallops  GI: : Soft, Nontender, Nondistended; Bowel sounds present  NERVOUS SYSTEM:  Alert & Oriented X3  EXTREMITIES:  mild edema  LYMPH: No lymphadenopathy noted  SKIN: No rashes or lesions  ENDOCRINOLOGY: No Thyromegaly  PSYCH: Appropriate    Labs:                              11.4   6.69  )-----------( 228      ( 06 Jul 2021 07:40 )             34.2                         10.7   6.19  )-----------( 219      ( 05 Jul 2021 06:52 )             32.8                         10.7   7.21  )-----------( 219      ( 04 Jul 2021 07:01 )             33.1                         10.4   7.98  )-----------( 235      ( 03 Jul 2021 07:25 )             31.7                         11.1   9.53  )-----------( 236      ( 02 Jul 2021 16:09 )             34.2     07-06    140  |  103  |  23  ----------------------------<  221<H>  3.3<L>   |  22  |  1.47<H>  07-05    142  |  107  |  28<H>  ----------------------------<  185<H>  3.5   |  23  |  1.62<H>  07-04    142  |  110<H>  |  22  ----------------------------<  131<H>  3.6   |  20<L>  |  1.55<H>  07-03    143  |  111<H>  |  24<H>  ----------------------------<  165<H>  3.8   |  20<L>  |  1.57<H>  07-02    142  |  110<H>  |  25<H>  ----------------------------<  159<H>  4.3   |  22  |  1.62<H>    Ca    9.4      06 Jul 2021 07:40  Ca    9.2      05 Jul 2021 06:52  Phos  4.3     07-06  Phos  4.3     07-05  Mg     1.80     07-06  Mg     1.80     07-05    TPro  6.9  /  Alb  3.5  /  TBili  0.2  /  DBili  x   /  AST  17  /  ALT  24  /  AlkPhos  60  07-06  TPro  6.5  /  Alb  3.3  /  TBili  <0.2  /  DBili  x   /  AST  25  /  ALT  28  /  AlkPhos  55  07-05  TPro  6.7  /  Alb  3.2<L>  /  TBili  0.2  /  DBili  x   /  AST  17  /  ALT  23  /  AlkPhos  60  07-04  TPro  6.8  /  Alb  3.2<L>  /  TBili  0.2  /  DBili  x   /  AST  20  /  ALT  23  /  AlkPhos  60  07-03  TPro  6.8  /  Alb  3.4  /  TBili  <0.2  /  DBili  x   /  AST  25  /  ALT  29  /  AlkPhos  63  07-02    CAPILLARY BLOOD GLUCOSE      POCT Blood Glucose.: 187 mg/dL (06 Jul 2021 13:06)  POCT Blood Glucose.: 208 mg/dL (06 Jul 2021 08:30)  POCT Blood Glucose.: 208 mg/dL (05 Jul 2021 22:14)  POCT Blood Glucose.: 102 mg/dL (05 Jul 2021 17:42)      LIVER FUNCTIONS - ( 06 Jul 2021 07:40 )  Alb: 3.5 g/dL / Pro: 6.9 g/dL / ALK PHOS: 60 U/L / ALT: 24 U/L / AST: 17 U/L / GGT: x             ra< from: US Duplex Venous Lower Ext Complete, Bilateral (07.04.21 @ 14:34) >        INTERPRETATION:  CLINICAL INFORMATION: Leg swelling.    COMPARISON: None available.    TECHNIQUE: Duplex sonography of the BILATERAL LOWER extremity veins with color and spectral Doppler, with and without compression.    FINDINGS:    RIGHT:  Normal compressibility of the RIGHT common femoral, femoral and popliteal veins.  Doppler examination shows normal spontaneous and phasic flow.  No RIGHT calf vein thrombosis is detected.    LEFT:  Normal compressibility of the LEFT common femoral, femoral and popliteal veins.  Doppler examination shows normal spontaneous and phasic flow.  No LEFT calf vein thrombosis is detected.    IMPRESSION:  No evidence of deep venousthrombosis in either lower extremity.              < from: US Abdomen Complete (US Abdomen Complete .) (07.04.21 @ 14:32) >    EXAM:  US ABDOMEN COMPLETE        PROCEDURE DATE:  Jul 4 2021         INTERPRETATION:  CLINICAL INFORMATION: Alcohol abuse. Abdominal distention.    COMPARISON: Chest CT dated 07/02/2021 and renal ultrasound dated 06/23/2021.    TECHNIQUE: Sonography of the abdomen.    FINDINGS:    Liver: Within normal limits.  Bile ducts: Normal caliber. Common bile duct measures 4 mm.  Gallbladder: Within normal limits.  Pancreas: Poorly visualized.  Spleen: 7.4 cm. Within normal limits.  Right kidney: 11.8 cm. No hydronephrosis. Increased renal echogenicity.  Left kidney: 10.2 cm.  No hydronephrosis. Increased renal echogenicity.  Ascites: None.  There are mild bilateral pleural effusions.  Aorta and IVC: Visualized portions are within normal limits.    IMPRESSION:  Bilateral pleural effusions.    Bilateral increased renal echogenicity suggestive of underlying medical renal disease.                SUJATA ISLAS MD; Attending Radiologist  This document has been electronically signed. Jul 4 2021  4:02PM    < end of copied text >      SUJATA ISLAS MD; Attending Radiologist  This document has been electronically signed. Jul 4 2021  2:54PM    < end of copied text >          RECENT CULTURES:        RESPIRATORY CULTURES:          Studies  Chest X-RAY  CT SCAN Chest   Venous Dopplers: LE:   CT Abdomen  Others

## 2021-07-06 NOTE — PROGRESS NOTE ADULT - SUBJECTIVE AND OBJECTIVE BOX
ENT FOLLOW UP CONSULT NOTE    Interval Events  6/23: FREDERICKON - seen and examined bedside. Pt feels almost complete improvement in pain on IV antibiotics and cipro drops in the ear. Afebrile and hemodynamically stable. WBC stable. No other complaints at this time.    ICU Vital Signs Last 24 Hrs  T(C): 36.7 (06 Jul 2021 05:40), Max: 36.7 (05 Jul 2021 21:40)  T(F): 98 (06 Jul 2021 05:40), Max: 98 (05 Jul 2021 21:40)  HR: 82 (06 Jul 2021 05:40) (72 - 82)  BP: 145/82 (06 Jul 2021 05:40) (144/83 - 149/90)  BP(mean): --  ABP: --  ABP(mean): --  RR: 17 (06 Jul 2021 05:40) (17 - 18)  SpO2: 97% (06 Jul 2021 05:40) (96% - 97%)      PHYSICAL EXAM:  Gen: NAD, A/Ox3  Breathing comfortably on RA  Voice: normal  Facial nerve fully intact  Ears: Right - ear canal cerumen, TM intact            Left - external ear canal mild swelling, drops in place, no wick present   Face: facial nerve intact b/l  Neck: Flat, supple, no lymphadenopathy    A/P  61M with DM2, HTN, HLD, diabetic neuropathy, glaucoma/cataract with poor vision, HCV s/p [place] (cleared per patient) presented 6/20 with left ear pain. 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 Facial nerve intact  -duration of abx as per ID   -Strict glucose control  -Recommend seeing Dr. Feldman outpatient for microscopic exam and additional management (will require cx, imaging and biopsy as indicated of granulation tissue does not resolve with therapy)    Call/page with questions

## 2021-07-06 NOTE — PROGRESS NOTE ADULT - SUBJECTIVE AND OBJECTIVE BOX
Patient is a 61y old  Male who presents with a chief complaint of otitis externa, uncontrolled DM (2021 15:00)    DATE OF SERVICE: 21 @ 21:36    HPI:  Afebrile.   SOB better,    PAST MEDICAL & SURGICAL HISTORY:  DM (diabetes mellitus)  On Insulin    HTN (hypertension)      No significant past surgical history        Review of Systems:   CONSTITUTIONAL: No fever, weight loss, or fatigue  EYES: No eye pain, visual disturbances, or discharge  ENMT:  No difficulty hearing, tinnitus, vertigo; No sinus or throat pain.   NECK: No pain or stiffness  BREASTS: No pain, masses, or nipple discharge  RESPIRATORY: No cough, wheezing, chills or hemoptysis; +shortness of breath  CARDIOVASCULAR: No chest pain, palpitations, dizziness, or leg swelling  GASTROINTESTINAL: No abdominal or epigastric pain. No nausea, vomiting, or hematemesis; No diarrhea or constipation. No melena or hematochezia.  GENITOURINARY: No dysuria, frequency, hematuria, or incontinence  NEUROLOGICAL: No headaches, memory loss, loss of strength, numbness, or tremors  SKIN: No itching, burning, rashes, or lesions   LYMPH NODES: No enlarged glands  ENDOCRINE: No heat or cold intolerance; No hair loss  MUSCULOSKELETAL: No joint pain or swelling; No muscle, back, or extremity pain  PSYCHIATRIC: No depression, anxiety, mood swings, or difficulty sleeping  HEME/LYMPH: No easy bruising, or bleeding gums  ALLERY AND IMMUNOLOGIC: No hives or eczema    Allergies    No Known Allergies    Intolerances        Social History:     FAMILY HISTORY:  No pertinent family history in first degree relatives        MEDICATIONS  (STANDING):  amLODIPine   Tablet 10 milliGRAM(s) Oral daily  atorvastatin 20 milliGRAM(s) Oral at bedtime  ciprofloxacin   IVPB 400 milliGRAM(s) IV Intermittent every 12 hours  dextrose 40% Gel 15 Gram(s) Oral once  dextrose 50% Injectable 25 Gram(s) IV Push once  dextrose 50% Injectable 12.5 Gram(s) IV Push once  dextrose 50% Injectable 25 Gram(s) IV Push once  glucagon  Injectable 1 milliGRAM(s) IntraMuscular once  insulin glargine Injectable (LANTUS) 20 Unit(s) SubCutaneous at bedtime  insulin lispro (ADMELOG) corrective regimen sliding scale   SubCutaneous three times a day before meals  insulin lispro (ADMELOG) corrective regimen sliding scale   SubCutaneous at bedtime  insulin lispro Injectable (ADMELOG) 7 Unit(s) SubCutaneous three times a day before meals  ofloxacin 0.3% Solution 10 Drop(s) Left Ear two times a day  sodium chloride 0.9%. 1000 milliLiter(s) (100 mL/Hr) IV Continuous <Continuous>  tamsulosin 0.4 milliGRAM(s) Oral at bedtime  vancomycin  IVPB 1000 milliGRAM(s) IV Intermittent every 12 hours    MEDICATIONS  (PRN):  acetaminophen   Tablet .. 650 milliGRAM(s) Oral every 4 hours PRN Mild Pain (1 - 3)  oxyCODONE    IR 5 milliGRAM(s) Oral every 4 hours PRN Moderate Pain (4 - 6)  oxyCODONE    IR 10 milliGRAM(s) Oral every 4 hours PRN Severe Pain (7 - 10)        CAPILLARY BLOOD GLUCOSE      POCT Blood Glucose.: 272 mg/dL (2021 22:33)  POCT Blood Glucose.: 284 mg/dL (2021 17:48)  POCT Blood Glucose.: 274 mg/dL (2021 12:09)  POCT Blood Glucose.: 322 mg/dL (2021 08:11)    I&O's Summary    2021 07:01  -  2021 23:11  --------------------------------------------------------  IN: 0 mL / OUT: 525 mL / NET: -525 mL        PHYSICAL EXAM:  Vital Signs Last 24 Hrs  T(C): 37 (2021 22:11), Max: 37.1 (2021 05:31)  T(F): 98.6 (2021 22:11), Max: 98.7 (2021 05:31)  HR: 91 (2021 22:11) (86 - 91)  BP: 134/75 (2021 22:11) (134/75 - 157/72)  BP(mean): --  RR: 16 (2021 22:11) (16 - 16)  SpO2: 96% (2021 22:11) (96% - 100%)    GENERAL: NAD, well-developed  HEAD:  Atraumatic, Normocephalic  EYES: EOMI, PERRLA, conjunctiva and sclera clear  Ears: Left ear tender, cotton plug noted  NECK: Supple, No JVD  CHEST/LUNG: Clear to auscultation bilaterally; No wheeze  HEART: Regular rate and rhythm; No murmurs, rubs, or gallops  ABDOMEN: Soft, Nontender, distended; Bowel sounds present  EXTREMITIES:  2+ Peripheral Pulses, No clubbing, cyanosis, or edema  PSYCH: AAOx3  NEUROLOGY: non-focal  SKIN: No rashes or lesions    LABS:                        11.8   7.89  )-----------( 208      ( 2021 06:35 )             35.8     06-21    136  |  102  |  20  ----------------------------<  329<H>  4.0   |  23  |  1.60<H>    Ca    8.4      2021 06:35  Phos  3.7     06-21  Mg     1.9     06-21            Urinalysis Basic - ( 2021 23:18 )    Color: Yellow / Appearance: Clear / S.015 / pH: x  Gluc: x / Ketone: Negative  / Bili: Negative / Urobili: Negative mg/dL   Blood: x / Protein: 500 mg/dL / Nitrite: Negative   Leuk Esterase: Negative / RBC: 3-5 /HPF / WBC 0-2   Sq Epi: x / Non Sq Epi: Occasional / Bacteria: Occasional        RADIOLOGY & ADDITIONAL TESTS:    Imaging Personally Reviewed:    Consultant(s) Notes Reviewed:      Care Discussed with Consultants/Other Providers:

## 2021-07-06 NOTE — PROGRESS NOTE ADULT - SUBJECTIVE AND OBJECTIVE BOX
[facility]   MD ANITA QUINTANILLA DO ANAM SIDDIQUI ANGELA WONG, PA    TEL:  OFFICE: [phone]  DR MONROE CELL: [phone]  DR. PORTILLO CELL: [phone]  DR. MYLES CELL: [phone]  PEGGY JAFFE CELL: [phone]    From 5pm-7am Answering Service [phone]    -- RENAL FOLLOW UP NOTE ---Date of Service 07-06-21 @ 11:39    Patient is a 61y old  Male who presents with a chief complaint of otitis externa, uncontrolled DM (06 Jul 2021 07:27)      Patient seen and examined at bedside. No chest pain/sob    VITALS:  T(F): 98 (07-06-21 @ 05:40), Max: 98 (07-05-21 @ 21:40)  HR: 82 (07-06-21 @ 05:40)  BP: 145/82 (07-06-21 @ 05:40)  RR: 17 (07-06-21 @ 05:40)  SpO2: 97% (07-06-21 @ 05:40)  Wt(kg): --    07-05 @ 07:01  -  07-06 @ 07:00  --------------------------------------------------------  IN: 400 mL / OUT: 0 mL / NET: 400 mL          PHYSICAL EXAM:  Constitutional: NAD  Neck: No JVD  Respiratory: CTAB, no wheezes, rales or rhonchi  Cardiovascular: S1, S2, RRR  Gastrointestinal: BS+, soft, epigastric tenderness  Extremities: No peripheral edema    Hospital Medications:   MEDICATIONS  (STANDING):  albuterol/ipratropium for Nebulization 3 milliLiter(s) Nebulizer every 12 hours  amLODIPine   Tablet 10 milliGRAM(s) Oral daily  atorvastatin 20 milliGRAM(s) Oral at bedtime  chlorhexidine 4% Liquid 1 Application(s) Topical daily  dextrose 40% Gel 15 Gram(s) Oral once  dextrose 50% Injectable 25 Gram(s) IV Push once  dextrose 50% Injectable 12.5 Gram(s) IV Push once  dextrose 50% Injectable 25 Gram(s) IV Push once  ergocalciferol 29001 Unit(s) Oral <User Schedule>  furosemide    Tablet 40 milliGRAM(s) Oral daily  glucagon  Injectable 1 milliGRAM(s) IntraMuscular once  heparin   Injectable 5000 Unit(s) SubCutaneous every 12 hours  insulin glargine Injectable (LANTUS) 25 Unit(s) SubCutaneous at bedtime  insulin lispro (ADMELOG) corrective regimen sliding scale   SubCutaneous three times a day before meals  insulin lispro (ADMELOG) corrective regimen sliding scale   SubCutaneous at bedtime  insulin lispro Injectable (ADMELOG) 6 Unit(s) SubCutaneous three times a day before meals  metoprolol succinate  milliGRAM(s) Oral daily  mupirocin 2% Ointment 1 Application(s) Topical two times a day  ofloxacin 0.3% Solution 10 Drop(s) Left Ear two times a day  potassium chloride    Tablet ER 40 milliEquivalent(s) Oral once  tamsulosin 0.4 milliGRAM(s) Oral at bedtime  vancomycin  IVPB 1250 milliGRAM(s) IV Intermittent daily      LABS:  07-06    140  |  103  |  23  ----------------------------<  221<H>  3.3<L>   |  22  |  1.47<H>    Ca    9.4      06 Jul 2021 07:40  Phos  4.3     07-06  Mg     1.80     07-06    TPro  6.9  /  Alb  3.5  /  TBili  0.2  /  DBili      /  AST  17  /  ALT  24  /  AlkPhos  60  07-06    Creatinine Trend: 1.47 <--, 1.62 <--, 1.55 <--, 1.57 <--, 1.62 <--, 1.55 <--, 1.52 <--, 1.51 <--    Albumin, Serum: 3.5 g/dL (07-06 @ 07:40)  Phosphorus Level, Serum: 4.3 mg/dL (07-06 @ 07:40)                              11.4   6.69  )-----------( 228      ( 06 Jul 2021 07:40 )             34.2     Urine Studies:  Urinalysis - [06-22-21 @ 16:47]      Color Light Yellow / Appearance Clear / SG 1.009 / pH 6.0      Gluc >= 1000 mg/dL / Ketone Negative  / Bili Negative / Urobili <2 mg/dL       Blood Trace / Protein Trace / Leuk Est Negative / Nitrite Negative      RBC 1 / WBC 1 / Hyaline 0 / Gran  / Sq Epi  / Non Sq Epi 0 / Bacteria Negative    Urine Creatinine 180      [07-01-21 @ 14:47]  Urine Sodium 65      [07-01-21 @ 14:47]    PTH -- (Ca --)      [06-23-21 @ 08:23]   69  Vitamin D (25OH) 7.7      [06-23-21 @ 12:30]  Lipid: chol 101, , HDL 27, LDL --      [06-23-21 @ 08:23]        RADIOLOGY & ADDITIONAL STUDIES:

## 2021-07-06 NOTE — PROGRESS NOTE ADULT - SUBJECTIVE AND OBJECTIVE BOX
S: SOB resolved, denies chest pain. Review of systems otherwise (-)      Review of Systems:   Constitutional: [ ] fevers, [ ] chills.   Skin: [ ] dry skin. [ ] rashes.  Psychiatric: [ ] depression, [ ] anxiety.   Gastrointestinal: [ ] BRBPR, [ ] melena.   Neurological: [ ] confusion. [ ] seizures. [ ] shuffling gait.   Ears,Nose,Mouth and Throat: [ ] ear pain [ ] sore throat.   Eyes: [ ] diplopia.   Respiratory: [ ] hemoptysis. [ ] shortness of breath  Cardiovascular: See HPI above  Hematologic/Lymphatic: [ ] anemia. [ ] painful nodes. [ ] prolonged bleeding.   Genitourinary: [ ] hematuria. [ ] flank pain.   Endocrine: [ ] significant change in weight. [ ] intolerance to heat and cold.     Review of systems [x ] otherwise negative, [ ] otherwise unable to obtain    FH: no family history of sudden cardiac death in first degree relatives    SH: [ ] tobacco, [ ] alcohol, [ ] drugs    acetaminophen   Tablet .. 650 milliGRAM(s) Oral every 4 hours PRN  albuterol/ipratropium for Nebulization 3 milliLiter(s) Nebulizer every 12 hours  amLODIPine   Tablet 10 milliGRAM(s) Oral daily  atorvastatin 20 milliGRAM(s) Oral at bedtime  chlorhexidine 4% Liquid 1 Application(s) Topical daily  dextrose 40% Gel 15 Gram(s) Oral once  dextrose 50% Injectable 25 Gram(s) IV Push once  dextrose 50% Injectable 12.5 Gram(s) IV Push once  dextrose 50% Injectable 25 Gram(s) IV Push once  ergocalciferol 37239 Unit(s) Oral <User Schedule>  furosemide    Tablet 40 milliGRAM(s) Oral daily  glucagon  Injectable 1 milliGRAM(s) IntraMuscular once  heparin   Injectable 5000 Unit(s) SubCutaneous every 12 hours  insulin glargine Injectable (LANTUS) 25 Unit(s) SubCutaneous at bedtime  insulin lispro (ADMELOG) corrective regimen sliding scale   SubCutaneous three times a day before meals  insulin lispro (ADMELOG) corrective regimen sliding scale   SubCutaneous at bedtime  insulin lispro Injectable (ADMELOG) 6 Unit(s) SubCutaneous three times a day before meals  metoprolol succinate  milliGRAM(s) Oral daily  mupirocin 2% Ointment 1 Application(s) Topical two times a day  ofloxacin 0.3% Solution 10 Drop(s) Left Ear two times a day  oxyCODONE    IR 5 milliGRAM(s) Oral every 4 hours PRN  oxyCODONE    IR 10 milliGRAM(s) Oral every 4 hours PRN  potassium chloride    Tablet ER 40 milliEquivalent(s) Oral once  sodium chloride 0.9% lock flush 10 milliLiter(s) IV Push every 1 hour PRN  tamsulosin 0.4 milliGRAM(s) Oral at bedtime  vancomycin  IVPB 1250 milliGRAM(s) IV Intermittent daily                            11.4   6.69  )-----------( 228      ( 06 Jul 2021 07:40 )             34.2       140  |  103  |  23  ----------------------------<  221<H>  3.3<L>   |  22  |  1.47<H>    Ca    9.4      06 Jul 2021 07:40  Phos  4.3     07-06  Mg     1.80     07-06    TPro  6.9  /  Alb  3.5  /  TBili  0.2  /  DBili  x   /  AST  17  /  ALT  24  /  AlkPhos  60  07-06      T(C): 36.7 (07-06-21 @ 05:40), Max: 36.7 (07-05-21 @ 21:40)  HR: 82 (07-06-21 @ 05:40) (72 - 82)  BP: 145/82 (07-06-21 @ 05:40) (144/83 - 149/90)  RR: 17 (07-06-21 @ 05:40) (17 - 18)  SpO2: 97% (07-06-21 @ 05:40) (96% - 97%)      General: Well nourished in no acute distress. Alert and Oriented * 3.   Head: Normocephalic and atraumatic.   Neck: No JVD. No bruits. Supple. Does not appear to be enlarged.   Cardiovascular: + S1,S2 ; RRR Soft systolic murmur at the left lower sternal border. No rubs noted.    Lungs: CTA b/l. No rhonchi, rales or wheezes.   Abdomen: + BS, soft. Non tender. Non distended. No rebound. No guarding.   Extremities: No clubbing/cyanosis/edema.   Neurologic: Moves all four extremities. Full range of motion.   Skin: Warm and moist. The patient's skin has normal elasticity and good skin turgor.   Psychiatric: Appropriate mood and affect.  Musculoskeletal: Normal range of motion, normal strength    TELEMETRY: SR 70-80s    < from: Transthoracic Echocardiogram (07.04.21 @ 09:23) >  CONCLUSIONS:  1. Normal mitral valve. Mild mitral regurgitation.  2. Mildly dilated left atrium.  3. Normal left ventricular internal dimensions and wall  thicknesses.  4. Normal left ventricular systolic function. No segmental  wall motion abnormalities.  5. The right ventricle is not well visualized; grossly  normal right ventricular systolic function.  6. Small pericardial effusion.  7. Left pleural effusion.  *** No previous Echo exam.    < end of copied text >      ASSESSMENT/PLAN: 61M with PMH DM2, HTN, HLD, diabetic neuropathy, glaucoma/cataract with poor vision, HCV s/p [place] (cleared per patient) who is being seen for dyspnea.    - pt. with pulmonary edema on CXR/CT and LE edema  - volume status improved - transitioned to PO lasix  - cont BB/ Norvasc , BP stable   - TTE noted with normal LV/RV function, small pericardial effusion  - ABx per ID  --check NST

## 2021-07-07 LAB
ANION GAP SERPL CALC-SCNC: 13 MMOL/L — SIGNIFICANT CHANGE UP (ref 7–14)
BASOPHILS # BLD AUTO: 0.02 K/UL — SIGNIFICANT CHANGE UP (ref 0–0.2)
BASOPHILS NFR BLD AUTO: 0.3 % — SIGNIFICANT CHANGE UP (ref 0–2)
BUN SERPL-MCNC: 25 MG/DL — HIGH (ref 7–23)
CALCIUM SERPL-MCNC: 9 MG/DL — SIGNIFICANT CHANGE UP (ref 8.4–10.5)
CHLORIDE SERPL-SCNC: 111 MMOL/L — HIGH (ref 98–107)
CO2 SERPL-SCNC: 22 MMOL/L — SIGNIFICANT CHANGE UP (ref 22–31)
CREAT SERPL-MCNC: 1.62 MG/DL — HIGH (ref 0.5–1.3)
EOSINOPHIL # BLD AUTO: 0.19 K/UL — SIGNIFICANT CHANGE UP (ref 0–0.5)
EOSINOPHIL NFR BLD AUTO: 2.7 % — SIGNIFICANT CHANGE UP (ref 0–6)
GLUCOSE BLDC GLUCOMTR-MCNC: 141 MG/DL — HIGH (ref 70–99)
GLUCOSE BLDC GLUCOMTR-MCNC: 170 MG/DL — HIGH (ref 70–99)
GLUCOSE BLDC GLUCOMTR-MCNC: 175 MG/DL — HIGH (ref 70–99)
GLUCOSE BLDC GLUCOMTR-MCNC: 193 MG/DL — HIGH (ref 70–99)
GLUCOSE BLDC GLUCOMTR-MCNC: 206 MG/DL — HIGH (ref 70–99)
GLUCOSE BLDC GLUCOMTR-MCNC: 67 MG/DL — LOW (ref 70–99)
GLUCOSE BLDC GLUCOMTR-MCNC: 68 MG/DL — LOW (ref 70–99)
GLUCOSE BLDC GLUCOMTR-MCNC: 92 MG/DL — SIGNIFICANT CHANGE UP (ref 70–99)
GLUCOSE SERPL-MCNC: 61 MG/DL — LOW (ref 70–99)
HCT VFR BLD CALC: 33.2 % — LOW (ref 39–50)
HGB BLD-MCNC: 11.1 G/DL — LOW (ref 13–17)
IANC: 3.71 K/UL — SIGNIFICANT CHANGE UP (ref 1.5–8.5)
IMM GRANULOCYTES NFR BLD AUTO: 0.1 % — SIGNIFICANT CHANGE UP (ref 0–1.5)
LYMPHOCYTES # BLD AUTO: 2.62 K/UL — SIGNIFICANT CHANGE UP (ref 1–3.3)
LYMPHOCYTES # BLD AUTO: 36.6 % — SIGNIFICANT CHANGE UP (ref 13–44)
MAGNESIUM SERPL-MCNC: 1.7 MG/DL — SIGNIFICANT CHANGE UP (ref 1.6–2.6)
MCHC RBC-ENTMCNC: 31 PG — SIGNIFICANT CHANGE UP (ref 27–34)
MCHC RBC-ENTMCNC: 33.4 GM/DL — SIGNIFICANT CHANGE UP (ref 32–36)
MCV RBC AUTO: 92.7 FL — SIGNIFICANT CHANGE UP (ref 80–100)
MONOCYTES # BLD AUTO: 0.61 K/UL — SIGNIFICANT CHANGE UP (ref 0–0.9)
MONOCYTES NFR BLD AUTO: 8.5 % — SIGNIFICANT CHANGE UP (ref 2–14)
NEUTROPHILS # BLD AUTO: 3.71 K/UL — SIGNIFICANT CHANGE UP (ref 1.8–7.4)
NEUTROPHILS NFR BLD AUTO: 51.8 % — SIGNIFICANT CHANGE UP (ref 43–77)
NRBC # BLD: 0 /100 WBCS — SIGNIFICANT CHANGE UP
NRBC # FLD: 0 K/UL — SIGNIFICANT CHANGE UP
PHOSPHATE SERPL-MCNC: 3.7 MG/DL — SIGNIFICANT CHANGE UP (ref 2.5–4.5)
PLATELET # BLD AUTO: 207 K/UL — SIGNIFICANT CHANGE UP (ref 150–400)
POTASSIUM SERPL-MCNC: 3.7 MMOL/L — SIGNIFICANT CHANGE UP (ref 3.5–5.3)
POTASSIUM SERPL-SCNC: 3.7 MMOL/L — SIGNIFICANT CHANGE UP (ref 3.5–5.3)
RBC # BLD: 3.58 M/UL — LOW (ref 4.2–5.8)
RBC # FLD: 12.2 % — SIGNIFICANT CHANGE UP (ref 10.3–14.5)
SODIUM SERPL-SCNC: 146 MMOL/L — HIGH (ref 135–145)
VANCOMYCIN TROUGH SERPL-MCNC: 20.6 UG/ML — HIGH (ref 10–20)
WBC # BLD: 7.16 K/UL — SIGNIFICANT CHANGE UP (ref 3.8–10.5)
WBC # FLD AUTO: 7.16 K/UL — SIGNIFICANT CHANGE UP (ref 3.8–10.5)

## 2021-07-07 PROCEDURE — 99232 SBSQ HOSP IP/OBS MODERATE 35: CPT

## 2021-07-07 RX ORDER — POTASSIUM CHLORIDE 20 MEQ
40 PACKET (EA) ORAL ONCE
Refills: 0 | Status: COMPLETED | OUTPATIENT
Start: 2021-07-07 | End: 2021-07-07

## 2021-07-07 RX ORDER — CHLORHEXIDINE GLUCONATE 213 G/1000ML
1 SOLUTION TOPICAL DAILY
Refills: 0 | Status: DISCONTINUED | OUTPATIENT
Start: 2021-07-07 | End: 2021-07-09

## 2021-07-07 RX ORDER — INSULIN GLARGINE 100 [IU]/ML
21 INJECTION, SOLUTION SUBCUTANEOUS AT BEDTIME
Refills: 0 | Status: DISCONTINUED | OUTPATIENT
Start: 2021-07-07 | End: 2021-07-08

## 2021-07-07 RX ADMIN — ATORVASTATIN CALCIUM 20 MILLIGRAM(S): 80 TABLET, FILM COATED ORAL at 22:07

## 2021-07-07 RX ADMIN — ERGOCALCIFEROL 50000 UNIT(S): 1.25 CAPSULE ORAL at 17:43

## 2021-07-07 RX ADMIN — Medication 10 DROP(S): at 05:29

## 2021-07-07 RX ADMIN — AMLODIPINE BESYLATE 10 MILLIGRAM(S): 2.5 TABLET ORAL at 06:09

## 2021-07-07 RX ADMIN — MUPIROCIN 1 APPLICATION(S): 20 OINTMENT TOPICAL at 05:30

## 2021-07-07 RX ADMIN — CHLORHEXIDINE GLUCONATE 1 APPLICATION(S): 213 SOLUTION TOPICAL at 12:33

## 2021-07-07 RX ADMIN — Medication 3 MILLILITER(S): at 10:10

## 2021-07-07 RX ADMIN — TAMSULOSIN HYDROCHLORIDE 0.4 MILLIGRAM(S): 0.4 CAPSULE ORAL at 22:06

## 2021-07-07 RX ADMIN — Medication 10 DROP(S): at 17:42

## 2021-07-07 RX ADMIN — Medication 40 MILLIGRAM(S): at 06:09

## 2021-07-07 RX ADMIN — Medication 6 UNIT(S): at 12:32

## 2021-07-07 RX ADMIN — HEPARIN SODIUM 5000 UNIT(S): 5000 INJECTION INTRAVENOUS; SUBCUTANEOUS at 05:29

## 2021-07-07 RX ADMIN — Medication 3 MILLILITER(S): at 20:59

## 2021-07-07 RX ADMIN — Medication 3 MILLILITER(S): at 00:23

## 2021-07-07 RX ADMIN — Medication 2: at 17:45

## 2021-07-07 RX ADMIN — HEPARIN SODIUM 5000 UNIT(S): 5000 INJECTION INTRAVENOUS; SUBCUTANEOUS at 17:43

## 2021-07-07 RX ADMIN — Medication 1: at 12:33

## 2021-07-07 RX ADMIN — Medication 100 MILLIGRAM(S): at 05:31

## 2021-07-07 RX ADMIN — MUPIROCIN 1 APPLICATION(S): 20 OINTMENT TOPICAL at 17:41

## 2021-07-07 RX ADMIN — Medication 6 UNIT(S): at 17:44

## 2021-07-07 RX ADMIN — INSULIN GLARGINE 21 UNIT(S): 100 INJECTION, SOLUTION SUBCUTANEOUS at 22:07

## 2021-07-07 NOTE — PROGRESS NOTE ADULT - SUBJECTIVE AND OBJECTIVE BOX
Infectious Diseases progress note:    Subjective: NAD, afebrile    ROS:  CONSTITUTIONAL:  No fever, chills, rigors  CARDIOVASCULAR:  No chest pain or palpitations  RESPIRATORY:   No SOB, cough, dyspnea on exertion.  No wheezing  GASTROINTESTINAL:  No abd pain, N/V, diarrhea/constipation  EXTREMITIES:  No swelling or joint pain  GENITOURINARY:  No burning on urination, increased frequency or urgency.  No flank pain  NEUROLOGIC:  No HA, visual disturbances  SKIN: No rashes    Allergies    No Known Allergies    Intolerances        ANTIBIOTICS/RELEVANT:  antimicrobials  vancomycin  IVPB 1250 milliGRAM(s) IV Intermittent daily    immunologic:    OTHER:  acetaminophen   Tablet .. 650 milliGRAM(s) Oral every 4 hours PRN  albuterol/ipratropium for Nebulization 3 milliLiter(s) Nebulizer every 12 hours  amLODIPine   Tablet 10 milliGRAM(s) Oral daily  atorvastatin 20 milliGRAM(s) Oral at bedtime  chlorhexidine 4% Liquid 1 Application(s) Topical daily  dextrose 40% Gel 15 Gram(s) Oral once  dextrose 50% Injectable 25 Gram(s) IV Push once  dextrose 50% Injectable 12.5 Gram(s) IV Push once  dextrose 50% Injectable 25 Gram(s) IV Push once  ergocalciferol 74842 Unit(s) Oral <User Schedule>  glucagon  Injectable 1 milliGRAM(s) IntraMuscular once  heparin   Injectable 5000 Unit(s) SubCutaneous every 12 hours  insulin glargine Injectable (LANTUS) 21 Unit(s) SubCutaneous at bedtime  insulin lispro (ADMELOG) corrective regimen sliding scale   SubCutaneous three times a day before meals  insulin lispro (ADMELOG) corrective regimen sliding scale   SubCutaneous at bedtime  insulin lispro Injectable (ADMELOG) 6 Unit(s) SubCutaneous three times a day before meals  metoprolol succinate  milliGRAM(s) Oral daily  mupirocin 2% Ointment 1 Application(s) Topical two times a day  ofloxacin 0.3% Solution 10 Drop(s) Left Ear two times a day  oxyCODONE    IR 5 milliGRAM(s) Oral every 4 hours PRN  oxyCODONE    IR 10 milliGRAM(s) Oral every 4 hours PRN  sodium chloride 0.9% lock flush 10 milliLiter(s) IV Push every 1 hour PRN  tamsulosin 0.4 milliGRAM(s) Oral at bedtime      Objective:  Vital Signs Last 24 Hrs  T(C): 36.8 (07 Jul 2021 17:22), Max: 36.9 (06 Jul 2021 22:01)  T(F): 98.2 (07 Jul 2021 17:22), Max: 98.5 (06 Jul 2021 22:01)  HR: 81 (07 Jul 2021 17:22) (77 - 85)  BP: 144/82 (07 Jul 2021 17:22) (143/84 - 146/84)  BP(mean): 112 (07 Jul 2021 06:00) (94 - 112)  RR: 18 (07 Jul 2021 17:22) (18 - 20)  SpO2: 100% (07 Jul 2021 17:22) (94% - 100%)    PHYSICAL EXAM:  Constitutional:NAD  Eyes:VINCENZO, EOMI  Ear/Nose/Throat: no thrush, mucositis.  Moist mucous membranes	  Neck:no JVD, no lymphadenopathy, supple  Respiratory: CTA sudheer  Cardiovascular: S1S2 RRR, no murmurs  Gastrointestinal:soft, nontender,  nondistended (+) BS  Extremities:no e/e/c  Skin:  no rashes, open wounds or ulcerations, picc in place        LABS:                        11.1   7.16  )-----------( 207      ( 07 Jul 2021 06:44 )             33.2     07-07    146<H>  |  111<H>  |  25<H>  ----------------------------<  61<L>  3.7   |  22  |  1.62<H>    Ca    9.0      07 Jul 2021 06:44  Phos  3.7     07-07  Mg     1.70     07-07    TPro  6.9  /  Alb  3.5  /  TBili  0.2  /  DBili  x   /  AST  17  /  ALT  24  /  AlkPhos  60  07-06                Vancomycin Level, Trough: 20.6 ug/mL (07-07 @ 06:44)  Vancomycin Level, Trough: 18.8 ug/mL (07-05 @ 12:44)  Vancomycin Level, Trough: 16.2 ug/mL (07-03 @ 14:12)              MICROBIOLOGY:          RADIOLOGY & ADDITIONAL STUDIES:    < from: US Abdomen Complete (US Abdomen Complete .) (07.04.21 @ 14:32) >    FINDINGS:    Liver: Within normal limits.  Bile ducts: Normal caliber. Common bile duct measures 4 mm.  Gallbladder: Within normal limits.  Pancreas: Poorly visualized.  Spleen: 7.4 cm. Within normal limits.  Right kidney: 11.8 cm. No hydronephrosis. Increased renal echogenicity.  Left kidney: 10.2 cm.  No hydronephrosis. Increased renal echogenicity.  Ascites: None.  There are mild bilateral pleural effusions.  Aorta and IVC: Visualized portions are within normal limits.    IMPRESSION:  Bilateral pleural effusions.    Bilateral increased renal echogenicity suggestive of underlying medical renal disease.    < end of copied text >

## 2021-07-07 NOTE — PROGRESS NOTE ADULT - ASSESSMENT
61M with PMH DM2, HTN, HLD, diabetic neuropathy, glaucoma/cataract with poor vision, HCV s/p Vishal (cleared per patient) presented with left ear pain. Patient initially presented to [facility] and transferred to [facility] for ENT evaluation. Consulted for uncontrolled T2DM with a1c 14.3%    1. Uncontrolled type 2 diabetes mellitus with hyperglycemia  T2DM uncontrolled a1c of 14.3 with barriers of poor vision (unable to see insulin units), living in shelter prior to admission    Inpatient BG target 100-180 mg/dl  Hypoglycemia this AM. Otherwise glucose values within/close to goal, tolerating PO  Decrease Lantus to 21 units SQ qHS   Continue Admelog 6 units SQ TID before meals (Hold if NPO/not eating meal)   Continue Admelog low dose correctional scales before meals and bedtime as currently ordered  Check BG before meals and bedtime  Consistent carbohydrate diet, Nutrition consult done    Discharge Plan:  With A1c 14.3%, patient requires insulin for discharge. Patient UNABLE to successfully self-inject insulin due to poor vision  Per care coordination notes, possible discharge to LTC facility. This would be optimal so that insulin can be administered to patient by staff at facility  Therefore, recommend basal/bolus insulin with dose TBD  Contact endocrine to confirm dosing on day of discharge  Recommend PCP, optho, podiatry and endocrine followup as outpatient  [facility] ([facility] [place]   [id number] [facility] [phone].    2. Essential hypertension  BP target less than 130/80  Management per primary team    3. HLD  Recommend to check fasting lipid panel, goal LDL less than 70  Continue Atorvastatin 20 mg daily    4. Adrenal nodule  Adrenal nodule incidentally found  Aldosterone 3.0, no need to check renin activity  Plasma metanephrine (normal) and 24 hour urine cortisol (not elevated)  Low suspicion for hypersecretion of adrenal hormones      5. Vitamin D deficiency  Vitamin D 25 OH level noted to be 7.7  Recommend Ergocalciferol 50,000 units PO weekly x 8-12 weeks  Corrected calcium acceptable. Vitamin D deficiency likely contributing to low normal Ca    Emilie Lam  Nurse Practitioner  Division of Endocrinology & Diabetes  In house pager #[phone]/long range pager #[phone]    If before 9AM or after 6PM, or on weekends/holidays, please call endocrine answering service for assistance ([phone]).  For nonurgent matters email [email or web address] for assistance.

## 2021-07-07 NOTE — PROGRESS NOTE ADULT - SUBJECTIVE AND OBJECTIVE BOX
S: SOB resolved, denies chest pain. Review of systems otherwise (-)    Review of Systems:   Constitutional: [ ] fevers, [ ] chills.   Skin: [ ] dry skin. [ ] rashes.  Psychiatric: [ ] depression, [ ] anxiety.   Gastrointestinal: [ ] BRBPR, [ ] melena.   Neurological: [ ] confusion. [ ] seizures. [ ] shuffling gait.   Ears,Nose,Mouth and Throat: [ ] ear pain [ ] sore throat.   Eyes: [ ] diplopia.   Respiratory: [ ] hemoptysis. [ ] shortness of breath  Cardiovascular: See HPI above  Hematologic/Lymphatic: [ ] anemia. [ ] painful nodes. [ ] prolonged bleeding.   Genitourinary: [ ] hematuria. [ ] flank pain.   Endocrine: [ ] significant change in weight. [ ] intolerance to heat and cold.     Review of systems [x ] otherwise negative, [ ] otherwise unable to obtain    FH: no family history of sudden cardiac death in first degree relatives    SH: [ ] tobacco, [ ] alcohol, [ ] drugs    acetaminophen   Tablet .. 650 milliGRAM(s) Oral every 4 hours PRN  albuterol/ipratropium for Nebulization 3 milliLiter(s) Nebulizer every 12 hours  amLODIPine   Tablet 10 milliGRAM(s) Oral daily  atorvastatin 20 milliGRAM(s) Oral at bedtime  chlorhexidine 4% Liquid 1 Application(s) Topical daily  dextrose 40% Gel 15 Gram(s) Oral once  dextrose 50% Injectable 25 Gram(s) IV Push once  dextrose 50% Injectable 12.5 Gram(s) IV Push once  dextrose 50% Injectable 25 Gram(s) IV Push once  ergocalciferol 87007 Unit(s) Oral <User Schedule>  glucagon  Injectable 1 milliGRAM(s) IntraMuscular once  heparin   Injectable 5000 Unit(s) SubCutaneous every 12 hours  insulin glargine Injectable (LANTUS) 21 Unit(s) SubCutaneous at bedtime  insulin lispro (ADMELOG) corrective regimen sliding scale   SubCutaneous three times a day before meals  insulin lispro (ADMELOG) corrective regimen sliding scale   SubCutaneous at bedtime  insulin lispro Injectable (ADMELOG) 6 Unit(s) SubCutaneous three times a day before meals  metoprolol succinate  milliGRAM(s) Oral daily  mupirocin 2% Ointment 1 Application(s) Topical two times a day  ofloxacin 0.3% Solution 10 Drop(s) Left Ear two times a day  oxyCODONE    IR 5 milliGRAM(s) Oral every 4 hours PRN  oxyCODONE    IR 10 milliGRAM(s) Oral every 4 hours PRN  sodium chloride 0.9% lock flush 10 milliLiter(s) IV Push every 1 hour PRN  tamsulosin 0.4 milliGRAM(s) Oral at bedtime  vancomycin  IVPB 1250 milliGRAM(s) IV Intermittent daily                            11.1   7.16  )-----------( 207      ( 07 Jul 2021 06:44 )             33.2       07-07    146<H>  |  111<H>  |  25<H>  ----------------------------<  61<L>  3.7   |  22  |  1.62<H>    Ca    9.0      07 Jul 2021 06:44  Phos  3.7     07-07  Mg     1.70     07-07    TPro  6.9  /  Alb  3.5  /  TBili  0.2  /  DBili  x   /  AST  17  /  ALT  24  /  AlkPhos  60  07-06    T(C): 36.8 (07-07-21 @ 13:00), Max: 36.9 (07-06-21 @ 22:01)  HR: 85 (07-07-21 @ 13:00) (77 - 85)  BP: 143/84 (07-07-21 @ 13:00) (143/84 - 146/84)  RR: 18 (07-07-21 @ 13:00) (18 - 20)  SpO2: 100% (07-07-21 @ 13:00) (94% - 100%)      General: Well nourished in no acute distress. Alert and Oriented * 3.   Head: Normocephalic and atraumatic.   Neck: No JVD. No bruits. Supple. Does not appear to be enlarged.   Cardiovascular: + S1,S2 ; RRR Soft systolic murmur at the left lower sternal border. No rubs noted.    Lungs: CTA b/l. No rhonchi, rales or wheezes.   Abdomen: + BS, soft. Non tender. Non distended. No rebound. No guarding.   Extremities: No clubbing/cyanosis/edema.   Neurologic: Moves all four extremities. Full range of motion.   Skin: Warm and moist. The patient's skin has normal elasticity and good skin turgor.   Psychiatric: Appropriate mood and affect.  Musculoskeletal: Normal range of motion, normal strength        TELEMETRY: SR 70-80s    < from: Transthoracic Echocardiogram (07.04.21 @ 09:23) >  CONCLUSIONS:  1. Normal mitral valve. Mild mitral regurgitation.  2. Mildly dilated left atrium.  3. Normal left ventricular internal dimensions and wall  thicknesses.  4. Normal left ventricular systolic function. No segmental  wall motion abnormalities.  5. The right ventricle is not well visualized; grossly  normal right ventricular systolic function.  6. Small pericardial effusion.  7. Left pleural effusion.  *** No previous Echo exam.    < end of copied text >      ASSESSMENT/PLAN: 61M with PMH DM2, HTN, HLD, diabetic neuropathy, glaucoma/cataract with poor vision, HCV s/p Sherman (cleared per patient) who is being seen for dyspnea.    - pt. with pulmonary edema on CXR/CT and LE edema  - volume status improved - transitioned to PO lasix  - cont BB/ Norvasc , BP stable   - TTE noted with normal LV/RV function, small pericardial effusion  - ABx per ID  --check NST

## 2021-07-07 NOTE — PROGRESS NOTE ADULT - ASSESSMENT
61M with PMH uncontrolled DM, with neuropathy, cataract, glaucoma presented with malignant otitis externa and folliculitis.     Malignant otitis media:  SOB:  Folliculitis  Uncontrolled DM:  HTN      7/2:    Malignant otitis media: cont antibiotics: he has no ear pain now:    SOB: he is not that SOB at this time: his lungs are clear: chest xray suggests mild fluid overload: ct scan chest is pending: do doppler bilateral LE and 2 decho :  Folliculitis: On antibiotics  Uncontrolled DM: defer to p [facility] team   HTN: mildly increased:   start dvt prophylaxis  DW ACP and PMD     7/3:    Malignant otitis media: cont antibiotics: he has no ear pain now:    SOB: he is not that SOB at this time: his lungs are clear: ct scan chest is suggestive of pulm edema: with bl effusions and PVC's: to cont lasix   Folliculitis: On antibiotics  Uncontrolled DM: defer to p [facility] team   HTN: mildly increased:   start dvt prophylaxis  DW ACP     7/4:    Malignant otitis media: cont antibiotics: he has no ear pain now:    SOB: he is not that SOB at this time: his lungs are clear: ct scan chest is suggestive of pulm edema: with bl effusions and PVC's: to cont lasix - he feels much better: echo noted:  Folliculitis: On antibiotics  Uncontrolled DM: defer to primary team   HTN: mildly increased:   start dvt prophylaxis  DW ACP     7/5:    Malignant otitis media: cont antibiotics: he has no ear pain now:    SOB: he is not that SOB at this time: his lungs are clear: ct scan chest is suggestive of pulm edema: with bl effusions and PVC's: to cont lasix - he feels much better: echo noted:  now for dc to placement   Folliculitis: On antibiotics  Uncontrolled DM: defer to primary team   HTN: mildly increased:   start dvt prophylaxis  DW ACP     7/6:    Malignant otitis media: cont antibiotics: he has no ear pain now:  ent following : cont antibiotics per ID duration:   SOB: he is not that SOB at this time: his lungs are clear: ct scan chest is suggestive of pulm edema: with bl effusions and PVC's: to cont lasix - he feels much better: echo noted:  now for dc to placement : switched to po lasix   Folliculitis: On antibiotics: per ID  Uncontrolled DM: defer to primary team   HTN: mildly increased:   start dvt prophylaxis  DW ACP : he is on rom air at this time      7/7:    Malignant otitis media: cont antibiotics: he has no ear pain now:  ent following : cont antibiotics per ID duration? on vanco till  SOB: he is not that SOB at this time: his lungs are clear: ct scan chest is suggestive of pulm edema: with bl effusions and PVC's: to cont lasix - he feels much better: echo noted:  now for dc to placement : switched to po lasix -- uch better now; dcplanning  Folliculitis: On antibiotics: per ID: mrsa:per derm  Uncontrolled DM: defer to primary team : relatively better controlled:   HTN: mildly increased: controlled  start dvt prophylaxis  DW ACP : he is on rom air at this time

## 2021-07-07 NOTE — PROGRESS NOTE ADULT - SUBJECTIVE AND OBJECTIVE BOX
Chief Complaint: DM 2    History: Patient seen at bedside. Noted with episode of hypoglycemia this AM to 67 mg/dl; patient denies symptoms, treated with oral carbohydrate replacement - 2 cups of apple juice with resolution and patient tolerated breakfast. No nausea/vomiting.    MEDICATIONS  (STANDING):  albuterol/ipratropium for Nebulization 3 milliLiter(s) Nebulizer every 12 hours  amLODIPine   Tablet 10 milliGRAM(s) Oral daily  atorvastatin 20 milliGRAM(s) Oral at bedtime  chlorhexidine 4% Liquid 1 Application(s) Topical daily  dextrose 40% Gel 15 Gram(s) Oral once  dextrose 50% Injectable 25 Gram(s) IV Push once  dextrose 50% Injectable 12.5 Gram(s) IV Push once  dextrose 50% Injectable 25 Gram(s) IV Push once  ergocalciferol 54701 Unit(s) Oral <User Schedule>  glucagon  Injectable 1 milliGRAM(s) IntraMuscular once  heparin   Injectable 5000 Unit(s) SubCutaneous every 12 hours  insulin glargine Injectable (LANTUS) 21 Unit(s) SubCutaneous at bedtime  insulin lispro (ADMELOG) corrective regimen sliding scale   SubCutaneous three times a day before meals  insulin lispro (ADMELOG) corrective regimen sliding scale   SubCutaneous at bedtime  insulin lispro Injectable (ADMELOG) 6 Unit(s) SubCutaneous three times a day before meals  metoprolol succinate  milliGRAM(s) Oral daily  mupirocin 2% Ointment 1 Application(s) Topical two times a day  ofloxacin 0.3% Solution 10 Drop(s) Left Ear two times a day  tamsulosin 0.4 milliGRAM(s) Oral at bedtime  vancomycin  IVPB 1250 milliGRAM(s) IV Intermittent daily    MEDICATIONS  (PRN):  acetaminophen   Tablet .. 650 milliGRAM(s) Oral every 4 hours PRN Mild Pain (1 - 3)  oxyCODONE    IR 5 milliGRAM(s) Oral every 4 hours PRN Moderate Pain (4 - 6)  oxyCODONE    IR 10 milliGRAM(s) Oral every 4 hours PRN Severe Pain (7 - 10)  sodium chloride 0.9% lock flush 10 milliLiter(s) IV Push every 1 hour PRN Pre/post blood products, medications, blood draw, and to maintain line patency    No Known Allergies    Review of Systems:  Cardiovascular: No chest pain  Respiratory: No SOB  GI: No nausea, vomiting  Endocrine: no hypoglycemia symptoms     PHYSICAL EXAM:  VITALS: T(C): 36.8 (07-07-21 @ 06:00)  T(F): 98.2 (07-07-21 @ 06:00), Max: 98.5 (07-06-21 @ 22:01)  HR: 81 (07-07-21 @ 06:00) (77 - 81)  BP: 146/84 (07-07-21 @ 06:00) (145/75 - 146/84)  RR:  (18 - 20)  SpO2:  (94% - 98%)  Wt(kg): --  GENERAL: NAD  EYES: No proptosis, no lid lag, anicteric  HEENT:  Atraumatic, Normocephalic, moist mucous membranes  RESPIRATORY: unlabored respirations   PSYCH: Alert and oriented x 3    CAPILLARY BLOOD GLUCOSE    POCT Blood Glucose.: 193 mg/dL (07 Jul 2021 12:23)  POCT Blood Glucose.: 170 mg/dL (07 Jul 2021 10:09)  POCT Blood Glucose.: 141 mg/dL (07 Jul 2021 09:41)  POCT Blood Glucose.: 92 mg/dL (07 Jul 2021 09:01)  POCT Blood Glucose.: 68 mg/dL (07 Jul 2021 08:24)  POCT Blood Glucose.: 67 mg/dL (07 Jul 2021 08:24)  POCT Blood Glucose.: 107 mg/dL (06 Jul 2021 21:32)  POCT Blood Glucose.: 128 mg/dL (06 Jul 2021 17:45)      07-07    146<H>  |  111<H>  |  25<H>  ----------------------------<  61<L>  3.7   |  22  |  1.62<H>    EGFR if : 53<L>  EGFR if non : 46<L>    Ca    9.0      07-07  Mg     1.70     07-07  Phos  3.7     07-07    TPro  6.9  /  Alb  3.5  /  TBili  0.2  /  DBili  x   /  AST  17  /  ALT  24  /  AlkPhos  60  07-06       A1C with Estimated Average Glucose Result: 14.3 % (06-20-21 @ 03:47)  A1C with Estimated Average Glucose Result: 11.8 % (03-22-21 @ 14:20)    Diet, Regular:   Consistent Carbohydrate Evening Snack (CSTCHOSN) (06-20-21 @ 09:02)

## 2021-07-07 NOTE — PROVIDER CONTACT NOTE (HYPOGLYCEMIA EVENT) - NS PROVIDER CONTACT BACKGROUND-HYPO
Age: 61y    Gender: Male    POCT Blood Glucose:  170 mg/dL (07-07-21 @ 10:09)  141 mg/dL (07-07-21 @ 09:41)  92 mg/dL (07-07-21 @ 09:01)  68 mg/dL (07-07-21 @ 08:24)  67 mg/dL (07-07-21 @ 08:24)  107 mg/dL (07-06-21 @ 21:32)  128 mg/dL (07-06-21 @ 17:45)  187 mg/dL (07-06-21 @ 13:06)      eMAR:atorvastatin   20 milliGRAM(s) Oral (07-06-21 @ 22:03)    insulin glargine Injectable (LANTUS)   26 Unit(s) SubCutaneous (07-06-21 @ 22:04)    insulin lispro (ADMELOG) corrective regimen sliding scale   1 Unit(s) SubCutaneous (07-06-21 @ 13:29)    insulin lispro Injectable (ADMELOG)   6 Unit(s) SubCutaneous (07-06-21 @ 18:22)   6 Unit(s) SubCutaneous (07-06-21 @ 13:29)

## 2021-07-07 NOTE — PROGRESS NOTE ADULT - ASSESSMENT
Patient is a 61M with PMH DM2, HTN, HLD, diabetic neuropathy, glaucoma/cataract with poor vision, HCV s/p Gillespie (cleared per patient) presented with left ear pain.  CT auditory ear canal shows:    Findings concerning for malignant left-sided otitis externa with bony erosive changes involving the external auditory canal. Superimposed osteomyelitis within the bony external auditory canal is a consideration given the patient's clinical information. An external auditory canal cholesteatoma cannot be excluded. A neoplastic process such as squamous cell carcinoma is also difficult to exclude.    2. Additional imaging findings compatible with left-sided otitis media and left-sided mastoiditis. No gross ossicular chain erosion or destructive changes. No evidence of venous sinus thrombosis.    3. Unremarkable right-sided temporal bone CT examination apart from cerumen in the external auditory canal.    Pt also c/o new painful bumps, draining pus, in b/l armpits, b/l groin and R perineal area.    ID consulted for further abx management.     Left sided otitis externa:    - Cont present abx, f/u L ear cultures.  Concern for superimposed OM of bony external auditory canal.   Cannot exclude cholesteatoma or squamous cell carcinoma.    - ENT f/u appreciated.  Continued outpt f/u to address further need for cultures, imaging, biopsy.      - Current ear cultures growing MRSA.  IV cipro d/c'd.  Cont IV vanco, and maintain trough between 15-20.     -  Cont abx ear gtt as per ENT    - Plan for  long term IV abx.  f/u blood cx x 2.  ESR, CRP    - Weekly cbc, sma-7, esr, crp, vanco trough.       r/o Hidradenitis suppurativa:    - c/o new nodular/pustular lesions in b/l armpits, b/l groin and perineum for past 1-3 weeks. States lesions are painful and drain pus.    - R Derm eval appreciated, ?hidradenitis suppurativa vs staph folliculitis.  Pt started on doxycycline and mupirocin for staph decolonization.      - Cont IV abx for now.  Cont to monitor and evaluate for need for further I&D - lesions have improved      Pulm edema/fluid overload:    - cxr 7/2 with small b/l pleural effusions and congestion    - Pt started on diuresis.  Echo results noted.  Pt with incr abd distention, Abd US no ascites    - Pulm and Cardiology f/u appreciated       * cont IV vancomycin x 6 week course (6/20 through 7/31), maintain trough between 15-20.  Trough level on 7/7 mildly supratherapeutic.  Hold today's dose, check repeat level tomorrow.  Restart at 1gm IV qdaily when level < 20.      * Check weekly cbc, sma-7, esr, crp, vanco trough.      d/c planning in place    Mena Osullivan  [phone]

## 2021-07-07 NOTE — PROGRESS NOTE ADULT - SUBJECTIVE AND OBJECTIVE BOX
Patient is a 61y old  Male who presents with a chief complaint of otitis externa, uncontrolled DM (2021 15:00)    DATE OF SERVICE: 21 @ 13:08    HPI:  Afebrile.   SOB better, awaiting stress test    PAST MEDICAL & SURGICAL HISTORY:  DM (diabetes mellitus)  On Insulin    HTN (hypertension)      No significant past surgical history        Review of Systems:   CONSTITUTIONAL: No fever, weight loss, or fatigue  EYES: No eye pain, visual disturbances, or discharge  ENMT:  No difficulty hearing, tinnitus, vertigo; No sinus or throat pain.   NECK: No pain or stiffness  BREASTS: No pain, masses, or nipple discharge  RESPIRATORY: No cough, wheezing, chills or hemoptysis; +shortness of breath  CARDIOVASCULAR: No chest pain, palpitations, dizziness, or leg swelling  GASTROINTESTINAL: No abdominal or epigastric pain. No nausea, vomiting, or hematemesis; No diarrhea or constipation. No melena or hematochezia.  GENITOURINARY: No dysuria, frequency, hematuria, or incontinence  NEUROLOGICAL: No headaches, memory loss, loss of strength, numbness, or tremors  SKIN: No itching, burning, rashes, or lesions   LYMPH NODES: No enlarged glands  ENDOCRINE: No heat or cold intolerance; No hair loss  MUSCULOSKELETAL: No joint pain or swelling; No muscle, back, or extremity pain  PSYCHIATRIC: No depression, anxiety, mood swings, or difficulty sleeping  HEME/LYMPH: No easy bruising, or bleeding gums  ALLERY AND IMMUNOLOGIC: No hives or eczema    Allergies    No Known Allergies    Intolerances        Social History:     FAMILY HISTORY:  No pertinent family history in first degree relatives        MEDICATIONS  (STANDING):  amLODIPine   Tablet 10 milliGRAM(s) Oral daily  atorvastatin 20 milliGRAM(s) Oral at bedtime  ciprofloxacin   IVPB 400 milliGRAM(s) IV Intermittent every 12 hours  dextrose 40% Gel 15 Gram(s) Oral once  dextrose 50% Injectable 25 Gram(s) IV Push once  dextrose 50% Injectable 12.5 Gram(s) IV Push once  dextrose 50% Injectable 25 Gram(s) IV Push once  glucagon  Injectable 1 milliGRAM(s) IntraMuscular once  insulin glargine Injectable (LANTUS) 20 Unit(s) SubCutaneous at bedtime  insulin lispro (ADMELOG) corrective regimen sliding scale   SubCutaneous three times a day before meals  insulin lispro (ADMELOG) corrective regimen sliding scale   SubCutaneous at bedtime  insulin lispro Injectable (ADMELOG) 7 Unit(s) SubCutaneous three times a day before meals  ofloxacin 0.3% Solution 10 Drop(s) Left Ear two times a day  sodium chloride 0.9%. 1000 milliLiter(s) (100 mL/Hr) IV Continuous <Continuous>  tamsulosin 0.4 milliGRAM(s) Oral at bedtime  vancomycin  IVPB 1000 milliGRAM(s) IV Intermittent every 12 hours    MEDICATIONS  (PRN):  acetaminophen   Tablet .. 650 milliGRAM(s) Oral every 4 hours PRN Mild Pain (1 - 3)  oxyCODONE    IR 5 milliGRAM(s) Oral every 4 hours PRN Moderate Pain (4 - 6)  oxyCODONE    IR 10 milliGRAM(s) Oral every 4 hours PRN Severe Pain (7 - 10)        CAPILLARY BLOOD GLUCOSE      POCT Blood Glucose.: 272 mg/dL (2021 22:33)  POCT Blood Glucose.: 284 mg/dL (2021 17:48)  POCT Blood Glucose.: 274 mg/dL (2021 12:09)  POCT Blood Glucose.: 322 mg/dL (2021 08:11)    I&O's Summary    2021 07:01  -  2021 23:11  --------------------------------------------------------  IN: 0 mL / OUT: 525 mL / NET: -525 mL        PHYSICAL EXAM:  Vital Signs Last 24 Hrs  T(C): 37 (2021 22:11), Max: 37.1 (2021 05:31)  T(F): 98.6 (2021 22:11), Max: 98.7 (2021 05:31)  HR: 91 (2021 22:11) (86 - 91)  BP: 134/75 (2021 22:11) (134/75 - 157/72)  BP(mean): --  RR: 16 (2021 22:11) (16 - 16)  SpO2: 96% (2021 22:11) (96% - 100%)    GENERAL: NAD, well-developed  HEAD:  Atraumatic, Normocephalic  EYES: EOMI, PERRLA, conjunctiva and sclera clear  Ears: Left ear tender, cotton plug noted  NECK: Supple, No JVD  CHEST/LUNG: Clear to auscultation bilaterally; No wheeze  HEART: Regular rate and rhythm; No murmurs, rubs, or gallops  ABDOMEN: Soft, Nontender, distended; Bowel sounds present  EXTREMITIES:  2+ Peripheral Pulses, No clubbing, cyanosis, or edema  PSYCH: AAOx3  NEUROLOGY: non-focal  SKIN: No rashes or lesions    LABS:                        11.8   7.89  )-----------( 208      ( 2021 06:35 )             35.8     06-21    136  |  102  |  20  ----------------------------<  329<H>  4.0   |  23  |  1.60<H>    Ca    8.4      2021 06:35  Phos  3.7     06-21  Mg     1.9     06-21            Urinalysis Basic - ( 2021 23:18 )    Color: Yellow / Appearance: Clear / S.015 / pH: x  Gluc: x / Ketone: Negative  / Bili: Negative / Urobili: Negative mg/dL   Blood: x / Protein: 500 mg/dL / Nitrite: Negative   Leuk Esterase: Negative / RBC: 3-5 /HPF / WBC 0-2   Sq Epi: x / Non Sq Epi: Occasional / Bacteria: Occasional        RADIOLOGY & ADDITIONAL TESTS:    Imaging Personally Reviewed:    Consultant(s) Notes Reviewed:      Care Discussed with Consultants/Other Providers:

## 2021-07-07 NOTE — PROGRESS NOTE ADULT - SUBJECTIVE AND OBJECTIVE BOX
[facility] [facility]   MD ANITA QUINTANILLA DO ANAM SIDDIQUI ANGELA WONG, PA    TEL:  OFFICE: [phone]  DR MONROE CELL: [phone]  DR. PORTILLO CELL: [phone]  DR. MYLES CELL: [phone]  PEGGY JAFFE CELL: [phone]    From 5pm-7am Answering Service [phone]    -- RENAL FOLLOW UP NOTE ---Date of Service 07-07-21 @ 12:41    Patient is a 61y old  Male who presents with a chief complaint of otitis externa, uncontrolled DM (06 Jul 2021 21:35)      Patient seen and examined at bedside. No chest pain/sob    VITALS:  T(F): 98.2 (07-07-21 @ 06:00), Max: 98.5 (07-06-21 @ 22:01)  HR: 81 (07-07-21 @ 06:00)  BP: 146/84 (07-07-21 @ 06:00)  RR: 18 (07-07-21 @ 06:00)  SpO2: 94% (07-07-21 @ 06:00)  Wt(kg): --    07-06 @ 07:01  -  07-07 @ 07:00  --------------------------------------------------------  IN: 200 mL / OUT: 0 mL / NET: 200 mL          PHYSICAL EXAM:  Constitutional: NAD  Neck: No JVD  Respiratory: CTAB, no wheezes, rales or rhonchi  Cardiovascular: S1, S2, RRR  Gastrointestinal: BS+, soft, NT/ND  Extremities: No peripheral edema    Hospital Medications:   MEDICATIONS  (STANDING):  albuterol/ipratropium for Nebulization 3 milliLiter(s) Nebulizer every 12 hours  amLODIPine   Tablet 10 milliGRAM(s) Oral daily  atorvastatin 20 milliGRAM(s) Oral at bedtime  chlorhexidine 4% Liquid 1 Application(s) Topical daily  dextrose 40% Gel 15 Gram(s) Oral once  dextrose 50% Injectable 25 Gram(s) IV Push once  dextrose 50% Injectable 12.5 Gram(s) IV Push once  dextrose 50% Injectable 25 Gram(s) IV Push once  ergocalciferol 54472 Unit(s) Oral <User Schedule>  glucagon  Injectable 1 milliGRAM(s) IntraMuscular once  heparin   Injectable 5000 Unit(s) SubCutaneous every 12 hours  insulin glargine Injectable (LANTUS) 21 Unit(s) SubCutaneous at bedtime  insulin lispro (ADMELOG) corrective regimen sliding scale   SubCutaneous three times a day before meals  insulin lispro (ADMELOG) corrective regimen sliding scale   SubCutaneous at bedtime  insulin lispro Injectable (ADMELOG) 6 Unit(s) SubCutaneous three times a day before meals  metoprolol succinate  milliGRAM(s) Oral daily  mupirocin 2% Ointment 1 Application(s) Topical two times a day  ofloxacin 0.3% Solution 10 Drop(s) Left Ear two times a day  tamsulosin 0.4 milliGRAM(s) Oral at bedtime  vancomycin  IVPB 1250 milliGRAM(s) IV Intermittent daily      LABS:  07-07    146<H>  |  111<H>  |  25<H>  ----------------------------<  61<L>  3.7   |  22  |  1.62<H>    Ca    9.0      07 Jul 2021 06:44  Phos  3.7     07-07  Mg     1.70     07-07    TPro  6.9  /  Alb  3.5  /  TBili  0.2  /  DBili      /  AST  17  /  ALT  24  /  AlkPhos  60  07-06    Creatinine Trend: 1.62 <--, 1.47 <--, 1.62 <--, 1.55 <--, 1.57 <--, 1.62 <--, 1.55 <--, 1.52 <--    Phosphorus Level, Serum: 3.7 mg/dL (07-07 @ 06:44)                              11.1   7.16  )-----------( 207      ( 07 Jul 2021 06:44 )             33.2     Urine Studies:  Urinalysis - [06-22-21 @ 16:47]      Color Light Yellow / Appearance Clear / SG 1.009 / pH 6.0      Gluc >= 1000 mg/dL / Ketone Negative  / Bili Negative / Urobili <2 mg/dL       Blood Trace / Protein Trace / Leuk Est Negative / Nitrite Negative      RBC 1 / WBC 1 / Hyaline 0 / Gran  / Sq Epi  / Non Sq Epi 0 / Bacteria Negative    Urine Creatinine 180      [07-01-21 @ 14:47]  Urine Sodium 65      [07-01-21 @ 14:47]    PTH -- (Ca --)      [06-23-21 @ 08:23]   69  Vitamin D (25OH) 7.7      [06-23-21 @ 12:30]  Lipid: chol 101, , HDL 27, LDL --      [06-23-21 @ 08:23]        RADIOLOGY & ADDITIONAL STUDIES:

## 2021-07-07 NOTE — PROGRESS NOTE ADULT - SUBJECTIVE AND OBJECTIVE BOX
Date of Service: 07-07-21 @ 15:18    Patient is a 61y old  Male who presents with a chief complaint of otitis externa, uncontrolled DM (07 Jul 2021 13:32)      Any change in ROS: Doing much better: no SOB : on room air:     MEDICATIONS  (STANDING):  albuterol/ipratropium for Nebulization 3 milliLiter(s) Nebulizer every 12 hours  amLODIPine   Tablet 10 milliGRAM(s) Oral daily  atorvastatin 20 milliGRAM(s) Oral at bedtime  chlorhexidine 4% Liquid 1 Application(s) Topical daily  dextrose 40% Gel 15 Gram(s) Oral once  dextrose 50% Injectable 25 Gram(s) IV Push once  dextrose 50% Injectable 12.5 Gram(s) IV Push once  dextrose 50% Injectable 25 Gram(s) IV Push once  ergocalciferol 40636 Unit(s) Oral <User Schedule>  glucagon  Injectable 1 milliGRAM(s) IntraMuscular once  heparin   Injectable 5000 Unit(s) SubCutaneous every 12 hours  insulin glargine Injectable (LANTUS) 21 Unit(s) SubCutaneous at bedtime  insulin lispro (ADMELOG) corrective regimen sliding scale   SubCutaneous three times a day before meals  insulin lispro (ADMELOG) corrective regimen sliding scale   SubCutaneous at bedtime  insulin lispro Injectable (ADMELOG) 6 Unit(s) SubCutaneous three times a day before meals  metoprolol succinate  milliGRAM(s) Oral daily  mupirocin 2% Ointment 1 Application(s) Topical two times a day  ofloxacin 0.3% Solution 10 Drop(s) Left Ear two times a day  tamsulosin 0.4 milliGRAM(s) Oral at bedtime  vancomycin  IVPB 1250 milliGRAM(s) IV Intermittent daily    MEDICATIONS  (PRN):  acetaminophen   Tablet .. 650 milliGRAM(s) Oral every 4 hours PRN Mild Pain (1 - 3)  oxyCODONE    IR 5 milliGRAM(s) Oral every 4 hours PRN Moderate Pain (4 - 6)  oxyCODONE    IR 10 milliGRAM(s) Oral every 4 hours PRN Severe Pain (7 - 10)  sodium chloride 0.9% lock flush 10 milliLiter(s) IV Push every 1 hour PRN Pre/post blood products, medications, blood draw, and to maintain line patency    Vital Signs Last 24 Hrs  T(C): 36.8 (07 Jul 2021 06:00), Max: 36.9 (06 Jul 2021 22:01)  T(F): 98.2 (07 Jul 2021 06:00), Max: 98.5 (06 Jul 2021 22:01)  HR: 79 (07 Jul 2021 10:08) (77 - 82)  BP: 146/84 (07 Jul 2021 06:00) (145/75 - 146/84)  BP(mean): 112 (07 Jul 2021 06:00) (94 - 112)  RR: 18 (07 Jul 2021 06:00) (18 - 20)  SpO2: 98% (07 Jul 2021 10:08) (94% - 98%)    I&O's Summary    06 Jul 2021 07:01  -  07 Jul 2021 07:00  --------------------------------------------------------  IN: 200 mL / OUT: 0 mL / NET: 200 mL          Physical Exam:   GENERAL: NAD, well-groomed, well-developed  HEENT: [place]/   Atraumatic, Normocephalic  ENMT: No tonsillar erythema, exudates, or enlargement; Moist mucous membranes, Good dentition, No lesions  NECK: Supple, No JVD, Normal thyroid  CHEST/LUNG: no wheezing  CVS: Regular rate and rhythm; No murmurs, rubs, or gallops  GI: : Soft, Nontender, Nondistended; Bowel sounds present  NERVOUS SYSTEM:  Alert & Oriented X3  EXTREMITIES: - edema  LYMPH: No lymphadenopathy noted  SKIN: No rashes or lesions  ENDOCRINOLOGY: No Thyromegaly  PSYCH: calm+     Labs:                              11.1   7.16  )-----------( 207      ( 07 Jul 2021 06:44 )             33.2                         11.4   6.69  )-----------( 228      ( 06 Jul 2021 07:40 )             34.2                         10.7   6.19  )-----------( 219      ( 05 Jul 2021 06:52 )             32.8                         10.7   7.21  )-----------( 219      ( 04 Jul 2021 07:01 )             33.1     07-07    146<H>  |  111<H>  |  25<H>  ----------------------------<  61<L>  3.7   |  22  |  1.62<H>  07-06    140  |  103  |  23  ----------------------------<  221<H>  3.3<L>   |  22  |  1.47<H>  07-05    142  |  107  |  28<H>  ----------------------------<  185<H>  3.5   |  23  |  1.62<H>  07-04    142  |  110<H>  |  22  ----------------------------<  131<H>  3.6   |  20<L>  |  1.55<H>    Ca    9.0      07 Jul 2021 06:44  Ca    9.4      06 Jul 2021 07:40  Phos  3.7     07-07  Phos  4.3     07-06  Mg     1.70     07-07  Mg     1.80     07-06    TPro  6.9  /  Alb  3.5  /  TBili  0.2  /  DBili  x   /  AST  17  /  ALT  24  /  AlkPhos  60  07-06  TPro  6.5  /  Alb  3.3  /  TBili  <0.2  /  DBili  x   /  AST  25  /  ALT  28  /  AlkPhos  55  07-05  TPro  6.7  /  Alb  3.2<L>  /  TBili  0.2  /  DBili  x   /  AST  17  /  ALT  23  /  AlkPhos  60  07-04    CAPILLARY BLOOD GLUCOSE      POCT Blood Glucose.: 193 mg/dL (07 Jul 2021 12:23)  POCT Blood Glucose.: 170 mg/dL (07 Jul 2021 10:09)  POCT Blood Glucose.: 141 mg/dL (07 Jul 2021 09:41)  POCT Blood Glucose.: 92 mg/dL (07 Jul 2021 09:01)  POCT Blood Glucose.: 68 mg/dL (07 Jul 2021 08:24)  POCT Blood Glucose.: 67 mg/dL (07 Jul 2021 08:24)  POCT Blood Glucose.: 107 mg/dL (06 Jul 2021 21:32)  POCT Blood Glucose.: 128 mg/dL (06 Jul 2021 17:45)      LIVER FUNCTIONS - ( 06 Jul 2021 07:40 )  Alb: 3.5 g/dL / Pro: 6.9 g/dL / ALK PHOS: 60 U/L / ALT: 24 U/L / AST: 17 U/L / GGT: x             r< from: US Duplex Venous Lower Ext Complete, Bilateral (07.04.21 @ 14:34) >    EXAM:  US DPLX LWR EXT VEINS COMPL BI        PROCEDURE DATE:  Jul 4 2021         INTERPRETATION:  CLINICAL INFORMATION: Leg swelling.    COMPARISON: None available.    TECHNIQUE: Duplex sonography of the BILATERAL LOWER extremity veins with color and spectral Doppler, with and without compression.    FINDINGS:    RIGHT:  Normal compressibility of the RIGHT common femoral, femoral and popliteal veins.  Doppler examination shows normal spontaneous and phasic flow.  No RIGHT calf vein thrombosis is detected.    LEFT:  Normal compressibility of the LEFT common femoral, femoral and popliteal veins.  Doppler examination shows normal spontaneous and phasic flow.  No LEFT calf vein thrombosis is detected.    IMPRESSION:  No evidence of deep venousthrombosis in either lower extremity.            < from: US Abdomen Complete (US Abdomen Complete .) (07.04.21 @ 14:32) >    EXAM:  US ABDOMEN COMPLETE        PROCEDURE DATE:  Jul 4 2021         INTERPRETATION:  CLINICAL INFORMATION: Alcohol abuse. Abdominal distention.    COMPARISON: Chest CT dated 07/02/2021 and renal ultrasound dated 06/23/2021.    TECHNIQUE: Sonography of the abdomen.    FINDINGS:    Liver: Within normal limits.  Bile ducts: Normal caliber. Common bile duct measures 4 mm.  Gallbladder: Within normal limits.  Pancreas: Poorly visualized.  Spleen: 7.4 cm. Within normal limits.  Right kidney: 11.8 cm. No hydronephrosis. Increased renal echogenicity.  Left kidney: 10.2 cm.  No hydronephrosis. Increased renal echogenicity.  Ascites: None.  There are mild bilateral pleural effusions.  Aorta and IVC: Visualized portions are within normal limits.    IMPRESSION:  Bilateral pleural effusions.    Bilateral increased renal echogenicity suggestive of underlying medical renal disease.                SUJATA ISLAS MD; Attending Radiologist  This document has been electronically signed. Jul 4 2021  4:02PM    < end of copied text >        SUJATA ISLAS MD; Attending Radiologist  This document has been electronically signed. Jul 4 2021  2:54PM    < end of copied text >          RECENT CULTURES:        RESPIRATORY CULTURES:          Studies  Chest X-RAY  CT SCAN Chest   Venous Dopplers: LE:   CT Abdomen  Others

## 2021-07-07 NOTE — PROVIDER CONTACT NOTE (HYPOGLYCEMIA EVENT) - NS PROVIDER CONTACT SITUATION-HYPO
pt is hypoglycemic   pt admitted for left jacquelyn media and has hx of DM   pt is asymptomatic   Follow hypoglycemic protocol and monitor pt

## 2021-07-07 NOTE — PROGRESS NOTE ADULT - ASSESSMENT
61M with PMH DM2, HTN, HLD, diabetic neuropathy, glaucoma/cataract with poor vision, HCV s/p [place] (cleared per patient) presented with left ear pain. seen by ENT likely malignant otitis externa and folliculitis on antibiotic. nephrology consulted for elvin    ELVIN  baseline ~ 1  ELVIN likely sec to GEMINI  s/p CT with contrast 6/20  FEna>1% suggested ATN  UA with proteinuria and hematuria  renal us without hydro  Renal function slightly worsen  possible sec to lasix. fluid status much better today, echo normal. will hold lasix today  check urine cr, urea today  Vanc 20.6 7/6, check vanco level prior to next dose of vanco.    ACEI still on hold   avoid nephrotoxic agents  optimize dm control. f/u endo  on vanco  monitor vanco level  monitor bmp and urine output    proteinuria/hematuria  renal us noted  p/c ratio <1g  likely sec to dm/htn  work up can be done outpatient  monitor    Acidosis  non AG  improved with oral bicarb  monitor serum Co2    htn  improving   ACEI on hold  BP meds per cardio  monitor    hypocalcemia  likely vit d def  On weekly vit d [id number] dose  monitor    OM  malignant otitis externa on CT  f/u ENT/ID  antibiotic per team    Hypokalemia  likely sec to diuresing  supplement  monitor    epigastric pain  ? etiology  resolved   monitor  get ct and gi eval if symptoms worsen

## 2021-07-08 LAB
ANION GAP SERPL CALC-SCNC: 10 MMOL/L — SIGNIFICANT CHANGE UP (ref 7–14)
BASOPHILS # BLD AUTO: 0.04 K/UL — SIGNIFICANT CHANGE UP (ref 0–0.2)
BASOPHILS NFR BLD AUTO: 0.6 % — SIGNIFICANT CHANGE UP (ref 0–2)
BUN SERPL-MCNC: 20 MG/DL — SIGNIFICANT CHANGE UP (ref 7–23)
CALCIUM SERPL-MCNC: 8.4 MG/DL — SIGNIFICANT CHANGE UP (ref 8.4–10.5)
CHLORIDE SERPL-SCNC: 111 MMOL/L — HIGH (ref 98–107)
CO2 SERPL-SCNC: 22 MMOL/L — SIGNIFICANT CHANGE UP (ref 22–31)
CREAT SERPL-MCNC: 1.44 MG/DL — HIGH (ref 0.5–1.3)
EOSINOPHIL # BLD AUTO: 0.18 K/UL — SIGNIFICANT CHANGE UP (ref 0–0.5)
EOSINOPHIL NFR BLD AUTO: 2.8 % — SIGNIFICANT CHANGE UP (ref 0–6)
GLUCOSE BLDC GLUCOMTR-MCNC: 109 MG/DL — HIGH (ref 70–99)
GLUCOSE BLDC GLUCOMTR-MCNC: 165 MG/DL — HIGH (ref 70–99)
GLUCOSE BLDC GLUCOMTR-MCNC: 201 MG/DL — HIGH (ref 70–99)
GLUCOSE BLDC GLUCOMTR-MCNC: 87 MG/DL — SIGNIFICANT CHANGE UP (ref 70–99)
GLUCOSE SERPL-MCNC: 119 MG/DL — HIGH (ref 70–99)
HCT VFR BLD CALC: 29.2 % — LOW (ref 39–50)
HGB BLD-MCNC: 9.7 G/DL — LOW (ref 13–17)
IANC: 3.25 K/UL — SIGNIFICANT CHANGE UP (ref 1.5–8.5)
IMM GRANULOCYTES NFR BLD AUTO: 0.2 % — SIGNIFICANT CHANGE UP (ref 0–1.5)
LYMPHOCYTES # BLD AUTO: 2.23 K/UL — SIGNIFICANT CHANGE UP (ref 1–3.3)
LYMPHOCYTES # BLD AUTO: 35.1 % — SIGNIFICANT CHANGE UP (ref 13–44)
MAGNESIUM SERPL-MCNC: 1.7 MG/DL — SIGNIFICANT CHANGE UP (ref 1.6–2.6)
MCHC RBC-ENTMCNC: 31 PG — SIGNIFICANT CHANGE UP (ref 27–34)
MCHC RBC-ENTMCNC: 33.2 GM/DL — SIGNIFICANT CHANGE UP (ref 32–36)
MCV RBC AUTO: 93.3 FL — SIGNIFICANT CHANGE UP (ref 80–100)
MONOCYTES # BLD AUTO: 0.64 K/UL — SIGNIFICANT CHANGE UP (ref 0–0.9)
MONOCYTES NFR BLD AUTO: 10.1 % — SIGNIFICANT CHANGE UP (ref 2–14)
NEUTROPHILS # BLD AUTO: 3.25 K/UL — SIGNIFICANT CHANGE UP (ref 1.8–7.4)
NEUTROPHILS NFR BLD AUTO: 51.2 % — SIGNIFICANT CHANGE UP (ref 43–77)
NRBC # BLD: 0 /100 WBCS — SIGNIFICANT CHANGE UP
NRBC # FLD: 0 K/UL — SIGNIFICANT CHANGE UP
PHOSPHATE SERPL-MCNC: 3.5 MG/DL — SIGNIFICANT CHANGE UP (ref 2.5–4.5)
PLATELET # BLD AUTO: 191 K/UL — SIGNIFICANT CHANGE UP (ref 150–400)
POTASSIUM SERPL-MCNC: 4 MMOL/L — SIGNIFICANT CHANGE UP (ref 3.5–5.3)
POTASSIUM SERPL-SCNC: 4 MMOL/L — SIGNIFICANT CHANGE UP (ref 3.5–5.3)
RBC # BLD: 3.13 M/UL — LOW (ref 4.2–5.8)
RBC # FLD: 12.4 % — SIGNIFICANT CHANGE UP (ref 10.3–14.5)
SARS-COV-2 RNA SPEC QL NAA+PROBE: SIGNIFICANT CHANGE UP
SODIUM SERPL-SCNC: 143 MMOL/L — SIGNIFICANT CHANGE UP (ref 135–145)
VANCOMYCIN TROUGH SERPL-MCNC: 9.5 UG/ML — LOW (ref 10–20)
WBC # BLD: 6.35 K/UL — SIGNIFICANT CHANGE UP (ref 3.8–10.5)
WBC # FLD AUTO: 6.35 K/UL — SIGNIFICANT CHANGE UP (ref 3.8–10.5)

## 2021-07-08 PROCEDURE — 99232 SBSQ HOSP IP/OBS MODERATE 35: CPT

## 2021-07-08 PROCEDURE — 78452 HT MUSCLE IMAGE SPECT MULT: CPT | Mod: 26

## 2021-07-08 PROCEDURE — 93018 CV STRESS TEST I&R ONLY: CPT | Mod: GC

## 2021-07-08 PROCEDURE — 93016 CV STRESS TEST SUPVJ ONLY: CPT | Mod: GC

## 2021-07-08 RX ORDER — OXYCODONE HYDROCHLORIDE 5 MG/1
5 TABLET ORAL EVERY 4 HOURS
Refills: 0 | Status: DISCONTINUED | OUTPATIENT
Start: 2021-07-08 | End: 2021-07-09

## 2021-07-08 RX ORDER — INSULIN GLARGINE 100 [IU]/ML
17 INJECTION, SOLUTION SUBCUTANEOUS AT BEDTIME
Refills: 0 | Status: DISCONTINUED | OUTPATIENT
Start: 2021-07-08 | End: 2021-07-09

## 2021-07-08 RX ORDER — VANCOMYCIN HCL 1 G
1000 VIAL (EA) INTRAVENOUS DAILY
Refills: 0 | Status: DISCONTINUED | OUTPATIENT
Start: 2021-07-08 | End: 2021-07-09

## 2021-07-08 RX ORDER — OXYCODONE HYDROCHLORIDE 5 MG/1
10 TABLET ORAL EVERY 4 HOURS
Refills: 0 | Status: DISCONTINUED | OUTPATIENT
Start: 2021-07-08 | End: 2021-07-09

## 2021-07-08 RX ADMIN — Medication 6 UNIT(S): at 18:15

## 2021-07-08 RX ADMIN — Medication 3 MILLILITER(S): at 10:16

## 2021-07-08 RX ADMIN — CHLORHEXIDINE GLUCONATE 1 APPLICATION(S): 213 SOLUTION TOPICAL at 12:01

## 2021-07-08 RX ADMIN — AMLODIPINE BESYLATE 10 MILLIGRAM(S): 2.5 TABLET ORAL at 06:18

## 2021-07-08 RX ADMIN — MUPIROCIN 1 APPLICATION(S): 20 OINTMENT TOPICAL at 18:15

## 2021-07-08 RX ADMIN — Medication 100 MILLIGRAM(S): at 06:18

## 2021-07-08 RX ADMIN — HEPARIN SODIUM 5000 UNIT(S): 5000 INJECTION INTRAVENOUS; SUBCUTANEOUS at 06:18

## 2021-07-08 RX ADMIN — Medication 10 DROP(S): at 18:17

## 2021-07-08 RX ADMIN — ATORVASTATIN CALCIUM 20 MILLIGRAM(S): 80 TABLET, FILM COATED ORAL at 21:35

## 2021-07-08 RX ADMIN — Medication 10 DROP(S): at 06:18

## 2021-07-08 RX ADMIN — Medication 2: at 18:16

## 2021-07-08 RX ADMIN — Medication 250 MILLIGRAM(S): at 12:27

## 2021-07-08 RX ADMIN — Medication 6 UNIT(S): at 09:03

## 2021-07-08 RX ADMIN — HEPARIN SODIUM 5000 UNIT(S): 5000 INJECTION INTRAVENOUS; SUBCUTANEOUS at 18:17

## 2021-07-08 RX ADMIN — MUPIROCIN 1 APPLICATION(S): 20 OINTMENT TOPICAL at 06:18

## 2021-07-08 RX ADMIN — Medication 40 MILLIEQUIVALENT(S): at 00:27

## 2021-07-08 RX ADMIN — INSULIN GLARGINE 17 UNIT(S): 100 INJECTION, SOLUTION SUBCUTANEOUS at 22:06

## 2021-07-08 RX ADMIN — Medication 6 UNIT(S): at 12:27

## 2021-07-08 RX ADMIN — TAMSULOSIN HYDROCHLORIDE 0.4 MILLIGRAM(S): 0.4 CAPSULE ORAL at 21:35

## 2021-07-08 RX ADMIN — Medication 3 MILLILITER(S): at 20:37

## 2021-07-08 NOTE — PROGRESS NOTE ADULT - SUBJECTIVE AND OBJECTIVE BOX
Date of Service: 07-08-21 @ 14:39    Patient is a 61y old  Male who presents with a chief complaint of otitis externa, uncontrolled DM (08 Jul 2021 12:16)      Any change in ROS: She seems OK: no SOB :       MEDICATIONS  (STANDING):  albuterol/ipratropium for Nebulization 3 milliLiter(s) Nebulizer every 12 hours  amLODIPine   Tablet 10 milliGRAM(s) Oral daily  atorvastatin 20 milliGRAM(s) Oral at bedtime  chlorhexidine 4% Liquid 1 Application(s) Topical daily  dextrose 40% Gel 15 Gram(s) Oral once  dextrose 50% Injectable 25 Gram(s) IV Push once  dextrose 50% Injectable 12.5 Gram(s) IV Push once  dextrose 50% Injectable 25 Gram(s) IV Push once  ergocalciferol 46601 Unit(s) Oral <User Schedule>  glucagon  Injectable 1 milliGRAM(s) IntraMuscular once  heparin   Injectable 5000 Unit(s) SubCutaneous every 12 hours  insulin glargine Injectable (LANTUS) 21 Unit(s) SubCutaneous at bedtime  insulin lispro (ADMELOG) corrective regimen sliding scale   SubCutaneous three times a day before meals  insulin lispro (ADMELOG) corrective regimen sliding scale   SubCutaneous at bedtime  insulin lispro Injectable (ADMELOG) 6 Unit(s) SubCutaneous three times a day before meals  metoprolol succinate  milliGRAM(s) Oral daily  mupirocin 2% Ointment 1 Application(s) Topical two times a day  ofloxacin 0.3% Solution 10 Drop(s) Left Ear two times a day  tamsulosin 0.4 milliGRAM(s) Oral at bedtime  vancomycin  IVPB 1000 milliGRAM(s) IV Intermittent daily    MEDICATIONS  (PRN):  acetaminophen   Tablet .. 650 milliGRAM(s) Oral every 4 hours PRN Mild Pain (1 - 3)  oxyCODONE    IR 5 milliGRAM(s) Oral every 4 hours PRN Moderate Pain (4 - 6)  oxyCODONE    IR 10 milliGRAM(s) Oral every 4 hours PRN Severe Pain (7 - 10)  sodium chloride 0.9% lock flush 10 milliLiter(s) IV Push every 1 hour PRN Pre/post blood products, medications, blood draw, and to maintain line patency    Vital Signs Last 24 Hrs  T(C): 36.7 (08 Jul 2021 12:39), Max: 37.1 (07 Jul 2021 22:01)  T(F): 98.1 (08 Jul 2021 12:39), Max: 98.8 (07 Jul 2021 22:01)  HR: 78 (08 Jul 2021 12:39) (77 - 83)  BP: 123/69 (08 Jul 2021 12:39) (123/69 - 148/85)  BP(mean): 111 (08 Jul 2021 06:01) (3 - 111)  RR: 19 (08 Jul 2021 12:39) (18 - 20)  SpO2: 98% (08 Jul 2021 12:39) (96% - 100%)    I&O's Summary    08 Jul 2021 07:01  -  08 Jul 2021 14:39  --------------------------------------------------------  IN: 598 mL / OUT: 0 mL / NET: 598 mL          Physical Exam:   GENERAL: NAD, well-groomed, well-developed  HEENT: VINCENZO/   Atraumatic, Normocephalic  ENMT: No tonsillar erythema, exudates, or enlargement; Moist mucous membranes, Good dentition, No lesions  NECK: Supple, No JVD, Normal thyroid  CHEST/LUNG: Clear to auscultaion  CVS: Regular rate and rhythm; No murmurs, rubs, or gallops  GI: : Soft, Nontender, Nondistended; Bowel sounds present  NERVOUS SYSTEM:  Alert & Oriented X3  EXTREMITIES: mild  edema  LYMPH: No lymphadenopathy noted  SKIN: No rashes or lesions  ENDOCRINOLOGY: No Thyromegaly  PSYCH: Appropriate    Labs:                              9.7    6.35  )-----------( 191      ( 08 Jul 2021 07:02 )             29.2                         11.1   7.16  )-----------( 207      ( 07 Jul 2021 06:44 )             33.2                         11.4   6.69  )-----------( 228      ( 06 Jul 2021 07:40 )             34.2                         10.7   6.19  )-----------( 219      ( 05 Jul 2021 06:52 )             32.8     07-08    143  |  111<H>  |  20  ----------------------------<  119<H>  4.0   |  22  |  1.44<H>  07-07    146<H>  |  111<H>  |  25<H>  ----------------------------<  61<L>  3.7   |  22  |  1.62<H>  07-06    140  |  103  |  23  ----------------------------<  221<H>  3.3<L>   |  22  |  1.47<H>  07-05    142  |  107  |  28<H>  ----------------------------<  185<H>  3.5   |  23  |  1.62<H>    Ca    8.4      08 Jul 2021 07:02  Ca    9.0      07 Jul 2021 06:44  Phos  3.5     07-08  Phos  3.7     07-07  Mg     1.70     07-08  Mg     1.70     07-07    TPro  6.9  /  Alb  3.5  /  TBili  0.2  /  DBili  x   /  AST  17  /  ALT  24  /  AlkPhos  60  07-06  TPro  6.5  /  Alb  3.3  /  TBili  <0.2  /  DBili  x   /  AST  25  /  ALT  28  /  AlkPhos  55  07-05    CAPILLARY BLOOD GLUCOSE      POCT Blood Glucose.: 109 mg/dL (08 Jul 2021 12:10)  POCT Blood Glucose.: 87 mg/dL (08 Jul 2021 08:24)  POCT Blood Glucose.: 175 mg/dL (07 Jul 2021 21:57)  POCT Blood Glucose.: 206 mg/dL (07 Jul 2021 17:34)      < from: US Duplex Venous Lower Ext Complete, Bilateral (07.04.21 @ 14:34) >    PROCEDURE DATE:  Jul 4 2021         INTERPRETATION:  CLINICAL INFORMATION: Leg swelling.    COMPARISON: None available.    TECHNIQUE: Duplex sonography of the BILATERAL LOWER extremity veins with color and spectral Doppler, with and without compression.    FINDINGS:    RIGHT:  Normal compressibility of the RIGHT common femoral, femoral and popliteal veins.  Doppler examination shows normal spontaneous and phasic flow.  No RIGHT calf vein thrombosis is detected.    LEFT:  Normal compressibility of the LEFT common femoral, femoral and popliteal veins.  Doppler examination shows normal spontaneous and phasic flow.  No LEFT calf vein thrombosis is detected.    IMPRESSION:  No evidence of deep venousthrombosis in either lower extremity.                  SUJATA ISLAS MD; Attending Radiologist  This document has been electronically signed. Jul 4 2021  2:54PM    < end of copied text >        < from: CT Chest No Cont (07.02.21 @ 19:02) >    EXAM:  CT CHEST        PROCEDURE DATE:  Jul 2 2021         INTERPRETATION:  CLINICAL INFORMATION: Shortness of breath. Uncontrolled diabetes with neuropathy.    COMPARISON: Chest radiograph 7/2/2021.    CONTRAST/COMPLICATIONS:  IV Contrast: None  Oral Contrast: None.  Complications: None.    PROCEDURE:  CT of the Chest was performed.  Sagittal and coronal reformats were performed.    FINDINGS: The quality of the images are degraded by motion.  LINES/TUBES: Left PICC with tip in the SVC.  LUNGS/AIRWAYS/PLEURA: Patent central airways. Bilateral small pleural effusions with atelectasis of basilar lower lobes. Mild interlobular septal thickening likely representing interstitial edema.  MEDIASTINUM AND DELROY: No lymphadenopathy.  VESSELS: Coronary calcifications.  HEART: Cardiomegaly. pericardial effusion.  CHEST WALL AND LOWER NECK: Within normal limits.  VISUALIZED UPPER ABDOMEN: Within normal limits.  BONES: Within normal limits.    IMPRESSION:  Small pleural effusions and atelectasis of basilar lower lobes. Mild interlobular septal thickening representing interstitial edema.            SARIAH NASH MD; Resident Radiology  This document has been electronically signed.  NINOSKA GIVENS MD; Attending Radiologist  This document has been electronically signed. Jul  3 2021 12:52PM    < end of copied text >        RECENT CULTURES:        RESPIRATORY CULTURES:          Studies  Chest X-RAY  CT SCAN Chest   Venous Dopplers: LE:   CT Abdomen  Others

## 2021-07-08 NOTE — PROGRESS NOTE ADULT - ASSESSMENT
61M with PMH DM2, HTN, HLD, diabetic neuropathy, glaucoma/cataract with poor vision, HCV s/p Vishal (cleared per patient) presented with left ear pain. Patient initially presented to [facility] and transferred to [facility] for ENT evaluation. Consulted for uncontrolled T2DM with a1c 14.3%    1. Uncontrolled type 2 diabetes mellitus with hyperglycemia  T2DM uncontrolled a1c of 14.3 with barriers of poor vision (unable to see insulin units), living in shelter prior to admission    Inpatient BG target 100-180 mg/dl  Tightly controlled glucose this AM in spite of insulin reduction yesterday  Decrease Lantus to 17 units SQ qHS   Continue Admelog 6 units SQ TID before meals (Hold if NPO/not eating meal)   Continue Admelog low dose correctional scales before meals and bedtime as currently ordered  Check BG before meals and bedtime  Consistent carbohydrate diet, Nutrition consult done    Discharge Plan:  With A1c 14.3%, patient requires insulin for discharge. Patient UNABLE to successfully self-inject insulin due to poor vision  Per care coordination notes, possible discharge to LTC facility. This would be optimal so that insulin can be administered to patient by staff at facility  Therefore, recommend basal/bolus insulin with dose TBD  Contact endocrine to confirm dosing on day of discharge  Recommend PCP, optho, podiatry and endocrine followup as outpatient  [facility] ([facility] [place])   [address] [facility] [phone].    2. Essential hypertension  BP target less than 130/80  Management per primary team    3. HLD  Recommend to check fasting lipid panel, goal LDL less than 70  Continue Atorvastatin 20 mg daily    4. Adrenal nodule  Adrenal nodule incidentally found  Aldosterone 3.0, no need to check renin activity  Plasma metanephrine (normal) and 24 hour urine cortisol (not elevated)  Low suspicion for hypersecretion of adrenal hormones      5. Vitamin D deficiency  Vitamin D 25 OH level noted to be 7.7  Recommend Ergocalciferol 50,000 units PO weekly x 8-12 weeks  Corrected calcium acceptable. Vitamin D deficiency likely contributing to low normal Ca    Emilie Lam  Nurse Practitioner  Division of Endocrinology & Diabetes  In house pager #[phone]/long range pager #[phone]    If before 9AM or after 6PM, or on weekends/holidays, please call endocrine answering service for assistance ([phone]).  For nonurgent matters email [email or web address] for assistance.

## 2021-07-08 NOTE — PROGRESS NOTE ADULT - PROBLEM SELECTOR PLAN 1
c/w Vanc as per ID
c/w Vanc     PICC to be placed for DC to rehab. today
c/w Vanc     DC planning, PICC to be placed
c/w Vanc
Improved
c/w Vanc as per ID
CT head as above  ENT eval appreciated, f/u culture  CT IAC w/ contrast to evaluated for OM  c/w Vanc and Cipro.  ID consult done, FU abx plan .
Improved
CT head as above  ENT eval appreciated,MRSA noted on cultures    c/w Vanc and Cipro.  ID FU noted  DC planning, PICC to be placed
Improved  c/w Vanc as per ID
c/w Vanc     PICC to be placed for DC to rehab
c/w Vanc   NAYELI ALBERT noted, need guidance for duration of antibiotics  DC planning, PICC to be placed
CT head as above  ENT eval appreciated, f/u culture    c/w Vanc and Cipro.  ID consult FU abx plan .
CT head as above  ENT eval appreciated,MRSA noted on cultures    c/w Vanc and Cipro.  ID consult FU abx plan .
c/w Vanc     PICC to be placed for DC to rehab. today
Improved
c/w Vanc   ID FU noted  DC planning, PICC to be placed
CT head as above  ENT eval appreciated, f/u culture    c/w Vanc and Cipro.  ID consult done, FU abx plan .

## 2021-07-08 NOTE — PROGRESS NOTE ADULT - SUBJECTIVE AND OBJECTIVE BOX
Infectious Diseases progress note:    Subjective: NAD, afebrile, pleasant.  No new somatic complaints    ROS:  CONSTITUTIONAL:  No fever, chills, rigors  CARDIOVASCULAR:  No chest pain or palpitations  RESPIRATORY:   No SOB, cough, dyspnea on exertion.  No wheezing  GASTROINTESTINAL:  No abd pain, N/V, diarrhea/constipation  EXTREMITIES:  No swelling or joint pain  GENITOURINARY:  No burning on urination, increased frequency or urgency.  No flank pain  NEUROLOGIC:  No HA, visual disturbances  SKIN: No rashes    Allergies    No Known Allergies    Intolerances        ANTIBIOTICS/RELEVANT:  antimicrobials  vancomycin  IVPB 1000 milliGRAM(s) IV Intermittent daily    immunologic:    OTHER:  acetaminophen   Tablet .. 650 milliGRAM(s) Oral every 4 hours PRN  albuterol/ipratropium for Nebulization 3 milliLiter(s) Nebulizer every 12 hours  amLODIPine   Tablet 10 milliGRAM(s) Oral daily  atorvastatin 20 milliGRAM(s) Oral at bedtime  chlorhexidine 4% Liquid 1 Application(s) Topical daily  dextrose 40% Gel 15 Gram(s) Oral once  dextrose 50% Injectable 25 Gram(s) IV Push once  dextrose 50% Injectable 12.5 Gram(s) IV Push once  dextrose 50% Injectable 25 Gram(s) IV Push once  ergocalciferol 85056 Unit(s) Oral <User Schedule>  glucagon  Injectable 1 milliGRAM(s) IntraMuscular once  heparin   Injectable 5000 Unit(s) SubCutaneous every 12 hours  insulin glargine Injectable (LANTUS) 17 Unit(s) SubCutaneous at bedtime  insulin lispro (ADMELOG) corrective regimen sliding scale   SubCutaneous three times a day before meals  insulin lispro (ADMELOG) corrective regimen sliding scale   SubCutaneous at bedtime  insulin lispro Injectable (ADMELOG) 6 Unit(s) SubCutaneous three times a day before meals  metoprolol succinate  milliGRAM(s) Oral daily  mupirocin 2% Ointment 1 Application(s) Topical two times a day  ofloxacin 0.3% Solution 10 Drop(s) Left Ear two times a day  oxyCODONE    IR 5 milliGRAM(s) Oral every 4 hours PRN  oxyCODONE    IR 10 milliGRAM(s) Oral every 4 hours PRN  sodium chloride 0.9% lock flush 10 milliLiter(s) IV Push every 1 hour PRN  tamsulosin 0.4 milliGRAM(s) Oral at bedtime      Objective:  Vital Signs Last 24 Hrs  T(C): 36.7 (08 Jul 2021 12:39), Max: 37.1 (07 Jul 2021 22:01)  T(F): 98.1 (08 Jul 2021 12:39), Max: 98.8 (07 Jul 2021 22:01)  HR: 78 (08 Jul 2021 12:39) (77 - 83)  BP: 123/69 (08 Jul 2021 12:39) (123/69 - 148/85)  BP(mean): 111 (08 Jul 2021 06:01) (3 - 111)  RR: 19 (08 Jul 2021 12:39) (18 - 20)  SpO2: 98% (08 Jul 2021 12:39) (96% - 100%)    PHYSICAL EXAM:  Constitutional:NAD  Eyes:VINCENZO, EOMI  Ear/Nose/Throat: no thrush, mucositis.  Moist mucous membranes	  Neck:no JVD, no lymphadenopathy, supple  Respiratory: CTA sudheer  Cardiovascular: S1S2 RRR, no murmurs  Gastrointestinal:soft, nontender,  nondistended (+) BS  Extremities:no e/e/c  Skin:  no rashes, open wounds or ulcerations        LABS:                        9.7    6.35  )-----------( 191      ( 08 Jul 2021 07:02 )             29.2     07-08    143  |  111<H>  |  20  ----------------------------<  119<H>  4.0   |  22  |  1.44<H>    Ca    8.4      08 Jul 2021 07:02  Phos  3.5     07-08  Mg     1.70     07-08                  Vancomycin Level, Trough: 9.5 ug/mL (07-08 @ 07:02)  Vancomycin Level, Trough: 20.6 ug/mL (07-07 @ 06:44)  Vancomycin Level, Trough: 18.8 ug/mL (07-05 @ 12:44)              MICROBIOLOGY:    Culture - Blood (06.21.21 @ 18:39)   Specimen Source: .Blood Blood-Venous   Culture Results:   No Growth Final     RADIOLOGY & ADDITIONAL STUDIES:    < from: US Duplex Venous Lower Ext Complete, Bilateral (07.04.21 @ 14:34) >  FINDINGS:    RIGHT:  Normal compressibility of the RIGHT common femoral, femoral and popliteal veins.  Doppler examination shows normal spontaneous and phasic flow.  No RIGHT calf vein thrombosis is detected.    LEFT:  Normal compressibility of the LEFT common femoral, femoral and popliteal veins.  Doppler examination shows normal spontaneous and phasic flow.  No LEFT calf vein thrombosis is detected.    IMPRESSION:  No evidence of deep venousthrombosis in either lower extremity.    < end of copied text >      < from: US Abdomen Complete (US Abdomen Complete .) (07.04.21 @ 14:32) >  FINDINGS:    Liver: Within normal limits.  Bile ducts: Normal caliber. Common bile duct measures 4 mm.  Gallbladder: Within normal limits.  Pancreas: Poorly visualized.  Spleen: 7.4 cm. Within normal limits.  Right kidney: 11.8 cm. No hydronephrosis. Increased renal echogenicity.  Left kidney: 10.2 cm.  No hydronephrosis. Increased renal echogenicity.  Ascites: None.  There are mild bilateral pleural effusions.  Aorta and IVC: Visualized portions are within normal limits.    IMPRESSION:  Bilateral pleural effusions.    Bilateral increased renal echogenicity suggestive of underlying medical renal disease.    < end of copied text >   Infectious Diseases progress note:    Subjective: NAD, afebrile, pleasant.  No new somatic complaints    ROS:  CONSTITUTIONAL:  No fever, chills, rigors  CARDIOVASCULAR:  No chest pain or palpitations  RESPIRATORY:   No SOB, cough, dyspnea on exertion.  No wheezing  GASTROINTESTINAL:  No abd pain, N/V, diarrhea/constipation  EXTREMITIES:  No swelling or joint pain  GENITOURINARY:  No burning on urination, increased frequency or urgency.  No flank pain  NEUROLOGIC:  No HA, visual disturbances  SKIN: No rashes    Allergies    No Known Allergies    Intolerances        ANTIBIOTICS/RELEVANT:  antimicrobials  vancomycin  IVPB 1000 milliGRAM(s) IV Intermittent daily    immunologic:    OTHER:  acetaminophen   Tablet .. 650 milliGRAM(s) Oral every 4 hours PRN  albuterol/ipratropium for Nebulization 3 milliLiter(s) Nebulizer every 12 hours  amLODIPine   Tablet 10 milliGRAM(s) Oral daily  atorvastatin 20 milliGRAM(s) Oral at bedtime  chlorhexidine 4% Liquid 1 Application(s) Topical daily  dextrose 40% Gel 15 Gram(s) Oral once  dextrose 50% Injectable 25 Gram(s) IV Push once  dextrose 50% Injectable 12.5 Gram(s) IV Push once  dextrose 50% Injectable 25 Gram(s) IV Push once  ergocalciferol 19881 Unit(s) Oral <User Schedule>  glucagon  Injectable 1 milliGRAM(s) IntraMuscular once  heparin   Injectable 5000 Unit(s) SubCutaneous every 12 hours  insulin glargine Injectable (LANTUS) 17 Unit(s) SubCutaneous at bedtime  insulin lispro (ADMELOG) corrective regimen sliding scale   SubCutaneous three times a day before meals  insulin lispro (ADMELOG) corrective regimen sliding scale   SubCutaneous at bedtime  insulin lispro Injectable (ADMELOG) 6 Unit(s) SubCutaneous three times a day before meals  metoprolol succinate  milliGRAM(s) Oral daily  mupirocin 2% Ointment 1 Application(s) Topical two times a day  ofloxacin 0.3% Solution 10 Drop(s) Left Ear two times a day  oxyCODONE    IR 5 milliGRAM(s) Oral every 4 hours PRN  oxyCODONE    IR 10 milliGRAM(s) Oral every 4 hours PRN  sodium chloride 0.9% lock flush 10 milliLiter(s) IV Push every 1 hour PRN  tamsulosin 0.4 milliGRAM(s) Oral at bedtime      Objective:    Vital Signs Last 24 Hrs  T(C): 36.7 (08 Jul 2021 12:39), Max: 37.1 (07 Jul 2021 22:01)  T(F): 98.1 (08 Jul 2021 12:39), Max: 98.8 (07 Jul 2021 22:01)  HR: 78 (08 Jul 2021 12:39) (77 - 83)  BP: 123/69 (08 Jul 2021 12:39) (123/69 - 148/85)  BP(mean): 111 (08 Jul 2021 06:01) (3 - 111)  RR: 19 (08 Jul 2021 12:39) (18 - 20)  SpO2: 98% (08 Jul 2021 12:39) (96% - 100%)    PHYSICAL EXAM:    Constitutional:NAD  Eyes:VINCENZO, EOMI  Ear/Nose/Throat: no thrush, mucositis.  Moist mucous membranes	  Neck:no JVD, no lymphadenopathy, supple  Respiratory: CTA sudheer  Cardiovascular: S1S2 RRR, no murmurs  Gastrointestinal:soft, nontender,  nondistended (+) BS  Extremities:no e/e/c  Skin:  no rashes.  PICC line in place        LABS:                        9.7    6.35  )-----------( 191      ( 08 Jul 2021 07:02 )             29.2     07-08    143  |  111<H>  |  20  ----------------------------<  119<H>  4.0   |  22  |  1.44<H>    Ca    8.4      08 Jul 2021 07:02  Phos  3.5     07-08  Mg     1.70     07-08                  Vancomycin Level, Trough: 9.5 ug/mL (07-08 @ 07:02)  Vancomycin Level, Trough: 20.6 ug/mL (07-07 @ 06:44)  Vancomycin Level, Trough: 18.8 ug/mL (07-05 @ 12:44)              MICROBIOLOGY:    Culture - Blood (06.21.21 @ 18:39)   Specimen Source: .Blood Blood-Venous   Culture Results:   No Growth Final     RADIOLOGY & ADDITIONAL STUDIES:    < from: US Duplex Venous Lower Ext Complete, Bilateral (07.04.21 @ 14:34) >  FINDINGS:    RIGHT:  Normal compressibility of the RIGHT common femoral, femoral and popliteal veins.  Doppler examination shows normal spontaneous and phasic flow.  No RIGHT calf vein thrombosis is detected.    LEFT:  Normal compressibility of the LEFT common femoral, femoral and popliteal veins.  Doppler examination shows normal spontaneous and phasic flow.  No LEFT calf vein thrombosis is detected.    IMPRESSION:  No evidence of deep venousthrombosis in either lower extremity.    < end of copied text >      < from: US Abdomen Complete (US Abdomen Complete .) (07.04.21 @ 14:32) >  FINDINGS:    Liver: Within normal limits.  Bile ducts: Normal caliber. Common bile duct measures 4 mm.  Gallbladder: Within normal limits.  Pancreas: Poorly visualized.  Spleen: 7.4 cm. Within normal limits.  Right kidney: 11.8 cm. No hydronephrosis. Increased renal echogenicity.  Left kidney: 10.2 cm.  No hydronephrosis. Increased renal echogenicity.  Ascites: None.  There are mild bilateral pleural effusions.  Aorta and IVC: Visualized portions are within normal limits.    IMPRESSION:  Bilateral pleural effusions.    Bilateral increased renal echogenicity suggestive of underlying medical renal disease.    < end of copied text >

## 2021-07-08 NOTE — PROGRESS NOTE ADULT - ASSESSMENT
Patient is a 61M with PMH DM2, HTN, HLD, diabetic neuropathy, glaucoma/cataract with poor vision, HCV s/p [place] (cleared per patient) presented with left ear pain.  CT auditory ear canal shows:    Findings concerning for malignant left-sided otitis externa with bony erosive changes involving the external auditory canal. Superimposed osteomyelitis within the bony external auditory canal is a consideration given the patient's clinical information. An external auditory canal cholesteatoma cannot be excluded. A neoplastic process such as squamous cell carcinoma is also difficult to exclude.    2. Additional imaging findings compatible with left-sided otitis media and left-sided mastoiditis. No gross ossicular chain erosion or destructive changes. No evidence of venous sinus thrombosis.    3. Unremarkable right-sided temporal bone CT examination apart from cerumen in the external auditory canal.    Pt also c/o new painful bumps, draining pus, in b/l armpits, b/l groin and R perineal area.    ID consulted for further abx management.     Left sided otitis externa:    - Cont present abx, f/u L ear cultures.  Concern for superimposed OM of bony external auditory canal.   Cannot exclude cholesteatoma or squamous cell carcinoma.    - ENT f/u appreciated.  Continued outpt f/u to address further need for cultures, imaging, biopsy.      - Current ear cultures growing MRSA.  IV cipro d/c'd.  Cont IV vanco, and maintain trough between 15-20.     -  Cont abx ear gtt as per ENT    - Plan for  long term IV abx.  f/u blood cx x 2.  ESR, CRP    - Weekly cbc, sma-7, esr, crp, vanco trough.       r/o Hidradenitis suppurativa:    - c/o new nodular/pustular lesions in b/l armpits, b/l groin and perineum for past 1-3 weeks. States lesions are painful and drain pus.    - R Derm eval appreciated, ?hidradenitis suppurativa vs staph folliculitis.  Pt started on doxycycline and mupirocin for staph decolonization.      - Cont IV abx for now.  Cont to monitor and evaluate for need for further I&D - lesions have improved      Pulm edema/fluid overload:    - cxr 7/2 with small b/l pleural effusions and congestion    - Pt started on diuresis.  Echo results noted.  Pt with incr abd distention, Abd US no ascites    - Pulm and Cardiology f/u appreciated       * cont IV vancomycin x 6 week course (6/20 through 7/31), maintain trough between 15-20.  Trough level on 7/7 mildly supratherapeutic.  Hold today's dose, check repeat level tomorrow.  Restart at 1gm IV qdaily when level < 20.      * Check weekly cbc, sma-7, esr, crp, vanco trough.      d/c planning in place    Mena Osullivan  [phone]       Patient is a 61M with PMH DM2, HTN, HLD, diabetic neuropathy, glaucoma/cataract with poor vision, HCV s/p [place] (cleared per patient) presented with left ear pain.  CT auditory ear canal shows:    Findings concerning for malignant left-sided otitis externa with bony erosive changes involving the external auditory canal. Superimposed osteomyelitis within the bony external auditory canal is a consideration given the patient's clinical information. An external auditory canal cholesteatoma cannot be excluded. A neoplastic process such as squamous cell carcinoma is also difficult to exclude.    2. Additional imaging findings compatible with left-sided otitis media and left-sided mastoiditis. No gross ossicular chain erosion or destructive changes. No evidence of venous sinus thrombosis.    3. Unremarkable right-sided temporal bone CT examination apart from cerumen in the external auditory canal.    Pt also c/o new painful bumps, draining pus, in b/l armpits, b/l groin and R perineal area.    ID consulted for further abx management.     Left sided otitis externa:    - Cont present abx, f/u L ear cultures.  Concern for superimposed OM of bony external auditory canal.   Cannot exclude cholesteatoma or squamous cell carcinoma.    - ENT f/u appreciated.  Continued outpt f/u to address further need for cultures, imaging, biopsy.      - Current ear cultures growing MRSA.  IV cipro d/c'd.  Cont IV vanco, and maintain trough between 15-20.     -  Cont abx ear gtt as per ENT    - Plan for  long term IV abx.  f/u blood cx x 2.  ESR, CRP    - Weekly cbc, sma-7, esr, crp, vanco trough.       r/o Hidradenitis suppurativa:    - c/o new nodular/pustular lesions in b/l armpits, b/l groin and perineum for past 1-3 weeks. States lesions are painful and drain pus.    - R Derm eval appreciated, ?hidradenitis suppurativa vs staph folliculitis.  Pt started on doxycycline and mupirocin for staph decolonization.      - Cont IV abx for now.  Cont to monitor and evaluate for need for further I&D - lesions have improved      Pulm edema/fluid overload:    - cxr 7/2 with small b/l pleural effusions and congestion    - Pt started on diuresis.  Echo results noted.  Pt with incr abd distention, Abd US no ascites    - Pulm and Cardiology f/u appreciated       * cont IV vancomycin x 6 week course (6/20 through 7/31), maintain trough between 15-20.  Trough level on 7/7 mildly supratherapeutic.  Dose held.  Resumed at 1gm qdaily.      * Check weekly cbc, sma-7, esr, crp, vanco trough.      d/c planning in place    Mena Osullivan  [phone]

## 2021-07-08 NOTE — PROGRESS NOTE ADULT - PROBLEM SELECTOR PLAN 4
Monitor on current medications  Dyspnea: Resolved. Stress test negative.
BP uncontrolled  Toprol 100 mg added
BP uncontrolled  slowly restarting home meds.
BP uncontrolled  Toprol 100 mg added
BP uncontrolled  slowly restarting home meds.
Monitor on current medications
BP uncontrolled  slowly restarting home meds.
BP uncontrolled
BP uncontrolled  slowly restarting home meds.
BP uncontrolled  Toprol 100 mg added
BP uncontrolled  slowly restarting home meds.
Monitor on current medications
BP uncontrolled  slowly restarting home meds.
BP uncontrolled  slowly restarting home meds.

## 2021-07-08 NOTE — PROGRESS NOTE ADULT - SUBJECTIVE AND OBJECTIVE BOX
[facility]   MD ANITA QUINTANILLA DO ANAM SIDDIQUI ANGELA WONG, PA    TEL:  OFFICE: [phone]  DR MONROE CELL: [phone]  DR. PORTILLO CELL: [phone]  DR. MYLES CELL: [phone]  PEGGY JAFFE CELL: [phone]    From 5pm-7am Answering Service [phone]    -- RENAL FOLLOW UP NOTE ---Date of Service 07-08-21 @ 11:16    Patient is a 61y old  Male who presents with a chief complaint of otitis externa, uncontrolled DM (07 Jul 2021 21:05)      Patient seen and examined at bedside. No chest pain/sob    VITALS:  T(F): 98.4 (07-08-21 @ 06:01), Max: 98.8 (07-07-21 @ 22:01)  HR: 79 (07-08-21 @ 10:17)  BP: 148/85 (07-08-21 @ 06:01)  RR: 20 (07-08-21 @ 06:01)  SpO2: 96% (07-08-21 @ 10:17)  Wt(kg): --        PHYSICAL EXAM:  Constitutional: NAD  Neck: No JVD  Respiratory: CTAB, no wheezes, rales or rhonchi  Cardiovascular: S1, S2, RRR  Gastrointestinal: BS+, soft, NT/ND  Extremities: No peripheral edema    Hospital Medications:   MEDICATIONS  (STANDING):  albuterol/ipratropium for Nebulization 3 milliLiter(s) Nebulizer every 12 hours  amLODIPine   Tablet 10 milliGRAM(s) Oral daily  atorvastatin 20 milliGRAM(s) Oral at bedtime  chlorhexidine 4% Liquid 1 Application(s) Topical daily  dextrose 40% Gel 15 Gram(s) Oral once  dextrose 50% Injectable 25 Gram(s) IV Push once  dextrose 50% Injectable 12.5 Gram(s) IV Push once  dextrose 50% Injectable 25 Gram(s) IV Push once  ergocalciferol 68114 Unit(s) Oral <User Schedule>  glucagon  Injectable 1 milliGRAM(s) IntraMuscular once  heparin   Injectable 5000 Unit(s) SubCutaneous every 12 hours  insulin glargine Injectable (LANTUS) 21 Unit(s) SubCutaneous at bedtime  insulin lispro (ADMELOG) corrective regimen sliding scale   SubCutaneous three times a day before meals  insulin lispro (ADMELOG) corrective regimen sliding scale   SubCutaneous at bedtime  insulin lispro Injectable (ADMELOG) 6 Unit(s) SubCutaneous three times a day before meals  metoprolol succinate  milliGRAM(s) Oral daily  mupirocin 2% Ointment 1 Application(s) Topical two times a day  ofloxacin 0.3% Solution 10 Drop(s) Left Ear two times a day  tamsulosin 0.4 milliGRAM(s) Oral at bedtime  vancomycin  IVPB 1000 milliGRAM(s) IV Intermittent daily      LABS:  07-08    143  |  111<H>  |  20  ----------------------------<  119<H>  4.0   |  22  |  1.44<H>    Ca    8.4      08 Jul 2021 07:02  Phos  3.5     07-08  Mg     1.70     07-08      Creatinine Trend: 1.44 <--, 1.62 <--, 1.47 <--, 1.62 <--, 1.55 <--, 1.57 <--, 1.62 <--, 1.55 <--    Phosphorus Level, Serum: 3.5 mg/dL (07-08 @ 07:02)                              9.7    6.35  )-----------( 191      ( 08 Jul 2021 07:02 )             29.2     Urine Studies:  Urinalysis - [06-22-21 @ 16:47]      Color Light Yellow / Appearance Clear / SG 1.009 / pH 6.0      Gluc >= 1000 mg/dL / Ketone Negative  / Bili Negative / Urobili <2 mg/dL       Blood Trace / Protein Trace / Leuk Est Negative / Nitrite Negative      RBC 1 / WBC 1 / Hyaline 0 / Gran  / Sq Epi  / Non Sq Epi 0 / Bacteria Negative    Urine Creatinine 180      [07-01-21 @ 14:47]  Urine Sodium 65      [07-01-21 @ 14:47]    PTH -- (Ca --)      [06-23-21 @ 08:23]   69  Vitamin D (25OH) 7.7      [06-23-21 @ 12:30]  Lipid: chol 101, , HDL 27, LDL --      [06-23-21 @ 08:23]        RADIOLOGY & ADDITIONAL STUDIES:

## 2021-07-08 NOTE — PROGRESS NOTE ADULT - PROBLEM SELECTOR PLAN 2
Endocrine eval noted
uncontrolled in setting of medication non-adherence  A1c increased from 11 to 14 over past 3 months  start basal Lantus 10U, moderate ISS  Endocrineeval called
Endocrine eval noted
uncontrolled in setting of medication non-adherence  A1c increased from 11 to 14 over past 3 months  start basal Lantus 10U, moderate ISS  Endocrine eval noted
Endocrine eval noted
Endocrine eval noted  RISS
Endocrine eval noted  RISS
Endocrine eval noted, needs to be on Insulin long term.  RISS
uncontrolled in setting of medication non-adherence  A1c increased from 11 to 14 over past 3 months  start basal Lantus 10U, moderate ISS  Endocrineeval noted
Endocrine eval noted
Endocrine eval noted  RISS
Endocrine eval noted, needs to be on Insulin long term.  RISS
Endocrine eval noted
uncontrolled in setting of medication non-adherence  A1c increased from 11 to 14 over past 3 months  start basal Lantus 10U, moderate ISS  Endocrine eval noted
Endocrine eval noted
Endocrine eval noted
uncontrolled in setting of medication non-adherence  A1c increased from 11 to 14 over past 3 months  start basal Lantus 10U, moderate ISS  Endocrineeval noted
Endocrine eval noted, needs to be on Insulin long term.  RISS

## 2021-07-08 NOTE — PROGRESS NOTE ADULT - ATTENDING COMMENTS
61M with diabetes presents with left ear pain and drainage.  Exam shows edema/drainage left EAC with posteroinferior granulation.  I personally reviewed and interpreted CT temporal bone images/report, which shows opacification left me/mastoid with erosion of bony ear canal posteriorly.  I personally reviewed lab results which show WBC of 8 today.      Impression is left otorrhea/otalgia/skull base osteomyelitis/malignant otitis externa. Recommend continuation of IV abx for at least 6 weeks with coverage for MRSA, would also start sulfa drops to left ear.  Patient will need outpatient followup/monitoring with otology after discharge from hospital.    I spent 30 minutes in the management of this patient.
Patient seen and examined.  Agree with above.   TTE with normal LV function   Check NST    Truman Ramos MD
61M with diabetes presents with left ear pain and drainage.  Exam shows edema/drainage left EAC with posteroinferior granulation.  I personally reviewed and interpreted CT temporal bone images/report, which shows opacification left me/mastoid with erosion of bony ear canal posteriorly.  I personally reviewed lab results which show WBC of 8 today.      Impression is left otorrhea/otalgia/skull base osteomyelitis/malignant otitis externa. Recommend continuation of IV abx for at least 6 weeks with coverage for MRSA, would also start sulfa drops to left ear.  Patient will need outpatient followup/monitoring with otology after discharge from hospital.    I spent 30 minutes in the management of this patient.
ELVIN: Improving-monitor. Change lasix to PO
Patient seen and examined.  Agree with above.   Awaiting NST  Further workup pending above    Truman Ramos MD
Patient seen and examined.  Agree with above.   Check NST  Further workup pending above    Truman Ramos MD
ELVIN: ETiology?, work up as suggested. Lasix one dose for pul edema, consider cardio evaluation

## 2021-07-08 NOTE — PROGRESS NOTE ADULT - SUBJECTIVE AND OBJECTIVE BOX
Patient is a 61y old  Male who presents with a chief complaint of otitis externa, uncontrolled DM (2021 15:00)    DATE OF SERVICE: 21 @ 16:22    HPI:  Afebrile  Stress test done, negative    PAST MEDICAL & SURGICAL HISTORY:  DM (diabetes mellitus)  On Insulin    HTN (hypertension)      No significant past surgical history        Review of Systems:   CONSTITUTIONAL: No fever, weight loss, or fatigue  EYES: No eye pain, visual disturbances, or discharge  ENMT:  No difficulty hearing, tinnitus, vertigo; No sinus or throat pain.   NECK: No pain or stiffness  BREASTS: No pain, masses, or nipple discharge  RESPIRATORY: No cough, wheezing, chills or hemoptysis; +shortness of breath  CARDIOVASCULAR: No chest pain, palpitations, dizziness, or leg swelling  GASTROINTESTINAL: No abdominal or epigastric pain. No nausea, vomiting, or hematemesis; No diarrhea or constipation. No melena or hematochezia.  GENITOURINARY: No dysuria, frequency, hematuria, or incontinence  NEUROLOGICAL: No headaches, memory loss, loss of strength, numbness, or tremors  SKIN: No itching, burning, rashes, or lesions   LYMPH NODES: No enlarged glands  ENDOCRINE: No heat or cold intolerance; No hair loss  MUSCULOSKELETAL: No joint pain or swelling; No muscle, back, or extremity pain  PSYCHIATRIC: No depression, anxiety, mood swings, or difficulty sleeping  HEME/LYMPH: No easy bruising, or bleeding gums  ALLERY AND IMMUNOLOGIC: No hives or eczema    Allergies    No Known Allergies    Intolerances        Social History:     FAMILY HISTORY:  No pertinent family history in first degree relatives        MEDICATIONS  (STANDING):  amLODIPine   Tablet 10 milliGRAM(s) Oral daily  atorvastatin 20 milliGRAM(s) Oral at bedtime  ciprofloxacin   IVPB 400 milliGRAM(s) IV Intermittent every 12 hours  dextrose 40% Gel 15 Gram(s) Oral once  dextrose 50% Injectable 25 Gram(s) IV Push once  dextrose 50% Injectable 12.5 Gram(s) IV Push once  dextrose 50% Injectable 25 Gram(s) IV Push once  glucagon  Injectable 1 milliGRAM(s) IntraMuscular once  insulin glargine Injectable (LANTUS) 20 Unit(s) SubCutaneous at bedtime  insulin lispro (ADMELOG) corrective regimen sliding scale   SubCutaneous three times a day before meals  insulin lispro (ADMELOG) corrective regimen sliding scale   SubCutaneous at bedtime  insulin lispro Injectable (ADMELOG) 7 Unit(s) SubCutaneous three times a day before meals  ofloxacin 0.3% Solution 10 Drop(s) Left Ear two times a day  sodium chloride 0.9%. 1000 milliLiter(s) (100 mL/Hr) IV Continuous <Continuous>  tamsulosin 0.4 milliGRAM(s) Oral at bedtime  vancomycin  IVPB 1000 milliGRAM(s) IV Intermittent every 12 hours    MEDICATIONS  (PRN):  acetaminophen   Tablet .. 650 milliGRAM(s) Oral every 4 hours PRN Mild Pain (1 - 3)  oxyCODONE    IR 5 milliGRAM(s) Oral every 4 hours PRN Moderate Pain (4 - 6)  oxyCODONE    IR 10 milliGRAM(s) Oral every 4 hours PRN Severe Pain (7 - 10)        CAPILLARY BLOOD GLUCOSE      POCT Blood Glucose.: 272 mg/dL (2021 22:33)  POCT Blood Glucose.: 284 mg/dL (2021 17:48)  POCT Blood Glucose.: 274 mg/dL (2021 12:09)  POCT Blood Glucose.: 322 mg/dL (2021 08:11)    I&O's Summary    2021 07:01  -  2021 23:11  --------------------------------------------------------  IN: 0 mL / OUT: 525 mL / NET: -525 mL        PHYSICAL EXAM:  Vital Signs Last 24 Hrs  T(C): 37 (2021 22:11), Max: 37.1 (2021 05:31)  T(F): 98.6 (2021 22:11), Max: 98.7 (2021 05:31)  HR: 91 (2021 22:11) (86 - 91)  BP: 134/75 (2021 22:11) (134/75 - 157/72)  BP(mean): --  RR: 16 (2021 22:11) (16 - 16)  SpO2: 96% (2021 22:11) (96% - 100%)    GENERAL: NAD, well-developed  HEAD:  Atraumatic, Normocephalic  EYES: EOMI, PERRLA, conjunctiva and sclera clear  Ears: Left ear tender, cotton plug noted  NECK: Supple, No JVD  CHEST/LUNG: Clear to auscultation bilaterally; No wheeze  HEART: Regular rate and rhythm; No murmurs, rubs, or gallops  ABDOMEN: Soft, Nontender, distended; Bowel sounds present  EXTREMITIES:  2+ Peripheral Pulses, No clubbing, cyanosis, or edema  PSYCH: AAOx3  NEUROLOGY: non-focal  SKIN: No rashes or lesions    LABS:                        11.8   7.89  )-----------( 208      ( 2021 06:35 )             35.8     06-21    136  |  102  |  20  ----------------------------<  329<H>  4.0   |  23  |  1.60<H>    Ca    8.4      2021 06:35  Phos  3.7     06-21  Mg     1.9     06-21            Urinalysis Basic - ( 2021 23:18 )    Color: Yellow / Appearance: Clear / S.015 / pH: x  Gluc: x / Ketone: Negative  / Bili: Negative / Urobili: Negative mg/dL   Blood: x / Protein: 500 mg/dL / Nitrite: Negative   Leuk Esterase: Negative / RBC: 3-5 /HPF / WBC 0-2   Sq Epi: x / Non Sq Epi: Occasional / Bacteria: Occasional        RADIOLOGY & ADDITIONAL TESTS:    Imaging Personally Reviewed:    Consultant(s) Notes Reviewed:      Care Discussed with Consultants/Other Providers:

## 2021-07-08 NOTE — PROGRESS NOTE ADULT - SUBJECTIVE AND OBJECTIVE BOX
S: SOB resolved, denies chest pain. Review of systems otherwise (-)    Review of Systems:   Constitutional: [ ] fevers, [ ] chills.   Skin: [ ] dry skin. [ ] rashes.  Psychiatric: [ ] depression, [ ] anxiety.   Gastrointestinal: [ ] BRBPR, [ ] melena.   Neurological: [ ] confusion. [ ] seizures. [ ] shuffling gait.   Ears,Nose,Mouth and Throat: [ ] ear pain [ ] sore throat.   Eyes: [ ] diplopia.   Respiratory: [ ] hemoptysis. [ ] shortness of breath  Cardiovascular: See HPI above  Hematologic/Lymphatic: [ ] anemia. [ ] painful nodes. [ ] prolonged bleeding.   Genitourinary: [ ] hematuria. [ ] flank pain.   Endocrine: [ ] significant change in weight. [ ] intolerance to heat and cold.     Review of systems [x ] otherwise negative, [ ] otherwise unable to obtain    FH: no family history of sudden cardiac death in first degree relatives    SH: [ ] tobacco, [ ] alcohol, [ ] drugs    acetaminophen   Tablet .. 650 milliGRAM(s) Oral every 4 hours PRN  albuterol/ipratropium for Nebulization 3 milliLiter(s) Nebulizer every 12 hours  amLODIPine   Tablet 10 milliGRAM(s) Oral daily  atorvastatin 20 milliGRAM(s) Oral at bedtime  chlorhexidine 4% Liquid 1 Application(s) Topical daily  dextrose 40% Gel 15 Gram(s) Oral once  dextrose 50% Injectable 25 Gram(s) IV Push once  dextrose 50% Injectable 12.5 Gram(s) IV Push once  dextrose 50% Injectable 25 Gram(s) IV Push once  ergocalciferol 85181 Unit(s) Oral <User Schedule>  glucagon  Injectable 1 milliGRAM(s) IntraMuscular once  heparin   Injectable 5000 Unit(s) SubCutaneous every 12 hours  insulin glargine Injectable (LANTUS) 21 Unit(s) SubCutaneous at bedtime  insulin lispro (ADMELOG) corrective regimen sliding scale   SubCutaneous three times a day before meals  insulin lispro (ADMELOG) corrective regimen sliding scale   SubCutaneous at bedtime  insulin lispro Injectable (ADMELOG) 6 Unit(s) SubCutaneous three times a day before meals  metoprolol succinate  milliGRAM(s) Oral daily  mupirocin 2% Ointment 1 Application(s) Topical two times a day  ofloxacin 0.3% Solution 10 Drop(s) Left Ear two times a day  oxyCODONE    IR 5 milliGRAM(s) Oral every 4 hours PRN  oxyCODONE    IR 10 milliGRAM(s) Oral every 4 hours PRN  sodium chloride 0.9% lock flush 10 milliLiter(s) IV Push every 1 hour PRN  tamsulosin 0.4 milliGRAM(s) Oral at bedtime  vancomycin  IVPB 1000 milliGRAM(s) IV Intermittent daily                            9.7    6.35  )-----------( 191      ( 08 Jul 2021 07:02 )             29.2     143  |  111<H>  |  20  ----------------------------<  119<H>  4.0   |  22  |  1.44<H>    Ca    8.4      08 Jul 2021 07:02  Phos  3.5     07-08  Mg     1.70     07-08      T(C): 36.9 (07-08-21 @ 06:01), Max: 37.1 (07-07-21 @ 22:01)  HR: 79 (07-08-21 @ 10:17) (77 - 85)  BP: 148/85 (07-08-21 @ 06:01) (143/84 - 148/85)  RR: 20 (07-08-21 @ 06:01) (18 - 20)  SpO2: 96% (07-08-21 @ 10:17) (96% - 100%)    General: Well nourished in no acute distress. Alert and Oriented * 3.   Head: Normocephalic and atraumatic.   Neck: No JVD. No bruits. Supple. Does not appear to be enlarged.   Cardiovascular: + S1,S2 ; RRR Soft systolic murmur at the left lower sternal border. No rubs noted.    Lungs: CTA b/l. No rhonchi, rales or wheezes.   Abdomen: + BS, soft. Non tender. Non distended. No rebound. No guarding.   Extremities: No clubbing/cyanosis/edema.   Neurologic: Moves all four extremities. Full range of motion.   Skin: Warm and moist. The patient's skin has normal elasticity and good skin turgor.   Psychiatric: Appropriate mood and affect.  Musculoskeletal: Normal range of motion, normal strength        TELEMETRY: SR 70-80s    < from: Transthoracic Echocardiogram (07.04.21 @ 09:23) >  CONCLUSIONS:  1. Normal mitral valve. Mild mitral regurgitation.  2. Mildly dilated left atrium.  3. Normal left ventricular internal dimensions and wall  thicknesses.  4. Normal left ventricular systolic function. No segmental  wall motion abnormalities.  5. The right ventricle is not well visualized; grossly  normal right ventricular systolic function.  6. Small pericardial effusion.  7. Left pleural effusion.  *** No previous Echo exam.    < end of copied text >      ASSESSMENT/PLAN: 61M with PMH DM2, HTN, HLD, diabetic neuropathy, glaucoma/cataract with poor vision, HCV s/p [place] (cleared per patient) who is being seen for dyspnea.    - pt. with pulmonary edema on CXR/CT and LE edema  - volume status improved - transitioned to PO lasix  - cont BB/ Norvasc , BP stable   - TTE noted with normal LV/RV function, small pericardial effusion  - ABx per ID  --check NST

## 2021-07-08 NOTE — PROGRESS NOTE ADULT - PROBLEM SELECTOR PLAN 5
Renal eval called. Could be from Abx. Check Urine eosinophils.
Renal FU noted  Prob fluid overload. Creat is almost same. 1.60-1.57  CKD stage 4
Renal eval called. Could be from Abx. Check Urine eosinophils.
Renal FU noted  Prob fluid overload. Creat is almost same. 1.60-1.57  CKD stage 4
Renal eval noted  Prob fluid overload. Creat is almost same. 1.60-1.57  CKD stage 4
Renal eval called. Could be from Abx. Check Urine eosinophils.
Renal eval noted  Prob fluid overload. Creat is almost same. 1.60-1.57  CKD stage 4      Abdominal distension: R/o Ascites. Abdominal sono ordered
Renal eval called. Could be from Abx. Check Urine eosinophils.
Renal FU noted  Prob fluid overload. Creat is almost same. 1.60-1.57  CKD stage 4
Renal eval noted  Prob fluid overload. Creat is almost same. 1.60-1.57  CKD stage 4      Abdominal distension: No ascites on sono
Renal eval noted  Prob fluid overload. Creat is almost same. 1.60-1.57  CKD stage 4      Abdominal distension: No ascites on sono
Renal eval called. Could be from Abx. Check Urine eosinophils.
Renal eval called. Could be from Abx. Check Urine eosinophils.
Renal eval noted  Prob fluid overload. Creat is almost same. 1.60-1.57  CKD stage 4  CT Chest ordered, Pulm and cardiology eval called  Check TTE
Renal eval called. Could be from Abx. Check Urine eosinophils.

## 2021-07-08 NOTE — PROGRESS NOTE ADULT - SUBJECTIVE AND OBJECTIVE BOX
Chief Complaint: DM 2    History: Patient seen at bedside. Reports he is eating meals, tolerating well. Noted with tightly controlled glucose this AM, 87 mg/dl, no overt hypoglycemia    MEDICATIONS  (STANDING):  albuterol/ipratropium for Nebulization 3 milliLiter(s) Nebulizer every 12 hours  amLODIPine   Tablet 10 milliGRAM(s) Oral daily  atorvastatin 20 milliGRAM(s) Oral at bedtime  chlorhexidine 4% Liquid 1 Application(s) Topical daily  dextrose 40% Gel 15 Gram(s) Oral once  dextrose 50% Injectable 25 Gram(s) IV Push once  dextrose 50% Injectable 12.5 Gram(s) IV Push once  dextrose 50% Injectable 25 Gram(s) IV Push once  ergocalciferol 09009 Unit(s) Oral <User Schedule>  glucagon  Injectable 1 milliGRAM(s) IntraMuscular once  heparin   Injectable 5000 Unit(s) SubCutaneous every 12 hours  insulin glargine Injectable (LANTUS) 21 Unit(s) SubCutaneous at bedtime  insulin lispro (ADMELOG) corrective regimen sliding scale   SubCutaneous three times a day before meals  insulin lispro (ADMELOG) corrective regimen sliding scale   SubCutaneous at bedtime  insulin lispro Injectable (ADMELOG) 6 Unit(s) SubCutaneous three times a day before meals  metoprolol succinate  milliGRAM(s) Oral daily  mupirocin 2% Ointment 1 Application(s) Topical two times a day  ofloxacin 0.3% Solution 10 Drop(s) Left Ear two times a day  tamsulosin 0.4 milliGRAM(s) Oral at bedtime  vancomycin  IVPB 1000 milliGRAM(s) IV Intermittent daily    MEDICATIONS  (PRN):  acetaminophen   Tablet .. 650 milliGRAM(s) Oral every 4 hours PRN Mild Pain (1 - 3)  oxyCODONE    IR 5 milliGRAM(s) Oral every 4 hours PRN Moderate Pain (4 - 6)  oxyCODONE    IR 10 milliGRAM(s) Oral every 4 hours PRN Severe Pain (7 - 10)  sodium chloride 0.9% lock flush 10 milliLiter(s) IV Push every 1 hour PRN Pre/post blood products, medications, blood draw, and to maintain line patency    No Known Allergies    Review of Systems:  HEENT: No pain  Cardiovascular: No chest pain  Respiratory: No SOB  GI: No nausea, vomiting    PHYSICAL EXAM:  VITALS: T(C): 36.7 (07-08-21 @ 12:39)  T(F): 98.1 (07-08-21 @ 12:39), Max: 98.8 (07-07-21 @ 22:01)  HR: 78 (07-08-21 @ 12:39) (77 - 83)  BP: 123/69 (07-08-21 @ 12:39) (123/69 - 148/85)  RR:  (18 - 20)  SpO2:  (96% - 100%)  Wt(kg): --  GENERAL: NAD  EYES: No proptosis, no lid lag, anicteric  HEENT:  Atraumatic, Normocephalic, moist mucous membranes  RESPIRATORY: unlabored respirations   PSYCH: Alert and oriented x 3, normal affect, normal mood    CAPILLARY BLOOD GLUCOSE    POCT Blood Glucose.: 109 mg/dL (08 Jul 2021 12:10)  POCT Blood Glucose.: 87 mg/dL (08 Jul 2021 08:24)  POCT Blood Glucose.: 175 mg/dL (07 Jul 2021 21:57)  POCT Blood Glucose.: 206 mg/dL (07 Jul 2021 17:34)      07-08    143  |  111<H>  |  20  ----------------------------<  119<H>  4.0   |  22  |  1.44<H>    EGFR if : 61  EGFR if non : 53<L>    Ca    8.4      07-08  Mg     1.70     07-08  Phos  3.5     07-08    TPro  6.9  /  Alb  3.5  /  TBili  0.2  /  DBili  x   /  AST  17  /  ALT  24  /  AlkPhos  60  07-06       A1C with Estimated Average Glucose Result: 14.3 % (06-20-21 @ 03:47)  A1C with Estimated Average Glucose Result: 11.8 % (03-22-21 @ 14:20)    Diet, Regular:   Consistent Carbohydrate Evening Snack (CSTCHOSN) (06-20-21 @ 09:02)

## 2021-07-08 NOTE — PROGRESS NOTE ADULT - PROBLEM SELECTOR PLAN 3
MRSA related folliculitis   plan as outlined by Dermatology
multiple skin lesions with small area of induration and erythema. no fluctuant or discharges from these lesions.   likely secondary to uncontrolled DM. would be empirically covered with current abx regimen.   monitor lesions.  HS suspected, derm eval in AM
MRSA related folliculitis   plan as outlined by Dermatology
MRSA related folliculitis   plan as outlined by Dermatology
multiple skin lesions with small area of induration and erythema. no fluctuant or discharges from these lesions.   likely secondary to uncontrolled DM. would be empirically covered with current abx regimen.   monitor lesions.  HS suspected, derm eval in AM
MRSA related folliculitis   plan as outlined by Dermatology
MRSA related folliculitis   plan as outlined by Dermatology
multiple skin lesions with small area of induration and erythema. no fluctuant or discharges from these lesions.   likely secondary to uncontrolled DM. would be empirically covered with current abx regimen.   monitor lesions.  HS suspected, derm eval in AM
MRSA related folliculitis   plan as outlined by Dermatology

## 2021-07-08 NOTE — PROGRESS NOTE ADULT - ASSESSMENT
61M with PMH uncontrolled DM, with neuropathy, cataract, glaucoma presented with malignant otitis externa and folliculitis.     Malignant otitis media:  SOB:  Folliculitis  Uncontrolled DM:  HTN      7/2:    Malignant otitis media: cont antibiotics: he has no ear pain now:    SOB: he is not that SOB at this time: his lungs are clear: chest xray suggests mild fluid overload: ct scan chest is pending: do doppler bilateral LE and 2 decho :  Folliculitis: On antibiotics  Uncontrolled DM: defer to p [facility] team   HTN: mildly increased:   start dvt prophylaxis  DW ACP and PMD     7/3:    Malignant otitis media: cont antibiotics: he has no ear pain now:    SOB: he is not that SOB at this time: his lungs are clear: ct scan chest is suggestive of pulm edema: with bl effusions and PVC's: to cont lasix   Folliculitis: On antibiotics  Uncontrolled DM: defer to p [facility] team   HTN: mildly increased:   start dvt prophylaxis  DW ACP     7/4:    Malignant otitis media: cont antibiotics: he has no ear pain now:    SOB: he is not that SOB at this time: his lungs are clear: ct scan chest is suggestive of pulm edema: with bl effusions and PVC's: to cont lasix - he feels much better: echo noted:  Folliculitis: On antibiotics  Uncontrolled DM: defer to primary team   HTN: mildly increased:   start dvt prophylaxis  DW ACP     7/5:    Malignant otitis media: cont antibiotics: he has no ear pain now:    SOB: he is not that SOB at this time: his lungs are clear: ct scan chest is suggestive of pulm edema: with bl effusions and PVC's: to cont lasix - he feels much better: echo noted:  now for dc to placement   Folliculitis: On antibiotics  Uncontrolled DM: defer to primary team   HTN: mildly increased:   start dvt prophylaxis  DW ACP     7/6:    Malignant otitis media: cont antibiotics: he has no ear pain now:  ent following : cont antibiotics per ID duration:   SOB: he is not that SOB at this time: his lungs are clear: ct scan chest is suggestive of pulm edema: with bl effusions and PVC's: to cont lasix - he feels much better: echo noted:  now for dc to placement : switched to po lasix   Folliculitis: On antibiotics: per ID  Uncontrolled DM: defer to primary team   HTN: mildly increased:   start dvt prophylaxis  DW ACP : he is on rom air at this time      7/7:    Malignant otitis media: cont antibiotics: he has no ear pain now:  ent following : cont antibiotics per ID duration? on vanco till  SOB: he is not that SOB at this time: his lungs are clear: ct scan chest is suggestive of pulm edema: with bl effusions and PVC's: to cont lasix - he feels much better: echo noted:  now for dc to placement : switched to po lasix -- uch better now; dcplanning  Folliculitis: On antibiotics: per ID: mrsa:per derm  Uncontrolled DM: defer to primary team : relatively better controlled:   HTN: mildly increased: controlled  start dvt prophylaxis  DW ACP : he is on rom air at this time    7/8:    Malignant otitis media: cont antibiotics: he has no ear pain now:  ent following : cont antibiotics per ID duration :planning for long term antibtioc  SOB: he is not that SOB at this time: his lungs are clear: ct scan chest is suggestive of pulm edema: with bl effusions and PVC's: to cont lasix - he feels much better: echo noted:  now for dc to placement : switched to po lasix -- much better now  Folliculitis: On antibiotics: per ID: mrsa: per derm and ID   Uncontrolled DM: defer to primary team : relatively better controlled:   HTN: controlled  start dvt prophylaxis  dw team : he is on rom air at this time

## 2021-07-08 NOTE — PROGRESS NOTE ADULT - ASSESSMENT
61M with PMH DM2, HTN, HLD, diabetic neuropathy, glaucoma/cataract with poor vision, HCV s/p [place] (cleared per patient) presented with left ear pain. seen by ENT likely malignant otitis externa and folliculitis on antibiotic. nephrology consulted for elvin    ELVIN  baseline ~ 1  ELVIN likely sec to GEMINI  s/p CT with contrast 6/20  FEna>1% suggested ATN  UA with proteinuria and hematuria  renal us without hydro  Renal function slightly worsen yesterday  possible sec to lasix. fluid status much better. echo normal. now off lasix  renal function better  ACEI still on hold   avoid nephrotoxic agents  optimize dm control. f/u endo  on vanco  monitor vanco level  monitor bmp and urine output    proteinuria/hematuria  renal us noted  p/c ratio <1g  likely sec to dm/htn  work up can be done outpatient  monitor    Acidosis  non AG  improved with oral bicarb  monitor serum Co2    htn  improving   ACEI on hold  BP meds per cardio  monitor    hypocalcemia  likely vit d def  On weekly vit d 50000x12 dose  monitor    OM  malignant otitis externa on CT  f/u ENT/ID  antibiotic per team    Hypokalemia  likely sec to diuresing  supplement  monitor    epigastric pain  ? etiology  resolved   monitor  get ct and gi eval if symptoms worsen

## 2021-07-09 ENCOUNTER — TRANSCRIPTION ENCOUNTER (OUTPATIENT)
Age: 61
End: 2021-07-09

## 2021-07-09 VITALS
HEART RATE: 82 BPM | TEMPERATURE: 98 F | RESPIRATION RATE: 16 BRPM | SYSTOLIC BLOOD PRESSURE: 141 MMHG | DIASTOLIC BLOOD PRESSURE: 82 MMHG | OXYGEN SATURATION: 100 %

## 2021-07-09 LAB
ANION GAP SERPL CALC-SCNC: 13 MMOL/L — SIGNIFICANT CHANGE UP (ref 7–14)
BASOPHILS # BLD AUTO: 0.03 K/UL — SIGNIFICANT CHANGE UP (ref 0–0.2)
BASOPHILS NFR BLD AUTO: 0.5 % — SIGNIFICANT CHANGE UP (ref 0–2)
BUN SERPL-MCNC: 20 MG/DL — SIGNIFICANT CHANGE UP (ref 7–23)
CALCIUM SERPL-MCNC: 8.8 MG/DL — SIGNIFICANT CHANGE UP (ref 8.4–10.5)
CHLORIDE SERPL-SCNC: 108 MMOL/L — HIGH (ref 98–107)
CO2 SERPL-SCNC: 20 MMOL/L — LOW (ref 22–31)
CREAT SERPL-MCNC: 1.5 MG/DL — HIGH (ref 0.5–1.3)
EOSINOPHIL # BLD AUTO: 0.24 K/UL — SIGNIFICANT CHANGE UP (ref 0–0.5)
EOSINOPHIL NFR BLD AUTO: 3.7 % — SIGNIFICANT CHANGE UP (ref 0–6)
GLUCOSE BLDC GLUCOMTR-MCNC: 129 MG/DL — HIGH (ref 70–99)
GLUCOSE BLDC GLUCOMTR-MCNC: 131 MG/DL — HIGH (ref 70–99)
GLUCOSE BLDC GLUCOMTR-MCNC: 182 MG/DL — HIGH (ref 70–99)
GLUCOSE SERPL-MCNC: 170 MG/DL — HIGH (ref 70–99)
HCT VFR BLD CALC: 32.5 % — LOW (ref 39–50)
HGB BLD-MCNC: 10.8 G/DL — LOW (ref 13–17)
IANC: 3.21 K/UL — SIGNIFICANT CHANGE UP (ref 1.5–8.5)
IMM GRANULOCYTES NFR BLD AUTO: 0.2 % — SIGNIFICANT CHANGE UP (ref 0–1.5)
LYMPHOCYTES # BLD AUTO: 2.23 K/UL — SIGNIFICANT CHANGE UP (ref 1–3.3)
LYMPHOCYTES # BLD AUTO: 34.7 % — SIGNIFICANT CHANGE UP (ref 13–44)
MAGNESIUM SERPL-MCNC: 1.9 MG/DL — SIGNIFICANT CHANGE UP (ref 1.6–2.6)
MCHC RBC-ENTMCNC: 31.1 PG — SIGNIFICANT CHANGE UP (ref 27–34)
MCHC RBC-ENTMCNC: 33.2 GM/DL — SIGNIFICANT CHANGE UP (ref 32–36)
MCV RBC AUTO: 93.7 FL — SIGNIFICANT CHANGE UP (ref 80–100)
MONOCYTES # BLD AUTO: 0.71 K/UL — SIGNIFICANT CHANGE UP (ref 0–0.9)
MONOCYTES NFR BLD AUTO: 11 % — SIGNIFICANT CHANGE UP (ref 2–14)
NEUTROPHILS # BLD AUTO: 3.21 K/UL — SIGNIFICANT CHANGE UP (ref 1.8–7.4)
NEUTROPHILS NFR BLD AUTO: 49.9 % — SIGNIFICANT CHANGE UP (ref 43–77)
NRBC # BLD: 0 /100 WBCS — SIGNIFICANT CHANGE UP
NRBC # FLD: 0 K/UL — SIGNIFICANT CHANGE UP
PHOSPHATE SERPL-MCNC: 4 MG/DL — SIGNIFICANT CHANGE UP (ref 2.5–4.5)
PLATELET # BLD AUTO: 189 K/UL — SIGNIFICANT CHANGE UP (ref 150–400)
POTASSIUM SERPL-MCNC: 3.9 MMOL/L — SIGNIFICANT CHANGE UP (ref 3.5–5.3)
POTASSIUM SERPL-SCNC: 3.9 MMOL/L — SIGNIFICANT CHANGE UP (ref 3.5–5.3)
RBC # BLD: 3.47 M/UL — LOW (ref 4.2–5.8)
RBC # FLD: 12.3 % — SIGNIFICANT CHANGE UP (ref 10.3–14.5)
SODIUM SERPL-SCNC: 141 MMOL/L — SIGNIFICANT CHANGE UP (ref 135–145)
WBC # BLD: 6.43 K/UL — SIGNIFICANT CHANGE UP (ref 3.8–10.5)
WBC # FLD AUTO: 6.43 K/UL — SIGNIFICANT CHANGE UP (ref 3.8–10.5)

## 2021-07-09 PROCEDURE — 99232 SBSQ HOSP IP/OBS MODERATE 35: CPT

## 2021-07-09 RX ORDER — REPAGLINIDE 1 MG/1
1 TABLET ORAL
Qty: 90 | Refills: 0
Start: 2021-07-09 | End: 2021-08-07

## 2021-07-09 RX ORDER — ENOXAPARIN SODIUM 100 MG/ML
17 INJECTION SUBCUTANEOUS
Qty: 6 | Refills: 0
Start: 2021-07-09 | End: 2021-08-07

## 2021-07-09 RX ORDER — ATORVASTATIN CALCIUM 80 MG/1
1 TABLET, FILM COATED ORAL
Qty: 0 | Refills: 0 | DISCHARGE
Start: 2021-07-09

## 2021-07-09 RX ORDER — ERGOCALCIFEROL 1.25 MG/1
1 CAPSULE ORAL
Qty: 4 | Refills: 0
Start: 2021-07-09 | End: 2021-08-07

## 2021-07-09 RX ORDER — OFLOXACIN 0.3 %
10 DROPS OPHTHALMIC (EYE)
Qty: 0 | Refills: 0 | DISCHARGE
Start: 2021-07-09

## 2021-07-09 RX ORDER — LISINOPRIL 2.5 MG/1
1 TABLET ORAL
Qty: 0 | Refills: 0 | DISCHARGE

## 2021-07-09 RX ORDER — ATORVASTATIN CALCIUM 80 MG/1
0 TABLET, FILM COATED ORAL
Qty: 0 | Refills: 0 | DISCHARGE

## 2021-07-09 RX ORDER — MUPIROCIN 20 MG/G
1 OINTMENT TOPICAL
Qty: 0 | Refills: 0 | DISCHARGE
Start: 2021-07-09

## 2021-07-09 RX ORDER — OXYCODONE HYDROCHLORIDE 5 MG/1
1 TABLET ORAL
Qty: 0 | Refills: 0 | DISCHARGE
Start: 2021-07-09

## 2021-07-09 RX ORDER — VANCOMYCIN HCL 1 G
1 VIAL (EA) INTRAVENOUS
Qty: 1 | Refills: 0
Start: 2021-07-09 | End: 2021-08-07

## 2021-07-09 RX ORDER — METOPROLOL TARTRATE 50 MG
1 TABLET ORAL
Qty: 0 | Refills: 0 | DISCHARGE
Start: 2021-07-09

## 2021-07-09 RX ADMIN — MUPIROCIN 1 APPLICATION(S): 20 OINTMENT TOPICAL at 06:06

## 2021-07-09 RX ADMIN — HEPARIN SODIUM 5000 UNIT(S): 5000 INJECTION INTRAVENOUS; SUBCUTANEOUS at 05:40

## 2021-07-09 RX ADMIN — Medication 3 MILLILITER(S): at 10:32

## 2021-07-09 RX ADMIN — Medication 10 DROP(S): at 05:41

## 2021-07-09 RX ADMIN — Medication 6 UNIT(S): at 12:50

## 2021-07-09 RX ADMIN — Medication 6 UNIT(S): at 08:48

## 2021-07-09 RX ADMIN — Medication 250 MILLIGRAM(S): at 12:51

## 2021-07-09 RX ADMIN — AMLODIPINE BESYLATE 10 MILLIGRAM(S): 2.5 TABLET ORAL at 05:41

## 2021-07-09 RX ADMIN — Medication 1: at 12:51

## 2021-07-09 RX ADMIN — Medication 100 MILLIGRAM(S): at 05:41

## 2021-07-09 NOTE — PROGRESS NOTE ADULT - SUBJECTIVE AND OBJECTIVE BOX
[facility]   MD ANITA QUINTANILLA DO ANAM SIDDIQUI ANGELA WONG, PA    TEL:  OFFICE: [phone]  DR MONROE CELL: [phone]  DR. PORTILLO CELL: [phone]  DR. MYLES CELL: [phone]  PEGGY JAFFE CELL: [phone]    From 5pm-7am Answering Service [phone]    -- RENAL FOLLOW UP NOTE ---Date of Service 07-09-21 @ 14:32    Patient is a 61y old  Male who presents with a chief complaint of otitis externa, uncontrolled DM (09 Jul 2021 13:52)      Patient seen and examined at bedside. No chest pain/sob    VITALS:  T(F): 98.1 (07-09-21 @ 11:38), Max: 98.1 (07-09-21 @ 11:38)  HR: 82 (07-09-21 @ 11:38)  BP: 141/82 (07-09-21 @ 11:38)  RR: 16 (07-09-21 @ 11:38)  SpO2: 100% (07-09-21 @ 11:38)  Wt(kg): --    07-08 @ 07:01  -  07-09 @ 07:00  --------------------------------------------------------  IN: 838 mL / OUT: 0 mL / NET: 838 mL    07-09 @ 07:01  -  07-09 @ 14:32  --------------------------------------------------------  IN: 480 mL / OUT: 0 mL / NET: 480 mL          PHYSICAL EXAM:  Constitutional: NAD  Neck: No JVD  Respiratory: CTAB, no wheezes, rales or rhonchi  Cardiovascular: S1, S2, RRR  Gastrointestinal: BS+, soft, NT/ND  Extremities: No peripheral edema    Hospital Medications:   MEDICATIONS  (STANDING):  albuterol/ipratropium for Nebulization 3 milliLiter(s) Nebulizer every 12 hours  amLODIPine   Tablet 10 milliGRAM(s) Oral daily  atorvastatin 20 milliGRAM(s) Oral at bedtime  chlorhexidine 4% Liquid 1 Application(s) Topical daily  dextrose 40% Gel 15 Gram(s) Oral once  dextrose 50% Injectable 25 Gram(s) IV Push once  dextrose 50% Injectable 12.5 Gram(s) IV Push once  dextrose 50% Injectable 25 Gram(s) IV Push once  ergocalciferol 55992 Unit(s) Oral <User Schedule>  glucagon  Injectable 1 milliGRAM(s) IntraMuscular once  heparin   Injectable 5000 Unit(s) SubCutaneous every 12 hours  insulin glargine Injectable (LANTUS) 17 Unit(s) SubCutaneous at bedtime  insulin lispro (ADMELOG) corrective regimen sliding scale   SubCutaneous three times a day before meals  insulin lispro (ADMELOG) corrective regimen sliding scale   SubCutaneous at bedtime  insulin lispro Injectable (ADMELOG) 6 Unit(s) SubCutaneous three times a day before meals  metoprolol succinate  milliGRAM(s) Oral daily  mupirocin 2% Ointment 1 Application(s) Topical two times a day  ofloxacin 0.3% Solution 10 Drop(s) Left Ear two times a day  tamsulosin 0.4 milliGRAM(s) Oral at bedtime  vancomycin  IVPB 1000 milliGRAM(s) IV Intermittent daily      LABS:  07-09    141  |  108<H>  |  20  ----------------------------<  170<H>  3.9   |  20<L>  |  1.50<H>    Ca    8.8      09 Jul 2021 06:52  Phos  4.0     07-09  Mg     1.90     07-09      Creatinine Trend: 1.50 <--, 1.44 <--, 1.62 <--, 1.47 <--, 1.62 <--, 1.55 <--, 1.57 <--, 1.62 <--    Phosphorus Level, Serum: 4.0 mg/dL (07-09 @ 06:52)                              10.8   6.43  )-----------( 189      ( 09 Jul 2021 06:52 )             32.5     Urine Studies:  Urinalysis - [06-22-21 @ 16:47]      Color Light Yellow / Appearance Clear / SG 1.009 / pH 6.0      Gluc >= 1000 mg/dL / Ketone Negative  / Bili Negative / Urobili <2 mg/dL       Blood Trace / Protein Trace / Leuk Est Negative / Nitrite Negative      RBC 1 / WBC 1 / Hyaline 0 / Gran  / Sq Epi  / Non Sq Epi 0 / Bacteria Negative      PTH -- (Ca --)      [06-23-21 @ 08:23]   69  Vitamin D (25OH) 7.7      [06-23-21 @ 12:30]  Lipid: chol 101, , HDL 27, LDL --      [06-23-21 @ 08:23]        RADIOLOGY & ADDITIONAL STUDIES:

## 2021-07-09 NOTE — DISCHARGE NOTE NURSING/CASE MANAGEMENT/SOCIAL WORK - PATIENT PORTAL LINK FT
You can access the FollowMyHealth Patient Portal offered by [facility] by registering at the following website: [email or web address]. By joining [email or web address] FollowMyHealth portal, you will also be able to view your health information using other applications (apps) compatible with our system.

## 2021-07-09 NOTE — PROGRESS NOTE ADULT - ASSESSMENT
61M with PMH DM2, HTN, HLD, diabetic neuropathy, glaucoma/cataract with poor vision, HCV s/p [place] (cleared per patient) presented with left ear pain. Patient initially presented to [facility] and transferred to [facility] for ENT evaluation. Consulted for uncontrolled T2DM with a1c 14.3%    1. Uncontrolled type 2 diabetes mellitus with hyperglycemia  T2DM uncontrolled a1c of 14.3 with barriers of poor vision (unable to see insulin units), living in shelter prior to admission    Inpatient BG target 100-180 mg/dl  Continue Lantus 17 units SQ qHS   Continue Admelog 6 units SQ TID before meals (Hold if NPO/not eating meal)   Continue Admelog low dose correctional scales before meals and bedtime as currently ordered  Check BG before meals and bedtime  Consistent carbohydrate diet, Nutrition consult done    Discharge Plan:  With A1c 14.3%, patient requires insulin for discharge. For discharge to rehab, recommend basal/bolus insulin (current dosing)  Significant concern for patient's safety, injecting insulin on his own once he is dc from rehab to shelter. Would recommend that patient continue to practice insulin pen and glucometer use while in rehab (see pharmacy intervention note from today for details) to ensure he can safely perform independently PRIOR to discharge from rehab   FOR POST REHAB: Recommend basal/oral (to reduce burden of injections) - Lantus Solostar PEN 17 units SQ qHS and Prandin (Repaglinide) 2 mg PO TID before meals (Hold if not eating meal)   Ensure patient has supplies including glucometer, glucose test strips, lancets, alcohol swabs and insulin PEN needles   Recommend PCP, optho, podiatry and endocrine followup as outpatient  [facility] ([facility] [place])   [address] [facility] [phone].    2. Essential hypertension  BP target less than 130/80  Management per primary team    3. HLD  Recommend to check fasting lipid panel, goal LDL less than 70  Continue Atorvastatin 20 mg daily    4. Adrenal nodule  Adrenal nodule incidentally found  Aldosterone 3.0, no need to check renin activity  Plasma metanephrine (normal) and 24 hour urine cortisol (not elevated)  Low suspicion for hypersecretion of adrenal hormones      5. Vitamin D deficiency  Vitamin D 25 OH level noted to be 7.7  Recommend Ergocalciferol 50,000 units PO weekly x 8-12 weeks  Corrected calcium acceptable. Vitamin D deficiency likely contributing to low normal Ca    Emilie Lam  Nurse Practitioner  Division of Endocrinology & Diabetes  In house pager #[phone]/long range pager #[phone]    If before 9AM or after 6PM, or on weekends/holidays, please call endocrine answering service for assistance ([phone]).  For nonurgent matters email [email or web address] for assistance.  61M with PMH DM2, HTN, HLD, diabetic neuropathy, glaucoma/cataract with poor vision, HCV s/p [place] (cleared per patient) presented with left ear pain. Patient initially presented to [facility] and transferred to [facility] for ENT evaluation. Consulted for uncontrolled T2DM with a1c 14.3%    1. Uncontrolled type 2 diabetes mellitus with hyperglycemia  T2DM uncontrolled a1c of 14.3 with barriers of poor vision (unable to see insulin units), living in shelter prior to admission    Inpatient BG target 100-180 mg/dl  Continue Lantus 17 units SQ qHS   Continue Admelog 6 units SQ TID before meals (Hold if NPO/not eating meal)   Continue Admelog low dose correctional scales before meals and bedtime as currently ordered  Check BG before meals and bedtime  Consistent carbohydrate diet, Nutrition consult done    Discharge Plan:  With A1c 14.3%, patient requires insulin for discharge. For discharge to rehab, recommend basal/bolus insulin (current dosing)  Significant concern for patient's safety, injecting insulin on his own once he is dc from rehab to shelter. Would recommend that patient continue to practice insulin pen and glucometer use while in rehab (see pharmacy intervention note from today for details) to ensure he can safely perform independently PRIOR to discharge from rehab   FOR POST REHAB: Recommend basal/oral (to reduce burden of injections) - Lantus Solostar PEN 17 units SQ qHS and Prandin (Repaglinide) 2 mg PO TID before meals (Hold if not eating meal)   Ensure patient has supplies including glucometer, glucose test strips, lancets, alcohol swabs and insulin PEN needles   Recommend PCP, optho, podiatry and endocrine followup as outpatient  [facility] ([facility] [place])   [address] [facility] [phone].  ENDOCRINE APPT: 10/5/21 at 9:40 AM    2. Essential hypertension  BP target less than 130/80  Management per primary team    3. HLD  Recommend to check fasting lipid panel, goal LDL less than 70  Continue Atorvastatin 20 mg daily    4. Adrenal nodule  Adrenal nodule incidentally found  Aldosterone 3.0, no need to check renin activity  Plasma metanephrine (normal) and 24 hour urine cortisol (not elevated)  Low suspicion for hypersecretion of adrenal hormones      5. Vitamin D deficiency  Vitamin D 25 OH level noted to be 7.7  Recommend Ergocalciferol 50,000 units PO weekly x 8-12 weeks  Corrected calcium acceptable. Vitamin D deficiency likely contributing to low normal Ca    DC recs given to primary team ACP  Emilie Lam  Nurse Practitioner  Division of Endocrinology & Diabetes  In house pager #[phone]/long range pager #[phone]    If before 9AM or after 6PM, or on weekends/holidays, please call endocrine answering service for assistance ([phone]).  For nonurgent matters email [email or web address] for assistance.

## 2021-07-09 NOTE — PROGRESS NOTE ADULT - ASSESSMENT
61M with PMH DM2, HTN, HLD, diabetic neuropathy, glaucoma/cataract with poor vision, HCV s/p [place] (cleared per patient) presented with left ear pain. seen by ENT likely malignant otitis externa and folliculitis on antibiotic. nephrology consulted for elvin    ELVIN  baseline ~ 1  ELVIN likely sec to GEMINI  s/p CT with contrast 6/20  FEna>1% suggested ATN  UA with proteinuria and hematuria  renal us without hydro  Renal function slightly worsen likely sec to lasix. fluid status much better. echo normal. now off lasix  renal function relatively stable   ACEI still on hold   avoid nephrotoxic agents  optimize dm control. f/u endo  on vanco  monitor vanco level  monitor bmp and urine output    proteinuria/hematuria  renal us noted  p/c ratio <1g  likely sec to dm/htn  work up can be done outpatient  monitor    Acidosis  non AG  improved with oral bicarb  monitor serum Co2    htn  improving   ACEI on hold  BP meds per cardio  monitor    hypocalcemia  likely vit d def  On weekly vit d 50000x12 dose  monitor    OM  malignant otitis externa on CT  f/u ENT/ID  antibiotic per team    Hypokalemia  likely sec to diuresing  supplement  monitor    epigastric pain  ? etiology  resolved   monitor  get ct and gi eval if symptoms worsen     61M with PMH DM2, HTN, HLD, diabetic neuropathy, glaucoma/cataract with poor vision, HCV s/p [place] (cleared per patient) presented with left ear pain. seen by ENT likely malignant otitis externa and folliculitis on antibiotic. nephrology consulted for elvin    ELVIN  baseline ~ 1  ELVIN likely sec to GEMINI  s/p CT with contrast 6/20  FEna>1% suggested ATN  UA with proteinuria and hematuria  renal us without hydro  Renal function slightly worsen likely sec to lasix. fluid status much better.   echo normal. now off lasix  renal function relatively stable   ACEI still on hold   avoid nephrotoxic agents  optimize dm control. f/u endo  on vanco  monitor vanco level  monitor bmp and urine output    proteinuria/hematuria  renal us noted  p/c ratio <1g  likely sec to dm/htn  work up can be done outpatient  monitor    Acidosis  non AG  improved with oral bicarb  monitor serum Co2    htn  improving   ACEI on hold  BP meds per cardio  monitor    hypocalcemia  likely vit d def  On weekly vit d 50000x12 dose  monitor    OM  malignant otitis externa on CT  f/u ENT/ID  antibiotic per team    Hypokalemia  likely sec to diuresing  supplement  monitor    epigastric pain  ? etiology  resolved   monitor  get ct and gi eval if symptoms worsen

## 2021-07-09 NOTE — PROGRESS NOTE ADULT - SUBJECTIVE AND OBJECTIVE BOX
S: SOB resolved, denies chest pain. Review of systems otherwise (-)    Review of Systems:   Constitutional: [ ] fevers, [ ] chills.   Skin: [ ] dry skin. [ ] rashes.  Psychiatric: [ ] depression, [ ] anxiety.   Gastrointestinal: [ ] BRBPR, [ ] melena.   Neurological: [ ] confusion. [ ] seizures. [ ] shuffling gait.   Ears,Nose,Mouth and Throat: [ ] ear pain [ ] sore throat.   Eyes: [ ] diplopia.   Respiratory: [ ] hemoptysis. [ ] shortness of breath  Cardiovascular: See HPI above  Hematologic/Lymphatic: [ ] anemia. [ ] painful nodes. [ ] prolonged bleeding.   Genitourinary: [ ] hematuria. [ ] flank pain.   Endocrine: [ ] significant change in weight. [ ] intolerance to heat and cold.     Review of systems [x ] otherwise negative, [ ] otherwise unable to obtain    FH: no family history of sudden cardiac death in first degree relatives    SH: [ ] tobacco, [ ] alcohol, [ ] drugs    acetaminophen   Tablet .. 650 milliGRAM(s) Oral every 4 hours PRN  albuterol/ipratropium for Nebulization 3 milliLiter(s) Nebulizer every 12 hours  amLODIPine   Tablet 10 milliGRAM(s) Oral daily  atorvastatin 20 milliGRAM(s) Oral at bedtime  chlorhexidine 4% Liquid 1 Application(s) Topical daily  dextrose 40% Gel 15 Gram(s) Oral once  dextrose 50% Injectable 25 Gram(s) IV Push once  dextrose 50% Injectable 12.5 Gram(s) IV Push once  dextrose 50% Injectable 25 Gram(s) IV Push once  ergocalciferol 50307 Unit(s) Oral <User Schedule>  glucagon  Injectable 1 milliGRAM(s) IntraMuscular once  heparin   Injectable 5000 Unit(s) SubCutaneous every 12 hours  insulin glargine Injectable (LANTUS) 17 Unit(s) SubCutaneous at bedtime  insulin lispro (ADMELOG) corrective regimen sliding scale   SubCutaneous three times a day before meals  insulin lispro (ADMELOG) corrective regimen sliding scale   SubCutaneous at bedtime  insulin lispro Injectable (ADMELOG) 6 Unit(s) SubCutaneous three times a day before meals  metoprolol succinate  milliGRAM(s) Oral daily  mupirocin 2% Ointment 1 Application(s) Topical two times a day  ofloxacin 0.3% Solution 10 Drop(s) Left Ear two times a day  oxyCODONE    IR 5 milliGRAM(s) Oral every 4 hours PRN  oxyCODONE    IR 10 milliGRAM(s) Oral every 4 hours PRN  sodium chloride 0.9% lock flush 10 milliLiter(s) IV Push every 1 hour PRN  tamsulosin 0.4 milliGRAM(s) Oral at bedtime  vancomycin  IVPB 1000 milliGRAM(s) IV Intermittent daily                            10.8   6.43  )-----------( 189      ( 09 Jul 2021 06:52 )             32.5       07-09    141  |  108<H>  |  20  ----------------------------<  170<H>  3.9   |  20<L>  |  1.50<H>    Ca    8.8      09 Jul 2021 06:52  Phos  4.0     07-09  Mg     1.90     07-09              T(C): 36.7 (07-09-21 @ 11:38), Max: 36.7 (07-08-21 @ 12:39)  HR: 82 (07-09-21 @ 11:38) (78 - 92)  BP: 141/82 (07-09-21 @ 11:38) (123/69 - 161/93)  RR: 16 (07-09-21 @ 11:38) (16 - 19)  SpO2: 100% (07-09-21 @ 11:38) (97% - 100%)  Wt(kg): --    I&O's Summary    08 Jul 2021 07:01  -  09 Jul 2021 07:00  --------------------------------------------------------  IN: 838 mL / OUT: 0 mL / NET: 838 mL    09 Jul 2021 07:01  -  09 Jul 2021 11:54  --------------------------------------------------------  IN: 240 mL / OUT: 0 mL / NET: 240 mL        General: Well nourished in no acute distress. Alert and Oriented * 3.   Head: Normocephalic and atraumatic.   Neck: No JVD. No bruits. Supple. Does not appear to be enlarged.   Cardiovascular: + S1,S2 ; RRR Soft systolic murmur at the left lower sternal border. No rubs noted.    Lungs: CTA b/l. No rhonchi, rales or wheezes.   Abdomen: + BS, soft. Non tender. Non distended. No rebound. No guarding.   Extremities: No clubbing/cyanosis/edema.   Neurologic: Moves all four extremities. Full range of motion.   Skin: Warm and moist. The patient's skin has normal elasticity and good skin turgor.   Psychiatric: Appropriate mood and affect.  Musculoskeletal: Normal range of motion, normal strength        TELEMETRY: SR 80's    < from: Transthoracic Echocardiogram (07.04.21 @ 09:23) >  CONCLUSIONS:  1. Normal mitral valve. Mild mitral regurgitation.  2. Mildly dilated left atrium.  3. Normal left ventricular internal dimensions and wall  thicknesses.  4. Normal left ventricular systolic function. No segmental  wall motion abnormalities.  5. The right ventricle is not well visualized; grossly  normal right ventricular systolic function.  6. Small pericardial effusion.  7. Left pleural effusion.  *** No previous Echo exam.    < end of copied text >      ASSESSMENT/PLAN: 61M with PMH DM2, HTN, HLD, diabetic neuropathy, glaucoma/cataract with poor vision, HCV s/p [place] (cleared per patient) who is being seen for dyspnea.    -pt. with pulmonary edema on CXR/CT and LE edema  -volume status improved - transitioned to PO lasix  -cont BB/ Norvasc , BP stable   -TTE noted with normal LV/RV function, small pericardial effusion  - ABx per ID  --NST with no ischemia or infarct  --No further inpatient cardiac workup needed at this time.     Truman Ramos MD

## 2021-07-09 NOTE — PROGRESS NOTE ADULT - PROBLEM SELECTOR PROBLEM 3
Other hyperlipidemia
Folliculitis
Folliculitis
Other hyperlipidemia
Other hyperlipidemia
Folliculitis
Folliculitis
Other hyperlipidemia
Folliculitis
Other hyperlipidemia
Other hyperlipidemia
Folliculitis
Other hyperlipidemia
Folliculitis
Other hyperlipidemia
Folliculitis
Other hyperlipidemia
Folliculitis

## 2021-07-09 NOTE — PROGRESS NOTE ADULT - SUBJECTIVE AND OBJECTIVE BOX
Chief Complaint: DM 2    History: Patient seen at bedside. Reports he is eating meals, denies n/v, denies s/s of hypoglycemia  Per primary team, plan for discharge today to [facility] - not long term care as previously planned. Patient will be dc to shelter from rehab  Pharmacy liaison met with patient and he required assistance to complete an insulin PEN return demonstration    MEDICATIONS  (STANDING):  albuterol/ipratropium for Nebulization 3 milliLiter(s) Nebulizer every 12 hours  amLODIPine   Tablet 10 milliGRAM(s) Oral daily  atorvastatin 20 milliGRAM(s) Oral at bedtime  chlorhexidine 4% Liquid 1 Application(s) Topical daily  dextrose 40% Gel 15 Gram(s) Oral once  dextrose 50% Injectable 25 Gram(s) IV Push once  dextrose 50% Injectable 12.5 Gram(s) IV Push once  dextrose 50% Injectable 25 Gram(s) IV Push once  ergocalciferol 79196 Unit(s) Oral <User Schedule>  glucagon  Injectable 1 milliGRAM(s) IntraMuscular once  heparin   Injectable 5000 Unit(s) SubCutaneous every 12 hours  insulin glargine Injectable (LANTUS) 17 Unit(s) SubCutaneous at bedtime  insulin lispro (ADMELOG) corrective regimen sliding scale   SubCutaneous three times a day before meals  insulin lispro (ADMELOG) corrective regimen sliding scale   SubCutaneous at bedtime  insulin lispro Injectable (ADMELOG) 6 Unit(s) SubCutaneous three times a day before meals  metoprolol succinate  milliGRAM(s) Oral daily  mupirocin 2% Ointment 1 Application(s) Topical two times a day  ofloxacin 0.3% Solution 10 Drop(s) Left Ear two times a day  tamsulosin 0.4 milliGRAM(s) Oral at bedtime  vancomycin  IVPB 1000 milliGRAM(s) IV Intermittent daily    MEDICATIONS  (PRN):  acetaminophen   Tablet .. 650 milliGRAM(s) Oral every 4 hours PRN Mild Pain (1 - 3)  oxyCODONE    IR 5 milliGRAM(s) Oral every 4 hours PRN Moderate Pain (4 - 6)  oxyCODONE    IR 10 milliGRAM(s) Oral every 4 hours PRN Severe Pain (7 - 10)  sodium chloride 0.9% lock flush 10 milliLiter(s) IV Push every 1 hour PRN Pre/post blood products, medications, blood draw, and to maintain line patency    No Known Allergies    Review of Systems:  HEENT: No pain  Cardiovascular: No chest pain  Respiratory: No SOB  GI: No nausea, vomiting    PHYSICAL EXAM:  VITALS: T(C): 36.7 (07-09-21 @ 11:38)  T(F): 98.1 (07-09-21 @ 11:38), Max: 98.1 (07-09-21 @ 11:38)  HR: 82 (07-09-21 @ 11:38) (81 - 92)  BP: 141/82 (07-09-21 @ 11:38) (141/82 - 161/93)  RR:  (16 - 18)  SpO2:  (97% - 100%)  Wt(kg): --  GENERAL: NAD  EYES: No proptosis, no lid lag, anicteric  HEENT:  Atraumatic, Normocephalic, moist mucous membranes  RESPIRATORY: unlabored respirations     CAPILLARY BLOOD GLUCOSE    POCT Blood Glucose.: 182 mg/dL (09 Jul 2021 12:42)  POCT Blood Glucose.: 129 mg/dL (09 Jul 2021 08:26)  POCT Blood Glucose.: 165 mg/dL (08 Jul 2021 21:41)  POCT Blood Glucose.: 201 mg/dL (08 Jul 2021 18:02)      07-09    141  |  108<H>  |  20  ----------------------------<  170<H>  3.9   |  20<L>  |  1.50<H>    EGFR if : 58<L>  EGFR if non : 50<L>    Ca    8.8      07-09  Mg     1.90     07-09  Phos  4.0     07-09      A1C with Estimated Average Glucose Result: 14.3 % (06-20-21 @ 03:47)  A1C with Estimated Average Glucose Result: 11.8 % (03-22-21 @ 14:20)    Diet, Regular:   Consistent Carbohydrate Evening Snack (CSTCHOSN) (06-20-21 @ 09:02)

## 2021-07-09 NOTE — PROGRESS NOTE ADULT - PROBLEM SELECTOR PROBLEM 5
Vitamin D deficiency
Vitamin D deficiency
ARF (acute renal failure) with tubular necrosis
Vitamin D deficiency
ARF (acute renal failure) with tubular necrosis
Vitamin D deficiency
ARF (acute renal failure) with tubular necrosis
Vitamin D deficiency
ARF (acute renal failure) with tubular necrosis
Vitamin D deficiency
ARF (acute renal failure) with tubular necrosis

## 2021-07-09 NOTE — PROGRESS NOTE ADULT - SUBJECTIVE AND OBJECTIVE BOX
Date of Service: 07-09-21 @ 13:52    Patient is a 61y old  Male who presents with a chief complaint of otitis externa, uncontrolled DM (09 Jul 2021 13:39)      Any change in ROS: Doing pretty good: no sob:       MEDICATIONS  (STANDING):  albuterol/ipratropium for Nebulization 3 milliLiter(s) Nebulizer every 12 hours  amLODIPine   Tablet 10 milliGRAM(s) Oral daily  atorvastatin 20 milliGRAM(s) Oral at bedtime  chlorhexidine 4% Liquid 1 Application(s) Topical daily  dextrose 40% Gel 15 Gram(s) Oral once  dextrose 50% Injectable 25 Gram(s) IV Push once  dextrose 50% Injectable 12.5 Gram(s) IV Push once  dextrose 50% Injectable 25 Gram(s) IV Push once  ergocalciferol 98592 Unit(s) Oral <User Schedule>  glucagon  Injectable 1 milliGRAM(s) IntraMuscular once  heparin   Injectable 5000 Unit(s) SubCutaneous every 12 hours  insulin glargine Injectable (LANTUS) 17 Unit(s) SubCutaneous at bedtime  insulin lispro (ADMELOG) corrective regimen sliding scale   SubCutaneous three times a day before meals  insulin lispro (ADMELOG) corrective regimen sliding scale   SubCutaneous at bedtime  insulin lispro Injectable (ADMELOG) 6 Unit(s) SubCutaneous three times a day before meals  metoprolol succinate  milliGRAM(s) Oral daily  mupirocin 2% Ointment 1 Application(s) Topical two times a day  ofloxacin 0.3% Solution 10 Drop(s) Left Ear two times a day  tamsulosin 0.4 milliGRAM(s) Oral at bedtime  vancomycin  IVPB 1000 milliGRAM(s) IV Intermittent daily    MEDICATIONS  (PRN):  acetaminophen   Tablet .. 650 milliGRAM(s) Oral every 4 hours PRN Mild Pain (1 - 3)  oxyCODONE    IR 5 milliGRAM(s) Oral every 4 hours PRN Moderate Pain (4 - 6)  oxyCODONE    IR 10 milliGRAM(s) Oral every 4 hours PRN Severe Pain (7 - 10)  sodium chloride 0.9% lock flush 10 milliLiter(s) IV Push every 1 hour PRN Pre/post blood products, medications, blood draw, and to maintain line patency    Vital Signs Last 24 Hrs  T(C): 36.7 (09 Jul 2021 11:38), Max: 36.7 (09 Jul 2021 11:38)  T(F): 98.1 (09 Jul 2021 11:38), Max: 98.1 (09 Jul 2021 11:38)  HR: 82 (09 Jul 2021 11:38) (81 - 92)  BP: 141/82 (09 Jul 2021 11:38) (141/82 - 161/93)  BP(mean): --  RR: 16 (09 Jul 2021 11:38) (16 - 18)  SpO2: 100% (09 Jul 2021 11:38) (97% - 100%)    I&O's Summary    08 Jul 2021 07:01  -  09 Jul 2021 07:00  --------------------------------------------------------  IN: 838 mL / OUT: 0 mL / NET: 838 mL    09 Jul 2021 07:01  -  09 Jul 2021 13:52  --------------------------------------------------------  IN: 240 mL / OUT: 0 mL / NET: 240 mL          Physical Exam:   GENERAL: NAD, well-groomed, well-developed  HEENT: [place]/   Atraumatic, Normocephalic  ENMT: No tonsillar erythema, exudates, or enlargement; Moist mucous membranes, Good dentition, No lesions  NECK: Supple, No JVD, Normal thyroid  CHEST/LUNG: Clear to auscultaion  CVS: Regular rate and rhythm; No murmurs, rubs, or gallops  GI: : Soft, Nontender, Nondistended; Bowel sounds present  NERVOUS SYSTEM:  Alert & Oriented X3  EXTREMITIES:  -edema  LYMPH: No lymphadenopathy noted  SKIN: No rashes or lesions  ENDOCRINOLOGY: No Thyromegaly  PSYCH: Appropriate    Labs:                              10.8   6.43  )-----------( 189      ( 09 Jul 2021 06:52 )             32.5                         9.7    6.35  )-----------( 191      ( 08 Jul 2021 07:02 )             29.2                         11.1   7.16  )-----------( 207      ( 07 Jul 2021 06:44 )             33.2                         11.4   6.69  )-----------( 228      ( 06 Jul 2021 07:40 )             34.2     07-09    141  |  108<H>  |  20  ----------------------------<  170<H>  3.9   |  20<L>  |  1.50<H>  07-08    143  |  111<H>  |  20  ----------------------------<  119<H>  4.0   |  22  |  1.44<H>  07-07    146<H>  |  111<H>  |  25<H>  ----------------------------<  61<L>  3.7   |  22  |  1.62<H>  07-06    140  |  103  |  23  ----------------------------<  221<H>  3.3<L>   |  22  |  1.47<H>    Ca    8.8      09 Jul 2021 06:52  Ca    8.4      08 Jul 2021 07:02  Phos  4.0     07-09  Phos  3.5     07-08  Mg     1.90     07-09  Mg     1.70     07-08    TPro  6.9  /  Alb  3.5  /  TBili  0.2  /  DBili  x   /  AST  17  /  ALT  24  /  AlkPhos  60  07-06    CAPILLARY BLOOD GLUCOSE      POCT Blood Glucose.: 182 mg/dL (09 Jul 2021 12:42)  POCT Blood Glucose.: 129 mg/dL (09 Jul 2021 08:26)  POCT Blood Glucose.: 165 mg/dL (08 Jul 2021 21:41)  POCT Blood Glucose.: 201 mg/dL (08 Jul 2021 18:02)          R< from: US Duplex Venous Lower Ext Complete, Bilateral (07.04.21 @ 14:34) >  COMPARISON: None available.    TECHNIQUE: Duplex sonography of the BILATERAL LOWER extremity veins with color and spectral Doppler, with and without compression.    FINDINGS:    RIGHT:  Normal compressibility of the RIGHT common femoral, femoral and popliteal veins.  Doppler examination shows normal spontaneous and phasic flow.  No RIGHT calf vein thrombosis is detected.    LEFT:  Normal compressibility of the LEFT common femoral, femoral and popliteal veins.  Doppler examination shows normal spontaneous and phasic flow.  No LEFT calf vein thrombosis is detected.    IMPRESSION:  No evidence of deep venousthrombosis in either lower extremity.                  SUJATA ISLAS MD; Attending Radiologist  This document has been electronically signed. Jul 4 2021  2:54PM    < end of copied text >  < from: CT Chest No Cont (07.02.21 @ 19:02) >  INTERPRETATION:  CLINICAL INFORMATION: Shortness of breath. Uncontrolled diabetes with neuropathy.    COMPARISON: Chest radiograph 7/2/2021.    CONTRAST/COMPLICATIONS:  IV Contrast: None  Oral Contrast: None.  Complications: None.    PROCEDURE:  CT of the Chest was performed.  Sagittal and coronal reformats were performed.    FINDINGS: The quality of the images are degraded by motion.  LINES/TUBES: Left PICC with tip in the SVC.  LUNGS/AIRWAYS/PLEURA: Patent central airways. Bilateral small pleural effusions with atelectasis of basilar lower lobes. Mild interlobular septal thickening likely representing interstitial edema.  MEDIASTINUM AND DELROY: No lymphadenopathy.  VESSELS: Coronary calcifications.  HEART: Cardiomegaly. pericardial effusion.  CHEST WALL AND LOWER NECK: Within normal limits.  VISUALIZED UPPER ABDOMEN: Within normal limits.  BONES: Within normal limits.    IMPRESSION:  Small pleural effusions and atelectasis of basilar lower lobes. Mild interlobular septal thickening representing interstitial edema.            SARIAH NASH MD; Resident Radiology  This document has been electronically signed.  NINOSKA GIVENS MD; Attending Radiologist  This document has been electronically signed. Jul  3 2021 12:52PM    < end of copied text >          RECENT CULTURES:        RESPIRATORY CULTURES:          Studies  Chest X-RAY  CT SCAN Chest   Venous Dopplers: LE:   CT Abdomen  Others

## 2021-07-09 NOTE — DISCHARGE NOTE NURSING/CASE MANAGEMENT/SOCIAL WORK - NSDCCRNAME_GEN_ALL_CORE_FT
1 - [facility], [id number] [place] [address]  2 - ambulette arranged by [facility] Care Coordination Unit

## 2021-07-09 NOTE — PROGRESS NOTE ADULT - REASON FOR ADMISSION
otitis externa, uncontrolled DM

## 2021-07-09 NOTE — PROGRESS NOTE ADULT - NSICDXPILOT_GEN_ALL_CORE
[place]

## 2021-07-09 NOTE — PROGRESS NOTE ADULT - TIME-BASED BILLING (NON-CRITICAL CARE)
Time-based billing (NON-critical care)

## 2021-07-09 NOTE — PROGRESS NOTE ADULT - PROBLEM SELECTOR PROBLEM 1
Acute malignant otitis externa of left ear
Uncontrolled type 2 diabetes mellitus with hyperglycemia
Acute malignant otitis externa of left ear
Uncontrolled type 2 diabetes mellitus with hyperglycemia
Acute malignant otitis externa of left ear
Uncontrolled type 2 diabetes mellitus with hyperglycemia
Uncontrolled type 2 diabetes mellitus with hyperglycemia
Acute malignant otitis externa of left ear
Uncontrolled type 2 diabetes mellitus with hyperglycemia
Acute malignant otitis externa of left ear

## 2021-07-09 NOTE — PROGRESS NOTE ADULT - PROBLEM SELECTOR PROBLEM 2
Essential hypertension
Essential hypertension
Uncontrolled type 2 diabetes mellitus with hyperglycemia
Essential hypertension
Uncontrolled type 2 diabetes mellitus with hyperglycemia
Uncontrolled type 2 diabetes mellitus with hyperglycemia
Essential hypertension
Uncontrolled type 2 diabetes mellitus with hyperglycemia
Essential hypertension
Essential hypertension
Uncontrolled type 2 diabetes mellitus with hyperglycemia
Essential hypertension
Uncontrolled type 2 diabetes mellitus with hyperglycemia
Essential hypertension
Uncontrolled type 2 diabetes mellitus with hyperglycemia
Essential hypertension
Uncontrolled type 2 diabetes mellitus with hyperglycemia

## 2021-07-09 NOTE — DISCHARGE NOTE NURSING/CASE MANAGEMENT/SOCIAL WORK - NSDCVIVACCINE_GEN_ALL_CORE_FT
COVID-19 vaccine, vector-nr, [id number], PF, 0.5 mL ([facility]); 24-Mar-2021 17:36; Daiana Diallo (CASTRO); [facility]; [id number] (Exp. Date: 25-May-2021); IntraMuscular; Deltoid Right.; 0.5 milliLiter(s);

## 2021-07-09 NOTE — PROGRESS NOTE ADULT - ASSESSMENT
61M with PMH uncontrolled DM, with neuropathy, cataract, glaucoma presented with malignant otitis externa and folliculitis.     Malignant otitis media:  SOB:  Folliculitis  Uncontrolled DM:  HTN      7/2:    Malignant otitis media: cont antibiotics: he has no ear pain now:    SOB: he is not that SOB at this time: his lungs are clear: chest xray suggests mild fluid overload: ct scan chest is pending: do doppler bilateral LE and 2 decho :  Folliculitis: On antibiotics  Uncontrolled DM: defer to p [facility] team   HTN: mildly increased:   start dvt prophylaxis  DW ACP and PMD     7/3:    Malignant otitis media: cont antibiotics: he has no ear pain now:    SOB: he is not that SOB at this time: his lungs are clear: ct scan chest is suggestive of pulm edema: with bl effusions and PVC's: to cont lasix   Folliculitis: On antibiotics  Uncontrolled DM: defer to p [facility] team   HTN: mildly increased:   start dvt prophylaxis  DW ACP     7/4:    Malignant otitis media: cont antibiotics: he has no ear pain now:    SOB: he is not that SOB at this time: his lungs are clear: ct scan chest is suggestive of pulm edema: with bl effusions and PVC's: to cont lasix - he feels much better: echo noted:  Folliculitis: On antibiotics  Uncontrolled DM: defer to primary team   HTN: mildly increased:   start dvt prophylaxis  DW ACP     7/5:    Malignant otitis media: cont antibiotics: he has no ear pain now:    SOB: he is not that SOB at this time: his lungs are clear: ct scan chest is suggestive of pulm edema: with bl effusions and PVC's: to cont lasix - he feels much better: echo noted:  now for dc to placement   Folliculitis: On antibiotics  Uncontrolled DM: defer to primary team   HTN: mildly increased:   start dvt prophylaxis  DW ACP     7/6:    Malignant otitis media: cont antibiotics: he has no ear pain now:  ent following : cont antibiotics per ID duration:   SOB: he is not that SOB at this time: his lungs are clear: ct scan chest is suggestive of pulm edema: with bl effusions and PVC's: to cont lasix - he feels much better: echo noted:  now for dc to placement : switched to po lasix   Folliculitis: On antibiotics: per ID  Uncontrolled DM: defer to primary team   HTN: mildly increased:   start dvt prophylaxis  DW ACP : he is on rom air at this time      7/7:    Malignant otitis media: cont antibiotics: he has no ear pain now:  ent following : cont antibiotics per ID duration? on vanco till  SOB: he is not that SOB at this time: his lungs are clear: ct scan chest is suggestive of pulm edema: with bl effusions and PVC's: to cont lasix - he feels much better: echo noted:  now for dc to placement : switched to po lasix -- uch better now; dcplanning  Folliculitis: On antibiotics: per ID: mrsa:per derm  Uncontrolled DM: defer to primary team : relatively better controlled:   HTN: mildly increased: controlled  start dvt prophylaxis  DW ACP : he is on rom air at this time    7/8:    Malignant otitis media: cont antibiotics: he has no ear pain now:  ent following : cont antibiotics per ID duration :planning for long term antibtioc  SOB: he is not that SOB at this time: his lungs are clear: ct scan chest is suggestive of pulm edema: with bl effusions and PVC's: to cont lasix - he feels much better: echo noted:  now for dc to placement : switched to po lasix -- much better now  Folliculitis: On antibiotics: per ID: mrsa: per derm and ID   Uncontrolled DM: defer to primary team : relatively better controlled:   HTN: controlled  start dvt prophylaxis  dw team : he is on rom air at this time    7/9:      Malignant otitis media: NO ENT COMPLAINTS NOW: NO EAR PAIN: cont antibiotics: he has no ear pain now:  ent following : cont antibiotics per ID duration :planning for long term antibiotics  SOB: RESOLVED ON ROOM AIR: AND ON DIURETCS  Folliculitis: On antibiotics: per ID: mrsa: per derm and ID   Uncontrolled DM: defer to primary team : relatively better controlled:   HTN: controlled  start dvt prophylaxis  dw team : he is on rom air at this time:  FOR DCPLANNING

## 2021-07-09 NOTE — PHARMACOTHERAPY INTERVENTION NOTE - COMMENTS
Attempted to reinforce insulin pen and glucometer teaching with patient. Due to vision loss, it is really difficult for the patient to do on his own without assistance. He was able to count the clicks successfully to the appropriate dose and inject in the practice pas, however he struggled to open the tab for the pen needle and removing the needle from the pen. With assistance he was able to do the return demonstration. We also practiced on demo glucometer (unclear which brand his insurance will cover) - he was unable to do it on his own. He struggled to open the lancing device - once it was opened for him, he was able to place the lancet. Additionally, when inserting the test strip, he cannot see which side is supposed to go into the machine. He will need assistance in order to properly manage his diabetes. Plan per the primary team is for him to go to rehab and then rehab will assess for shelter - reiterated with the patient multiple times if he is discharged to a shelter, he will need to get prescriptions for his medications and be set up with a local physician for follow-up. Concened for how he will be able to manage his diabetes due to his vision loss/need for assistance. Also discussed endocrine's recommendation for basal insulin only plus prandin before meals. pt instructed on causes of, s/s of and treatment of hypoglycemia, pt verbalized good understanding. Attempted to reinforce insulin pen and glucometer teaching with patient. Due to vision loss, it is really difficult for the patient to do on his own without assistance. He was able to count the clicks successfully to the appropriate dose and inject in the practice pad, however he struggled to open the tab for the pen needle and removing the needle from the pen. With assistance he was able to do the return demonstration. We also practiced on demo glucometer (unclear which brand his insurance will cover) - he was unable to do it on his own. He struggled to open the lancing device - once it was opened for him, he was able to place the lancet. Additionally, when inserting the test strip, he cannot see which side is supposed to go into the machine. He will need assistance in order to properly manage his diabetes. Plan per the primary team is for him to go to rehab and then rehab will assess for shelter - reiterated with the patient multiple times if he is discharged to a shelter, he will need to get prescriptions for his medications and be set up with a local physician for follow-up. Concerned for how he will be able to manage his diabetes due to his vision loss/need for assistance. Also discussed endocrine's recommendation for basal insulin only plus prandin before meals. pt instructed on causes of, s/s of and treatment of hypoglycemia, pt verbalized good understanding.

## 2021-07-09 NOTE — PROGRESS NOTE ADULT - PROVIDER SPECIALTY LIST ADULT
Cardiology
ENT
Nephrology
Cardiology
ENT
Internal Medicine
Nephrology
Pulmonology
Pulmonology
Cardiology
Cardiology
ENT
ENT
Infectious Disease
Internal Medicine
Nephrology
Nephrology
Pulmonology
Pulmonology
Cardiology
ENT
Infectious Disease
Nephrology
Endocrinology
Infectious Disease
Infectious Disease
Nephrology
Nephrology
Pulmonology
Endocrinology
Infectious Disease
Nephrology
Endocrinology
Internal Medicine
Nephrology
Endocrinology
Endocrinology
Internal Medicine
Endocrinology
Endocrinology
Internal Medicine
Internal Medicine
Endocrinology
Endocrinology
Internal Medicine
Internal Medicine

## 2021-07-09 NOTE — PROGRESS NOTE ADULT - PROBLEM SELECTOR PROBLEM 4
Adrenal nodule
Adrenal nodule
Essential hypertension
Adrenal nodule
Essential hypertension
Adrenal nodule
Essential hypertension
Adrenal nodule
Essential hypertension
Adrenal nodule
Essential hypertension

## 2021-08-19 NOTE — CHART NOTE - NSCHARTNOTESELECT_GEN_ALL_CORE
ACP-NP Note/Event Note
ACP-NP note/Event Note
Monitor for increased risk corneal edema.
ACP/Event Note
Endocrine/Event Note
Endocrinology
Event Note
Nutrition Follow Up/Nutrition Services

## 2021-10-05 ENCOUNTER — APPOINTMENT (OUTPATIENT)
Dept: ENDOCRINOLOGY | Facility: CLINIC | Age: 61
End: 2021-10-05

## 2022-12-12 NOTE — PROVIDER CONTACT NOTE (OTHER) - SITUATION
Patient's /88
Patient c/o wheezing, SOB, also verbalized " I feel like there is something stuck in my throat" Patient reports duoneb tx did not help.
none

## 2023-10-18 NOTE — PROGRESS NOTE ADULT - ASSESSMENT
Continued Stay SW/CM Assessment/Plan of Care Note       Active Substitute Decision Maker (SDM)     SYLVIA MABRY Health Care Agent 1 - Son   Primary Phone: [phone] (Mobile)                   Progress note:  (SW coverage)  SW following for discharge planning. Reviewed medical record.  Pt had HD today.  He is on a fiber-restricted diet, and has a sosa and ileostomy.  Per PT note yesterday, pt requiring mod assist for bed mobility and modified independent/mod assist for transfers.    Spoke with RN, who will alert SW if pt's son/activated POA-HC Sylvia arrives, to discuss discharge plans.  Anticipate need for SNF placement with on-site HD vs. transportation to new outpatient HD clinic.  SW to follow.    See SW/CM flowsheets for other objective data.    Disposition Recommendations:  Preliminary discharge destination: Planned Discharge Destination: Home/apartment with Services/Support  SW/CM recommendation for discharge: Home care, Home therapy    Discharge Plan/Needs:     Continued Care and Services - Admitted Since 9/24/2023    Coordination has not been started for this encounter.     Continued Care and Services - Linked Episodes Includes continued care and service providers from the active episodes linked to this encounter, which are listed below    Care Transitions Episode start date: 9/25/2023   There are no active outsourced providers for this episode.                 Prior To Hospitalization:    Living Situation: Adult children, Family members and residing at House    .  Support Systems: Family members   Home Devices/Equipment: Commode, Hospital bed, Peritoneal Dialysis equipment, Mobility assist device, Blood glucose monitor, Shower chair            Mobility Assist Devices: Wheelchair   Type of Service Prior to Hospitalization: Social work, Nurse (specify) (For PD dialysis)               Patient/Family discharge goal (s):  Home therapy, Home Care     Resources provided:           Therapy  Recommendations for Discharge:   PT:      Last Filed Values       Value Time User    PT Discharge Needs  therapy 5 or more times per week 10/17/2023 11:20 AM Lorene Hood, PTA        OT:       Last Filed Values     None        SLP:    Last Filed Values       Value Time User    SLP Discharge Needs  therapy 1-3 times per week 10/13/2023  4:11 PM Ericka Hawley, SLP          Mobility Equipment Recommended for Discharge: has w/c      Barriers to Discharge  Identified Barriers to Discharge/Transition Planning: Medical necessity for acute care                 61M with PMH uncontrolled DM, with neuropathy, cataract, glaucoma presented with malignant otitis externa and folliculitis.